# Patient Record
Sex: MALE | Race: WHITE | NOT HISPANIC OR LATINO | Employment: OTHER | URBAN - METROPOLITAN AREA
[De-identification: names, ages, dates, MRNs, and addresses within clinical notes are randomized per-mention and may not be internally consistent; named-entity substitution may affect disease eponyms.]

---

## 2017-01-09 ENCOUNTER — TELEPHONE (OUTPATIENT)
Dept: CARDIOLOGY | Facility: CLINIC | Age: 67
End: 2017-01-09

## 2017-01-09 NOTE — TELEPHONE ENCOUNTER
Called and spoke with wife regarding who is following his defibrillator.  His wife said Dr Powell is following his device.  He missed his last appt.  We are receiving remote interrogations on him through [facility].  Will have  get pt an appt in [place].

## 2017-01-11 ENCOUNTER — TELEPHONE (OUTPATIENT)
Dept: CARDIOLOGY | Facility: CLINIC | Age: 67
End: 2017-01-11

## 2017-01-11 NOTE — TELEPHONE ENCOUNTER
Pt wife called Amanda stated that Derek had been in hospital for ICD shock and was told to call us for follow up appt. Pt will  send download to Fabiola in device clinic and she has been notified as well as Taryn Hall to schedule follow up.

## 2017-01-12 ENCOUNTER — TRANSCRIBE ORDERS (OUTPATIENT)
Dept: ADMINISTRATIVE | Facility: HOSPITAL | Age: 67
End: 2017-01-12

## 2017-01-12 ENCOUNTER — HOSPITAL ENCOUNTER (OUTPATIENT)
Dept: GENERAL RADIOLOGY | Facility: HOSPITAL | Age: 67
Discharge: HOME | End: 2017-01-12
Admitting: FAMILY MEDICINE

## 2017-01-12 DIAGNOSIS — J40 BRONCHITIS: ICD-10-CM

## 2017-01-12 DIAGNOSIS — J44.9 CHRONIC OBSTRUCTIVE PULMONARY DISEASE, UNSPECIFIED COPD TYPE (HCC): Primary | ICD-10-CM

## 2017-01-12 DIAGNOSIS — J44.9 CHRONIC OBSTRUCTIVE PULMONARY DISEASE, UNSPECIFIED COPD TYPE (HCC): ICD-10-CM

## 2017-01-12 PROCEDURE — 71020 HC CHEST PA AND LATERAL: CPT

## 2017-01-12 PROCEDURE — 71020 XR CHEST PA AND LATERAL: CPT | Performed by: RADIOLOGY

## 2017-01-17 ENCOUNTER — DOCUMENTATION (OUTPATIENT)
Dept: CARDIOLOGY | Facility: CLINIC | Age: 67
End: 2017-01-17

## 2017-01-17 ENCOUNTER — OFFICE VISIT (OUTPATIENT)
Dept: CARDIOLOGY | Facility: CLINIC | Age: 67
End: 2017-01-17

## 2017-01-17 VITALS
HEIGHT: 68 IN | BODY MASS INDEX: 27.01 KG/M2 | WEIGHT: 178.2 LBS | DIASTOLIC BLOOD PRESSURE: 62 MMHG | HEART RATE: 93 BPM | SYSTOLIC BLOOD PRESSURE: 124 MMHG

## 2017-01-17 DIAGNOSIS — I47.20 VT (VENTRICULAR TACHYCARDIA) (HCC): Primary | ICD-10-CM

## 2017-01-17 DIAGNOSIS — Z72.0 TOBACCO ABUSE: ICD-10-CM

## 2017-01-17 DIAGNOSIS — I25.9 ISCHEMIC HEART DISEASE: ICD-10-CM

## 2017-01-17 DIAGNOSIS — I42.9 CARDIOMYOPATHY (HCC): ICD-10-CM

## 2017-01-17 DIAGNOSIS — I50.22 CHRONIC SYSTOLIC HEART FAILURE (HCC): ICD-10-CM

## 2017-01-17 DIAGNOSIS — I25.5 CARDIOMYOPATHY, ISCHEMIC: Primary | ICD-10-CM

## 2017-01-17 DIAGNOSIS — Z95.810 ICD (IMPLANTABLE CARDIOVERTER-DEFIBRILLATOR) IN PLACE: ICD-10-CM

## 2017-01-17 PROBLEM — I25.118 CORONARY ARTERY DISEASE OF NATIVE ARTERY WITH STABLE ANGINA PECTORIS (HCC): Status: ACTIVE | Noted: 2017-01-17

## 2017-01-17 PROCEDURE — 99214 OFFICE O/P EST MOD 30 MIN: CPT | Performed by: INTERNAL MEDICINE

## 2017-01-17 PROCEDURE — 93289 INTERROG DEVICE EVAL HEART: CPT | Performed by: INTERNAL MEDICINE

## 2017-01-17 RX ORDER — CARVEDILOL 12.5 MG/1
12.5 TABLET ORAL 2 TIMES DAILY WITH MEALS
Status: ON HOLD | COMMUNITY
Start: 2017-01-17 | End: 2018-07-07

## 2017-01-17 RX ORDER — TIZANIDINE 4 MG/1
4 TABLET ORAL NIGHTLY
Refills: 4 | COMMUNITY
Start: 2016-12-21 | End: 2018-09-20

## 2017-01-17 NOTE — PROGRESS NOTES
Chief Complaint(s): VT    History of Present Illness:    The acute complicated chief complaint(s) first occurred in last month,  is paroxysmal in nature, moderate in severity, random in occurrence, happening twice times since last visit, lasting seconds at a time, and has been medicated with nothing new, and manifest as a feeling of dizziness followed by shock. It is worsened with exertion and is relieved with rest.  He had evaluation at [facility] with stress test by his report with EF 12% (much worse than had previously been). It is also occurring in the setting of concomitant chronic ICM, chronic tobacco abuse and likely COPD, and chronic systolic CHF. He is more short of breath with chest tightness with minimal exertion.      Patient Active Problem List   Diagnosis   • Ischemic heart disease   • Hypertension   • Chronic systolic heart failure   • VT (ventricular tachycardia)   • Hyperlipidemia   • Type 2 diabetes mellitus   • Tobacco abuse   • Coronary artery disease of native artery with stable angina pectoris   • Cardiomyopathy          Current Outpatient Prescriptions:   •  carvedilol (COREG) 25 MG tablet, Take 25 mg by mouth 2 (two) times a day with meals., Disp: , Rfl:   •  cefadroxil (DURICEF) 500 MG capsule, Take 500 mg by mouth 2 (two) times a day., Disp: , Rfl:   •  doxazosin (CARDURA) 2 MG tablet, Take 2 mg by mouth every night., Disp: , Rfl:   •  fluticasone (FLONASE) 50 MCG/ACT nasal spray, 2 sprays into each nostril As Needed. Administer 2 sprays in each nostril for each dose. , Disp: , Rfl:   •  glipiZIDE (GLUCOTROL) 5 MG tablet, Take 5 mg by mouth Daily., Disp: , Rfl:   •  lisinopril (PRINIVIL,ZESTRIL) 40 MG tablet, Take 40 mg by mouth 2 (Two) Times a Day., Disp: , Rfl:   •  rOPINIRole (REQUIP) 2 MG tablet, Take 2 mg by mouth every night., Disp: , Rfl:   •  spironolactone (ALDACTONE) 25 MG tablet, Take 25 mg by mouth daily., Disp: , Rfl:   •  tiZANidine (ZANAFLEX) 4 MG tablet, 2 (Two) Times a  Day., Disp: , Rfl: 4  Allergies   Allergen Reactions   • Crestor [Rosuvastatin Calcium]    • Lipitor [Atorvastatin]      Joint pain myalgias   • Plavix [Clopidogrel Bisulfate]       resistant.        PFSH:    Denies any alcohol, caffeine or tobacco abuse      ROS:    Cardiov: Denies chest pain, tightness, palpitations, LIMON, PND, or edema  Respiratory: Denies dyspnea, cough, hemoptysis, pleuritic chest pain, wheezing or sputum production    Vitals:    01/17/17 1133   BP: 124/62   Pulse: 93        Physical Exam   Pulmonary/Chest:            Lungs: CTA, no wheezing, equal air entry bilaterally, resonant to percussion  Cor: RRR, physiologic S1, S2, no rubs, gallops, murmurs, snaps, clicks, rubs or thrills, PMI non-displaced  Ext: warm, negative edema, all pulses normal     Diagnosis Plan   1. VT (ventricular tachycardia)  Has symptoms consistent with possible ischemia and in setting of worsened chronic ICM. He needs a left heart cath. As well will switch from coreg to sotalol. He may also need referral for LVAD   2. ICD (implantable cardioverter-defibrillator) in place normal function    3. Ischemic heart disease     4. Cardiomyopathy EF now 12%    5. Tobacco abuse  Encouraged cessation        Device Check (ICD)    Company SJM  Mode DDDR  Lower Rate 70 bpm  Upper rate 120 bpm         Thresholds    % pacing 81  Atrial Pacing 0.875 Volts @ 0.5 ms  Atrial Sensing 3.7 mV  Atrial Impedence 450 Ohms    % pacing <1  Right Ventricular Pacing 0.75 Volts @ 0.5 ms  Right Ventricular Sensing >12 mV  Right Ventricular Impedence 510 Ohms             Tachy Rx    VT1 -- - bpm  VT2 - - - bpm  VF > 222 bpm    Charge Time 83% sec  Shock Impedence 61 Ohms    Battery Voltage 83% Volts  Longevity 6.9Y    Episodes 2 VF    Reprogramming 0    Comments NORMAL FUNCTION

## 2017-01-17 NOTE — MR AVS SNAPSHOT
Derek Richardenrique Sanchez   1/17/2017 11:30 AM   Office Visit    Dept Phone:  [phone]   Encounter #:  [id number]    Provider:  Trav Powell MD   Department:  [facility] CARDIOLOGY                Your Full Care Plan              Today's Medication Changes          These changes are accurate as of: 1/17/17 12:29 PM.  If you have any questions, ask your nurse or doctor.               New Medication(s)Ordered:     Sotalol HCl AF 80 MG tablet   Take 1 tablet by mouth 2 (Two) Times a Day.   Started by:  Trav Powell MD         Medication(s)that have changed:     carvedilol 12.5 MG tablet   Commonly known as:  COREG   Take 1 tablet by mouth 2 (Two) Times a Day With Meals.   What changed:  medication strength   Changed by:  Trav Powell MD         Stop taking medication(s)listed here:     predniSONE 20 MG tablet   Commonly known as:  DELTASONE   Stopped by:  Trav Powell MD                Where to Get Your Medications      These medications were sent to [facility], [place] - y [address] - [phone] [facility] [phone]   [address], [facility] [address]     Phone:  [phone]     Sotalol HCl AF 80 MG tablet                  Your Updated Medication List          This list is accurate as of: 1/17/17 12:29 PM.  Always use your most recent med list.                carvedilol 12.5 MG tablet   Commonly known as:  COREG       cefadroxil 500 MG capsule   Commonly known as:  DURICEF       doxazosin 2 MG tablet   Commonly known as:  CARDURA       fluticasone 50 MCG/ACT nasal spray   Commonly known as:  FLONASE       glipiZIDE 5 MG tablet   Commonly known as:  GLUCOTROL       lisinopril 40 MG tablet   Commonly known as:  PRINIVIL,ZESTRIL       rOPINIRole 2 MG tablet   Commonly known as:  REQUIP       Sotalol HCl AF 80 MG tablet   Take 1 tablet by mouth 2 (Two) Times a Day.       spironolactone 25 MG tablet      Commonly known as:  ALDACTONE       tiZANidine 4 MG tablet   Commonly known as:  ZANAFLEX               You Were Diagnosed With        Codes Comments    VT (ventricular tachycardia)    -  Primary ICD-10-CM: I47.2  ICD-9-CM: 427.1     ICD (implantable cardioverter-defibrillator) in place     ICD-10-CM: Z95.810  ICD-9-CM: V45.02     Ischemic heart disease     ICD-10-CM: I25.9  ICD-9-CM: 414.9     Cardiomyopathy     ICD-10-CM: I42.9  ICD-9-CM: 425.4     Tobacco abuse     ICD-10-CM: Z72.0  ICD-9-CM: 305.1     Chronic systolic heart failure     ICD-10-CM: I50.22  ICD-9-CM: 428.22       Instructions     None    Patient Instructions History      Upcoming Appointments     Visit Type Date Time Department    FO[facility] UP 2017 11:30 AM [facility]    FOLLOW UP 2017  1:30 PM [facility] [place]      [employer] Signup     [facility] [employer] allows you to send messages to your doctor, view your test results, renew your prescriptions, schedule appointments, and more. To sign up, go to [employer] and click on the Sign Up Now link in the New User? box. Enter your [employer] Activation Code exactly as it appears below along with the last four digits of your Social Security Number and your Date of Birth () to complete the sign-up process. If you do not sign up before the expiration date, you must request a new code.    [employer] Activation Code: [email or web address]  Expires: 2017 12:27 PM    If you have questions, you can email [email or web address] or call [phone] to talk to our [employer] staff. Remember, [employer] is NOT to be used for urgent needs. For medical emergencies, dial 911.               Other Info from Your Visit           Your Appointments     Aug 04, 2017  1:30 PM EDT   Follow Up with Trav Powell MD   [facility] [place] [facility] (--)    [address] [place]  [facility] [id number] [phone]           Arrive 15 minutes prior to appointment.     "          Allergies     Crestor [Rosuvastatin Calcium]      Lipitor [Atorvastatin]      Joint pain myalgias    Plavix [Clopidogrel Bisulfate]       resistant.      Vital Signs     Blood Pressure Pulse Height Weight Body Mass Index Smoking Status    124/62 (BP Location: Right arm, Patient Position: Sitting) 93 68\" (172.7 cm) 178 lb 3.2 oz (80.8 kg) 27.1 kg/m2 Current Every Day Smoker      Problems and Diagnoses Noted     Heart muscle disorder    Heart failure    Coronary artery disease of native artery with stable angina pectoris    Ischemic heart disease    Tobacco abuse    VT (ventricular tachycardia)    ICD (implantable cardioverter-defibrillator) in place            "

## 2017-01-17 NOTE — LETTER
January 17, 2017     Lyndsey Ahumada MD  [address]  [facility] [address]  [place] [address]    Patient: Derek Morris Jr.   YOB: 1950   Date of Visit: 1/17/2017       Dear Dr. Brandyn MD:    Thank you for referring Derek Morris to me for evaluation. Below are the relevant portions of my assessment and plan of care.    If you have questions, please do not hesitate to call me. I look forward to following Derek along with you.         Sincerely,        Trav Powell MD        CC: No Recipients  Trav Powell MD  1/17/2017 12:22 PM  Sign at close encounter    Chief Complaint(s): VT    History of Present Illness:    The acute complicated chief complaint(s) first occurred in last month,  is paroxysmal in nature, moderate in severity, random in occurrence, happening twice times since last visit, lasting seconds at a time, and has been medicated with nothing new, and manifest as a feeling of dizziness followed by shock. It is worsened with exertion and is relieved with rest.  He had evaluation at [facility] with stress test by his report with EF 12% (much worse than had previously been). It is also occurring in the setting of concomitant chronic ICM, chronic tobacco abuse and likely COPD, and chronic systolic CHF. He is more short of breath with chest tightness with minimal exertion.      Patient Active Problem List   Diagnosis   • Ischemic heart disease   • Hypertension   • Chronic systolic heart failure   • VT (ventricular tachycardia)   • Hyperlipidemia   • Type 2 diabetes mellitus   • Tobacco abuse   • Coronary artery disease of native artery with stable angina pectoris   • Cardiomyopathy          Current Outpatient Prescriptions:   •  carvedilol (COREG) 25 MG tablet, Take 25 mg by mouth 2 (two) times a day with meals., Disp: , Rfl:   •  cefadroxil (DURICEF) 500 MG capsule, Take 500 mg by mouth 2 (two) times a day., Disp: , Rfl:   •  doxazosin (CARDURA) 2 MG tablet, Take 2 mg by mouth every night.,  Disp: , Rfl:   •  fluticasone (FLONASE) 50 MCG/ACT nasal spray, 2 sprays into each nostril As Needed. Administer 2 sprays in each nostril for each dose. , Disp: , Rfl:   •  glipiZIDE (GLUCOTROL) 5 MG tablet, Take 5 mg by mouth Daily., Disp: , Rfl:   •  lisinopril (PRINIVIL,ZESTRIL) 40 MG tablet, Take 40 mg by mouth 2 (Two) Times a Day., Disp: , Rfl:   •  rOPINIRole (REQUIP) 2 MG tablet, Take 2 mg by mouth every night., Disp: , Rfl:   •  spironolactone (ALDACTONE) 25 MG tablet, Take 25 mg by mouth daily., Disp: , Rfl:   •  tiZANidine (ZANAFLEX) 4 MG tablet, 2 (Two) Times a Day., Disp: , Rfl: 4  Allergies   Allergen Reactions   • Crestor [Rosuvastatin Calcium]    • Lipitor [Atorvastatin]      Joint pain myalgias   • Plavix [Clopidogrel Bisulfate]       resistant.        PFSH:    Denies any alcohol, caffeine or tobacco abuse      ROS:    Cardiov: Denies chest pain, tightness, palpitations, LIMON, PND, or edema  Respiratory: Denies dyspnea, cough, hemoptysis, pleuritic chest pain, wheezing or sputum production    Vitals:    01/17/17 1133   BP: 124/62   Pulse: 93        Physical Exam   Pulmonary/Chest:            Lungs: CTA, no wheezing, equal air entry bilaterally, resonant to percussion  Cor: RRR, physiologic S1, S2, no rubs, gallops, murmurs, snaps, clicks, rubs or thrills, PMI non-displaced  Ext: warm, negative edema, all pulses normal     Diagnosis Plan   1. VT (ventricular tachycardia)  Has symptoms consistent with possible ischemia and in setting of worsened chronic ICM. He needs a left heart cath. As well will switch from coreg to sotalol. He may also need referral for LVAD   2. ICD (implantable cardioverter-defibrillator) in place normal function    3. Ischemic heart disease     4. Cardiomyopathy EF now 12%    5. Tobacco abuse  Encouraged cessation        Device Check (ICD)    Company [employer]  Mode DDDR  Lower Rate 70 bpm  Upper rate 120 bpm         Thresholds    % pacing 81  Atrial Pacing 0.875 Volts @ 0.5 ms  Atrial  Sensing 3.7 mV  Atrial Impedence 450 Ohms    % pacing <1  Right Ventricular Pacing 0.75 Volts @ 0.5 ms  Right Ventricular Sensing >12 mV  Right Ventricular Impedence 510 Ohms             Tachy Rx    VT1 -- - bpm  VT2 - - - bpm  VF > 222 bpm    Charge Time 83% sec  Shock Impedence 61 Ohms    Battery Voltage 83% Volts  Longevity 6.9Y    Episodes 2 VF    Reprogramming 0    Comments NORMAL FUNCTION

## 2017-01-17 NOTE — PROGRESS NOTES
[facility] They are going to see him next week and then refer him to Dr. Miramontes at  for possible LVAD.

## 2017-01-19 ENCOUNTER — TELEPHONE (OUTPATIENT)
Dept: CARDIOLOGY | Facility: CLINIC | Age: 67
End: 2017-01-19

## 2017-01-19 NOTE — TELEPHONE ENCOUNTER
As per my note he needs a cath. If they'd like to be transferred here please find an interventionalist to accept.

## 2017-01-19 NOTE — TELEPHONE ENCOUNTER
Patient's wife called to let you know that he is currently at [facility] with respiratory issues. She said that he is having issues with his CO2 levels. She was concerned about him needing a heart cath.

## 2017-01-19 NOTE — TELEPHONE ENCOUNTER
I called the patient's daughter, Alejandra. She said that her dad was feeling much better today. He is off of the c-pap now and on oxygen. I explained to her that if they want him transferred to [facility] that one of his physicians there will have to call the exchange here and get Dr. Bravo (who is here tomorrow) to accept him. I told her that I was not sure when the heart cath would be done, but hopefully the process could be started tomorrow. She said that she would let the physicians know that she wants him transferred tomorrow.

## 2017-01-25 ENCOUNTER — TELEPHONE (OUTPATIENT)
Dept: CARDIOLOGY | Facility: CLINIC | Age: 67
End: 2017-01-25

## 2017-01-25 ENCOUNTER — PREP FOR SURGERY (OUTPATIENT)
Dept: CARDIOLOGY | Facility: CLINIC | Age: 67
End: 2017-01-25

## 2017-01-25 RX ORDER — ASPIRIN 81 MG/1
325 TABLET ORAL DAILY
Status: CANCELLED | OUTPATIENT
Start: 2017-01-26

## 2017-01-25 RX ORDER — NITROGLYCERIN 0.4 MG/1
0.4 TABLET SUBLINGUAL
Status: CANCELLED | OUTPATIENT
Start: 2017-01-25

## 2017-01-25 RX ORDER — ONDANSETRON 2 MG/ML
4 INJECTION INTRAMUSCULAR; INTRAVENOUS EVERY 6 HOURS PRN
Status: CANCELLED | OUTPATIENT
Start: 2017-01-25

## 2017-01-25 RX ORDER — ACETAMINOPHEN 325 MG/1
650 TABLET ORAL EVERY 4 HOURS PRN
Status: CANCELLED | OUTPATIENT
Start: 2017-01-25

## 2017-01-25 RX ORDER — SODIUM CHLORIDE 0.9 % (FLUSH) 0.9 %
1-10 SYRINGE (ML) INJECTION AS NEEDED
Status: CANCELLED | OUTPATIENT
Start: 2017-01-25

## 2017-01-25 RX ORDER — ASPIRIN 325 MG
325 TABLET ORAL ONCE
Status: CANCELLED | OUTPATIENT
Start: 2017-01-25 | End: 2017-01-25

## 2017-01-25 NOTE — TELEPHONE ENCOUNTER
Returned wife's call regarding patient needing to come this evening for registration or labs. No answer at home number and cell number is not a working number.

## 2017-01-26 ENCOUNTER — HOSPITAL ENCOUNTER (OUTPATIENT)
Facility: HOSPITAL | Age: 67
Setting detail: HOSPITAL OUTPATIENT SURGERY
Discharge: HOME | End: 2017-01-26
Attending: INTERNAL MEDICINE | Admitting: INTERNAL MEDICINE

## 2017-01-26 ENCOUNTER — APPOINTMENT (OUTPATIENT)
Dept: CARDIOLOGY | Facility: HOSPITAL | Age: 67
End: 2017-01-26
Attending: INTERNAL MEDICINE

## 2017-01-26 VITALS
TEMPERATURE: 97.8 F | HEIGHT: 68 IN | DIASTOLIC BLOOD PRESSURE: 77 MMHG | RESPIRATION RATE: 16 BRPM | WEIGHT: 171 LBS | SYSTOLIC BLOOD PRESSURE: 121 MMHG | HEART RATE: 69 BPM | BODY MASS INDEX: 25.91 KG/M2 | OXYGEN SATURATION: 97 %

## 2017-01-26 DIAGNOSIS — I25.5 CARDIOMYOPATHY, ISCHEMIC: ICD-10-CM

## 2017-01-26 LAB
ACT BLD: 219 SECONDS (ref 82–152)
ALBUMIN SERPL-MCNC: 3.8 G/DL (ref 3.2–4.8)
ALBUMIN/GLOB SERPL: 1.6 G/DL (ref 1.5–2.5)
ALP SERPL-CCNC: 72 U/L (ref 25–100)
ALT SERPL W P-5'-P-CCNC: 32 U/L (ref 7–40)
ANION GAP SERPL CALCULATED.3IONS-SCNC: 6 MMOL/L (ref 3–11)
ARTICHOKE IGE QN: 94 MG/DL (ref 0–130)
AST SERPL-CCNC: 20 U/L (ref 0–33)
BH CV ECHO MEAS - AO ROOT AREA (BSA CORRECTED): 1.5
BH CV ECHO MEAS - AO ROOT AREA: 6.6 CM^2
BH CV ECHO MEAS - AO ROOT DIAM: 2.9 CM
BH CV ECHO MEAS - BSA(HAYCOCK): 1.9 M^2
BH CV ECHO MEAS - BSA: 1.9 M^2
BH CV ECHO MEAS - BZI_BMI: 26 KILOGRAMS/M^2
BH CV ECHO MEAS - BZI_METRIC_HEIGHT: 172.7 CM
BH CV ECHO MEAS - BZI_METRIC_WEIGHT: 77.6 KG
BH CV ECHO MEAS - CONTRAST EF (2CH): 26.5 ML/M^2
BH CV ECHO MEAS - CONTRAST EF 4CH: 44.5 ML/M^2
BH CV ECHO MEAS - EDV(CUBED): 58.9 ML
BH CV ECHO MEAS - EDV(MOD-SP2): 68 ML
BH CV ECHO MEAS - EDV(MOD-SP4): 119 ML
BH CV ECHO MEAS - EDV(TEICH): 65.5 ML
BH CV ECHO MEAS - EF(CUBED): 49.4 %
BH CV ECHO MEAS - EF(MOD-SP2): 26.5 %
BH CV ECHO MEAS - EF(MOD-SP4): 37 %
BH CV ECHO MEAS - EF(TEICH): 42.1 %
BH CV ECHO MEAS - ESV(CUBED): 29.8 ML
BH CV ECHO MEAS - ESV(MOD-SP2): 50 ML
BH CV ECHO MEAS - ESV(MOD-SP4): 66 ML
BH CV ECHO MEAS - ESV(TEICH): 37.9 ML
BH CV ECHO MEAS - FS: 20.3 %
BH CV ECHO MEAS - IVS/LVPW: 1.4
BH CV ECHO MEAS - IVSD: 2 CM
BH CV ECHO MEAS - LA DIMENSION: 3.7 CM
BH CV ECHO MEAS - LA/AO: 1.3
BH CV ECHO MEAS - LAT PEAK E' VEL: 10 CM/SEC
BH CV ECHO MEAS - LV DIASTOLIC VOL/BSA (35-75): 62.2 ML/M^2
BH CV ECHO MEAS - LV MASS(C)D: 286 GRAMS
BH CV ECHO MEAS - LV MASS(C)DI: 149.6 GRAMS/M^2
BH CV ECHO MEAS - LV MAX PG: 2.5 MMHG
BH CV ECHO MEAS - LV MEAN PG: 1 MMHG
BH CV ECHO MEAS - LV SYSTOLIC VOL/BSA (12-30): 34.5 ML/M^2
BH CV ECHO MEAS - LV V1 MAX: 79 CM/SEC
BH CV ECHO MEAS - LV V1 MEAN: 45.7 CM/SEC
BH CV ECHO MEAS - LV V1 VTI: 13.8 CM
BH CV ECHO MEAS - LVIDD: 3.9 CM
BH CV ECHO MEAS - LVIDS: 3.1 CM
BH CV ECHO MEAS - LVLD AP2: 8 CM
BH CV ECHO MEAS - LVLD AP4: 7.6 CM
BH CV ECHO MEAS - LVLS AP2: 7.4 CM
BH CV ECHO MEAS - LVLS AP4: 7.7 CM
BH CV ECHO MEAS - LVOT AREA (M): 3.5 CM^2
BH CV ECHO MEAS - LVOT AREA: 3.5 CM^2
BH CV ECHO MEAS - LVOT DIAM: 2.1 CM
BH CV ECHO MEAS - LVPWD: 1.5 CM
BH CV ECHO MEAS - MED PEAK E' VEL: 2.77 CM/SEC
BH CV ECHO MEAS - MV A MAX VEL: 99.2 CM/SEC
BH CV ECHO MEAS - MV E MAX VEL: 66.6 CM/SEC
BH CV ECHO MEAS - MV E/A: 0.67
BH CV ECHO MEAS - RAP SYSTOLE: 3 MMHG
BH CV ECHO MEAS - RVSP: 25 MMHG
BH CV ECHO MEAS - SI(CUBED): 15.2 ML/M^2
BH CV ECHO MEAS - SI(LVOT): 25 ML/M^2
BH CV ECHO MEAS - SI(MOD-SP2): 9.4 ML/M^2
BH CV ECHO MEAS - SI(MOD-SP4): 27.7 ML/M^2
BH CV ECHO MEAS - SI(TEICH): 14.4 ML/M^2
BH CV ECHO MEAS - SV(CUBED): 29.1 ML
BH CV ECHO MEAS - SV(LVOT): 47.8 ML
BH CV ECHO MEAS - SV(MOD-SP2): 18 ML
BH CV ECHO MEAS - SV(MOD-SP4): 53 ML
BH CV ECHO MEAS - SV(TEICH): 27.6 ML
BH CV ECHO MEAS - TAPSE (>1.6): 1.2 CM2
BH CV ECHO MEAS - TR MAX VEL: 232 CM/SEC
BH CV XLRA - RV BASE: 3.6 CM
BH CV XLRA - RV LENGTH: 5.8 CM
BH CV XLRA - RV MID: 3 CM
BH CV XLRA - TDI S': 7.5 CM/SEC
BILIRUB SERPL-MCNC: 0.7 MG/DL (ref 0.3–1.2)
BUN BLD-MCNC: 13 MG/DL (ref 9–23)
BUN/CREAT SERPL: 16.3 (ref 7–25)
CALCIUM SPEC-SCNC: 9.2 MG/DL (ref 8.7–10.4)
CHLORIDE SERPL-SCNC: 102 MMOL/L (ref 99–109)
CHOLEST SERPL-MCNC: 170 MG/DL (ref 0–200)
CO2 SERPL-SCNC: 33 MMOL/L (ref 20–31)
CREAT BLD-MCNC: 0.8 MG/DL (ref 0.6–1.3)
DEPRECATED RDW RBC AUTO: 46.1 FL (ref 37–54)
E/E' RATIO: 6.6
ERYTHROCYTE [DISTWIDTH] IN BLOOD BY AUTOMATED COUNT: 13.3 % (ref 11.3–14.5)
GFR SERPL CREATININE-BSD FRML MDRD: 96 ML/MIN/1.73
GLOBULIN UR ELPH-MCNC: 2.4 GM/DL
GLUCOSE BLD-MCNC: 98 MG/DL (ref 70–100)
GLUCOSE BLDC GLUCOMTR-MCNC: 93 MG/DL (ref 70–130)
GLUCOSE BLDC GLUCOMTR-MCNC: 95 MG/DL (ref 70–130)
HBA1C MFR BLD: 7.3 % (ref 4.8–5.6)
HCT VFR BLD AUTO: 46.3 % (ref 38.9–50.9)
HDLC SERPL-MCNC: 60 MG/DL (ref 40–60)
HGB BLD-MCNC: 15.2 G/DL (ref 13.1–17.5)
MCH RBC QN AUTO: 31.3 PG (ref 27–31)
MCHC RBC AUTO-ENTMCNC: 32.8 G/DL (ref 32–36)
MCV RBC AUTO: 95.5 FL (ref 80–99)
PLATELET # BLD AUTO: 187 10*3/MM3 (ref 150–450)
PMV BLD AUTO: 10.7 FL (ref 6–12)
POTASSIUM BLD-SCNC: 4.1 MMOL/L (ref 3.5–5.5)
PROT SERPL-MCNC: 6.2 G/DL (ref 5.7–8.2)
RBC # BLD AUTO: 4.85 10*6/MM3 (ref 4.2–5.76)
SODIUM BLD-SCNC: 141 MMOL/L (ref 132–146)
TRIGL SERPL-MCNC: 113 MG/DL (ref 0–150)
WBC NRBC COR # BLD: 13.14 10*3/MM3 (ref 3.5–10.8)

## 2017-01-26 PROCEDURE — C1894 INTRO/SHEATH, NON-LASER: HCPCS | Performed by: INTERNAL MEDICINE

## 2017-01-26 PROCEDURE — 92928 PRQ TCAT PLMT NTRAC ST 1 LES: CPT | Performed by: INTERNAL MEDICINE

## 2017-01-26 PROCEDURE — 93459 L HRT ART/GRFT ANGIO: CPT | Performed by: INTERNAL MEDICINE

## 2017-01-26 PROCEDURE — 93571 IV DOP VEL&/PRESS C FLO 1ST: CPT | Performed by: INTERNAL MEDICINE

## 2017-01-26 PROCEDURE — C1725 CATH, TRANSLUMIN NON-LASER: HCPCS | Performed by: INTERNAL MEDICINE

## 2017-01-26 PROCEDURE — C1874 STENT, COATED/COV W/DEL SYS: HCPCS | Performed by: INTERNAL MEDICINE

## 2017-01-26 PROCEDURE — C8929 TTE W OR WO FOL WCON,DOPPLER: HCPCS

## 2017-01-26 PROCEDURE — 82962 GLUCOSE BLOOD TEST: CPT

## 2017-01-26 PROCEDURE — C1769 GUIDE WIRE: HCPCS | Performed by: INTERNAL MEDICINE

## 2017-01-26 PROCEDURE — 0 IOPAMIDOL PER 1 ML: Performed by: INTERNAL MEDICINE

## 2017-01-26 PROCEDURE — 25010000002 HEPARIN (PORCINE) PER 1000 UNITS: Performed by: INTERNAL MEDICINE

## 2017-01-26 PROCEDURE — C9600 PERC DRUG-EL COR STENT SING: HCPCS | Performed by: INTERNAL MEDICINE

## 2017-01-26 PROCEDURE — C1887 CATHETER, GUIDING: HCPCS | Performed by: INTERNAL MEDICINE

## 2017-01-26 PROCEDURE — 85027 COMPLETE CBC AUTOMATED: CPT | Performed by: PHYSICIAN ASSISTANT

## 2017-01-26 PROCEDURE — 85347 COAGULATION TIME ACTIVATED: CPT

## 2017-01-26 PROCEDURE — 25010000002 MIDAZOLAM PER 1 MG: Performed by: INTERNAL MEDICINE

## 2017-01-26 PROCEDURE — 83036 HEMOGLOBIN GLYCOSYLATED A1C: CPT | Performed by: PHYSICIAN ASSISTANT

## 2017-01-26 PROCEDURE — 93306 TTE W/DOPPLER COMPLETE: CPT | Performed by: INTERNAL MEDICINE

## 2017-01-26 PROCEDURE — 36415 COLL VENOUS BLD VENIPUNCTURE: CPT

## 2017-01-26 PROCEDURE — 80061 LIPID PANEL: CPT | Performed by: PHYSICIAN ASSISTANT

## 2017-01-26 PROCEDURE — 80053 COMPREHEN METABOLIC PANEL: CPT | Performed by: PHYSICIAN ASSISTANT

## 2017-01-26 PROCEDURE — 25010000002 SULFUR HEXAFLUORIDE MICROSPH 60.7-25 MG RECONSTITUTED SUSPENSION: Performed by: INTERNAL MEDICINE

## 2017-01-26 PROCEDURE — 25010000002 FENTANYL CITRATE (PF) 100 MCG/2ML SOLUTION: Performed by: INTERNAL MEDICINE

## 2017-01-26 DEVICE — IMPLANTABLE DEVICE: Type: IMPLANTABLE DEVICE | Status: FUNCTIONAL

## 2017-01-26 RX ORDER — PRASUGREL 10 MG/1
10 TABLET, FILM COATED ORAL DAILY
Qty: 30 TABLET | Refills: 11 | Status: SHIPPED | OUTPATIENT
Start: 2017-01-27 | End: 2018-02-12 | Stop reason: SDUPTHER

## 2017-01-26 RX ORDER — METOCLOPRAMIDE HYDROCHLORIDE 5 MG/ML
10 INJECTION INTRAMUSCULAR; INTRAVENOUS EVERY 6 HOURS PRN
Status: DISCONTINUED | OUTPATIENT
Start: 2017-01-26 | End: 2017-01-26 | Stop reason: HOSPADM

## 2017-01-26 RX ORDER — MIDAZOLAM HYDROCHLORIDE 1 MG/ML
INJECTION INTRAMUSCULAR; INTRAVENOUS AS NEEDED
Status: DISCONTINUED | OUTPATIENT
Start: 2017-01-26 | End: 2017-01-26 | Stop reason: HOSPADM

## 2017-01-26 RX ORDER — SODIUM CHLORIDE 9 MG/ML
1-3 INJECTION, SOLUTION INTRAVENOUS CONTINUOUS
Status: DISCONTINUED | OUTPATIENT
Start: 2017-01-26 | End: 2017-01-26 | Stop reason: HOSPADM

## 2017-01-26 RX ORDER — SODIUM CHLORIDE 0.9 % (FLUSH) 0.9 %
1-10 SYRINGE (ML) INJECTION AS NEEDED
Status: DISCONTINUED | OUTPATIENT
Start: 2017-01-26 | End: 2017-01-26 | Stop reason: HOSPADM

## 2017-01-26 RX ORDER — ONDANSETRON 2 MG/ML
4 INJECTION INTRAMUSCULAR; INTRAVENOUS EVERY 6 HOURS PRN
Status: DISCONTINUED | OUTPATIENT
Start: 2017-01-26 | End: 2017-01-26 | Stop reason: HOSPADM

## 2017-01-26 RX ORDER — LIDOCAINE HYDROCHLORIDE 10 MG/ML
INJECTION, SOLUTION INFILTRATION; PERINEURAL AS NEEDED
Status: DISCONTINUED | OUTPATIENT
Start: 2017-01-26 | End: 2017-01-26 | Stop reason: HOSPADM

## 2017-01-26 RX ORDER — PRASUGREL 5 MG/1
TABLET, FILM COATED ORAL AS NEEDED
Status: DISCONTINUED | OUTPATIENT
Start: 2017-01-26 | End: 2017-01-26 | Stop reason: HOSPADM

## 2017-01-26 RX ORDER — ASPIRIN 325 MG
325 TABLET ORAL ONCE
Status: COMPLETED | OUTPATIENT
Start: 2017-01-26 | End: 2017-01-26

## 2017-01-26 RX ORDER — FENTANYL CITRATE 50 UG/ML
INJECTION, SOLUTION INTRAMUSCULAR; INTRAVENOUS AS NEEDED
Status: DISCONTINUED | OUTPATIENT
Start: 2017-01-26 | End: 2017-01-26 | Stop reason: HOSPADM

## 2017-01-26 RX ORDER — ACETAMINOPHEN 325 MG/1
650 TABLET ORAL EVERY 4 HOURS PRN
Status: DISCONTINUED | OUTPATIENT
Start: 2017-01-26 | End: 2017-01-26 | Stop reason: HOSPADM

## 2017-01-26 RX ORDER — PRASUGREL 10 MG/1
10 TABLET, FILM COATED ORAL DAILY
Status: DISCONTINUED | OUTPATIENT
Start: 2017-01-27 | End: 2017-01-26 | Stop reason: HOSPADM

## 2017-01-26 RX ORDER — HEPARIN SODIUM 1000 [USP'U]/ML
INJECTION, SOLUTION INTRAVENOUS; SUBCUTANEOUS AS NEEDED
Status: DISCONTINUED | OUTPATIENT
Start: 2017-01-26 | End: 2017-01-26 | Stop reason: HOSPADM

## 2017-01-26 RX ORDER — NITROGLYCERIN 0.4 MG/1
0.4 TABLET SUBLINGUAL
Status: DISCONTINUED | OUTPATIENT
Start: 2017-01-26 | End: 2017-01-26 | Stop reason: HOSPADM

## 2017-01-26 RX ORDER — ASPIRIN 325 MG
325 TABLET, DELAYED RELEASE (ENTERIC COATED) ORAL DAILY
Status: DISCONTINUED | OUTPATIENT
Start: 2017-01-27 | End: 2017-01-26 | Stop reason: HOSPADM

## 2017-01-26 RX ORDER — NITROGLYCERIN 5 MG/ML
INJECTION, SOLUTION INTRAVENOUS AS NEEDED
Status: DISCONTINUED | OUTPATIENT
Start: 2017-01-26 | End: 2017-01-26 | Stop reason: HOSPADM

## 2017-01-26 RX ADMIN — SULFUR HEXAFLUORIDE 3 ML: KIT at 14:45

## 2017-01-26 RX ADMIN — SODIUM CHLORIDE 3 ML/KG/HR: 9 INJECTION, SOLUTION INTRAVENOUS at 09:17

## 2017-01-26 RX ADMIN — ASPIRIN 325 MG ORAL TABLET 325 MG: 325 PILL ORAL at 08:50

## 2017-01-26 NOTE — H&P (VIEW-ONLY)
Chief Complaint(s): VT    History of Present Illness:    The acute complicated chief complaint(s) first occurred in last month,  is paroxysmal in nature, moderate in severity, random in occurrence, happening twice times since last visit, lasting seconds at a time, and has been medicated with nothing new, and manifest as a feeling of dizziness followed by shock. It is worsened with exertion and is relieved with rest.  He had evaluation at [facility] with stress test by his report with EF 12% (much worse than had previously been). It is also occurring in the setting of concomitant chronic ICM, chronic tobacco abuse and likely COPD, and chronic systolic CHF. He is more short of breath with chest tightness with minimal exertion.      Patient Active Problem List   Diagnosis   • Ischemic heart disease   • Hypertension   • Chronic systolic heart failure   • VT (ventricular tachycardia)   • Hyperlipidemia   • Type 2 diabetes mellitus   • Tobacco abuse   • Coronary artery disease of native artery with stable angina pectoris   • Cardiomyopathy          Current Outpatient Prescriptions:   •  carvedilol (COREG) 25 MG tablet, Take 25 mg by mouth 2 (two) times a day with meals., Disp: , Rfl:   •  cefadroxil (DURICEF) 500 MG capsule, Take 500 mg by mouth 2 (two) times a day., Disp: , Rfl:   •  doxazosin (CARDURA) 2 MG tablet, Take 2 mg by mouth every night., Disp: , Rfl:   •  fluticasone (FLONASE) 50 MCG/ACT nasal spray, 2 sprays into each nostril As Needed. Administer 2 sprays in each nostril for each dose. , Disp: , Rfl:   •  glipiZIDE (GLUCOTROL) 5 MG tablet, Take 5 mg by mouth Daily., Disp: , Rfl:   •  lisinopril (PRINIVIL,ZESTRIL) 40 MG tablet, Take 40 mg by mouth 2 (Two) Times a Day., Disp: , Rfl:   •  rOPINIRole (REQUIP) 2 MG tablet, Take 2 mg by mouth every night., Disp: , Rfl:   •  spironolactone (ALDACTONE) 25 MG tablet, Take 25 mg by mouth daily., Disp: , Rfl:   •  tiZANidine (ZANAFLEX) 4 MG tablet, 2 (Two) Times a  Day., Disp: , Rfl: 4  Allergies   Allergen Reactions   • Crestor [Rosuvastatin Calcium]    • Lipitor [Atorvastatin]      Joint pain myalgias   • Plavix [Clopidogrel Bisulfate]       resistant.        PFSH:    Denies any alcohol, caffeine or tobacco abuse      ROS:    Cardiov: Denies chest pain, tightness, palpitations, LIMON, PND, or edema  Respiratory: Denies dyspnea, cough, hemoptysis, pleuritic chest pain, wheezing or sputum production    Vitals:    01/17/17 1133   BP: 124/62   Pulse: 93        Physical Exam   Pulmonary/Chest:            Lungs: CTA, no wheezing, equal air entry bilaterally, resonant to percussion  Cor: RRR, physiologic S1, S2, no rubs, gallops, murmurs, snaps, clicks, rubs or thrills, PMI non-displaced  Ext: warm, negative edema, all pulses normal     Diagnosis Plan   1. VT (ventricular tachycardia)  Has symptoms consistent with possible ischemia and in setting of worsened chronic ICM. He needs a left heart cath. As well will switch from coreg to sotalol. He may also need referral for LVAD   2. ICD (implantable cardioverter-defibrillator) in place normal function    3. Ischemic heart disease     4. Cardiomyopathy EF now 12%    5. Tobacco abuse  Encouraged cessation        Device Check (ICD)    Company SJM  Mode DDDR  Lower Rate 70 bpm  Upper rate 120 bpm         Thresholds    % pacing 81  Atrial Pacing 0.875 Volts @ 0.5 ms  Atrial Sensing 3.7 mV  Atrial Impedence 450 Ohms    % pacing <1  Right Ventricular Pacing 0.75 Volts @ 0.5 ms  Right Ventricular Sensing >12 mV  Right Ventricular Impedence 510 Ohms             Tachy Rx    VT1 -- - bpm  VT2 - - - bpm  VF > 222 bpm    Charge Time 83% sec  Shock Impedence 61 Ohms    Battery Voltage 83% Volts  Longevity 6.9Y    Episodes 2 VF    Reprogramming 0    Comments NORMAL FUNCTION

## 2017-01-26 NOTE — IP AVS SNAPSHOT
AFTER VISIT SUMMARY             Derek Morris Jr.           About your hospitalization     You were admitted on:  January 26, 2017 You last received care in the:  [facility]       Procedures & Surgeries      Procedure(s) (LRB):  Left Heart Cath (N/A)     1/26/2017     Surgeon(s):  Vlad Bravo MD  -------------------      Medications    If you or your caregiver advised us that you are currently taking a medication and that medication is marked below as “Resume”, this simply indicates that we have reviewed those medications to make sure our new therapy recommendations do not interfere.  If you have concerns about medications other than those new ones which we are prescribing today, please consult the physician who prescribed them (or your primary physician).  Our review of your home medications is not meant to indicate that we are directing their use.             Your Medications      START taking these medications     prasugrel 10 MG tablet   Take 1 tablet by mouth Daily.   Last time this was given:  1/26/2017 12:03 PM   Commonly known as:  EFFIENT   Start taking on:  1/27/2017             CONTINUE taking these medications     aspirin 81 MG tablet   Take 1 tablet by mouth Daily.   Last time this was given:  1/26/2017  8:50 AM           carvedilol 12.5 MG tablet   Take 1 tablet by mouth 2 (Two) Times a Day With Meals.   Commonly known as:  COREG           doxazosin 2 MG tablet   Take 2 mg by mouth every night.   Commonly known as:  CARDURA           fluticasone 50 MCG/ACT nasal spray   2 sprays into each nostril As Needed. Administer 2 sprays in each nostril for each dose.   Commonly known as:  FLONASE           glipiZIDE 5 MG tablet   Take 5 mg by mouth Daily.   Commonly known as:  GLUCOTROL           lisinopril 40 MG tablet   Take 40 mg by mouth 2 (Two) Times a Day.   Commonly known as:  PRINIVIL,ZESTRIL           rOPINIRole 2 MG tablet   Take 2 mg by mouth every night.   Commonly known as:   REQUIP           Sotalol HCl AF 80 MG tablet   Take 1 tablet by mouth 2 (Two) Times a Day.           spironolactone 25 MG tablet   Take 25 mg by mouth daily.   Commonly known as:  ALDACTONE           tiZANidine 4 MG tablet   2 (Two) Times a Day.   Commonly known as:  ZANAFLEX                Where to Get Your Medications      These medications were sent to [facility] [place] - [address] - [phone]  - [phone] [address]  [address], [place] [address]     Phone:  [phone]     prasugrel 10 MG tablet                  Your Medications      Your Medication List           Morning Noon Evening Bedtime As Needed    aspirin 81 MG tablet   Take 1 tablet by mouth Daily.                                carvedilol 12.5 MG tablet   Take 1 tablet by mouth 2 (Two) Times a Day With Meals.   Commonly known as:  COREG                                doxazosin 2 MG tablet   Take 2 mg by mouth every night.   Commonly known as:  CARDURA                                fluticasone 50 MCG/ACT nasal spray   2 sprays into each nostril As Needed. Administer 2 sprays in each nostril for each dose.   Commonly known as:  FLONASE                                glipiZIDE 5 MG tablet   Take 5 mg by mouth Daily.   Commonly known as:  GLUCOTROL                                lisinopril 40 MG tablet   Take 40 mg by mouth 2 (Two) Times a Day.   Commonly known as:  PRINIVIL,ZESTRIL                                prasugrel 10 MG tablet   Take 1 tablet by mouth Daily.   Commonly known as:  EFFIENT   Start taking on:  1/27/2017                                rOPINIRole 2 MG tablet   Take 2 mg by mouth every night.   Commonly known as:  REQUIP                                Sotalol HCl AF 80 MG tablet   Take 1 tablet by mouth 2 (Two) Times a Day.                                spironolactone 25 MG tablet   Take 25 mg by mouth daily.   Commonly known as:  ALDACTONE                                tiZANidine  4 MG tablet   2 (Two) Times a Day.   Commonly known as:  ZANAFLEX                                         Instructions for After Discharge        Discharge References/Attachments     CORONARY ANGIOGRAM WITH STENT (ENGLISH)    CORONARY ANGIOGRAM WITH STENT, CARE AFTER  (ENGLISH)    RADIAL SITE CARE (ENGLISH)    PRASUGREL ORAL TABLETS (ENGLISH)       Follow-ups for After Discharge        Scheduled Appointments     Aug 04, 2017  1:30 PM EDT   Follow Up with Trav Powell MD   [facility] (--)    [address]  [facility] [id number] [phone]           Arrive 15 minutes prior to appointment.              [facility] Signup     [facility] allows you to send messages to your doctor, view your test results, renew your prescriptions, schedule appointments, and more. To sign up, go to [employer] and click on the Sign Up Now link in the New User? box. Enter your [facility] Activation Code exactly as it appears below along with the last four digits of your Social Security Number and your Date of Birth () to complete the sign-up process. If you do not sign up before the expiration date, you must request a new code.    [facility] Activation Code: [email or web address]  Expires: 2017 12:27 PM    If you have questions, you can email [email or web address] or call [phone] to talk to our [facility] staff. Remember, [employer] [facility] is NOT to be used for urgent needs. For medical emergencies, dial 911.           Summary of Your Hospitalization        Reason for Hospitalization     Your primary diagnosis was:  Not on File      Care Providers     Provider Service Role Specialty    Vlad Bravo MD Cardiology Attending Provider Cardiology      Your Allergies  Date Reviewed: 2017    Allergen Reactions    Crestor (Rosuvastatin Calcium) Not Noted         Lipitor (Atorvastatin) Not Noted    Joint pain myalgias         Plavix (Clopidogrel Bisulfate) Not  Noted     resistant.      Patient Belongings Returned     Document Return of Belongings Flowsheet     Were the patient bedside belongings sent home?   --   Belongings Retrieved from Security & Sent Home   --    Belongings Sent to Safe   --   Medications Retrieved from Pharmacy & Sent Home   --              More Information      Coronary Angiogram With Stent  Coronary angiography with stent placement is a procedure to widen or open a narrow blood vessel of the heart (coronary artery). When a coronary artery becomes partially blocked, it decreases blood flow to that area. This may lead to chest pain or a heart attack (myocardial infarction). Arteries may become blocked by cholesterol buildup (plaque) in the lining or wall.   A stent is a small piece of metal that looks like a mesh or a spring. Stent placement may be done right after a coronary angiography in which a blocked artery is found or as a treatment for a heart attack.   LET YOUR HEALTH CARE PROVIDER KNOW ABOUT:  · Any allergies you have.    · All medicines you are taking, including vitamins, herbs, eye drops, creams, and over-the-counter medicines.    · Previous problems you or members of your family have had with the use of anesthetics.    · Any blood disorders you have.    · Previous surgeries you have had.    · Medical conditions you have.  RISKS AND COMPLICATIONS  Generally, coronary angiography with stent is a safe procedure. However, problems can occur and include:  · Damage to the heart or its blood vessels.    · A return of blockage.    · Bleeding, infection, or bruising at the insertion site.    · A collection of blood under the skin (hematoma) at the insertion site.  · Blood clot in another part of the body.    · Kidney injury.    · Allergic reaction to the dye or contrast used.    · Bleeding into the abdomen (retroperitoneal bleeding).  BEFORE THE PROCEDURE  · Do not eat or drink anything after midnight on the night before the procedure or as  directed by your health care provider.   · Ask your health care provider about changing or stopping your regular medicines. This is especially important if you are taking diabetes medicines or blood thinners.  · Your health care provider will make sure you understand the procedure as well as the risks and potential problems associated with the procedure.    PROCEDURE  · You may be given a medicine to help you relax before and during the procedure (sedative). This medicine will be given through an IV tube that is put into one of your veins.    · The area where the catheter will be inserted will be shaved and cleaned. This is usually done in the groin but may be done in the fold of your arm (near your elbow) or in the wrist.     · A medicine will be given to numb the area where the catheter will be inserted (local anesthetic).    · The catheter will be inserted into an artery using a guide wire. A type of X-ray (fluoroscopy) will be used to help guide the catheter to the opening of the blocked artery.    · A dye will then be injected into the catheter, and X-rays will be taken. The dye will help to show where any narrowing or blockages are located in the heart arteries.    · A tiny wire will be guided to the blocked spot, and a balloon will be inflated to make the artery wider. The stent will be expanded and will crush the plaque into the wall of the vessel. The stent will hold the area open like a scaffolding and improve the blood flow.    · Sometimes the artery may be made wider using a laser or other tools to remove plaque.    · When the blood flow is better, the catheter will be removed. The lining of the artery will grow over the stent, which stays where it was placed.    AFTER THE PROCEDURE  · If the procedure is done through the leg, you will be kept in bed lying flat for about 6 hours. You will be instructed to not bend or cross your legs.    · The insertion site will be checked frequently.    · The pulse in  your feet or wrist will be checked frequently.    · Additional blood tests, X-rays, and electrocardiography may be done.     This information is not intended to replace advice given to you by your health care provider. Make sure you discuss any questions you have with your health care provider.     Document Released: 06/23/2004 Document Revised: 01/08/2016 Document Reviewed: 05/12/2014  [employer] Interactive Patient Education ©2016 [employer]          Coronary Angiogram With Stent, Care After  Refer to this sheet in the next few weeks. These instructions provide you with information about caring for yourself after your procedure. Your health care provider may also give you more specific instructions. Your treatment has been planned according to current medical practices, but problems sometimes occur. Call your health care provider if you have any problems or questions after your procedure.  WHAT TO EXPECT AFTER THE PROCEDURE   After your procedure, it is typical to have the following:  · Bruising at the catheter insertion site that usually fades within 1-2 weeks.  · Blood collecting in the tissue (hematoma) that may be painful to the touch. It should usually decrease in size and tenderness within 1-2 weeks.  HOME CARE INSTRUCTIONS  · Take medicines only as directed by your health care provider. Blood thinners may be prescribed after your procedure to improve blood flow through the stent.  · You may shower 24-48 hours after the procedure or as directed by your health care provider. Remove the bandage (dressing) and gently wash the catheter insertion site with plain soap and water. Pat the area dry with a clean towel. Do not rub the site, because this may cause bleeding.  · Do not take baths, swim, or use a hot tub until your health care provider approves.  · Check your catheter insertion site every day for redness, swelling, or drainage.  · Do not apply powder or lotion to the site.  · Do not lift over 10 lb (4.5  kg) for 5 days after your procedure or as directed by your health care provider.  · Ask your health care provider when it is okay to:    Return to work or school.    Resume usual physical activities or sports.    Resume sexual activity.  · Eat a heart-healthy diet. This should include plenty of fresh fruits and vegetables. Meat should be lean cuts. Avoid the following types of food:    Food that is high in salt.    Canned or highly processed food.    Food that is high in saturated fat or sugar.    Fried food.  · Make any other lifestyle changes as recommended by your health care provider. These may include:    Not using any tobacco products, including cigarettes, chewing tobacco, or electronic cigarettes. If you need help quitting, ask your health care provider.    Managing your weight.    Getting regular exercise.    Managing your blood pressure.    Limiting your alcohol intake.    Managing other health problems, such as diabetes.  · If you need an MRI after your heart stent has been placed, be sure to tell the health care provider who orders the MRI that you have a heart stent.  · Keep all follow-up visits as directed by your health care provider. This is important.  SEEK MEDICAL CARE IF:  · You have a fever.  · You have chills.  · You have increased bleeding from the catheter insertion site. Hold pressure on the site.  SEEK IMMEDIATE MEDICAL CARE IF:  · You develop chest pain or shortness of breath, feel faint, or pass out.  · You have unusual pain at the catheter insertion site.  · You have redness, warmth, or swelling at the catheter insertion site.  · You have drainage (other than a small amount of blood on the dressing) from the catheter insertion site.  · The catheter insertion site is bleeding, and the bleeding does not stop after 30 minutes of holding steady pressure on the site.  · You develop bleeding from any other place, such as from your rectum. There may be bright red blood in your urine or stool, or  it may appear as black, tarry stool.     This information is not intended to replace advice given to you by your health care provider. Make sure you discuss any questions you have with your health care provider.     Document Released: 07/07/2006 Document Revised: 01/08/2016 Document Reviewed: 05/12/2014  [employer] Interactive Patient Education ©2016 [employer]          Radial Site Care  Refer to this sheet in the next few weeks. These instructions provide you with information about caring for yourself after your procedure. Your health care provider may also give you more specific instructions. Your treatment has been planned according to current medical practices, but problems sometimes occur. Call your health care provider if you have any problems or questions after your procedure.  WHAT TO EXPECT AFTER THE PROCEDURE  After your procedure, it is typical to have the following:  · Bruising at the radial site that usually fades within 1-2 weeks.  · Blood collecting in the tissue (hematoma) that may be painful to the touch. It should usually decrease in size and tenderness within 1-2 weeks.  HOME CARE INSTRUCTIONS  · Take medicines only as directed by your health care provider.  · You may shower 24-48 hours after the procedure or as directed by your health care provider. Remove the bandage (dressing) and gently wash the site with plain soap and water. Pat the area dry with a clean towel. Do not rub the site, because this may cause bleeding.  · Do not take baths, swim, or use a hot tub until your health care provider approves.  · Check your insertion site every day for redness, swelling, or drainage.  · Do not apply powder or lotion to the site.  · Do not flex or bend the affected arm for 24 hours or as directed by your health care provider.  · Do not push or pull heavy objects with the affected arm for 24 hours or as directed by your health care provider.  · Do not lift over 10 lb (4.5 kg) for 5 days after your  procedure or as directed by your health care provider.  · Ask your health care provider when it is okay to:    Return to work or school.    Resume usual physical activities or sports.    Resume sexual activity.  · Do not drive home if you are discharged the same day as the procedure. Have someone else drive you.  · You may drive 24 hours after the procedure unless otherwise instructed by your health care provider.  · Do not operate machinery or power tools for 24 hours after the procedure.  · If your procedure was done as an outpatient procedure, which means that you went home the same day as your procedure, a responsible adult should be with you for the first 24 hours after you arrive home.  · Keep all follow-up visits as directed by your health care provider. This is important.  SEEK MEDICAL CARE IF:  · You have a fever.  · You have chills.  · You have increased bleeding from the radial site. Hold pressure on the site.  SEEK IMMEDIATE MEDICAL CARE IF:  · You have unusual pain at the radial site.  · You have redness, warmth, or swelling at the radial site.  · You have drainage (other than a small amount of blood on the dressing) from the radial site.  · The radial site is bleeding, and the bleeding does not stop after 30 minutes of holding steady pressure on the site.  · Your arm or hand becomes pale, cool, tingly, or numb.     This information is not intended to replace advice given to you by your health care provider. Make sure you discuss any questions you have with your health care provider.     Document Released: 01/20/2012 Document Revised: 01/08/2016 Document Reviewed: 07/06/2015  [employer] Interactive Patient Education ©2016 [employer]          Prasugrel oral tablets  What is this medicine?  PRASUGREL (PRA gillian grel) helps to prevent blood clots. This medicine is used to prevent heart attack, stroke, or other vascular events in people who are at high risk.  This medicine may be used for other purposes; ask  your health care provider or pharmacist if you have questions.  What should I tell my health care provider before I take this medicine?  They need to know if you have any of these conditions:  -bleeding disorders  -kidney disease  -liver disease  -recent trauma or surgery  -stomach or intestinal ulcers  -stroke or transient ischemic attack  -an unusual or allergic reaction to prasugrel, other medicines, foods, dyes, or preservatives  -pregnant or trying to get pregnant  -breast-feeding  How should I use this medicine?  Take this medicine by mouth with a drink of water. Follow the directions on the prescription label. You may take this medicine with or without food. If it upsets your stomach, take it with food. This medicine may be chewed or it may be crushed and put into food or liquids such as applesauce, juice, or water as long as it is taken immediately. This medicine has a bitter taste that you may notice if it is chewed or crushed. Take your medicine at regular intervals. Do not take your medicine more often than directed. Do not stop taking except on your doctor's advice.  Talk to your pediatrician regarding the use of this medicine in children. Special care may be needed.  Overdosage: If you think you have taken too much of this medicine contact a poison control center or emergency room at once.  NOTE: This medicine is only for you. Do not share this medicine with others.  What if I miss a dose?  If you miss a dose, take it as soon as you can. If it is almost time for your next dose, take only that dose. Do not take double or extra doses.  What may interact with this medicine?  -aspirin  -certain medicines that treat or prevent blood clots like warfarin, enoxaparin, and dalteparin  -NSAIDS, medicines for pain and inflammation, like ibuprofen or naproxen  This list may not describe all possible interactions. Give your health care provider a list of all the medicines, herbs, non-prescription drugs, or dietary  supplements you use. Also tell them if you smoke, drink alcohol, or use illegal drugs. Some items may interact with your medicine.  What should I watch for while using this medicine?  Visit your doctor or health care professional for regular check ups. Do not stop taking your medicine unless your doctor tells you to.  Notify your doctor or health care professional and seek emergency treatment if you develop breathing problems; changes in vision; chest pain; severe, sudden headache; pain, swelling, warmth in the leg; trouble speaking; sudden numbness or weakness of the face, arm, or leg. These can be signs that your condition has gotten worse.  If you are going to have surgery or dental work, tell your doctor or health care professional that you are taking this medicine.  What side effects may I notice from receiving this medicine?  Side effects that you should report to your doctor or health care professional as soon as possible:  -allergic reactions like skin rash, itching or hives, swelling of the face, lips, or tongue  -signs and symptoms of bleeding such as bloody or black, tarry stools; red or dark-brown urine; spitting up blood or brown material that looks like coffee grounds; red spots on the skin; unusual bruising or bleeding from the eye, gums, or nose  Side effects that usually do not require medical attention (report to your doctor or health care professional if they continue or are bothersome):  -diarrhea  -headache  -nausea, vomiting  -pain in back, arms or legs  This list may not describe all possible side effects. Call your doctor for medical advice about side effects. You may report side effects to FDA at 2-792-FDA-0289.  Where should I keep my medicine?  Keep out of the reach of children.  Store at room temperature between 15 and 30 degrees C (59 and 86 degrees F). Keep this medicine in the original container. Keep container closed and do not remove the gray cylinder from the bottle. Throw away any  unused medicine after the expiration date.  NOTE: This sheet is a summary. It may not cover all possible information. If you have questions about this medicine, talk to your doctor, pharmacist, or health care provider.     © 2016, [employer]. (2016-01-27 10:14:24)         PREVENTING SURGICAL SITE INFECTIONS     Surgical Site Infections FAQs  What is a Surgical Site Infection (SSI)?  A surgical site infection is an infection that occurs after surgery in the part of the body where the surgery took place. Most patients who have surgery do not develop an infection. However, infections develop in about 1 to 3 out of every 100 patients who have surgery.  Some of the common symptoms of a surgical site infection are:  · Redness and pain around the area where you had surgery  · Drainage of cloudy fluid from your surgical wound  · Fever  Can SSIs be treated?  Yes. Most surgical site infections can be treated with antibiotics. The antibiotic given to you depends on the bacteria (germs) causing the infection. Sometimes patients with SSIs also need another surgery to treat the infection.  What are some of the things that hospitals are doing to prevent SSIs?  To prevent SSIs, doctors, nurses, and other healthcare providers:  · Clean their hands and arms up to their elbows with an antiseptic agent just before the surgery.  · Clean their hands with soap and water or an alcohol-based hand rub before and after caring for each patient.  · May remove some of your hair immediately before your surgery using electric clippers if the hair is in the same area where the procedure will occur. They should not shave you with a razor.  · Wear special hair covers, masks, gowns, and gloves during surgery to keep the surgery area clean.  · Give you antibiotics before your surgery starts. In most cases, you should get antibiotics within 60 minutes before the surgery starts and the antibiotics should be stopped within 24 hours after  surgery.  · Clean the skin at the site of your surgery with a special soap that kills germs.  What can I do to help prevent SSIs?  Before your surgery:  · Tell your doctor about other medical problems you may have. Health problems such as allergies, diabetes, and obesity could affect your surgery and your treatment.  · Quit smoking. Patients who smoke get more infections. Talk to your doctor about how you can quit before your surgery.  · Do not shave near where you will have surgery. Shaving with a razor can irritate your skin and make it easier to develop an infection.  At the time of your surgery:  · Speak up if someone tries to shave you with a razor before surgery. Ask why you need to be shaved and talk with your surgeon if you have any concerns.  · Ask if you will get antibiotics before surgery.  After your surgery:  · Make sure that your healthcare providers clean their hands before examining you, either with soap and water or an alcohol-based hand rub.    If you do not see your providers clean their hands, please ask them to do so.  · Family and friends who visit you should not touch the surgical wound or dressings.  · Family and friends should clean their hands with soap and water or an alcohol-based hand rub before and after visiting you. If you do not see them clean their hands, ask them to clean their hands.  What do I need to do when I go home from the hospital?  · Before you go home, your doctor or nurse should explain everything you need to know about taking care of your wound. Make sure you understand how to care for your wound before you leave the hospital.  · Always clean your hands before and after caring for your wound.  · Before you go home, make sure you know who to contact if you have questions or problems after you get home.  · If you have any symptoms of an infection, such as redness and pain at the surgery site, drainage, or fever, call your doctor immediately.  If you have additional  questions, please ask your doctor or nurse.  Developed and co-sponsored by The Society for Healthcare Epidemiology of [place] (SHEA); Infectious Diseases Society of [place] (IDSA); American Hospital Association; Association for Professionals in Infection Control and Epidemiology (APIC); Centers for Disease Control and Prevention (CDC); and The Joint Commission.     This information is not intended to replace advice given to you by your health care provider. Make sure you discuss any questions you have with your health care provider.     Document Released: 12/23/2014 Document Revised: 01/08/2016 Document Reviewed: 03/02/2016  [employer] Interactive Patient Education ©2016 [employer].             SYMPTOMS OF A STROKE    Call 911 or have someone take you to the Emergency Department if you have any of the following:    · Sudden numbness or weakness of your face, arm or leg especially on one side of the body  · Sudden confusion, diffiiculty speaking or trouble understanding   · Changes in your vision or loss of sight in one eye  · Sudden severe headache with no known cause  · sudden dizziness, trouble walking, loss of balance or coordination    It is important to seek emergency care right away if you have further stroke symptoms. If you get emergency help quickly, the powerful clot-dissolving medicines can reduce the disabilities caused by a stroke.     For more information:    American Stroke Association  4-660-7-STROKE  www.strokeassociation.org           IF YOU SMOKE OR USE TOBACCO PLEASE READ THE FOLLOWING:    Why is smoking bad for me?  Smoking increases the risk of heart disease, lung disease, vascular disease, stroke, and cancer.     If you smoke, STOP!    If you would like more information on quitting smoking, please visit the [employer] website: [email or web address]   This link will provide additional resources including the QUIT line and the [employer] the [employer] support  groups.     For more information:    American Cancer Society  ([phone] 152-8101    American Heart Association  1-542.272.8470               YOU ARE THE MOST IMPORTANT FACTOR IN YOUR RECOVERY.     Follow all instructions carefully.     I have reviewed my discharge instructions with my nurse, including the following information, if applicable:     Information about my illness and diagnosis   Follow up appointments (including lab draws)   Wound Care   Equipment Needs   Medications (new and continuing) along with side effects   Preventative information such as vaccines and smoking cessations   Diet   Pain   I know when to contact my Doctor's office or seek emergency care      I want my nurse to describe the side effects of my medications: YES NO   If the answer is no, I understand the side effects of my medications: YES NO   My nurse described the side effects of my medications in a way that I could understand: YES NO   I have taken my personal belongings and my own medications with me at discharge: YES NO            I have received this information and my questions have been answered. I have discussed any concerns I see with this plan with the nurse or physician. I understand these instructions.    Signature of Patient or Responsible Person: _____________________________________    Date: _________________  Time: __________________    Signature of Healthcare Provider: _______________________________________  Date: _________________  Time: __________________

## 2017-01-26 NOTE — INTERVAL H&P NOTE
Pre-cardiac Catheterization Report  Cardiovascular Laboratory  [facility]    Patient:  Derek Morris Jr.  :  1950  PCP:  Lyndsey Ahumada MD  PHONE:  [phone]    DATE: 2017    Chief Complaint:  Ventricular Tachycardia, ICM, H/O IHD.    Stress test within last 6 months:  Yes; [facility] 2017   Details: Lexiscan Stress Test 01/10/17.                            Severe LV dysfunction with myocardial scarring. No evidence of reversibility.                           Global hypokinesis of the remaining wall segments. LVEF 12%.    Previous cardiac catheterization:  yes  Details:   H/O CABG x 3 -  (D.B.I)  [facility] 2010; Dr. Vlad Bird.            Chronic occlusion of 2 of 3 bypass grafts.           Sirolimus KAILASH placed in the proximal body of the SVG to major LCx marginal artery.     Allergies:     IV contrast allergy:  no  Allergies   Allergen Reactions   • Crestor [Rosuvastatin Calcium]   Joint pain myalgias   • Lipitor [Atorvastatin]  Joint pain myalgias   • Plavix [Clopidogrel Bisulfate]  Resistant     MEDICATIONS:  Medication Sig   carvedilol (COREG) 12.5 MG tablet Take 1 tablet by mouth  BID W/ Meals.   doxazosin (CARDURA) 2 MG tablet Take 2 mg by mouth every night.   fluticasone 50 MCG/ACT nasal spray 2 sprays in each nostril/prn.     glipizide (GLUCOTROL) 5 MG tablet Take 5 mg by mouth Daily.   lisinopril (PRINIVIL,ZESTRIL) 40 MG tablet Take 40 mg by mouth 2 (Two) Times a Day.   ropinerole (REQUIP) 2 MG tablet Take 2 mg by mouth every night.   sotalol HCl AF 80 MG tablet Take 1 tablet by mouth 2 (Two) Times a Day.   spironolactone (ALDACTONE) 25 MG tablet Take 25 mg by mouth daily.   tizanidine (ZANAFLEX) 4 MG tablet 2 (Two) Times a Day.   cefadroxil (DURICEF) 500 MG capsule Take 500 mg by mouth 2 (two) times a day.     Past medical & surgical history, social and family history reviewed in the electronic medical record.    Physical Exam:  Vitals:    17 0826    BP: 144/83   Pulse: 70   Resp: 16   Temp: 97.8 F   SpO2: 96 % on Room Air    Last Weight   BMI 26 kg/(m^2). 01/26/17 0825   Weight: 171 lb (77.6 kg)     GENERAL: No apparent distress.  No significant changes since last exam.  CHEST: Clear to auscultation bilaterally no stridor no wheeze.  CV: S1, S2, Regular without Murmurs, Rubs or Gallops  EXTREMITIES: No edema.    Barbaeu Test:  Left: Normal  (oxymetric Allens) Right: Not Assessed     Lab Units 01/26/17  0818   SODIUM mmol/L 141   POTASSIUM mmol/L 4.1   CHLORIDE mmol/L 102   TOTAL CO2 mmol/L 33.0*   BUN mg/dL 13   CREATININE mg/dL 0.80   GLUCOSE mg/dL 98   CALCIUM mg/dL 9.2     Lab Units 01/26/17  0818   WBC 10*3/mm3 13.14*   HGB g/dL 15.2   HCT % 46.3   PLATELETS 10*3/mm3 187     Lab Results   Component Value Date    TRIG 113 01/26/2017    HDL 60 01/26/2017    LDL 94 01/26/2017    AST 20 01/26/2017    ALT 32 01/26/2017     CXR-PA/LAT: 01/12/2017  IMPRESSION:  1. Reactive hilar changes and faint/focal opacities RUL which may represent atypical pneumonia.  2. Follow-up to resolution recommended.  3. Underlying COPD.    IMPRESSION:  Anginal class in last 2 weeks:  No anginal symptoms    PLAN:  · Procedure to perform: Left Heart Catheterization +/- CBI with Dr. Vlad Bravo.  · Planned Access: Left Radial.  · Patient was informed of this procedure, the risks, benefits, alternatives and agrees to proceed.  · Further recommendations will be based on outcomes of this procedure      Lorena Parson PA-C  01/26/2017    Cc: MD Lyndsey Le MD Michael J. Shih, MD, MSc, [facility]  Interventional Cardiology  [facility]

## 2017-02-01 ENCOUNTER — TELEPHONE (OUTPATIENT)
Dept: CARDIOLOGY | Facility: CLINIC | Age: 67
End: 2017-02-01

## 2017-02-01 DIAGNOSIS — J44.9 CHRONIC OBSTRUCTIVE PULMONARY DISEASE, UNSPECIFIED COPD TYPE (HCC): Primary | ICD-10-CM

## 2017-02-01 NOTE — TELEPHONE ENCOUNTER
Wife called concerned about his status, I spoke with the pt and he is very fatigued and has trouble breathing with any exertion at all, no chest tightness.  I discussed symptoms with Dr. Powell, referral to pulmonary as he has bad COPD.    I LM for pulm office to call back with appt and notified pt of plan.    Pulmonary called back they are calling pt and have appt for tomorrow at 12:30

## 2017-02-02 ENCOUNTER — OFFICE VISIT (OUTPATIENT)
Dept: PULMONOLOGY | Facility: CLINIC | Age: 67
End: 2017-02-02

## 2017-02-02 VITALS
RESPIRATION RATE: 16 BRPM | SYSTOLIC BLOOD PRESSURE: 114 MMHG | HEART RATE: 80 BPM | WEIGHT: 169 LBS | BODY MASS INDEX: 26.53 KG/M2 | HEIGHT: 67 IN | TEMPERATURE: 97.3 F | OXYGEN SATURATION: 95 % | DIASTOLIC BLOOD PRESSURE: 70 MMHG

## 2017-02-02 DIAGNOSIS — Z72.0 TOBACCO ABUSE: ICD-10-CM

## 2017-02-02 DIAGNOSIS — J44.9 CHRONIC OBSTRUCTIVE PULMONARY DISEASE, UNSPECIFIED COPD TYPE (HCC): ICD-10-CM

## 2017-02-02 DIAGNOSIS — R06.02 SHORTNESS OF BREATH: Primary | ICD-10-CM

## 2017-02-02 PROCEDURE — 94200 LUNG FUNCTION TEST (MBC/MVV): CPT | Performed by: INTERNAL MEDICINE

## 2017-02-02 PROCEDURE — 94726 PLETHYSMOGRAPHY LUNG VOLUMES: CPT | Performed by: INTERNAL MEDICINE

## 2017-02-02 PROCEDURE — 94060 EVALUATION OF WHEEZING: CPT | Performed by: INTERNAL MEDICINE

## 2017-02-02 PROCEDURE — 94729 DIFFUSING CAPACITY: CPT | Performed by: INTERNAL MEDICINE

## 2017-02-02 PROCEDURE — 99204 OFFICE O/P NEW MOD 45 MIN: CPT | Performed by: INTERNAL MEDICINE

## 2017-02-02 PROCEDURE — 82103 ALPHA-1-ANTITRYPSIN TOTAL: CPT | Performed by: INTERNAL MEDICINE

## 2017-02-02 PROCEDURE — 82785 ASSAY OF IGE: CPT | Performed by: INTERNAL MEDICINE

## 2017-02-02 RX ORDER — IPRATROPIUM BROMIDE AND ALBUTEROL SULFATE 2.5; .5 MG/3ML; MG/3ML
SOLUTION RESPIRATORY (INHALATION) EVERY 4 HOURS PRN
Refills: 3 | COMMUNITY
Start: 2017-01-04 | End: 2018-09-20

## 2017-02-02 RX ORDER — BUDESONIDE AND FORMOTEROL FUMARATE DIHYDRATE 160; 4.5 UG/1; UG/1
2 AEROSOL RESPIRATORY (INHALATION) 2 TIMES DAILY
Refills: 0 | COMMUNITY
Start: 2017-01-21 | End: 2018-02-27

## 2017-02-02 NOTE — PROGRESS NOTES
Subjective   Derek Morris Jr. is a 66 y.o. male.  He is referred by Dr. Powell for the evaluation of chronic obstructive lung disease.  His primary care physician is Dr. Ahumada.    History of Present Illness   Patient has a long history of cigarette abuse smoking up to 3 packs per day for 50 years.  He is been cutting back to only 1-2 cigarettes per day but still smokes.  He said occasional respiratory problems he says for the past 5 years.  He has been on supplemental oxygen for roughly 2 years.  He uses uses it mostly at night.  In January 2016 he was hospitalized at [facility] was on the ventilator for couple days with respiratory: He was also hospitalized in January 2017 but did not require intubation on this admission.  He denies much in the way of occupational exposure.  He did work with cars in the past.  He says currently he can walk 100 yards on flat ground or climb one flight of steps.  He denies any history of TB exposure or positive PPD.  He has a brother also has COPD.  He says he gets his flu shot regularly and has had a pneumonia shot past.  He complains of significant weakness when he has worse respiratory problems.  He says occasional night sweats and dyspnea.    He has a significant cardiac history is had his first MI in 1989.  These had coronary artery bypass grafting.  He's had problems with congestive heart failure and is had a defibrillator implanted.  He's had problems with ventricular tachycardia he was just recently [facility] and had a cardiac catheter and stent placed.    He has a history of type 2 diabetes.  He has a history of hypertension.    Past medical histories: Surgery: Had on antibiotics as grafting    Allergies: He is allergic to Crestor Lipitor and Plavix    Habits: Smoking: He smoked up to 3 packs per day for 50 years    Alcohol: Without    Caffeine: He has 6 cups of coffee and 4 caffeinated soft drinks each day    Family history is positive for chronic  "obstructive lung disease and congestive heart failure  The following portions of the patient's history were reviewed and updated as appropriate: allergies, current medications, past family history, past medical history, past social history, past surgical history and problem list.    Review of Systems   Constitutional: Positive for diaphoresis.   HENT: Positive for congestion, postnasal drip and sinus pressure.    Eyes: Negative.    Respiratory: Positive for cough, shortness of breath and wheezing.    Cardiovascular: Positive for palpitations.   Gastrointestinal: Positive for constipation.   Endocrine: Positive for polydipsia and polyuria.   Genitourinary: Negative.    Musculoskeletal: Negative.    Skin: Negative.    Allergic/Immunologic: Positive for environmental allergies.   Neurological: Negative.    Hematological: Negative.    Psychiatric/Behavioral: Negative.        Objective    Visit Vitals   • /70   • Pulse 80   • Temp 97.3 °F (36.3 °C)   • Resp 16   • Ht 67\" (170.2 cm)   • Wt 169 lb (76.7 kg)   • SpO2 95%  Comment: RA   • BMI 26.47 kg/m2       Physical Exam   Constitutional: He is oriented to person, place, and time. He appears well-developed and well-nourished.   He is overweight.   HENT:   Head: Normocephalic and atraumatic.   He has nasal airway narrowing and Mallampati class III anatomy he is edentulous   Eyes: EOM are normal. Pupils are equal, round, and reactive to light.   Neck: Normal range of motion. Neck supple.   Cardiovascular: Normal rate, regular rhythm and normal heart sounds.    Pulmonary/Chest: Effort normal. He has wheezes.   She had diminished breath sounds with some expiratory wheezes bilaterally   Abdominal: Soft. Bowel sounds are normal.   Musculoskeletal: Normal range of motion. He exhibits no edema.   Neurological: He is alert and oriented to person, place, and time.   Skin: Skin is warm and dry.   Psychiatric: He has a normal mood and affect. His behavior is normal.    chest " x-ray from [facility] showed changes consistent COPD and a slight right upper lobe infiltrate recently.    Pulmonary function tests show severe airways obstruction with FEV1 26% of predicted lung volumes are consistent with air trapping and seen obstruction diffusion capacity is within normal limits when corrected for alveolar volume.    Assessment/Plan   Derek was seen today for shortness of breath.    Diagnoses and all orders for this visit:    Shortness of breath  -     Pulmonary Function Test  -     IgE  -     Alpha - 1 - Antitrypsin    Chronic obstructive pulmonary disease, unspecified COPD type  -     Tiotropium Bromide Monohydrate (SPIRIVA RESPIMAT) 2.5 MCG/ACT aerosol solution; Inhale 2 puffs Daily.  -     IgE  -     Alpha - 1 - Antitrypsin    Tobacco abuse  -     IgE  -     Alpha - 1 - Antitrypsin     patient presents with a long history of cigarette abuse and  severe chronic obstructive lung disease.  He is on a fairly good regimen.  We will add Spiriva Respimat 2 puffs inhaled daily.  He is to use this in addition to his Symbicort.  He is also to continue his DuoNeb's as needed.  He is encouraged to completely stop smoking.  He and his wife are interested in doing pulmonary rehabilitation but will like to do it at [facility].  I've given a copy of his pulmonary function test and a order for this.  He is to return in 2 months.  He is to contact us earlier symptoms worsen.  We will check an alpha-1 antitrypsin IgE levels today.    Chris Sewell MD [facility]  Sleep Medicine  Pulmonary and Critical Care Medicine

## 2017-02-03 LAB
A1AT SERPL-MCNC: 145 MG/DL (ref 90–200)
TOTAL IGE SMQN RAST: 9 IU/ML (ref 0–100)

## 2017-04-19 ENCOUNTER — TELEPHONE (OUTPATIENT)
Dept: CARDIOLOGY | Facility: CLINIC | Age: 67
End: 2017-04-19

## 2017-04-19 ENCOUNTER — CLINICAL SUPPORT NO REQUIREMENTS (OUTPATIENT)
Dept: CARDIOLOGY | Facility: CLINIC | Age: 67
End: 2017-04-19

## 2017-04-19 DIAGNOSIS — I42.9 CARDIOMYOPATHY (HCC): Primary | ICD-10-CM

## 2017-04-19 DIAGNOSIS — I25.9 ISCHEMIC HEART DISEASE: ICD-10-CM

## 2017-04-19 PROCEDURE — 93295 DEV INTERROG REMOTE 1/2/MLT: CPT | Performed by: INTERNAL MEDICINE

## 2017-04-19 PROCEDURE — 93296 REM INTERROG EVL PM/IDS: CPT | Performed by: INTERNAL MEDICINE

## 2017-04-19 NOTE — TELEPHONE ENCOUNTER
Request for clearance to hold anticoagulant ASA for 6 teeth extracted.        []         If desired may come off Warfarin, Xarelto, Eliquis, Pradaxa, Savaysa, ASA for dental procedure.     []   Guidelines for holding per ADA.org recommendations        Performing physician: Dr. Marquez   Phone: [phone]  Fax: [phone]    []   Mail to patient   Fax to Dr. Marquez

## 2017-04-20 ENCOUNTER — TELEPHONE (OUTPATIENT)
Dept: CARDIOLOGY | Facility: CLINIC | Age: 67
End: 2017-04-20

## 2017-05-10 ENCOUNTER — TELEPHONE (OUTPATIENT)
Dept: CARDIOLOGY | Facility: CLINIC | Age: 67
End: 2017-05-10

## 2017-08-18 ENCOUNTER — OFFICE VISIT (OUTPATIENT)
Dept: CARDIOLOGY | Facility: CLINIC | Age: 67
End: 2017-08-18

## 2017-08-18 VITALS
WEIGHT: 170 LBS | HEART RATE: 71 BPM | SYSTOLIC BLOOD PRESSURE: 126 MMHG | DIASTOLIC BLOOD PRESSURE: 76 MMHG | BODY MASS INDEX: 25.76 KG/M2 | HEIGHT: 68 IN

## 2017-08-18 DIAGNOSIS — I25.9 ISCHEMIC HEART DISEASE: ICD-10-CM

## 2017-08-18 DIAGNOSIS — I47.20 VT (VENTRICULAR TACHYCARDIA) (HCC): Primary | ICD-10-CM

## 2017-08-18 DIAGNOSIS — J43.8 OTHER EMPHYSEMA (HCC): ICD-10-CM

## 2017-08-18 DIAGNOSIS — I25.118 CORONARY ARTERY DISEASE OF NATIVE ARTERY OF NATIVE HEART WITH STABLE ANGINA PECTORIS (HCC): ICD-10-CM

## 2017-08-18 DIAGNOSIS — I42.9 CARDIOMYOPATHY (HCC): ICD-10-CM

## 2017-08-18 PROCEDURE — 99214 OFFICE O/P EST MOD 30 MIN: CPT | Performed by: INTERNAL MEDICINE

## 2017-08-18 PROCEDURE — 93289 INTERROG DEVICE EVAL HEART: CPT | Performed by: INTERNAL MEDICINE

## 2017-08-18 NOTE — PROGRESS NOTES
Subjective:   Derek Morris Jr.  1950  [phone]      08/18/2017    [facility] CARDIOLOGY    Lyndsey Ahumada MD  [address]  [facility] [id number]    REFERRING DOCTOR: Vlad Bird      Patient ID: Derek Morris Jr. is a 66 y.o. male.    Chief Complaint: ICD, VT, CAD      PROBLEM LIST:  1. Ischemic heart disease:  a. MI x3, 1990, 1993, and 1995.  b. CABG x3 by Dr. Noble Cuello, 1995:  SVG to OM1 and OM2, SVG to PDA.  c. Normal LV.  d. Questionable LAD stent, incomplete  database.  e. STEMI, April 2009.  f. LHC by Dr. Glen Baker, April 2009:  EF approximately 40%, 1 of 3 patent grafts; data deficit.  g. LHC by Dr. Bird, 07/30/2010:  EF 30%, occluded vein graft to occluded RCA, minor plaque in LAD, 70% SVG -  circumflex treated with 4 x 18 mm Cypher KAILASH.  h. Echocardiogram, 02/08/2012:  EF 40% to 45%, diastolic dysfunction, mild TR.  i. Echocardiogram, 01/29/2015:  EF 30% to 35%, mild TR.  j. Jan 2017 EF 35-40%  k. LHC in Jan 2017 as noted in the results below. Medical Management Rx recommended.   2. Hypertension.  3. Chronic systolic CHF.  4. Nonsustained VT:  a. Inducible VT by EPS, January 2002.  b. Pacesetter ICD implant by Dr. Dickinson, January 2002.  c. Chronic amiodarone, discontinued fall of 2010:  Cannot  exclude amiodarone pulmonary toxicity.  d. ICD generator change-out with enrollment in the ANALYZE ST SEGMENT protocol, 08/16/2012.  5.  Hyperlipidemia; intolerance to multiple statins.  6. Type 2 diabetes mellitus.  7. Chronic tobacco; home O2.  8. Surgical history:  a.  CABG    Allergies   Allergen Reactions   • Crestor [Rosuvastatin Calcium]    • Lipitor [Atorvastatin]      Joint pain myalgias   • Plavix [Clopidogrel Bisulfate]       resistant.       Current Outpatient Prescriptions:   •  aspirin 81 MG tablet, Take 1 tablet by mouth Daily., Disp: 30 tablet, Rfl: 11  •  carvedilol (COREG) 12.5 MG tablet, Take 1 tablet by mouth 2 (Two) Times a Day With  Meals., Disp: , Rfl:   •  doxazosin (CARDURA) 2 MG tablet, Take 2 mg by mouth every night., Disp: , Rfl:   •  fluticasone (FLONASE) 50 MCG/ACT nasal spray, 2 sprays into each nostril As Needed. Administer 2 sprays in each nostril for each dose. , Disp: , Rfl:   •  glipiZIDE (GLUCOTROL) 5 MG tablet, Take 5 mg by mouth Daily., Disp: , Rfl:   •  ipratropium-albuterol (DUO-NEB) 0.5-2.5 mg/mL nebulizer, Every 4 (Four) Hours As Needed., Disp: , Rfl: 3  •  lisinopril (PRINIVIL,ZESTRIL) 40 MG tablet, Take 40 mg by mouth 2 (Two) Times a Day., Disp: , Rfl:   •  MUCINEX 600 MG 12 hr tablet, 2 (Two) Times a Day., Disp: , Rfl: 0  •  prasugrel (EFFIENT) 10 MG tablet, Take 1 tablet by mouth Daily., Disp: 30 tablet, Rfl: 11  •  rOPINIRole (REQUIP) 2 MG tablet, Take 2 mg by mouth every night., Disp: , Rfl:   •  Sotalol HCl AF 80 MG tablet, Take 1 tablet by mouth 2 (Two) Times a Day., Disp: 60 tablet, Rfl: 11  •  spironolactone (ALDACTONE) 25 MG tablet, Take 25 mg by mouth daily., Disp: , Rfl:   •  SYMBICORT 160-4.5 MCG/ACT inhaler, 2 puffs 2 (Two) Times a Day., Disp: , Rfl: 0  •  Tiotropium Bromide Monohydrate (SPIRIVA RESPIMAT) 2.5 MCG/ACT aerosol solution, Inhale 2 puffs Daily., Disp: 1 inhaler, Rfl: 3  •  tiZANidine (ZANAFLEX) 4 MG tablet, 2 (Two) Times a Day., Disp: , Rfl: 4    History of Present Illness  Patient is a 66-year-old male here today for follow-up visit for his ischemicCardiomyopathy/coronary disease/ventricular tachycardia and ICD. At times having some CP and SOB. CP about 2 times and takes the NTG maybe only once a month. Still smoking. Feels better in regards to energy since January events. Found a spot on his lungs and going to see a surgeon in the near future to get a biopsy. Overall doing well and just mowed some grass yesterday.     No issues with fevers, chills, night sweats, PND, orthopnea or palpitations. No recent ER visits or hospital stays.      The following portions of the patient's history were  "reviewed and updated as appropriate: allergies, current medications, past family history, past medical history, past social history, past surgical history and problem list.    ROS   14 point ROS negative except as outlined in problem list, HPI and other parts of the note.    Procedures       Objective:       Vitals:    08/18/17 1313   BP: 126/76   BP Location: Right arm   Patient Position: Sitting   Pulse: 71   Weight: 170 lb (77.1 kg)   Height: 68\" (172.7 cm)       GENERAL: Well-developed, well-nourished patient in no acute distress.  HEENT: Normocephalic, atraumatic, PERRLA. Moist mucous membranes.  NECK: No JVD present at 30°. No carotid bruits auscultated.  LUNGS: Clear to auscultation decreased at time  CARDIOVASCULAR: Heart has a regular rate and rhythm. No murmurs, gallops or rubs noted.   ABDOMEN: Soft, nontender. Positive bowel sounds.  MUSCULOSKELETAL: No gross deformities. No clubbing, cyanosis, or lower extremity edema.  SKIN: Pink, warm  Neuro: Nonfocal exam. Gait intact  Ext: No edema or bruising  ICD site.     The patient's old records including ambulatory rhythm recordings (ECGs, Holter/event monitor) were reviewed and discussed.      Lab Review:   Results for orders placed or performed in visit on 02/02/17   IgE   Result Value Ref Range    IgE 9 0 - 100 IU/mL   Alpha - 1 - Antitrypsin   Result Value Ref Range    A-1 Antitrypsin 145 90 - 200 mg/dL     DEVICE CHECK ST GERARDO ICD DDD ICD DDR    Device check ok today with no events or changes other than < 1% AMS longest 1 hour.       ECHO 1/2017    Left ventricular function is moderately decreased. Estimated EF appears to be in the range of 36 - 40%. Calculated EF = 37%.  · Left ventricular wall thickness is consistent with mild-to-moderate concentric hypertrophy.  · Left ventricular diastolic dysfunction (grade I a) consistent with impaired relaxation.  · Left ventricular wall segments contract abnormally. Akinesis of the inferior wall    [facility] Jan " 2017  IMPRESSION:  · Severe multivessel disease including a previously known occluded proximal left circumflex and mid right coronary artery.  There was a de hao 60% discrete stenosis in a medium sized second diagonal that supplies much of the anterolateral wall that was found to be functionally significant with an iFR of 0.60.  The LAD was otherwise patent, the SVG to a distal OM was widely patent, the circumflex artery is supplied by septal collaterals, the RCA was supplied by moderate graft to OM to right collaterals.  · Mild functionally significant lesion was unlikely to be the only contributor to the patient's ventricular fibrillation, it was deemed function is significant and reasonable to revascularize due to the patient's severe CAD, recent ventricular arrhythmia, severe cardiomyopathy.  The lesion is now status post resolute integrity 2.25 x 14 mm drug-eluting stent placement with 0% residual stenosis.  · Left ventricular ejection fraction of 49% with an akinetic inferior wall and hypokinesis of the anterolateral wall     RECOMMENDATIONS:  · Dual antiplatelet therapy with prasugrel and aspirin  · Transthoracic echocardiogram today to confirm ejection fraction  · Follow-up with Dr. Powell in 1 month for further management of the patient's severe CAD, severe card of myopathy, and recent ventricular fibrillation      Diagnosis:   1. VT (ventricular tachycardia)  2. Ischemic heart disease  3. COPD  4. Coronary artery disease of native artery of native heart with stable angina pectoris  5. Cardiomyopathy      Assessment & Plan:   1. CAD s/p stent in Jan 2017 to the diag branch as noted above with Dr Bravo. Occasionally with some recurrent chest pains but infrequent in nature. Needs to follow up with Dr Bravo in the next one month or so for his continued CP and CAD history. Cont DAPT  2. History of VT s/p ICD. No events today with normal device check other than AMS longest 1 hour. Monitor closely.   3.  Ischemic CMP EF 36-40% on optimal therapy wtih BP ok tody  4. COPD with no spot found on lung. Needs biopsy  5. Tobacco abuse current. Needs to stop  6. Follow up in 6 mths and Dr Bravo in 1-2 mths         CC: Lyndsey Santa,   08/18/17  1:51 PM      EMR Dragon/Transcription disclaimer:  Much of this encounter note is an electronic transcription/translation of spoken language to printed text. Electronic translation of spoken language may permit erroneous, or at times, nonsensical words or phrases to be inadvertently transcribed. Although I have reviewed the note for such errors, some may still exist.

## 2017-09-07 ENCOUNTER — CLINICAL SUPPORT NO REQUIREMENTS (OUTPATIENT)
Dept: CARDIOLOGY | Facility: CLINIC | Age: 67
End: 2017-09-07

## 2017-09-07 DIAGNOSIS — I42.9 CARDIOMYOPATHY (HCC): Primary | ICD-10-CM

## 2017-09-11 ENCOUNTER — OFFICE VISIT (OUTPATIENT)
Dept: CARDIAC SURGERY | Facility: CLINIC | Age: 67
End: 2017-09-11

## 2017-09-11 VITALS
TEMPERATURE: 97.6 F | WEIGHT: 174 LBS | BODY MASS INDEX: 26.37 KG/M2 | SYSTOLIC BLOOD PRESSURE: 150 MMHG | HEART RATE: 85 BPM | HEIGHT: 68 IN | DIASTOLIC BLOOD PRESSURE: 90 MMHG | OXYGEN SATURATION: 96 %

## 2017-09-11 DIAGNOSIS — R91.8 LUNG MASS: Primary | ICD-10-CM

## 2017-09-11 DIAGNOSIS — R91.1 LUNG NODULE: ICD-10-CM

## 2017-09-11 PROCEDURE — 94010 BREATHING CAPACITY TEST: CPT | Performed by: THORACIC SURGERY (CARDIOTHORACIC VASCULAR SURGERY)

## 2017-09-11 PROCEDURE — 99204 OFFICE O/P NEW MOD 45 MIN: CPT | Performed by: THORACIC SURGERY (CARDIOTHORACIC VASCULAR SURGERY)

## 2017-09-11 PROCEDURE — 99406 BEHAV CHNG SMOKING 3-10 MIN: CPT | Performed by: THORACIC SURGERY (CARDIOTHORACIC VASCULAR SURGERY)

## 2017-09-11 RX ORDER — ALBUTEROL SULFATE 90 UG/1
1-2 AEROSOL, METERED RESPIRATORY (INHALATION) EVERY 6 HOURS PRN
COMMUNITY

## 2017-09-11 RX ORDER — FUROSEMIDE 40 MG/1
40 TABLET ORAL DAILY
COMMUNITY
End: 2018-02-28 | Stop reason: ALTCHOICE

## 2017-09-11 NOTE — PROGRESS NOTES
09/11/2017  Patient Information  Derek Morris Jr.                                                                                          [address]  [place] [address]   1950  'PCP/Referring Physician'  Lyndsey Ahumada MD  [phone]  No ref. provider found    Chief Complaint   Patient presents with   • Consult     NP per Dr. Ahumada for Lung Nodule       History of Present Illness:  The patient is referred for evaluation of an 8 mm pulmonary nodule in the right middle lobe area.  The patient's nodule was found on a screening exam by his pulmonologist in the [facility].  Patient has lost approximately 20 pounds over the last 2 years.  The patient denies any hemoptysis.  Patient has had a cough over the last few months.  He denies hemoptysis or other pulmonary symptoms.  He denies any exposure to industrial carcinogens or to any infectious agents.  He has been a heavy smoker of 1-2 packs per day for most of his adult life and he continues to smoke.  Patient Active Problem List   Diagnosis   • Ischemic heart disease   • Hypertension   • Chronic systolic heart failure   • VT (ventricular tachycardia)   • Hyperlipidemia   • Type 2 diabetes mellitus   • Tobacco abuse   • Coronary artery disease of native artery with stable angina pectoris   • Cardiomyopathy   • Shortness of breath   • COPD (chronic obstructive pulmonary disease)   • Lung nodule     Past Medical History:   Diagnosis Date   • Arthritis    • Carotid stenosis    • CHF (congestive heart failure)    • COPD (chronic obstructive pulmonary disease)    • Coronary artery disease    • Diabetes mellitus    • Heart attack     X3 1990, 1993, 1995   • Hyperlipidemia    • Hypertension    • Lung nodule    • Peptic ulcer disease      Past Surgical History:   Procedure Laterality Date   • CARDIAC CATHETERIZATION Left 04/2009    by Dr. Glen Marcelo I. EF approx 40% II one of three patent grafts iii, Data deficit    • CARDIAC CATHETERIZATION N/A 1/26/2017     Procedure: Left Heart Cath;  Surgeon: Vlad Barvo MD;  Location: [facility] CATH INVASIVE LOCATION;  Service:    • CARDIAC CATHETERIZATION Left 07/30/2010    i. EF 30% ii occluded vein graft to occluded RCS iii minor plaque in the LAD iv. 70% SVG- circumfelx, treated with 4x18 mm. Cypher KAILASH.   • CARDIAC DEFIBRILLATOR PLACEMENT     • CORONARY ANGIOPLASTY     • CORONARY ARTERY BYPASS GRAFT  1995 1995, w/ subsequent stents 2009/2010 X3; svg TO om1 AND om2, svg TO pda    • CYST REMOVAL      Tailbone   • INSERT / REPLACE / REMOVE PACEMAKER     • PACEMAKER IMPLANTATION  01/2002    Implant by Dr. Dickinson        Current Outpatient Prescriptions:   •  albuterol (PROVENTIL HFA;VENTOLIN HFA) 108 (90 Base) MCG/ACT inhaler, Inhale 2 puffs Every 4 (Four) Hours As Needed for Wheezing., Disp: , Rfl:   •  carvedilol (COREG) 12.5 MG tablet, Take 25 mg by mouth 2 (Two) Times a Day With Meals., Disp: , Rfl:   •  furosemide (LASIX) 40 MG tablet, Take 40 mg by mouth Daily., Disp: , Rfl:   •  glipiZIDE (GLUCOTROL) 5 MG tablet, Take 5 mg by mouth Daily. 2.5 mg Daily, Disp: , Rfl:   •  lisinopril (PRINIVIL,ZESTRIL) 40 MG tablet, Take 40 mg by mouth 2 (Two) Times a Day., Disp: , Rfl:   •  prasugrel (EFFIENT) 10 MG tablet, Take 1 tablet by mouth Daily., Disp: 30 tablet, Rfl: 11  •  rOPINIRole (REQUIP) 2 MG tablet, Take 2 mg by mouth every night., Disp: , Rfl:   •  Sotalol HCl AF 80 MG tablet, Take 1 tablet by mouth 2 (Two) Times a Day., Disp: 60 tablet, Rfl: 11  •  tiZANidine (ZANAFLEX) 4 MG tablet, 2 (Two) Times a Day., Disp: , Rfl: 4  •  Umeclidinium Bromide 62.5 MCG/INH aerosol powder , Inhale., Disp: , Rfl:   •  aspirin 81 MG tablet, Take 1 tablet by mouth Daily., Disp: 30 tablet, Rfl: 11  •  doxazosin (CARDURA) 2 MG tablet, Take 2 mg by mouth every night., Disp: , Rfl:   •  fluticasone (FLONASE) 50 MCG/ACT nasal spray, 2 sprays into each nostril As Needed. Administer 2 sprays in each nostril for each dose. , Disp: , Rfl:   •   ipratropium-albuterol (DUO-NEB) 0.5-2.5 mg/mL nebulizer, Every 4 (Four) Hours As Needed., Disp: , Rfl: 3  •  MUCINEX 600 MG 12 hr tablet, 2 (Two) Times a Day., Disp: , Rfl: 0  •  spironolactone (ALDACTONE) 25 MG tablet, Take 25 mg by mouth daily., Disp: , Rfl:   •  SYMBICORT 160-4.5 MCG/ACT inhaler, 2 puffs 2 (Two) Times a Day., Disp: , Rfl: 0  •  Tiotropium Bromide Monohydrate (SPIRIVA RESPIMAT) 2.5 MCG/ACT aerosol solution, Inhale 2 puffs Daily., Disp: 1 inhaler, Rfl: 3  Allergies   Allergen Reactions   • Crestor [Rosuvastatin Calcium]    • Lipitor [Atorvastatin]      Joint pain myalgias   • Plavix [Clopidogrel Bisulfate]       resistant.     Social History     Social History   • Marital status:      Spouse name: N/A   • Number of children: 2   • Years of education: N/A     Occupational History   •       Retired     Social History Main Topics   • Smoking status: Current Every Day Smoker     Packs/day: 1.00     Years: 45.00     Types: Cigarettes   • Smokeless tobacco: Never Used   • Alcohol use No   • Drug use: No   • Sexual activity: Defer     Other Topics Concern   • Not on file     Social History Narrative     Family History   Problem Relation Age of Onset   • Hypertension Mother    • Sick sinus syndrome Father    • Coronary artery disease Father      Review of Systems   Constitution: Positive for malaise/fatigue. Negative for chills, fever, night sweats and weight loss.   HENT: Negative for headaches, hearing loss, odynophagia and sore throat.    Cardiovascular: Positive for chest pain, dyspnea on exertion, leg swelling and palpitations. Negative for orthopnea.   Respiratory: Positive for cough, shortness of breath and wheezing. Negative for hemoptysis.    Endocrine: Negative for cold intolerance, heat intolerance, polydipsia, polyphagia and polyuria.   Hematologic/Lymphatic: Bruises/bleeds easily.   Skin: Positive for itching and rash (Bilateal Ankle areas).   Musculoskeletal: Positive  "for arthritis, back pain and joint pain. Negative for joint swelling and myalgias.   Gastrointestinal: Negative for abdominal pain, constipation, diarrhea, hematemesis, hematochezia, melena, nausea and vomiting.   Genitourinary: Negative for dysuria, frequency and hematuria.   Neurological: Positive for light-headedness. Negative for focal weakness, numbness and seizures.   Psychiatric/Behavioral: Negative for suicidal ideas.   All other systems reviewed and are negative.    Vitals:    09/11/17 1422   BP: 150/90   BP Location: Left arm   Patient Position: Sitting   Pulse: 85   Temp: 97.6 °F (36.4 °C)   SpO2: 96%   Weight: 174 lb (78.9 kg)   Height: 68\" (172.7 cm)      Physical Exam   Constitutional: He is oriented to person, place, and time. He appears well-developed and well-nourished. No distress.   HENT:   Head: Normocephalic.   Eyes: EOM are normal. Pupils are equal, round, and reactive to light.   Neck: Normal range of motion. Carotid bruit is not present. No thyromegaly present.   Cardiovascular: Normal rate and regular rhythm.  Exam reveals no gallop and no friction rub.    No murmur heard.  Pulmonary/Chest: He has no wheezes. He has no rales.   Abdominal: Soft. Bowel sounds are normal. He exhibits no distension and no mass. There is no hepatomegaly. There is no tenderness.   Musculoskeletal: Normal range of motion. He exhibits no deformity.   Neurological: He is alert and oriented to person, place, and time. He has normal strength. No cranial nerve deficit or sensory deficit.   Skin: No bruising and no petechiae noted. No cyanosis. Nails show no clubbing.   Psychiatric: He has a normal mood and affect.       Labs/Imaging:  I obtained and reviewed medical records from Dr. Ahumada's office including CT of the chest demonstrated a pleural-based 8 mm right middle lobe nodule.    Assessment/Plan:   Patient is a 66 year old, very complex patient who has been a heavy smoker most of his life.  He has been found to " have 8 mm right middle lobe pulmonary nodule.  At this time I think the nodule is much too small do a needle biopsy.  The PET scan will not a value of any benefit.  Certainly a transbronchial biopsy would not be feasible as well.  I think the best thing to do at this point would be to follow the patient back approximately in 3 months with a repeat CT scan.  I also spent a great deal of time, 3-5 minutes, talking to him about the importance of smoking cessation.  He has obvious COPD as well.  He appears to understand all the above and is agreeable with the plan.         Patient Active Problem List   Diagnosis   • Ischemic heart disease   • Hypertension   • Chronic systolic heart failure   • VT (ventricular tachycardia)   • Hyperlipidemia   • Type 2 diabetes mellitus   • Tobacco abuse   • Coronary artery disease of native artery with stable angina pectoris   • Cardiomyopathy   • Shortness of breath   • COPD (chronic obstructive pulmonary disease)   • Lung nodule     Signed by: Anibal Noel M.D.    9/11/2017    CC:  MD Nela Montana, , editing for Anibal Noel M.D.    I, Anibal Noel MD, have read and agree with the editing done by Nela Santos, .

## 2017-09-13 DIAGNOSIS — R91.1 LUNG NODULE: Primary | ICD-10-CM

## 2017-10-02 ENCOUNTER — OFFICE VISIT (OUTPATIENT)
Dept: CARDIOLOGY | Facility: CLINIC | Age: 67
End: 2017-10-02

## 2017-10-02 VITALS
HEART RATE: 73 BPM | BODY MASS INDEX: 26.61 KG/M2 | DIASTOLIC BLOOD PRESSURE: 68 MMHG | SYSTOLIC BLOOD PRESSURE: 124 MMHG | HEIGHT: 68 IN | WEIGHT: 175.6 LBS

## 2017-10-02 DIAGNOSIS — I25.118 CORONARY ARTERY DISEASE OF NATIVE ARTERY OF NATIVE HEART WITH STABLE ANGINA PECTORIS (HCC): Primary | ICD-10-CM

## 2017-10-02 DIAGNOSIS — I47.20 VT (VENTRICULAR TACHYCARDIA) (HCC): ICD-10-CM

## 2017-10-02 DIAGNOSIS — I50.22 CHRONIC SYSTOLIC HEART FAILURE (HCC): ICD-10-CM

## 2017-10-02 PROCEDURE — 99214 OFFICE O/P EST MOD 30 MIN: CPT | Performed by: INTERNAL MEDICINE

## 2017-10-02 RX ORDER — SOTALOL HYDROCHLORIDE 120 MG/1
120 TABLET ORAL 2 TIMES DAILY
Qty: 60 EACH | Refills: 11 | Status: ON HOLD | OUTPATIENT
Start: 2017-10-02 | End: 2018-09-26

## 2017-10-02 NOTE — PROGRESS NOTES
" [facility] AT [address], [address]  [place], [place], [address] [phone]  [email or web address]           OUTPATIENT CLINIC FOLLOW UP NOTE    Patient Care Team:  Patient Care Team:  Lyndsey Ahumada MD as PCP - General (Family Medicine)    Subjective:   Reason for consultation:   Chief Complaint   Patient presents with   • Ischemic heart disease     Pt. states that \"device has shocked him twice in the last 2 weeks\".   • Hypertension   • Chronic systolic CHF       HPI:    Derek Morris Jr. is a 66 y.o. male.  History of Present Illness  The patient has a history of severe ischemic heart disease with prior bypass and most recently PCI in 1/2017, ischemic cardiomyopathy with an EF of about 35%, dual-chamber ICD, long smoking history, COPD, hypertension, diabetes, hyperlipidemia, who presents for follow-up.    Since the patient saw Dr. Lawson in 8/2017 the patient has had 2 episodes of defibrillator shocks.  The first time he was just seated and feeling well and suddenly got shocked.  The second time he was weed eating without symptoms and settling Lexy.  He did not lose consciousness.  This was not preceded by chest pain.  He has not had ongoing chest pain.  The patient does have exertional fatigue and dyspnea with class III symptoms.He has associated lower external swelling.  Dyspnea and swelling are improved with ongoing Lasix.    PFSH:  PROBLEM LIST:  1. Ischemic heart disease:  a. MI x3, 1990, 1993, and 1995.  b. CABG x3 by Dr. Noble Cuello, 1995:  SVG to OM1 and OM2, SVG to PDA.  c. Normal LV.  d. Questionable LAD stent, incomplete  database.  e. STEMI, April 2009.  f. LHC by Dr. Glen Baker, April 2009:  EF approximately 40%, 1 of 3 patent grafts; data deficit.  g. LHC by Dr. Bird, 07/30/2010:  EF 30%, occluded vein graft to occluded RCA, minor plaque in LAD, 70% SVG -  circumflex treated with 4 x 18 mm Cypher KAILASH.  h. Echocardiogram, 02/08/2012:  EF 40% to " 45%, diastolic dysfunction, mild TR.  i. Echocardiogram, 01/29/2015:  EF 30% to 35%, mild TR.  j. Jan 2017 EF 35-40%  k. [facility] in Jan 2017 as noted in the results below. Resolute Integrity KAILASH to D2  2. Hypertension.  3. Chronic systolic CHF.  4. Nonsustained VT:  a. Inducible VT by EPS, January 2002.  b. Pacesetter ICD implant by Dr. Dickinson, January 2002.  c. Chronic amiodarone, discontinued fall of 2010:  Cannot  exclude amiodarone pulmonary toxicity.  d. ICD generator change-out with enrollment in the ANALYZE ST SEGMENT protocol, 08/16/2012.  5.  Hyperlipidemia; intolerance to multiple statins.  6. Type 2 diabetes mellitus.  7. Chronic tobacco; home O2.  8. Surgical history:  a.  CABG    Patient Active Problem List   Diagnosis   • Ischemic heart disease   • Hypertension   • Chronic systolic heart failure   • VT (ventricular tachycardia)   • Hyperlipidemia   • Type 2 diabetes mellitus   • Tobacco abuse   • Coronary artery disease of native artery with stable angina pectoris   • Cardiomyopathy   • Shortness of breath   • COPD (chronic obstructive pulmonary disease)   • Lung nodule         Current Outpatient Prescriptions:   •  albuterol (PROVENTIL HFA;VENTOLIN HFA) 108 (90 Base) MCG/ACT inhaler, Inhale 2 puffs Every 4 (Four) Hours As Needed for Wheezing., Disp: , Rfl:   •  aspirin 81 MG tablet, Take 1 tablet by mouth Daily., Disp: 30 tablet, Rfl: 11  •  carvedilol (COREG) 12.5 MG tablet, Take 12.5 mg by mouth 2 (Two) Times a Day With Meals., Disp: , Rfl:   •  doxazosin (CARDURA) 2 MG tablet, Take 2 mg by mouth every night., Disp: , Rfl:   •  fluticasone (FLONASE) 50 MCG/ACT nasal spray, 2 sprays into each nostril As Needed. Administer 2 sprays in each nostril for each dose. , Disp: , Rfl:   •  furosemide (LASIX) 40 MG tablet, Take 40 mg by mouth Daily., Disp: , Rfl:   •  glipiZIDE (GLUCOTROL) 5 MG tablet, Take 5 mg by mouth Daily., Disp: , Rfl:   •  ipratropium-albuterol (DUO-NEB) 0.5-2.5 mg/mL nebulizer, Every 4  "(Four) Hours As Needed., Disp: , Rfl: 3  •  lisinopril (PRINIVIL,ZESTRIL) 40 MG tablet, Take 40 mg by mouth 2 (Two) Times a Day., Disp: , Rfl:   •  MUCINEX 600 MG 12 hr tablet, 2 (Two) Times a Day., Disp: , Rfl: 0  •  prasugrel (EFFIENT) 10 MG tablet, Take 1 tablet by mouth Daily., Disp: 30 tablet, Rfl: 11  •  rOPINIRole (REQUIP) 2 MG tablet, Take 2 mg by mouth every night., Disp: , Rfl:   •  Sotalol HCl AF 80 MG tablet, Take 1 tablet by mouth 2 (Two) Times a Day., Disp: 60 tablet, Rfl: 11  •  spironolactone (ALDACTONE) 25 MG tablet, Take 25 mg by mouth daily., Disp: , Rfl:   •  SYMBICORT 160-4.5 MCG/ACT inhaler, 2 puffs 2 (Two) Times a Day., Disp: , Rfl: 0  •  Tiotropium Bromide Monohydrate (SPIRIVA RESPIMAT) 2.5 MCG/ACT aerosol solution, Inhale 2 puffs Daily., Disp: 1 inhaler, Rfl: 3  •  tiZANidine (ZANAFLEX) 4 MG tablet, 2 (Two) Times a Day., Disp: , Rfl: 4  •  Umeclidinium Bromide 62.5 MCG/INH aerosol powder , Inhale., Disp: , Rfl:     Allergies   Allergen Reactions   • Crestor [Rosuvastatin Calcium]    • Lipitor [Atorvastatin]      Joint pain myalgias   • Plavix [Clopidogrel Bisulfate]       resistant.        reports that he has been smoking Cigarettes.  He has a 45.00 pack-year smoking history. He has never used smokeless tobacco.    Family History   Problem Relation Age of Onset   • Hypertension Mother    • Sick sinus syndrome Father    • Coronary artery disease Father        Review of Systems:  Positive for dyspnea, lower extremity swelling  Negative for exertional chest pain, palpitations, lightheadedness, syncope.   All other systems reviewed and are negative.    Objective:   Blood pressure 124/68, pulse 73, height 68\" (172.7 cm), weight 175 lb 9.6 oz (79.7 kg).  CONSTITUTIONAL: No acute distress, normal affect  HEENT: PERRLA, moist mucous membranes  Neck: No masses, no deviation, borderline JVD  RESPIRATORY: Normal effort. Clear to auscultation bilaterally without wheezing or rales  CARDIOVASCULAR: " Carotids with normal upstrokes without bruits.  Regular rate and rhythm with normal S1 and S2. Without murmur, gallop or rub.  PERIPHERAL VASCULAR: Normal radial pulses bilaterally. There is mild lower extremity edema bilaterally.  Psych: Normal mood, normal affect  Neuro: Cranial nerves II through XII grossly intact, normal lower external sensation    Labs:  Lab Results   Component Value Date    ALT 32 01/26/2017    AST 20 01/26/2017     Lab Results   Component Value Date    CHOL 170 01/26/2017    TRIG 113 01/26/2017    HDL 60 01/26/2017    LDLDIRECT 94 01/26/2017    CREATININE 0.80 01/26/2017       Diagnostic Data:      ECG 12 Lead  Date/Time: 10/2/2017 4:35 PM  Performed by: DEYA DEJESUS  Authorized by: DEYA DEJESUS   Comments: Atrial paced nonspecific intraventricular block  ms,  ms          [facility] 1/2017  · Severe multivessel disease including a previously known occluded proximal left circumflex and mid right coronary artery.  There was a de hao 60% discrete stenosis in a medium sized second diagonal that supplies much of the anterolateral wall that was found to be functionally significant with an iFR of 0.60.  The LAD was otherwise patent, the SVG to a distal OM was widely patent, the circumflex artery is supplied by septal collaterals, the RCA was supplied by moderate graft to OM to right collaterals.  · Mild functionally significant lesion was unlikely to be the only contributor to the patient's ventricular fibrillation, it was deemed function is significant and reasonable to revascularize due to the patient's severe CAD, recent ventricular arrhythmia, severe cardiomyopathy.  The lesion is now status post resolute integrity 2.25 x 14 mm drug-eluting stent placement with 0% residual stenosis.  · Left ventricular ejection fraction of 49% with an akinetic inferior wall and hypokinesis of the anterolateral wall    TTE 1/2017  · Left ventricular function is moderately decreased. Estimated EF appears  to be in the range of 36 - 40%. Calculated EF = 37%.  · Left ventricular wall thickness is consistent with mild-to-moderate concentric hypertrophy.  · Left ventricular diastolic dysfunction (grade I a) consistent with impaired relaxation.  · Left ventricular wall segments contract abnormally. Akinesis of the inferior wall    DEVICE INTERROGATION:  10/2/17, [employer] DC ICD: RA pacing 96%, RV pacing <1%. P wave is 1.8 mV with a threshold of 1 V at 0.5 msec and an impedance of 460 ohms. R wave is 11.9 mV with a threshold of 1 V at 0.4 msec and an impedance of 610 ohms. Battery voltage is 6yr. one and VF episode on 9/6/6/17 with failed ATP ×1 and a successful shock at 20 J.  Less than 1% mode switch longest 10 seconds.    Assessment and Plan:   Derek was seen today for ischemic heart disease, hypertension and chronic systolic chf.    Diagnoses and all orders for this visit:    Coronary artery disease of native artery of native heart with stable angina pectoris  Hyperlipidemia  -No chest pain but the patient has known under revascularized territories of the heart primarily due to an occluded RCA, occluded SVG to RCA, and no further revascularize for this vessel due to diffuse disease.  An attempt at recannulizing this  is unlikely to benefit the patient due to diffuse distal disease and small caliber size  -Continue aspirin, Effient, carvedilol  -Intolerance to atorvastatin and rosuvastatin   -We'll consider starting Zetia, LDL 94 in 1/2017  -Also consider PCS K-9 inhibitor especially if he has another acute coronary syndrome      Chronic systolic heart failure  Status post dual-chamber ICD  -Functional class 2-3  -Continue carvedilol, spironolactone  -Switch lisinopril 40 mg twice a day to Entresto 49-51 milligrams twice a day.  Stop lisinopril today, start Entresto Wednesday.  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    Vlad Bravo MD, MSc, [facility]  Interventional Cardiology  [facility] at [facility]

## 2017-10-10 DIAGNOSIS — I47.20 VT (VENTRICULAR TACHYCARDIA) (HCC): ICD-10-CM

## 2017-10-10 DIAGNOSIS — I50.22 CHRONIC SYSTOLIC HEART FAILURE (HCC): ICD-10-CM

## 2017-10-10 DIAGNOSIS — I25.118 CORONARY ARTERY DISEASE OF NATIVE ARTERY OF NATIVE HEART WITH STABLE ANGINA PECTORIS (HCC): ICD-10-CM

## 2017-10-11 ENCOUNTER — HOSPITAL ENCOUNTER (OUTPATIENT)
Dept: CV DIAGNOSTICS | Facility: HOSPITAL | Age: 67
Discharge: HOME | End: 2017-10-11
Admitting: INTERNAL MEDICINE

## 2017-10-11 DIAGNOSIS — I47.20 VT (VENTRICULAR TACHYCARDIA) (HCC): Primary | ICD-10-CM

## 2017-10-11 DIAGNOSIS — I47.20 VT (VENTRICULAR TACHYCARDIA) (HCC): ICD-10-CM

## 2017-10-11 PROCEDURE — 93010 ELECTROCARDIOGRAM REPORT: CPT | Performed by: INTERNAL MEDICINE

## 2017-10-11 PROCEDURE — 93005 ELECTROCARDIOGRAM TRACING: CPT | Performed by: INTERNAL MEDICINE

## 2017-10-13 ENCOUNTER — TELEPHONE (OUTPATIENT)
Dept: CARDIOLOGY | Facility: CLINIC | Age: 67
End: 2017-10-13

## 2017-10-13 NOTE — TELEPHONE ENCOUNTER
LVM advising that lab work was normal.  I left my name and number should he have any questions or concerns.

## 2017-12-06 ENCOUNTER — TELEPHONE (OUTPATIENT)
Dept: CARDIAC SURGERY | Facility: CLINIC | Age: 67
End: 2017-12-06

## 2018-01-29 ENCOUNTER — OFFICE VISIT (OUTPATIENT)
Dept: CARDIOLOGY | Facility: CLINIC | Age: 68
End: 2018-01-29

## 2018-01-29 VITALS
WEIGHT: 181 LBS | HEIGHT: 68 IN | BODY MASS INDEX: 27.43 KG/M2 | HEART RATE: 70 BPM | DIASTOLIC BLOOD PRESSURE: 50 MMHG | SYSTOLIC BLOOD PRESSURE: 128 MMHG

## 2018-01-29 DIAGNOSIS — I47.20 VT (VENTRICULAR TACHYCARDIA) (HCC): ICD-10-CM

## 2018-01-29 DIAGNOSIS — E78.2 MIXED HYPERLIPIDEMIA: ICD-10-CM

## 2018-01-29 DIAGNOSIS — F17.200 TOBACCO DEPENDENCE: ICD-10-CM

## 2018-01-29 DIAGNOSIS — I25.118 CORONARY ARTERY DISEASE OF NATIVE ARTERY OF NATIVE HEART WITH STABLE ANGINA PECTORIS (HCC): Primary | ICD-10-CM

## 2018-01-29 DIAGNOSIS — I10 ESSENTIAL HYPERTENSION: ICD-10-CM

## 2018-01-29 DIAGNOSIS — I50.22 CHRONIC SYSTOLIC HEART FAILURE (HCC): ICD-10-CM

## 2018-01-29 PROCEDURE — 93000 ELECTROCARDIOGRAM COMPLETE: CPT | Performed by: INTERNAL MEDICINE

## 2018-01-29 PROCEDURE — 99214 OFFICE O/P EST MOD 30 MIN: CPT | Performed by: INTERNAL MEDICINE

## 2018-01-29 RX ORDER — NITROGLYCERIN 0.4 MG/1
0.4 TABLET SUBLINGUAL
COMMUNITY
End: 2019-09-18 | Stop reason: SDUPTHER

## 2018-01-29 RX ORDER — EZETIMIBE 10 MG/1
10 TABLET ORAL DAILY
Qty: 30 TABLET | Refills: 11 | Status: SHIPPED | OUTPATIENT
Start: 2018-01-29 | End: 2019-02-25 | Stop reason: SDUPTHER

## 2018-01-29 NOTE — PROGRESS NOTES
[facility] AT [address], [address]  [place], [place], [address] [phone]  [email or web address]           OUTPATIENT CLINIC FOLLOW UP NOTE    Patient Care Team:  Patient Care Team:  Lyndsey Ahumada MD as PCP - General (Family Medicine)    Subjective:   Reason for consultation:   Chief Complaint   Patient presents with   • Coronary Artery Disease   • Chest Pain   • Dizziness       HPI:    Derek Morris Jr. is a 67 y.o. male.  Coronary Artery Disease   Presents for follow-up visit. Symptoms include chest pain and dizziness.   Chest Pain    Associated symptoms include dizziness.   His past medical history is significant for CAD.   Dizziness   Associated symptoms include chest pain.     The patient has a history of severe ischemic heart disease with prior bypass and most recently PCI in 1/2017, ischemic cardiomyopathy with an EF of about 35%, dual-chamber ICD, long smoking history, COPD, hypertension, diabetes, hyperlipidemia, who presents for follow-up.    The patient had defibrillator shocks in August 2017, has done well since increasing sotalol 120 mg twice a day.  Denies exertional chest pain currently.  Has class II-III exertional dyspnea and associated lower external swelling.  Has been taking Lasix only every other day.  He has good diuretic response from taking Lasix.  Feels marginally better today than he did at his last visit 3 months ago    PFSH:  PROBLEM LIST:  1. Ischemic heart disease:  a. MI x3, 1990, 1993, and 1995.  b. CABG x3 by Dr. Noble Cuello, 1995:  SVG to OM1 and OM2, SVG to PDA.  c. Normal LV.  d. Questionable LAD stent, incomplete  database.  e. STEMI, April 2009.  f. [facility] by Dr. Glen Baker, April 2009:  EF approximately 40%, 1 of 3 patent grafts; data deficit.  g. C by Dr. Bird, 07/30/2010:  EF 30%, occluded vein graft to occluded RCA, minor plaque in LAD, 70% SVG -  circumflex treated with 4 x 18 mm Cypher KAILASH.  h. Echocardiogram, 02/08/2012:   EF 40% to 45%, diastolic dysfunction, mild TR.  i. Echocardiogram, 01/29/2015:  EF 30% to 35%, mild TR.  j. Jan 2017 EF 35-40%  k. [facility] in Jan 2017 as noted in the results below. Resolute Integrity KAILASH to D2  2. Hypertension.  3. Chronic systolic CHF.  4. Nonsustained VT:  a. Inducible VT by EPS, January 2002.  b. Pacesetter ICD implant by Dr. Dickinson, January 2002.  c. Chronic amiodarone, discontinued fall of 2010:  Cannot  exclude amiodarone pulmonary toxicity.  d. ICD generator change-out with enrollment in the ANALYZE ST SEGMENT protocol, 08/16/2012.  5.  Hyperlipidemia; intolerance to multiple statins.  6. Type 2 diabetes mellitus.  7. Chronic tobacco; home O2.  8. Surgical history:  a.  CABG    Patient Active Problem List   Diagnosis   • Ischemic heart disease   • Hypertension   • Chronic systolic heart failure   • VT (ventricular tachycardia)   • Hyperlipidemia   • Type 2 diabetes mellitus   • Tobacco abuse   • Coronary artery disease of native artery with stable angina pectoris   • Cardiomyopathy   • Shortness of breath   • COPD (chronic obstructive pulmonary disease)   • Lung nodule         Current Outpatient Prescriptions:   •  albuterol (PROVENTIL HFA;VENTOLIN HFA) 108 (90 Base) MCG/ACT inhaler, Inhale 2 puffs Every 4 (Four) Hours As Needed for Wheezing., Disp: , Rfl:   •  carvedilol (COREG) 12.5 MG tablet, Take 12.5 mg by mouth 2 (Two) Times a Day With Meals., Disp: , Rfl:   •  fluticasone (FLONASE) 50 MCG/ACT nasal spray, 2 sprays into each nostril As Needed. Administer 2 sprays in each nostril for each dose. , Disp: , Rfl:   •  furosemide (LASIX) 40 MG tablet, Take 40 mg by mouth As Needed., Disp: , Rfl:   •  glipiZIDE (GLUCOTROL) 5 MG tablet, Take 5 mg by mouth Daily., Disp: , Rfl:   •  ipratropium-albuterol (DUO-NEB) 0.5-2.5 mg/mL nebulizer, Every 4 (Four) Hours As Needed., Disp: , Rfl: 3  •  MUCINEX 600 MG 12 hr tablet, As Needed., Disp: , Rfl: 0  •  nitroglycerin (NITROSTAT) 0.4 MG SL tablet,  "Place 0.4 mg under the tongue Every 5 (Five) Minutes As Needed for Chest Pain. Take no more than 3 doses in 15 minutes., Disp: , Rfl:   •  prasugrel (EFFIENT) 10 MG tablet, Take 1 tablet by mouth Daily., Disp: 30 tablet, Rfl: 11  •  rOPINIRole (REQUIP) 2 MG tablet, Take 2 mg by mouth every night., Disp: , Rfl:   •  sacubitril-valsartan (ENTRESTO) 49-51 MG tablet, Take 1 tablet by mouth 2 (Two) Times a Day., Disp: 60 tablet, Rfl: 11  •  Sotalol HCl, AF, 120 MG tablet, Take 120 mg by mouth 2 (Two) Times a Day., Disp: 60 each, Rfl: 11  •  spironolactone (ALDACTONE) 25 MG tablet, Take 25 mg by mouth daily., Disp: , Rfl:   •  SYMBICORT 160-4.5 MCG/ACT inhaler, 2 puffs 2 (Two) Times a Day., Disp: , Rfl: 0  •  Tiotropium Bromide Monohydrate (SPIRIVA RESPIMAT) 2.5 MCG/ACT aerosol solution, Inhale 2 puffs Daily., Disp: 1 inhaler, Rfl: 3  •  tiZANidine (ZANAFLEX) 4 MG tablet, 2 (Two) Times a Day., Disp: , Rfl: 4  •  Umeclidinium Bromide 62.5 MCG/INH aerosol powder , Inhale., Disp: , Rfl:     Allergies   Allergen Reactions   • Crestor [Rosuvastatin Calcium]    • Lipitor [Atorvastatin]      Joint pain myalgias   • Plavix [Clopidogrel Bisulfate]       resistant.        reports that he has been smoking Cigarettes.  He has a 45.00 pack-year smoking history. He has never used smokeless tobacco.    Family History   Problem Relation Age of Onset   • Hypertension Mother    • Sick sinus syndrome Father    • Coronary artery disease Father        Review of Systems:  Positive for dyspnea, lower extremity swelling  Negative for exertional chest pain, palpitations, lightheadedness, syncope.   All other systems reviewed and are negative.    Objective:   Blood pressure 128/50, pulse 70, height 172.7 cm (68\"), weight 82.1 kg (181 lb).  CONSTITUTIONAL: No acute distress, normal affect  Neck: No masses, no deviation, negative for JVD  RESPIRATORY: Normal effort. Clear to auscultation bilaterally without wheezing or rales  CARDIOVASCULAR: " Carotids with normal upstrokes without bruits.  Regular rate and rhythm with normal S1 and S2. Without murmur, gallop or rub.  PERIPHERAL VASCULAR: Normal radial pulses bilaterally. There is 1-2+ lower extremity edema bilaterally.    Labs:  Lab Results   Component Value Date    ALT 32 01/26/2017    AST 20 01/26/2017     Lab Results   Component Value Date    CHOL 170 01/26/2017    TRIG 113 01/26/2017    HDL 60 01/26/2017    LDLDIRECT 94 01/26/2017    CREATININE 0.80 01/26/2017       Diagnostic Data:      ECG 12 Lead  Date/Time: 1/29/2018 9:59 AM  Performed by: DEYA DEJESUS  Authorized by: DEYA DEJESUS   Comments: Atrial paced rhythm, rightward axis, nonspecific intraventricular block,  ms,  ms, no significant difference when compared to ECG on 10/2/17          [facility] 1/2017  · Severe multivessel disease including a previously known occluded proximal left circumflex and mid right coronary artery.  There was a de hao 60% discrete stenosis in a medium sized second diagonal that supplies much of the anterolateral wall that was found to be functionally significant with an iFR of 0.60.  The LAD was otherwise patent, the SVG to a distal OM was widely patent, the circumflex artery is supplied by septal collaterals, the RCA was supplied by moderate graft to OM to right collaterals.  · Mild functionally significant lesion was unlikely to be the only contributor to the patient's ventricular fibrillation, it was deemed function is significant and reasonable to revascularize due to the patient's severe CAD, recent ventricular arrhythmia, severe cardiomyopathy.  The lesion is now status post resolute integrity 2.25 x 14 mm drug-eluting stent placement with 0% residual stenosis.  · Left ventricular ejection fraction of 49% with an akinetic inferior wall and hypokinesis of the anterolateral wall    TTE 1/2017  · Left ventricular function is moderately decreased. Estimated EF appears to be in the range of 36 - 40%.  Calculated EF = 37%.  · Left ventricular wall thickness is consistent with mild-to-moderate concentric hypertrophy.  · Left ventricular diastolic dysfunction (grade I a) consistent with impaired relaxation.  · Left ventricular wall segments contract abnormally. Akinesis of the inferior wall    DEVICE INTERROGATION:  10/2/17, [employer] DC ICD: RA pacing 96%, RV pacing <1%. P wave is 1.8 mV with a threshold of 1 V at 0.5 msec and an impedance of 460 ohms. R wave is 11.9 mV with a threshold of 1 V at 0.4 msec and an impedance of 610 ohms. Battery voltage is 6yr. one and VF episode on 9/6/6/17 with failed ATP ×1 and a successful shock at 20 J.  Less than 1% mode switch longest 10 seconds.    DEVICE INTERROGATION:  [employer], Interrogation date 1/29/18 -   RA pacing 96%, RV pacing <1%. P wave is 1 mV with a threshold of 1 V at 0.5 msec and an impedance of 490 ohms. R wave is >12 mV with a threshold of 1 V at 0.5 msec and an impedance of 580 ohms. Battery voltage is 5.6yrs, no events.      Assessment and Plan:   Derek was seen today for ischemic heart disease, hypertension and chronic systolic chf.    Diagnoses and all orders for this visit:    Coronary artery disease of native artery of native heart with stable angina pectoris  Hyperlipidemia  -No chest pain but the patient has known under revascularized territories of the heart primarily due to an occluded RCA, occluded SVG to RCA, and no further revascularize for this vessel due to diffuse disease.  An attempt at recannulizing this  is unlikely to benefit the patient due to diffuse distal disease and small caliber size  -Continue aspirin, Effient, carvedilol  -Intolerance to atorvastatin and rosuvastatin   -Starting Zetia, LDL 94 in 1/2017; repeat labs at next visit  -Also consider PCS K-9 inhibitor especially if he has another acute coronary syndrome      Chronic systolic heart failure  Status post dual-chamber ICD  -Functional class 2-3  -Continue carvedilol,  spironolactone, Entresto (started 10/2017)  -Increase furosemide 40 mg to daily (taking QOD currently)  -Daily weights, additional furosemide if he gains more than 3 pounds in 3 days    VT (ventricular tachycardia), VF  Possible pulmonary toxicity due to amiodarone around 2002  -VF episode 9/2017, now on sotalol at 120 mg twice a day without recurrent events.   -QRS/QTc okay today  -If the patient has a repeat episode of VF despite increasing sotalol, we'll trial mexiletine due to his previous intolerance to amiodarone     Advanced COPD and pulmonary nodule  -Management per pulmonary and CT surgery teams    - Dietary and exercise counseling was provided during this visit  - Return in about 3 months (around 4/29/2018).    Vlad Bravo MD, MSc, [facility]  Interventional Cardiology  [facility] at [facility]

## 2018-02-13 RX ORDER — PRASUGREL 10 MG/1
TABLET, FILM COATED ORAL
Qty: 30 TABLET | Refills: 11 | Status: SHIPPED | OUTPATIENT
Start: 2018-02-13 | End: 2018-09-20

## 2018-02-26 ENCOUNTER — TELEPHONE (OUTPATIENT)
Dept: CARDIOLOGY | Facility: CLINIC | Age: 68
End: 2018-02-26

## 2018-02-26 DIAGNOSIS — R60.9 EDEMA, UNSPECIFIED TYPE: ICD-10-CM

## 2018-02-26 DIAGNOSIS — R06.00 DYSPNEA, UNSPECIFIED TYPE: ICD-10-CM

## 2018-02-26 DIAGNOSIS — R06.09 DYSPNEA ON EXERTION: Primary | ICD-10-CM

## 2018-02-26 NOTE — TELEPHONE ENCOUNTER
The patient's wife called stating that over the weekend his BP has run between /50-70, and he has been very fatigued, SOA, and edematous.  Per MJS I asked that he take an extra lasix tonight, come in the morning for labs and CXR, then be seen in the heart and valve center at 10:45.  She verbalized agreement and understanding at this time.  Date, time, and location of heart and valve appt as well as lab reviewed.

## 2018-02-27 ENCOUNTER — OFFICE VISIT (OUTPATIENT)
Dept: CARDIOLOGY | Facility: HOSPITAL | Age: 68
End: 2018-02-27

## 2018-02-27 ENCOUNTER — LAB (OUTPATIENT)
Dept: LAB | Facility: HOSPITAL | Age: 68
End: 2018-02-27

## 2018-02-27 ENCOUNTER — HOSPITAL ENCOUNTER (OUTPATIENT)
Dept: CARDIOLOGY | Facility: HOSPITAL | Age: 68
Discharge: HOME | End: 2018-02-27
Admitting: NURSE PRACTITIONER

## 2018-02-27 ENCOUNTER — HOSPITAL ENCOUNTER (OUTPATIENT)
Dept: GENERAL RADIOLOGY | Facility: HOSPITAL | Age: 68
Discharge: HOME | End: 2018-02-27
Attending: INTERNAL MEDICINE

## 2018-02-27 VITALS
TEMPERATURE: 98 F | HEART RATE: 80 BPM | WEIGHT: 182 LBS | RESPIRATION RATE: 22 BRPM | HEIGHT: 68 IN | SYSTOLIC BLOOD PRESSURE: 130 MMHG | OXYGEN SATURATION: 95 % | BODY MASS INDEX: 27.58 KG/M2 | DIASTOLIC BLOOD PRESSURE: 77 MMHG

## 2018-02-27 DIAGNOSIS — R06.09 DYSPNEA ON EXERTION: ICD-10-CM

## 2018-02-27 DIAGNOSIS — R06.00 DYSPNEA, UNSPECIFIED TYPE: ICD-10-CM

## 2018-02-27 DIAGNOSIS — I50.22 CHRONIC SYSTOLIC HEART FAILURE (HCC): ICD-10-CM

## 2018-02-27 DIAGNOSIS — R06.00 DYSPNEA, UNSPECIFIED TYPE: Primary | ICD-10-CM

## 2018-02-27 DIAGNOSIS — J43.8 OTHER EMPHYSEMA (HCC): ICD-10-CM

## 2018-02-27 DIAGNOSIS — R60.9 EDEMA, UNSPECIFIED TYPE: ICD-10-CM

## 2018-02-27 DIAGNOSIS — I25.9 ISCHEMIC HEART DISEASE: ICD-10-CM

## 2018-02-27 DIAGNOSIS — I25.118 CORONARY ARTERY DISEASE OF NATIVE ARTERY OF NATIVE HEART WITH STABLE ANGINA PECTORIS (HCC): ICD-10-CM

## 2018-02-27 DIAGNOSIS — I10 ESSENTIAL HYPERTENSION: ICD-10-CM

## 2018-02-27 DIAGNOSIS — I47.20 VT (VENTRICULAR TACHYCARDIA) (HCC): ICD-10-CM

## 2018-02-27 LAB
ANION GAP SERPL CALCULATED.3IONS-SCNC: 7 MMOL/L (ref 3–11)
BNP SERPL-MCNC: 114 PG/ML (ref 0–100)
BUN BLD-MCNC: 11 MG/DL (ref 9–23)
BUN/CREAT SERPL: 11 (ref 7–25)
CALCIUM SPEC-SCNC: 9.4 MG/DL (ref 8.7–10.4)
CHLORIDE SERPL-SCNC: 106 MMOL/L (ref 99–109)
CO2 SERPL-SCNC: 31 MMOL/L (ref 20–31)
CREAT BLD-MCNC: 1 MG/DL (ref 0.6–1.3)
GFR SERPL CREATININE-BSD FRML MDRD: 74 ML/MIN/1.73
GLUCOSE BLD-MCNC: 90 MG/DL (ref 70–100)
POTASSIUM BLD-SCNC: 4.7 MMOL/L (ref 3.5–5.5)
SODIUM BLD-SCNC: 144 MMOL/L (ref 132–146)

## 2018-02-27 PROCEDURE — 36415 COLL VENOUS BLD VENIPUNCTURE: CPT

## 2018-02-27 PROCEDURE — 80048 BASIC METABOLIC PNL TOTAL CA: CPT

## 2018-02-27 PROCEDURE — 71046 X-RAY EXAM CHEST 2 VIEWS: CPT

## 2018-02-27 PROCEDURE — 99214 OFFICE O/P EST MOD 30 MIN: CPT | Performed by: NURSE PRACTITIONER

## 2018-02-27 PROCEDURE — 83880 ASSAY OF NATRIURETIC PEPTIDE: CPT

## 2018-02-27 PROCEDURE — 93005 ELECTROCARDIOGRAM TRACING: CPT | Performed by: NURSE PRACTITIONER

## 2018-02-27 PROCEDURE — 93010 ELECTROCARDIOGRAM REPORT: CPT | Performed by: INTERNAL MEDICINE

## 2018-02-27 RX ORDER — BUMETANIDE 0.25 MG/ML
2 INJECTION INTRAMUSCULAR; INTRAVENOUS ONCE
Status: DISCONTINUED | OUTPATIENT
Start: 2018-02-27 | End: 2018-06-05

## 2018-02-27 RX ORDER — GLIPIZIDE 2.5 MG/1
2.5 TABLET, EXTENDED RELEASE ORAL DAILY
Refills: 11 | COMMUNITY
Start: 2018-02-16

## 2018-02-27 NOTE — PROGRESS NOTES
Subjective:     Encounter Date:02/27/2018      Patient ID: Derek Morris Jr. is a 67 y.o. male.    Chief Complaint: Low BP, SOA, edema  History of Present Illness:  Mr. Morris comes in to the [facility] today at the request of Dr. Vlad Bravo.  Known history of CAD, ischemic cardiomyopathy, Ventricular tachycardia.  Patient's spouse called Cardiology yesterday to say he was feeling very SOA, fatigued, and edematous.  Dr. Bravo instructed him to take an extra lasix and come in today.     Mr. Morris states he has been feeling bad about one week.  In addition to the above symptoms, he states he has a persistent cough and feels swollen in his abdomen.  The extra dose lasix last night did not yield much UOP.     Past Medical History:   Diagnosis Date   • Arthritis    • Carotid stenosis    • CHF (congestive heart failure)    • COPD (chronic obstructive pulmonary disease)    • Coronary artery disease    • Diabetes mellitus    • Heart attack     X3 1990, 1993, 1995   • Hyperlipidemia    • Hypertension    • Lung nodule    • Peptic ulcer disease        Past Surgical History:   Procedure Laterality Date   • CARDIAC CATHETERIZATION Left 04/2009    by Dr. Glen Baker- I. EF approx 40% II one of three patent grafts iii, Data deficit    • CARDIAC CATHETERIZATION N/A 1/26/2017    Procedure: Left Heart Cath;  Surgeon: Vlad Bravo MD;  Location: [facility] INVASIVE LOCATION;  Service:    • CARDIAC CATHETERIZATION Left 07/30/2010    i. EF 30% ii occluded vein graft to occluded RCS iii minor plaque in the LAD iv. 70% SVG- circumfelx, treated with 4x18 mm. Cypher KAILASH.   • CARDIAC DEFIBRILLATOR PLACEMENT     • CORONARY ANGIOPLASTY     • CORONARY ARTERY BYPASS GRAFT  1995 1995, w/ subsequent stents 2009/2010 X3; svg TO om1 AND om2, svg TO pda    • CYST REMOVAL      T[employer]bone   • INSERT / REPLACE / REMOVE PACEMAKER     • PACEMAKER IMPLANTATION  01/2002    Implant by Dr. Dickinson        Social History     Social  History   • Marital status:      Spouse name: N/A   • Number of children: 2   • Years of education: N/A     Occupational History   •       Retired     Social History Main Topics   • Smoking status: Current Every Day Smoker     Packs/day: 1.00     Years: 45.00     Types: Cigarettes   • Smokeless tobacco: Never Used   • Alcohol use No   • Drug use: No   • Sexual activity: Defer     Other Topics Concern   • Not on file     Social History Narrative    Caffeine use: 5-6 serving daily.                Family History   Problem Relation Age of Onset   • Hypertension Mother    • Sick sinus syndrome Father    • Coronary artery disease Father        Review of Systems   Constitution: Positive for malaise/fatigue. Negative for chills, decreased appetite, diaphoresis, fever, weakness, night sweats, weight gain and weight loss.   HENT: Positive for congestion and hearing loss. Negative for hoarse voice and nosebleeds.    Eyes: Negative for blurred vision, visual disturbance and visual halos.   Cardiovascular: Positive for dyspnea on exertion, leg swelling and orthopnea. Negative for chest pain, claudication, cyanosis, irregular heartbeat, near-syncope, palpitations, paroxysmal nocturnal dyspnea and syncope.   Respiratory: Positive for cough and wheezing. Negative for hemoptysis, shortness of breath, sleep disturbances due to breathing, snoring and sputum production.    Hematologic/Lymphatic: Negative for bleeding problem. Does not bruise/bleed easily.   Skin: Positive for itching. Negative for dry skin and rash.   Musculoskeletal: Negative for arthritis, joint pain, joint swelling and myalgias.   Gastrointestinal: Positive for bloating and constipation. Negative for abdominal pain, diarrhea, flatus, heartburn, hematemesis, hematochezia, melena, nausea and vomiting.   Genitourinary: Negative for dysuria, frequency, hematuria, nocturia and urgency.   Neurological: Positive for excessive daytime sleepiness,  "dizziness and light-headedness. Negative for headaches and loss of balance.   Psychiatric/Behavioral: Negative for depression. The patient does not have insomnia and is not nervous/anxious.      Vitals:    02/27/18 1027 02/27/18 1032 02/27/18 1033   BP: 120/70 117/71 130/77   BP Location: Right arm Left arm Left arm   Patient Position: Sitting Sitting Standing   Pulse: 72  80   Resp: 22     Temp: 98 °F (36.7 °C)     TempSrc: Temporal Artery      SpO2: 95%     Weight: 82.6 kg (182 lb)     Height: 172.7 cm (67.99\")         Objective:     Physical Exam   Constitutional: He is oriented to person, place, and time. He appears well-developed and well-nourished. No distress.   HENT:   Head: Normocephalic and atraumatic.   Eyes: Conjunctivae are normal. Pupils are equal, round, and reactive to light. No scleral icterus.   Neck: Neck supple. No JVD present.   Cardiovascular: Normal rate, regular rhythm, normal heart sounds and intact distal pulses.    Pulmonary/Chest: Effort normal. No respiratory distress. He has wheezes. He has rales. He exhibits no tenderness.   Expiratory wheezing and bi-basilar rales   Abdominal: Soft. Bowel sounds are normal.   probtuerant   Musculoskeletal: Normal range of motion. He exhibits edema.   Trace / +1 bilateral lower extremity edema   Neurological: He is alert and oriented to person, place, and time. No cranial nerve deficit.   Skin: Skin is warm and dry.   Psychiatric: He has a normal mood and affect. His behavior is normal.       Lab Review: BNP, BMP, CXR (pending/ will review)     Assessment/ Plan:          Diagnosis Plan   1. Chronic systolic heart failure With mild exacerbation.  Last LVEF 37% (January 2017).   Overall weight is up 1 lb from January visit weight, but evidence of exacerbation on exam.  Bumex 2 mg slow IV push in clinic today. (See flow sheet).  Diuresis of 220 ml prior to leaving the clinic.  Meds:  Entresto 49/51 mg BID, coreg 12.5 mg BID, Zetia 10 mg daily,  Effient 10 " mg dialy, aldactone 25 mg daily, lasix 40 mg daily.  Will review labs once available with Dr. Bravo and discuss any recommended dosing changes/ notify patient.     2. CAD EKG stable w/o ischemic changes.  QT/QTc intervals appropriate.     3. COPD CXR:  Pending.  Ongoing cigarette use.  Advised to DC. BreoEllipta, albuterol Duo Neb, Umeclidineium daily   4. Hx of VT Sotalol 120 mg BID.  No sx of palpitations.

## 2018-02-28 ENCOUNTER — TELEPHONE (OUTPATIENT)
Dept: CARDIOLOGY | Facility: HOSPITAL | Age: 68
End: 2018-02-28

## 2018-02-28 RX ORDER — BUMETANIDE 2 MG/1
2 TABLET ORAL DAILY
Qty: 30 TABLET | Refills: 1 | Status: SHIPPED | OUTPATIENT
Start: 2018-02-28 | End: 2018-05-08 | Stop reason: SDUPTHER

## 2018-02-28 NOTE — PROGRESS NOTES
Please call  Alexandra and let him know Dr. lozano wants to DC lasix.  Start Bumex 2 mg QD.  Weigh daily and call me in one week with symptoms/ weights.

## 2018-03-02 ENCOUNTER — TELEPHONE (OUTPATIENT)
Dept: CARDIOLOGY | Facility: HOSPITAL | Age: 68
End: 2018-03-02

## 2018-03-02 NOTE — TELEPHONE ENCOUNTER
Tried both phone numbers to follow up with Mr. Morris after HF clinic visit earlier this week.  Unable to leave a message.

## 2018-03-06 ENCOUNTER — TELEPHONE (OUTPATIENT)
Dept: CARDIOLOGY | Facility: CLINIC | Age: 68
End: 2018-03-06

## 2018-03-06 NOTE — TELEPHONE ENCOUNTER
Reviewed results of CXR with the patient, as well as lab results.  All questions and concerns addressed at this time.  Understanding verbalized.

## 2018-04-30 ENCOUNTER — OFFICE VISIT (OUTPATIENT)
Dept: CARDIOLOGY | Facility: CLINIC | Age: 68
End: 2018-04-30

## 2018-04-30 VITALS
WEIGHT: 183 LBS | SYSTOLIC BLOOD PRESSURE: 124 MMHG | BODY MASS INDEX: 27.74 KG/M2 | DIASTOLIC BLOOD PRESSURE: 68 MMHG | HEIGHT: 68 IN | HEART RATE: 70 BPM

## 2018-04-30 DIAGNOSIS — I25.118 CORONARY ARTERY DISEASE OF NATIVE ARTERY OF NATIVE HEART WITH STABLE ANGINA PECTORIS (HCC): Primary | ICD-10-CM

## 2018-04-30 DIAGNOSIS — I73.9 CLAUDICATION (HCC): ICD-10-CM

## 2018-04-30 DIAGNOSIS — I10 ESSENTIAL HYPERTENSION: ICD-10-CM

## 2018-04-30 DIAGNOSIS — I47.20 VT (VENTRICULAR TACHYCARDIA) (HCC): ICD-10-CM

## 2018-04-30 DIAGNOSIS — I50.22 CHRONIC SYSTOLIC HEART FAILURE (HCC): ICD-10-CM

## 2018-04-30 DIAGNOSIS — E78.2 MIXED HYPERLIPIDEMIA: ICD-10-CM

## 2018-04-30 PROCEDURE — 93000 ELECTROCARDIOGRAM COMPLETE: CPT | Performed by: INTERNAL MEDICINE

## 2018-04-30 PROCEDURE — 99214 OFFICE O/P EST MOD 30 MIN: CPT | Performed by: INTERNAL MEDICINE

## 2018-04-30 NOTE — PROGRESS NOTES
[facility] AT [address], [address]  [place], [place], [address] [phone]  WWW.[facility]           OUTPATIENT CLINIC FOLLOW UP NOTE    Patient Care Team:  Patient Care Team:  Lyndsey Ahumada MD as PCP - General (Family Medicine)    Subjective:   Reason for consultation:   Chief Complaint   Patient presents with   • Coronary Artery Disease       HPI:    Derek Morris Jr. is a 67 y.o. male.  Coronary Artery Disease   Presents for follow-up visit. Symptoms include chest pain and dizziness.   Chest Pain    Associated symptoms include dizziness.   His past medical history is significant for CAD.   Dizziness   Associated symptoms include chest pain.     The patient has a history of severe ischemic heart disease with prior bypass and most recently PCI in 1/2017, ischemic cardiomyopathy with an EF of about 35%, dual-chamber ICD, long smoking history, COPD, hypertension, diabetes, hyperlipidemia, who presents for follow-up.    His last visit patient has had persistent moderately severe dyspnea with exertion associated with lower extremity swelling.  The patient was transitioned from Lasix to Bumex 2 mg daily.  He states that his swelling is only gotten worse.  3 nights ago the patient had 2 sharp intense episodes of chest pain that potentially improved with nitroglycerin.  He was fatigued and next day.  He has not otherwise had chest pain over the last 3 months since his last follow-up.  Patient also complains of lower extremity weakness and pain with walking    The patient denies having had any defibrillator shocks since his last visit    PFSH:  PROBLEM LIST:  1. Ischemic heart disease:  a. MI x3, 1990, 1993, and 1995.  b. CABG x3 by Dr. Noble Cuello, 1995:  SVG to OM1 and OM2, SVG to PDA.  c. Normal LV.  d. Questionable LAD stent, incomplete  database.  e. STEMI, April 2009.  f. LHC by Dr. Glen Baker, April 2009:  EF approximately 40%, 1 of 3 patent grafts; data  travon canales. C by Dr. Bird, 07/30/2010:  EF 30%, occluded vein graft to occluded RCA, minor plaque in LAD, 70% SVG -  circumflex treated with 4 x 18 mm Cypher KAILASH.  h. Echocardiogram, 02/08/2012:  EF 40% to 45%, diastolic dysfunction, mild TR.  i. Echocardiogram, 01/29/2015:  EF 30% to 35%, mild TR.  j. Jan 2017 EF 35-40%  k. LHC in Jan 2017 as noted in the results below. Resolute Integrity KAILASH to D2  2. Hypertension.  3. Chronic systolic CHF.  4. Nonsustained VT:  a. Inducible VT by EPS, January 2002.  b. Pacesetter ICD implant by Dr. Dickinson, January 2002.  c. Chronic amiodarone, discontinued fall of 2010:  Cannot  exclude amiodarone pulmonary toxicity.  d. ICD generator change-out with enrollment in the ANALYZE ST SEGMENT protocol, 08/16/2012.  5.  Hyperlipidemia; intolerance to multiple statins.  6. Type 2 diabetes mellitus.  7. Chronic tobacco; home O2.  8. Surgical history:  a.  CABG    Patient Active Problem List   Diagnosis   • Ischemic heart disease   • Hypertension   • Chronic systolic heart failure   • VT (ventricular tachycardia)   • Hyperlipidemia   • Type 2 diabetes mellitus   • Tobacco dependence   • Coronary artery disease of native artery with stable angina pectoris   • Cardiomyopathy   • Shortness of breath   • COPD (chronic obstructive pulmonary disease)   • Lung nodule         Current Outpatient Prescriptions:   •  albuterol (PROVENTIL HFA;VENTOLIN HFA) 108 (90 Base) MCG/ACT inhaler, Inhale 2 puffs Every 4 (Four) Hours As Needed for Wheezing., Disp: , Rfl:   •  BREO ELLIPTA 100-25 MCG/INH aerosol powder , Inhale 1 puff Daily., Disp: , Rfl: 5  •  bumetanide (BUMEX) 2 MG tablet, Take 1 tablet by mouth Daily., Disp: 30 tablet, Rfl: 1  •  carvedilol (COREG) 12.5 MG tablet, Take 12.5 mg by mouth 2 (Two) Times a Day With Meals. PATIENT TAKES 1/2 TABLET, Disp: , Rfl:   •  ezetimibe (ZETIA) 10 MG tablet, Take 1 tablet by mouth Daily., Disp: 30 tablet, Rfl: 11  •  fluticasone (FLONASE) 50 MCG/ACT  nasal spray, 2 sprays into each nostril As Needed. Administer 2 sprays in each nostril for each dose. , Disp: , Rfl:   •  GLIPIZIDE XL 2.5 MG 24 hr tablet, Take 2.5 mg by mouth Daily., Disp: , Rfl: 11  •  ipratropium-albuterol (DUO-NEB) 0.5-2.5 mg/mL nebulizer, Every 4 (Four) Hours As Needed., Disp: , Rfl: 3  •  MUCINEX 600 MG 12 hr tablet, Take 600 mg by mouth As Needed., Disp: , Rfl: 0  •  nitroglycerin (NITROSTAT) 0.4 MG SL tablet, Place 0.4 mg under the tongue Every 5 (Five) Minutes As Needed for Chest Pain. Take no more than 3 doses in 15 minutes., Disp: , Rfl:   •  prasugrel (EFFIENT) 10 MG tablet, TAKE 1 TABLET BY MOUTH DAILY., Disp: 30 tablet, Rfl: 11  •  rOPINIRole (REQUIP) 2 MG tablet, Take 2 mg by mouth every night., Disp: , Rfl:   •  sacubitril-valsartan (ENTRESTO) 49-51 MG tablet, Take 1 tablet by mouth 2 (Two) Times a Day., Disp: 60 tablet, Rfl: 11  •  Sotalol HCl, AF, 120 MG tablet, Take 120 mg by mouth 2 (Two) Times a Day., Disp: 60 each, Rfl: 11  •  spironolactone (ALDACTONE) 25 MG tablet, Take 25 mg by mouth daily., Disp: , Rfl:   •  tiZANidine (ZANAFLEX) 4 MG tablet, Take 4 mg by mouth Every Night., Disp: , Rfl: 4  •  Umeclidinium Bromide 62.5 MCG/INH aerosol powder , Inhale 1 puff Daily., Disp: , Rfl:   •  aspirin 81 MG tablet, Take 1 tablet by mouth Daily., Disp: 30 tablet, Rfl: 11    Current Facility-Administered Medications:   •  bumetanide (BUMEX) injection 2 mg, 2 mg, Intravenous, Once, VANDANA Denton    Allergies   Allergen Reactions   • Crestor [Rosuvastatin Calcium]    • Lipitor [Atorvastatin]      Joint pain myalgias   • Plavix [Clopidogrel Bisulfate]       resistant.        reports that he has been smoking Cigarettes.  He has a 45.00 pack-year smoking history. He has never used smokeless tobacco.    Family History   Problem Relation Age of Onset   • Hypertension Mother    • Sick sinus syndrome Father    • Coronary artery disease Father        Review of Systems:  Positive for  "dyspnea, lower extremity swelling  Negative for exertional chest pain, palpitations, lightheadedness, syncope.   All other systems reviewed and are negative.    Objective:   Blood pressure 124/68, pulse 70, height 172.7 cm (68\"), weight 83 kg (183 lb).  CONSTITUTIONAL: No acute distress, normal affect  RESPIRATORY: Normal effort. Clear to auscultation bilaterally without wheezing or rales  CARDIOVASCULAR: Carotids with normal upstrokes without bruits.  Regular rate and rhythm with normal S1 and S2. Without murmur, gallop or rub.  PERIPHERAL VASCULAR: Normal radial pulses bilaterally. There is 1-2+ lower extremity edema bilaterally.    Labs:  Lab Results   Component Value Date    ALT 32 01/26/2017    AST 20 01/26/2017     Lab Results   Component Value Date    CHOL 170 01/26/2017    TRIG 113 01/26/2017    HDL 60 01/26/2017    CREATININE 1.00 02/27/2018       Diagnostic Data:      ECG 12 Lead  Date/Time: 4/30/2018 6:52 PM  Performed by: DEYA DEJESUS  Authorized by: DEYA DEJESUS   Rhythm: paced  Comments: Atrial paced rhythm, nonspecific intraventricular block, cannot rule out septal infarct, prior lateral and inferior infarct, T wave abnormality in the anterior leads, heart rate 78, ,             [facility] 1/2017  · Severe multivessel disease including a previously known occluded proximal left circumflex and mid right coronary artery.  There was a de hao 60% discrete stenosis in a medium sized second diagonal that supplies much of the anterolateral wall that was found to be functionally significant with an iFR of 0.60.  The LAD was otherwise patent, the SVG to a distal OM was widely patent, the circumflex artery is supplied by septal collaterals, the RCA was supplied by moderate graft to OM left to right collaterals.  · The diagonal stenosis is now status post resolute integrity 2.25 x 14 mm drug-eluting stent placement with 0% residual stenosis.  · Left ventricular ejection fraction of 49% with an akinetic " inferior wall and hypokinesis of the anterolateral wall    TTE 1/2017  · Left ventricular function is moderately decreased. Estimated EF appears to be in the range of 36 - 40%. Calculated EF = 37%.  · Left ventricular wall thickness is consistent with mild-to-moderate concentric hypertrophy.  · Left ventricular diastolic dysfunction (grade I a) consistent with impaired relaxation.  · Left ventricular wall segments contract abnormally. Akinesis of the inferior wall    DEVICE INTERROGATION:  St Ricky, Interrogation date 1/29/18 -   RA pacing 96%, RV pacing <1%. P wave is 1 mV with a threshold of 1 V at 0.5 msec and an impedance of 490 ohms. R wave is >12 mV with a threshold of 1 V at 0.5 msec and an impedance of 580 ohms. Battery voltage is 5.6yrs, no events.    DEVICE INTERROGATION:  St Ricky, Interrogation date 4/30/18-   RA pacing 97%, RV pacing 1%. P wave is 1 mV with a threshold of 1.25 V at 0.5 msec and an impedance of 490 ohms. R wave is >12 mV with a threshold of 1.0 V at 0.5 msec and an impedance of 590 ohms. Battery voltage is 5.5yr. mode switches less than 1% of the time due to far field R waves, no events, no shocks          Assessment and Plan:   Derek was seen today for ischemic heart disease, hypertension and chronic systolic chf.    Diagnoses and all orders for this visit:    Coronary artery disease of native artery of native heart with stable angina pectoris  Hyperlipidemia  -No chest pain but the patient has known under revascularized territories with no clear targets for intervention as of his last heart cath  -Continue aspirin, Effient, carvedilol  -Intolerance to atorvastatin and rosuvastatin   -Starting Zetia, LDL 94 in 1/2017; repeat panel ordered    Chronic systolic heart failure  Status post dual-chamber ICD  -Repeat Echo, depending on clinical  Course and EF, will consider another ischemic evaluation  -NYHA class 3  -Continue carvedilol, spironolactone, Entresto (started 10/2017)  -On Bumex 2  mg daily.  He felt he did better with Lasix.  He will try Lasix again for now.  Before Bumex he was on Lasix 40 mg daily.  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   -QRS/QTc okay today  -If the patient has a repeat episode of VF despite increasing sotalol, we'll trial mexiletine due to his previous intolerance to amiodarone     Possible Claudication  -ABIs    Advanced COPD and pulmonary nodule  -Management per pulmonary and CT surgery teams    - Dietary and exercise counseling was provided during this visit  - Return in about 3 months (around 7/30/2018).    Vlad Bravo MD, MSc, [facility]  Interventional Cardiology  [facility] at [facility]

## 2018-05-08 RX ORDER — BUMETANIDE 2 MG/1
TABLET ORAL
Qty: 30 TABLET | Refills: 1 | Status: ON HOLD | OUTPATIENT
Start: 2018-05-08 | End: 2018-06-05

## 2018-05-22 ENCOUNTER — TELEPHONE (OUTPATIENT)
Dept: CARDIAC SURGERY | Facility: CLINIC | Age: 68
End: 2018-05-22

## 2018-05-22 NOTE — TELEPHONE ENCOUNTER
I called the patient about returning to clinic and if they'd received letters, verified address as well. Spouse states she doesn't recall getting anything in the mail but would check with patient and call back.

## 2018-05-23 ENCOUNTER — LAB (OUTPATIENT)
Dept: LAB | Facility: HOSPITAL | Age: 68
End: 2018-05-23

## 2018-05-23 ENCOUNTER — HOSPITAL ENCOUNTER (OUTPATIENT)
Dept: CARDIOLOGY | Facility: HOSPITAL | Age: 68
Discharge: HOME | End: 2018-05-23
Attending: INTERNAL MEDICINE | Admitting: INTERNAL MEDICINE

## 2018-05-23 ENCOUNTER — HOSPITAL ENCOUNTER (OUTPATIENT)
Dept: CARDIOLOGY | Facility: HOSPITAL | Age: 68
Discharge: HOME | End: 2018-05-23
Attending: INTERNAL MEDICINE

## 2018-05-23 DIAGNOSIS — I73.9 CLAUDICATION (HCC): ICD-10-CM

## 2018-05-23 DIAGNOSIS — I50.22 CHRONIC SYSTOLIC HEART FAILURE (HCC): ICD-10-CM

## 2018-05-23 DIAGNOSIS — I25.118 CORONARY ARTERY DISEASE OF NATIVE ARTERY OF NATIVE HEART WITH STABLE ANGINA PECTORIS (HCC): ICD-10-CM

## 2018-05-23 LAB
ANION GAP SERPL CALCULATED.3IONS-SCNC: 4 MMOL/L (ref 3–11)
ARTICHOKE IGE QN: 154 MG/DL (ref 0–130)
BH CV ECHO MEAS - AO MAX PG (FULL): 1.4 MMHG
BH CV ECHO MEAS - AO MAX PG: 4 MMHG
BH CV ECHO MEAS - AO MEAN PG (FULL): 1 MMHG
BH CV ECHO MEAS - AO MEAN PG: 2.3 MMHG
BH CV ECHO MEAS - AO V2 MAX: 102 CM/SEC
BH CV ECHO MEAS - AO V2 MEAN: 69.7 CM/SEC
BH CV ECHO MEAS - AO V2 VTI: 22 CM
BH CV ECHO MEAS - AVA(I,A): 2.3 CM^2
BH CV ECHO MEAS - AVA(I,D): 2.3 CM^2
BH CV ECHO MEAS - AVA(V,A): 2.4 CM^2
BH CV ECHO MEAS - AVA(V,D): 2.4 CM^2
BH CV ECHO MEAS - BSA(HAYCOCK): 2 M^2
BH CV ECHO MEAS - BSA: 1.9 M^2
BH CV ECHO MEAS - BZI_BMI: 26.6 KILOGRAMS/M^2
BH CV ECHO MEAS - BZI_METRIC_HEIGHT: 172.7 CM
BH CV ECHO MEAS - BZI_METRIC_WEIGHT: 79.4 KG
BH CV ECHO MEAS - CONTRAST EF (2CH): 31.2 ML/M^2
BH CV ECHO MEAS - CONTRAST EF 4CH: 50.4 ML/M^2
BH CV ECHO MEAS - EDV(CUBED): 132.6 ML
BH CV ECHO MEAS - EDV(MOD-SP2): 109 ML
BH CV ECHO MEAS - EDV(MOD-SP4): 119 ML
BH CV ECHO MEAS - EDV(TEICH): 123.7 ML
BH CV ECHO MEAS - EF(CUBED): 59.6 %
BH CV ECHO MEAS - EF(MOD-BP): 40 %
BH CV ECHO MEAS - EF(MOD-SP2): 31.2 %
BH CV ECHO MEAS - EF(MOD-SP4): 50.4 %
BH CV ECHO MEAS - EF(TEICH): 50.9 %
BH CV ECHO MEAS - ESV(CUBED): 53.6 ML
BH CV ECHO MEAS - ESV(MOD-SP2): 75 ML
BH CV ECHO MEAS - ESV(MOD-SP4): 59 ML
BH CV ECHO MEAS - ESV(TEICH): 60.8 ML
BH CV ECHO MEAS - FS: 26.1 %
BH CV ECHO MEAS - IVS/LVPW: 1.1
BH CV ECHO MEAS - IVSD: 1.1 CM
BH CV ECHO MEAS - LAT PEAK E' VEL: 8.6 CM/SEC
BH CV ECHO MEAS - LV DIASTOLIC VOL/BSA (35-75): 61.6 ML/M^2
BH CV ECHO MEAS - LV MASS(C)D: 202.4 GRAMS
BH CV ECHO MEAS - LV MASS(C)DI: 104.8 GRAMS/M^2
BH CV ECHO MEAS - LV MAX PG: 2.6 MMHG
BH CV ECHO MEAS - LV MEAN PG: 1.3 MMHG
BH CV ECHO MEAS - LV SYSTOLIC VOL/BSA (12-30): 30.6 ML/M^2
BH CV ECHO MEAS - LV V1 MAX: 80.9 CM/SEC
BH CV ECHO MEAS - LV V1 MEAN: 51.5 CM/SEC
BH CV ECHO MEAS - LV V1 VTI: 16.7 CM
BH CV ECHO MEAS - LVIDD: 5.1 CM
BH CV ECHO MEAS - LVIDS: 3.8 CM
BH CV ECHO MEAS - LVLD AP2: 7.7 CM
BH CV ECHO MEAS - LVLD AP4: 7.1 CM
BH CV ECHO MEAS - LVLS AP2: 7 CM
BH CV ECHO MEAS - LVLS AP4: 6.1 CM
BH CV ECHO MEAS - LVOT AREA (M): 3.1 CM^2
BH CV ECHO MEAS - LVOT AREA: 3 CM^2
BH CV ECHO MEAS - LVOT DIAM: 2 CM
BH CV ECHO MEAS - LVPWD: 1 CM
BH CV ECHO MEAS - MED PEAK E' VEL: 6.02 CM/SEC
BH CV ECHO MEAS - MV A MAX VEL: 100.2 CM/SEC
BH CV ECHO MEAS - MV DEC TIME: 0.21 SEC
BH CV ECHO MEAS - MV E MAX VEL: 85.4 CM/SEC
BH CV ECHO MEAS - MV E/A: 0.85
BH CV ECHO MEAS - MV MAX PG: 3.2 MMHG
BH CV ECHO MEAS - MV MEAN PG: 1.9 MMHG
BH CV ECHO MEAS - MV V2 MAX: 90.1 CM/SEC
BH CV ECHO MEAS - MV V2 MEAN: 66.2 CM/SEC
BH CV ECHO MEAS - MV V2 VTI: 24.9 CM
BH CV ECHO MEAS - MVA(VTI): 2 CM^2
BH CV ECHO MEAS - PA ACC SLOPE: 428.3 CM/SEC^2
BH CV ECHO MEAS - PA ACC TIME: 0.13 SEC
BH CV ECHO MEAS - PA MAX PG: 2.4 MMHG
BH CV ECHO MEAS - PA PR(ACCEL): 22 MMHG
BH CV ECHO MEAS - PA V2 MAX: 77.8 CM/SEC
BH CV ECHO MEAS - RAP SYSTOLE: 8 MMHG
BH CV ECHO MEAS - RVSP: 36 MMHG
BH CV ECHO MEAS - SI(CUBED): 40.9 ML/M^2
BH CV ECHO MEAS - SI(LVOT): 26.1 ML/M^2
BH CV ECHO MEAS - SI(MOD-SP2): 17.6 ML/M^2
BH CV ECHO MEAS - SI(MOD-SP4): 31.1 ML/M^2
BH CV ECHO MEAS - SI(TEICH): 32.6 ML/M^2
BH CV ECHO MEAS - SV(CUBED): 79 ML
BH CV ECHO MEAS - SV(LVOT): 50.4 ML
BH CV ECHO MEAS - SV(MOD-SP2): 34 ML
BH CV ECHO MEAS - SV(MOD-SP4): 60 ML
BH CV ECHO MEAS - SV(TEICH): 62.9 ML
BH CV ECHO MEAS - TAPSE (>1.6): 1.6 CM2
BH CV ECHO MEAS - TR MAX VEL: 263.2 CM/SEC
BH CV ECHO MEASUREMENTS AVERAGE E/E' RATIO: 11.68
BH CV LOWER ARTERIAL LEFT ABI RATIO: 0.71
BH CV LOWER ARTERIAL LEFT DORSALIS PEDIS SYS MAX: 77 MMHG
BH CV LOWER ARTERIAL LEFT POST EX ABI RATIO: 0.41
BH CV LOWER ARTERIAL LEFT POST TIBIAL SYS MAX: 89 MMHG
BH CV LOWER ARTERIAL RIGHT ABI RATIO: 0.89
BH CV LOWER ARTERIAL RIGHT DORSALIS PEDIS SYS MAX: 109 MMHG
BH CV LOWER ARTERIAL RIGHT POST EX ABI RATIO: 0.69
BH CV LOWER ARTERIAL RIGHT POST TIBIAL SYS MAX: 111 MMHG
BH CV VAS BP LEFT ARM: NORMAL MMHG
BH CV XLRA - RV BASE: 3.8 CM
BH CV XLRA - RV LENGTH: 7.1 CM
BH CV XLRA - RV MID: 3.5 CM
BH CV XLRA - TDI S': 6.19 CM/SEC
BUN BLD-MCNC: 14 MG/DL (ref 9–23)
BUN/CREAT SERPL: 12.7 (ref 7–25)
CALCIUM SPEC-SCNC: 9.3 MG/DL (ref 8.7–10.4)
CHLORIDE SERPL-SCNC: 105 MMOL/L (ref 99–109)
CHOLEST SERPL-MCNC: 187 MG/DL (ref 0–200)
CO2 SERPL-SCNC: 33 MMOL/L (ref 20–31)
CREAT BLD-MCNC: 1.1 MG/DL (ref 0.6–1.3)
GFR SERPL CREATININE-BSD FRML MDRD: 66 ML/MIN/1.73
GLUCOSE BLD-MCNC: 118 MG/DL (ref 70–100)
HDLC SERPL-MCNC: 38 MG/DL (ref 40–60)
LEFT ATRIUM VOLUME INDEX: 21.2 ML/M2
POTASSIUM BLD-SCNC: 4.8 MMOL/L (ref 3.5–5.5)
SODIUM BLD-SCNC: 142 MMOL/L (ref 132–146)
TRIGL SERPL-MCNC: 119 MG/DL (ref 0–150)
UPPER ARTERIAL LEFT ARM BRACHIAL SYS MAX: 124 MMHG
UPPER ARTERIAL RIGHT ARM BRACHIAL SYS MAX: 125 MMHG

## 2018-05-23 PROCEDURE — 80048 BASIC METABOLIC PNL TOTAL CA: CPT

## 2018-05-23 PROCEDURE — 93923 UPR/LXTR ART STDY 3+ LVLS: CPT

## 2018-05-23 PROCEDURE — 80061 LIPID PANEL: CPT

## 2018-05-23 PROCEDURE — 93306 TTE W/DOPPLER COMPLETE: CPT

## 2018-05-23 PROCEDURE — 93923 UPR/LXTR ART STDY 3+ LVLS: CPT | Performed by: INTERNAL MEDICINE

## 2018-05-23 PROCEDURE — 36415 COLL VENOUS BLD VENIPUNCTURE: CPT

## 2018-05-23 PROCEDURE — 93306 TTE W/DOPPLER COMPLETE: CPT | Performed by: INTERNAL MEDICINE

## 2018-05-29 ENCOUNTER — TELEPHONE (OUTPATIENT)
Dept: CARDIOLOGY | Facility: CLINIC | Age: 68
End: 2018-05-29

## 2018-05-29 DIAGNOSIS — R68.89 ABNORMAL ANKLE BRACHIAL INDEX (ABI): Primary | ICD-10-CM

## 2018-05-29 NOTE — TELEPHONE ENCOUNTER
I spoke with the patient and reviewed the results of both his labs as well as his ABIs.  I advised per MJS that peripheral angiography is recommended at this time.  After explaining the procedure to him, he is amenable to this plan.  I advised that the order has been placed, and procedure scheduling will contact him soon to set this up.  All questions and concerns addressed at this time.  Understanding verbalized.

## 2018-05-31 PROBLEM — R68.89 ABNORMAL ANKLE BRACHIAL INDEX (ABI): Status: ACTIVE | Noted: 2018-05-31

## 2018-06-01 ENCOUNTER — PREP FOR SURGERY (OUTPATIENT)
Dept: OTHER | Facility: HOSPITAL | Age: 68
End: 2018-06-01

## 2018-06-01 DIAGNOSIS — E11.65 TYPE 2 DIABETES MELLITUS WITH HYPERGLYCEMIA, UNSPECIFIED LONG TERM INSULIN USE STATUS: ICD-10-CM

## 2018-06-01 DIAGNOSIS — R68.89 ABNORMAL ANKLE BRACHIAL INDEX (ABI): Primary | ICD-10-CM

## 2018-06-01 RX ORDER — ASPIRIN 81 MG/1
324 TABLET, CHEWABLE ORAL ONCE
Status: CANCELLED | OUTPATIENT
Start: 2018-06-01 | End: 2018-06-01

## 2018-06-01 RX ORDER — SODIUM CHLORIDE 0.9 % (FLUSH) 0.9 %
1-10 SYRINGE (ML) INJECTION AS NEEDED
Status: CANCELLED | OUTPATIENT
Start: 2018-06-01

## 2018-06-01 RX ORDER — ASPIRIN 81 MG/1
81 TABLET ORAL DAILY
Status: CANCELLED | OUTPATIENT
Start: 2018-06-02

## 2018-06-01 RX ORDER — ACETAMINOPHEN 325 MG/1
650 TABLET ORAL EVERY 4 HOURS PRN
Status: CANCELLED | OUTPATIENT
Start: 2018-06-01

## 2018-06-05 ENCOUNTER — HOSPITAL ENCOUNTER (OUTPATIENT)
Facility: HOSPITAL | Age: 68
Discharge: HOME | End: 2018-06-05
Attending: INTERNAL MEDICINE | Admitting: INTERNAL MEDICINE

## 2018-06-05 VITALS
HEART RATE: 71 BPM | WEIGHT: 181.66 LBS | OXYGEN SATURATION: 94 % | RESPIRATION RATE: 18 BRPM | HEIGHT: 68 IN | TEMPERATURE: 97.5 F | BODY MASS INDEX: 27.53 KG/M2 | SYSTOLIC BLOOD PRESSURE: 132 MMHG | DIASTOLIC BLOOD PRESSURE: 115 MMHG

## 2018-06-05 DIAGNOSIS — R68.89 ABNORMAL ANKLE BRACHIAL INDEX (ABI): ICD-10-CM

## 2018-06-05 LAB
ALBUMIN SERPL-MCNC: 4.3 G/DL (ref 3.2–4.8)
ALBUMIN/GLOB SERPL: 1.6 G/DL (ref 1.5–2.5)
ALP SERPL-CCNC: 70 U/L (ref 25–100)
ALT SERPL W P-5'-P-CCNC: 18 U/L (ref 7–40)
ANION GAP SERPL CALCULATED.3IONS-SCNC: 2 MMOL/L (ref 3–11)
ARTICHOKE IGE QN: 149 MG/DL (ref 0–130)
AST SERPL-CCNC: 17 U/L (ref 0–33)
BILIRUB SERPL-MCNC: 0.5 MG/DL (ref 0.3–1.2)
BUN BLD-MCNC: 12 MG/DL (ref 9–23)
BUN/CREAT SERPL: 10.9 (ref 7–25)
CALCIUM SPEC-SCNC: 9.2 MG/DL (ref 8.7–10.4)
CHLORIDE SERPL-SCNC: 109 MMOL/L (ref 99–109)
CHOLEST SERPL-MCNC: 183 MG/DL (ref 0–200)
CO2 SERPL-SCNC: 30 MMOL/L (ref 20–31)
CREAT BLD-MCNC: 1.1 MG/DL (ref 0.6–1.3)
DEPRECATED RDW RBC AUTO: 46 FL (ref 37–54)
ERYTHROCYTE [DISTWIDTH] IN BLOOD BY AUTOMATED COUNT: 13.1 % (ref 11.3–14.5)
GFR SERPL CREATININE-BSD FRML MDRD: 66 ML/MIN/1.73
GLOBULIN UR ELPH-MCNC: 2.7 GM/DL
GLUCOSE BLD-MCNC: 114 MG/DL (ref 70–100)
GLUCOSE BLDC GLUCOMTR-MCNC: 100 MG/DL (ref 70–130)
HBA1C MFR BLD: 6.9 % (ref 4.8–5.6)
HCT VFR BLD AUTO: 48.4 % (ref 38.9–50.9)
HDLC SERPL-MCNC: 37 MG/DL (ref 40–60)
HGB BLD-MCNC: 15.8 G/DL (ref 13.1–17.5)
MCH RBC QN AUTO: 31.1 PG (ref 27–31)
MCHC RBC AUTO-ENTMCNC: 32.6 G/DL (ref 32–36)
MCV RBC AUTO: 95.3 FL (ref 80–99)
PLATELET # BLD AUTO: 154 10*3/MM3 (ref 150–450)
PMV BLD AUTO: 10.5 FL (ref 6–12)
POTASSIUM BLD-SCNC: 4.3 MMOL/L (ref 3.5–5.5)
PROT SERPL-MCNC: 7 G/DL (ref 5.7–8.2)
RBC # BLD AUTO: 5.08 10*6/MM3 (ref 4.2–5.76)
SODIUM BLD-SCNC: 141 MMOL/L (ref 132–146)
TRIGL SERPL-MCNC: 145 MG/DL (ref 0–150)
WBC NRBC COR # BLD: 8.04 10*3/MM3 (ref 3.5–10.8)

## 2018-06-05 PROCEDURE — 75630 X-RAY AORTA LEG ARTERIES: CPT | Performed by: INTERNAL MEDICINE

## 2018-06-05 PROCEDURE — C1769 GUIDE WIRE: HCPCS | Performed by: INTERNAL MEDICINE

## 2018-06-05 PROCEDURE — 36415 COLL VENOUS BLD VENIPUNCTURE: CPT

## 2018-06-05 PROCEDURE — C1887 CATHETER, GUIDING: HCPCS | Performed by: INTERNAL MEDICINE

## 2018-06-05 PROCEDURE — 82962 GLUCOSE BLOOD TEST: CPT

## 2018-06-05 PROCEDURE — 25010000002 FENTANYL CITRATE (PF) 100 MCG/2ML SOLUTION: Performed by: INTERNAL MEDICINE

## 2018-06-05 PROCEDURE — 25010000002 MIDAZOLAM PER 1 MG: Performed by: INTERNAL MEDICINE

## 2018-06-05 PROCEDURE — 85027 COMPLETE CBC AUTOMATED: CPT | Performed by: INTERNAL MEDICINE

## 2018-06-05 PROCEDURE — C1894 INTRO/SHEATH, NON-LASER: HCPCS | Performed by: INTERNAL MEDICINE

## 2018-06-05 PROCEDURE — C1760 CLOSURE DEV, VASC: HCPCS | Performed by: INTERNAL MEDICINE

## 2018-06-05 PROCEDURE — 80053 COMPREHEN METABOLIC PANEL: CPT | Performed by: INTERNAL MEDICINE

## 2018-06-05 PROCEDURE — 83036 HEMOGLOBIN GLYCOSYLATED A1C: CPT | Performed by: INTERNAL MEDICINE

## 2018-06-05 PROCEDURE — 0 IOPAMIDOL PER 1 ML: Performed by: INTERNAL MEDICINE

## 2018-06-05 PROCEDURE — 36245 INS CATH ABD/L-EXT ART 1ST: CPT | Performed by: INTERNAL MEDICINE

## 2018-06-05 PROCEDURE — 80061 LIPID PANEL: CPT | Performed by: NURSE PRACTITIONER

## 2018-06-05 RX ORDER — MIDAZOLAM HYDROCHLORIDE 1 MG/ML
INJECTION INTRAMUSCULAR; INTRAVENOUS AS NEEDED
Status: DISCONTINUED | OUTPATIENT
Start: 2018-06-05 | End: 2018-06-05 | Stop reason: HOSPADM

## 2018-06-05 RX ORDER — ACETAMINOPHEN 325 MG/1
650 TABLET ORAL EVERY 4 HOURS PRN
Status: DISCONTINUED | OUTPATIENT
Start: 2018-06-05 | End: 2018-06-05 | Stop reason: HOSPADM

## 2018-06-05 RX ORDER — SODIUM CHLORIDE 0.9 % (FLUSH) 0.9 %
1-10 SYRINGE (ML) INJECTION AS NEEDED
Status: DISCONTINUED | OUTPATIENT
Start: 2018-06-05 | End: 2018-06-05 | Stop reason: HOSPADM

## 2018-06-05 RX ORDER — ASPIRIN 81 MG/1
324 TABLET, CHEWABLE ORAL ONCE
Status: COMPLETED | OUTPATIENT
Start: 2018-06-05 | End: 2018-06-05

## 2018-06-05 RX ORDER — FENTANYL CITRATE 50 UG/ML
INJECTION, SOLUTION INTRAMUSCULAR; INTRAVENOUS AS NEEDED
Status: DISCONTINUED | OUTPATIENT
Start: 2018-06-05 | End: 2018-06-05 | Stop reason: HOSPADM

## 2018-06-05 RX ORDER — BUMETANIDE 2 MG/1
1 TABLET ORAL DAILY
Qty: 30 TABLET | Refills: 11 | Status: SHIPPED | OUTPATIENT
Start: 2018-06-05 | End: 2018-08-27 | Stop reason: SDUPTHER

## 2018-06-05 RX ORDER — SODIUM CHLORIDE 9 MG/ML
3 INJECTION, SOLUTION INTRAVENOUS ONCE
Status: COMPLETED | OUTPATIENT
Start: 2018-06-05 | End: 2018-06-05

## 2018-06-05 RX ORDER — LIDOCAINE HYDROCHLORIDE 10 MG/ML
INJECTION, SOLUTION INFILTRATION; PERINEURAL AS NEEDED
Status: DISCONTINUED | OUTPATIENT
Start: 2018-06-05 | End: 2018-06-05 | Stop reason: HOSPADM

## 2018-06-05 RX ORDER — ASPIRIN 81 MG/1
81 TABLET ORAL DAILY
Status: DISCONTINUED | OUTPATIENT
Start: 2018-06-06 | End: 2018-06-05 | Stop reason: HOSPADM

## 2018-06-05 RX ADMIN — SODIUM CHLORIDE 3 ML/KG/HR: 9 INJECTION, SOLUTION INTRAVENOUS at 08:45

## 2018-06-05 RX ADMIN — ASPIRIN 81 MG CHEWABLE TABLET 324 MG: 81 TABLET CHEWABLE at 08:45

## 2018-06-13 ENCOUNTER — TELEPHONE (OUTPATIENT)
Dept: CARDIOLOGY | Facility: CLINIC | Age: 68
End: 2018-06-13

## 2018-06-13 NOTE — TELEPHONE ENCOUNTER
Pt will need to be scheduled first week in July (for a peripheral intervention) to accommodate both pt and Dr. Bravo. Pt has family event 6/22 and Lawrence out last week of June. Dr. Bravo is going to talk to Sharla in cath lab to ensure all catheters needed are available at that time. I don't see an order placed. I will send to [facility] as well. I spoke with wife Amanda and she is agreeable to this plan.

## 2018-06-14 DIAGNOSIS — I73.9 CLAUDICATION (HCC): Primary | ICD-10-CM

## 2018-06-26 PROBLEM — I73.9 CLAUDICATION (HCC): Status: ACTIVE | Noted: 2018-06-26

## 2018-07-03 ENCOUNTER — PREP FOR SURGERY (OUTPATIENT)
Dept: OTHER | Facility: HOSPITAL | Age: 68
End: 2018-07-03

## 2018-07-03 DIAGNOSIS — I73.9 CLAUDICATION (HCC): Primary | ICD-10-CM

## 2018-07-03 DIAGNOSIS — Z01.818 PREOPERATIVE TESTING: ICD-10-CM

## 2018-07-03 RX ORDER — SODIUM CHLORIDE 0.9 % (FLUSH) 0.9 %
1-10 SYRINGE (ML) INJECTION AS NEEDED
Status: CANCELLED | OUTPATIENT
Start: 2018-07-03

## 2018-07-03 RX ORDER — ASPIRIN 81 MG/1
81 TABLET ORAL DAILY
Status: CANCELLED | OUTPATIENT
Start: 2018-07-04

## 2018-07-03 RX ORDER — ASPIRIN 81 MG/1
324 TABLET, CHEWABLE ORAL ONCE
Status: CANCELLED | OUTPATIENT
Start: 2018-07-03 | End: 2018-07-03

## 2018-07-03 RX ORDER — ACETAMINOPHEN 325 MG/1
650 TABLET ORAL EVERY 4 HOURS PRN
Status: CANCELLED | OUTPATIENT
Start: 2018-07-03

## 2018-07-03 RX ORDER — SODIUM CHLORIDE 9 MG/ML
3 INJECTION, SOLUTION INTRAVENOUS CONTINUOUS
Status: CANCELLED | OUTPATIENT
Start: 2018-07-03 | End: 2018-07-03

## 2018-07-04 ENCOUNTER — APPOINTMENT (OUTPATIENT)
Dept: PREADMISSION TESTING | Facility: HOSPITAL | Age: 68
End: 2018-07-04

## 2018-07-04 DIAGNOSIS — Z01.818 PREOPERATIVE TESTING: ICD-10-CM

## 2018-07-04 DIAGNOSIS — I73.9 CLAUDICATION (HCC): ICD-10-CM

## 2018-07-04 LAB
ALBUMIN SERPL-MCNC: 4.31 G/DL (ref 3.2–4.8)
ALBUMIN/GLOB SERPL: 1.7 G/DL (ref 1.5–2.5)
ALP SERPL-CCNC: 75 U/L (ref 25–100)
ALT SERPL W P-5'-P-CCNC: 17 U/L (ref 7–40)
ANION GAP SERPL CALCULATED.3IONS-SCNC: 7 MMOL/L (ref 3–11)
AST SERPL-CCNC: 15 U/L (ref 0–33)
BILIRUB SERPL-MCNC: 0.5 MG/DL (ref 0.3–1.2)
BUN BLD-MCNC: 16 MG/DL (ref 9–23)
BUN/CREAT SERPL: 13.7 (ref 7–25)
CALCIUM SPEC-SCNC: 9.1 MG/DL (ref 8.7–10.4)
CHLORIDE SERPL-SCNC: 103 MMOL/L (ref 99–109)
CO2 SERPL-SCNC: 29 MMOL/L (ref 20–31)
CREAT BLD-MCNC: 1.17 MG/DL (ref 0.6–1.3)
DEPRECATED RDW RBC AUTO: 44.7 FL (ref 37–54)
ERYTHROCYTE [DISTWIDTH] IN BLOOD BY AUTOMATED COUNT: 12.9 % (ref 11.3–14.5)
GFR SERPL CREATININE-BSD FRML MDRD: 62 ML/MIN/1.73
GLOBULIN UR ELPH-MCNC: 2.6 GM/DL
GLUCOSE BLD-MCNC: 191 MG/DL (ref 70–100)
HCT VFR BLD AUTO: 48.3 % (ref 38.9–50.9)
HGB BLD-MCNC: 16 G/DL (ref 13.1–17.5)
MCH RBC QN AUTO: 31.2 PG (ref 27–31)
MCHC RBC AUTO-ENTMCNC: 33.1 G/DL (ref 32–36)
MCV RBC AUTO: 94.2 FL (ref 80–99)
PLATELET # BLD AUTO: 132 10*3/MM3 (ref 150–450)
PMV BLD AUTO: 11.2 FL (ref 6–12)
POTASSIUM BLD-SCNC: 4.1 MMOL/L (ref 3.5–5.5)
PROT SERPL-MCNC: 6.9 G/DL (ref 5.7–8.2)
RBC # BLD AUTO: 5.13 10*6/MM3 (ref 4.2–5.76)
SODIUM BLD-SCNC: 139 MMOL/L (ref 132–146)
WBC NRBC COR # BLD: 6.93 10*3/MM3 (ref 3.5–10.8)

## 2018-07-04 PROCEDURE — 80053 COMPREHEN METABOLIC PANEL: CPT | Performed by: NURSE PRACTITIONER

## 2018-07-04 PROCEDURE — 36415 COLL VENOUS BLD VENIPUNCTURE: CPT

## 2018-07-04 PROCEDURE — 85027 COMPLETE CBC AUTOMATED: CPT | Performed by: NURSE PRACTITIONER

## 2018-07-04 NOTE — DISCHARGE INSTRUCTIONS
"Dear Patient,    Do NOT eat, drink, or smoke after midnight the night before your procedure.   Take your medications as instructed by your doctor.    Glasses and jewelry may be worn, but dentures must be removed prior to your procedure.    Leave any items you consider valuable at home.      MORNING of your Procedure, please bring the following:     -Photo ID and insurance card(s)    -ALL medications in their ORIGINAL CONTAINERS    -Co-pay and/or deductible required by your insurance   -Copy of living will or power of  document (if not brought to    Pre-Admission Testing department)   -CPAP mask and tubing, not your machine (if applicable)    -Relaxation aids (music, books, magazines)   -Skin Prep Instruction Sheet (if applicable)   -Relaxation Aids    Check in on the 2nd floor in the [facility].  Your procedure will be performed in the cath lab or EP lab.  During your procedure, your family will wait in the cath lab waiting area where you checked in.      Need to make arrangements for transportation prior to discharge.    A handout regarding \"Heart Healthy Eating\" was provided today to encourage healthy eating habits.    Booklet published by [employer] was given in Pre-Admission testing.  This booklet is for informational purposes only.  If you have any questions about your procedure, please speak with your physician.      Please note:  If you are scheduled to have one of the following procedures: Pulmonary Vein Ablation, Lead Extraction, MitraClip, Cerebral Coilings or Embolization, please let your family know that after your procedure you will be going to recovery unit on the 2nd floor of the [facility].  When the physician is finished speaking with your family after your procedure is completed, your family will be directed or escorted to the surgery waiting area in the [facility].  This is where your family will wait until you are given a room assignment and then your family will be directed to the " appropriate unit.

## 2018-07-06 ENCOUNTER — HOSPITAL ENCOUNTER (OUTPATIENT)
Facility: HOSPITAL | Age: 68
Discharge: HOME | End: 2018-07-07
Attending: INTERNAL MEDICINE | Admitting: INTERNAL MEDICINE

## 2018-07-06 DIAGNOSIS — I73.9 CLAUDICATION (HCC): ICD-10-CM

## 2018-07-06 LAB
ACT BLD: 230 SECONDS (ref 82–152)
ACT BLD: 285 SECONDS (ref 82–152)
GLUCOSE BLDC GLUCOMTR-MCNC: 142 MG/DL (ref 70–130)

## 2018-07-06 PROCEDURE — C1760 CLOSURE DEV, VASC: HCPCS | Performed by: INTERNAL MEDICINE

## 2018-07-06 PROCEDURE — 75710 ARTERY X-RAYS ARM/LEG: CPT | Performed by: INTERNAL MEDICINE

## 2018-07-06 PROCEDURE — 37799 UNLISTED PX VASCULAR SURGERY: CPT

## 2018-07-06 PROCEDURE — A9270 NON-COVERED ITEM OR SERVICE: HCPCS | Performed by: INTERNAL MEDICINE

## 2018-07-06 PROCEDURE — 63710000001 SACUBITRIL-VALSARTAN 49-51 MG TABLET: Performed by: INTERNAL MEDICINE

## 2018-07-06 PROCEDURE — C1769 GUIDE WIRE: HCPCS | Performed by: INTERNAL MEDICINE

## 2018-07-06 PROCEDURE — 37252 INTRVASC US NONCORONARY 1ST: CPT

## 2018-07-06 PROCEDURE — 82962 GLUCOSE BLOOD TEST: CPT

## 2018-07-06 PROCEDURE — 25010000002 FENTANYL CITRATE (PF) 100 MCG/2ML SOLUTION: Performed by: INTERNAL MEDICINE

## 2018-07-06 PROCEDURE — 63710000001 TIZANIDINE 4 MG TABLET: Performed by: INTERNAL MEDICINE

## 2018-07-06 PROCEDURE — 63710000001 PRASUGREL 5 MG TABLET: Performed by: INTERNAL MEDICINE

## 2018-07-06 PROCEDURE — 37226 PR REVSC OPN/PRQ FEM/POP W/STNT/ANGIOP SM VSL: CPT | Performed by: INTERNAL MEDICINE

## 2018-07-06 PROCEDURE — 63710000001 SOTALOL 80 MG TABLET: Performed by: INTERNAL MEDICINE

## 2018-07-06 PROCEDURE — C1725 CATH, TRANSLUMIN NON-LASER: HCPCS | Performed by: INTERNAL MEDICINE

## 2018-07-06 PROCEDURE — C1894 INTRO/SHEATH, NON-LASER: HCPCS | Performed by: INTERNAL MEDICINE

## 2018-07-06 PROCEDURE — 25010000002 HEPARIN (PORCINE) PER 1000 UNITS: Performed by: INTERNAL MEDICINE

## 2018-07-06 PROCEDURE — 85347 COAGULATION TIME ACTIVATED: CPT

## 2018-07-06 PROCEDURE — C1874 STENT, COATED/COV W/DEL SYS: HCPCS | Performed by: INTERNAL MEDICINE

## 2018-07-06 PROCEDURE — C1887 CATHETER, GUIDING: HCPCS | Performed by: INTERNAL MEDICINE

## 2018-07-06 PROCEDURE — 25010000002 MIDAZOLAM PER 1 MG: Performed by: INTERNAL MEDICINE

## 2018-07-06 PROCEDURE — 0 IOPAMIDOL PER 1 ML: Performed by: INTERNAL MEDICINE

## 2018-07-06 PROCEDURE — C1753 CATH, INTRAVAS ULTRASOUND: HCPCS | Performed by: INTERNAL MEDICINE

## 2018-07-06 PROCEDURE — 37252 INTRVASC US NONCORONARY 1ST: CPT | Performed by: INTERNAL MEDICINE

## 2018-07-06 PROCEDURE — 63710000001 ROPINIROLE 2 MG TABLET: Performed by: INTERNAL MEDICINE

## 2018-07-06 DEVICE — STNT PERIPH ZILVER PTX 6F 6X80MM 125CM BX/1: Type: IMPLANTABLE DEVICE | Status: FUNCTIONAL

## 2018-07-06 RX ORDER — SOTALOL HYDROCHLORIDE 80 MG/1
120 TABLET ORAL EVERY 12 HOURS SCHEDULED
Status: DISCONTINUED | OUTPATIENT
Start: 2018-07-06 | End: 2018-07-07 | Stop reason: HOSPADM

## 2018-07-06 RX ORDER — PRASUGREL 10 MG/1
10 TABLET, FILM COATED ORAL DAILY
Status: DISCONTINUED | OUTPATIENT
Start: 2018-07-07 | End: 2018-07-07 | Stop reason: HOSPADM

## 2018-07-06 RX ORDER — GUAIFENESIN 600 MG/1
600 TABLET, EXTENDED RELEASE ORAL AS NEEDED
Status: DISCONTINUED | OUTPATIENT
Start: 2018-07-06 | End: 2018-07-07 | Stop reason: HOSPADM

## 2018-07-06 RX ORDER — IPRATROPIUM BROMIDE AND ALBUTEROL SULFATE 2.5; .5 MG/3ML; MG/3ML
3 SOLUTION RESPIRATORY (INHALATION) EVERY 4 HOURS PRN
Status: DISCONTINUED | OUTPATIENT
Start: 2018-07-06 | End: 2018-07-07 | Stop reason: HOSPADM

## 2018-07-06 RX ORDER — BUDESONIDE AND FORMOTEROL FUMARATE DIHYDRATE 80; 4.5 UG/1; UG/1
2 AEROSOL RESPIRATORY (INHALATION)
Status: DISCONTINUED | OUTPATIENT
Start: 2018-07-06 | End: 2018-07-07 | Stop reason: HOSPADM

## 2018-07-06 RX ORDER — SODIUM CHLORIDE 0.9 % (FLUSH) 0.9 %
1-10 SYRINGE (ML) INJECTION AS NEEDED
Status: DISCONTINUED | OUTPATIENT
Start: 2018-07-06 | End: 2018-07-06 | Stop reason: HOSPADM

## 2018-07-06 RX ORDER — CARVEDILOL 12.5 MG/1
12.5 TABLET ORAL 2 TIMES DAILY WITH MEALS
Status: DISCONTINUED | OUTPATIENT
Start: 2018-07-07 | End: 2018-07-07 | Stop reason: HOSPADM

## 2018-07-06 RX ORDER — ASPIRIN 81 MG/1
81 TABLET, CHEWABLE ORAL DAILY
Status: DISCONTINUED | OUTPATIENT
Start: 2018-07-07 | End: 2018-07-07 | Stop reason: HOSPADM

## 2018-07-06 RX ORDER — SODIUM CHLORIDE 9 MG/ML
3 INJECTION, SOLUTION INTRAVENOUS CONTINUOUS
Status: ACTIVE | OUTPATIENT
Start: 2018-07-06 | End: 2018-07-06

## 2018-07-06 RX ORDER — NITROGLYCERIN 0.4 MG/1
0.4 TABLET SUBLINGUAL
Status: DISCONTINUED | OUTPATIENT
Start: 2018-07-06 | End: 2018-07-07 | Stop reason: HOSPADM

## 2018-07-06 RX ORDER — ASPIRIN 81 MG/1
81 TABLET ORAL DAILY
Status: DISCONTINUED | OUTPATIENT
Start: 2018-07-07 | End: 2018-07-06 | Stop reason: HOSPADM

## 2018-07-06 RX ORDER — ROPINIROLE 2 MG/1
2 TABLET, FILM COATED ORAL NIGHTLY
Status: DISCONTINUED | OUTPATIENT
Start: 2018-07-06 | End: 2018-07-07 | Stop reason: HOSPADM

## 2018-07-06 RX ORDER — LIDOCAINE HYDROCHLORIDE 10 MG/ML
INJECTION, SOLUTION EPIDURAL; INFILTRATION; INTRACAUDAL; PERINEURAL AS NEEDED
Status: DISCONTINUED | OUTPATIENT
Start: 2018-07-06 | End: 2018-07-06 | Stop reason: HOSPADM

## 2018-07-06 RX ORDER — ASPIRIN 81 MG/1
324 TABLET, CHEWABLE ORAL ONCE
Status: COMPLETED | OUTPATIENT
Start: 2018-07-06 | End: 2018-07-06

## 2018-07-06 RX ORDER — SPIRONOLACTONE 25 MG/1
25 TABLET ORAL DAILY
Status: DISCONTINUED | OUTPATIENT
Start: 2018-07-07 | End: 2018-07-07 | Stop reason: HOSPADM

## 2018-07-06 RX ORDER — BUMETANIDE 1 MG/1
1 TABLET ORAL DAILY
Status: DISCONTINUED | OUTPATIENT
Start: 2018-07-07 | End: 2018-07-07 | Stop reason: HOSPADM

## 2018-07-06 RX ORDER — ACETAMINOPHEN 325 MG/1
650 TABLET ORAL EVERY 4 HOURS PRN
Status: DISCONTINUED | OUTPATIENT
Start: 2018-07-06 | End: 2018-07-06 | Stop reason: HOSPADM

## 2018-07-06 RX ORDER — TIZANIDINE 4 MG/1
4 TABLET ORAL NIGHTLY
Status: DISCONTINUED | OUTPATIENT
Start: 2018-07-06 | End: 2018-07-07 | Stop reason: HOSPADM

## 2018-07-06 RX ORDER — PRASUGREL 5 MG/1
TABLET, FILM COATED ORAL AS NEEDED
Status: DISCONTINUED | OUTPATIENT
Start: 2018-07-06 | End: 2018-07-06 | Stop reason: HOSPADM

## 2018-07-06 RX ORDER — HEPARIN SODIUM 1000 [USP'U]/ML
INJECTION, SOLUTION INTRAVENOUS; SUBCUTANEOUS AS NEEDED
Status: DISCONTINUED | OUTPATIENT
Start: 2018-07-06 | End: 2018-07-06 | Stop reason: HOSPADM

## 2018-07-06 RX ORDER — MIDAZOLAM HYDROCHLORIDE 1 MG/ML
INJECTION INTRAMUSCULAR; INTRAVENOUS AS NEEDED
Status: DISCONTINUED | OUTPATIENT
Start: 2018-07-06 | End: 2018-07-06 | Stop reason: HOSPADM

## 2018-07-06 RX ORDER — FENTANYL CITRATE 50 UG/ML
INJECTION, SOLUTION INTRAMUSCULAR; INTRAVENOUS AS NEEDED
Status: DISCONTINUED | OUTPATIENT
Start: 2018-07-06 | End: 2018-07-06 | Stop reason: HOSPADM

## 2018-07-06 RX ADMIN — SACUBITRIL AND VALSARTAN 1 TABLET: 49; 51 TABLET, FILM COATED ORAL at 20:44

## 2018-07-06 RX ADMIN — SOTALOL HYDROCHLORIDE 120 MG: 80 TABLET ORAL at 20:43

## 2018-07-06 RX ADMIN — TIZANIDINE 4 MG: 4 TABLET ORAL at 20:44

## 2018-07-06 RX ADMIN — ROPINIROLE HYDROCHLORIDE 2 MG: 2 TABLET, FILM COATED ORAL at 20:44

## 2018-07-06 RX ADMIN — ASPIRIN 81 MG 324 MG: 81 TABLET ORAL at 09:40

## 2018-07-06 RX ADMIN — SODIUM CHLORIDE 3 ML/KG/HR: 9 INJECTION, SOLUTION INTRAVENOUS at 09:41

## 2018-07-06 NOTE — PLAN OF CARE
Problem: Patient Care Overview  Goal: Plan of Care Review  Outcome: Ongoing (interventions implemented as appropriate)   07/06/18 3800   Coping/Psychosocial   Plan of Care Reviewed With patient   Plan of Care Review   Progress improving   OTHER   Outcome Summary pt arrived to floor s/p AA with runoff. Left groin site remains clean/dry/intact. Paced on the monitor with all VSS. Will continue to monitor.        Problem: Tissue Perfusion, Ineffective Peripheral (Adult)  Goal: Identify Related Risk Factors and Signs and Symptoms  Outcome: Ongoing (interventions implemented as appropriate)    Goal: Adequate Tissue Perfusion  Outcome: Ongoing (interventions implemented as appropriate)

## 2018-07-06 NOTE — H&P
[facility] AT [address], [address]  [place], [place], [address] [phone]  [email or web address]           History & Physical    Patient Care Team:  Patient Care Team:  Lyndsey Ahumada MD as PCP - General (Family Medicine)    Chief Complaint: Claudication           HPI:    Derek Morris Jr. is a 67 y.o. male.  History of Present Illness    The patient has a history of severe ischemic heart disease with prior bypass and most recently PCI in 1/2017, ischemic cardiomyopathy with an EF of about 41-45%, dual-chamber ICD, long smoking history, COPD, hypertension, diabetes, and hyperlipidemia.    The patient presents to [facility] for an outpatient AA with run off with staged intervention.  He continues to have claudication symptoms with walking.  His left leg remains more painful than the right.  He has complaints of lower extremity edema with the left being > than the right. He denies any changes to his moderate shortness of breath and continue to smoke. He denies any current chest pain or shortness of breath.    PFSH:  Problem List/PMH:  1. Ischemic heart disease:  a. MI x3, 1990, 1993, and 1995.  b. CABG x3 by Dr. Noble Cuello, 1995:  SVG to OM1 and OM2, SVG to PDA.  c. Normal LV.  d. Questionable LAD stent, incomplete  database.  e. STEMI, April 2009.  f. LHC by Dr. Glen Baker, April 2009:  EF approximately 40%, 1 of 3 patent grafts; data deficit.  g. LHC by Dr. Bird, 07/30/2010:  EF 30%, occluded vein graft to occluded RCA, minor plaque in LAD, 70% SVG -  circumflex treated with 4 x 18 mm Cypher KAILASH.  h. Echocardiogram, 02/08/2012:  EF 40% to 45%, diastolic dysfunction, mild TR.  i. Echocardiogram, 01/29/2015:  EF 30% to 35%, mild TR.  j. Jan 2017 EF 35-40%  k. LHC in Jan 2017 as noted in the results below. Resolute Integrity KAILASH to D2  l. Echocardiogram 5/23/2108: EF 41-45%, trace-mild AR.  2. Hypertension.  3. Chronic systolic CHF.  4. Nonsustained VT:  a. Inducible VT by  EPS, January 2002.  b. Pacesetter ICD implant by Dr. Dickinson, January 2002.  c. Chronic amiodarone, discontinued fall of 2010:  Cannot  exclude amiodarone pulmonary toxicity.  d. ICD generator change-out with enrollment in the ANALYZE ST SEGMENT protocol, 08/16/2012.  5.  Hyperlipidemia; intolerance to multiple statins.  6. Type 2 diabetes mellitus.  7. Chronic tobacco; home O2.  8. Surgical history:  a.  CABG    Patient Active Problem List   Diagnosis   • Ischemic heart disease   • Hypertension   • Chronic systolic heart failure (CMS/[facility])   • VT (ventricular tachycardia) (CMS/[facility])   • Hyperlipidemia   • Type 2 diabetes mellitus (CMS/[facility])   • Tobacco dependence   • Coronary artery disease of native artery with stable angina pectoris (CMS/[facility])   • Cardiomyopathy (CMS/[facility])   • Shortness of breath   • COPD (chronic obstructive pulmonary disease) (CMS/[facility])   • Lung nodule   • Abnormal ankle brachial index (PATT)   • Claudication (CMS/[facility])       No current facility-administered medications on file prior to encounter.      Current Outpatient Prescriptions on File Prior to Encounter   Medication Sig Dispense Refill   • albuterol (PROVENTIL HFA;VENTOLIN HFA) 108 (90 Base) MCG/ACT inhaler Inhale 2 puffs Every 4 (Four) Hours As Needed for Wheezing.     • aspirin 81 MG tablet Take 1 tablet by mouth Daily. 30 tablet 11   • BREO ELLIPTA 100-25 MCG/INH aerosol powder  Inhale 1 puff Daily.  5   • bumetanide (BUMEX) 2 MG tablet Take 0.5 tablets by mouth Daily. 30 tablet 11   • carvedilol (COREG) 12.5 MG tablet Take 12.5 mg by mouth 2 (Two) Times a Day With Meals.     • ezetimibe (ZETIA) 10 MG tablet Take 1 tablet by mouth Daily. 30 tablet 11   • GLIPIZIDE XL 2.5 MG 24 hr tablet Take 2.5 mg by mouth Daily.  11   • ipratropium-albuterol (DUO-NEB) 0.5-2.5 mg/mL nebulizer Every 4 (Four) Hours As Needed.  3   • MUCINEX 600 MG 12 hr tablet Take 600 mg by mouth As Needed.  0   • prasugrel (EFFIENT) 10 MG tablet TAKE 1 TABLET BY MOUTH  DAILY. 30 tablet 11   • rOPINIRole (REQUIP) 2 MG tablet Take 2 mg by mouth every night.     • sacubitril-valsartan (ENTRESTO) 49-51 MG tablet Take 1 tablet by mouth 2 (Two) Times a Day. 60 tablet 11   • Sotalol HCl, AF, 120 MG tablet Take 120 mg by mouth 2 (Two) Times a Day. 60 each 11   • spironolactone (ALDACTONE) 25 MG tablet Take 25 mg by mouth daily.     • tiZANidine (ZANAFLEX) 4 MG tablet Take 4 mg by mouth Every Night.  4   • Umeclidinium Bromide 62.5 MCG/INH aerosol powder  Inhale 1 puff Daily.     • nitroglycerin (NITROSTAT) 0.4 MG SL tablet Place 0.4 mg under the tongue Every 5 (Five) Minutes As Needed for Chest Pain. Take no more than 3 doses in 15 minutes.       Allergies   Allergen Reactions   • Crestor [Rosuvastatin Calcium] Myalgia   • Lipitor [Atorvastatin] Myalgia     Joint pain myalgias   • Plavix [Clopidogrel Bisulfate] Unknown (See Comments)      resistant.       Social History     Social History   • Marital status:    • Number of children: 2     Occupational History   •       Retired     Social History Main Topics   • Smoking status: Current Every Day Smoker     Packs/day: 2.00     Years: 45.00     Types: Cigarettes   • Smokeless tobacco: Never Used   • Alcohol use No   • Drug use: Yes     Types: Marijuana      Comment: occasional use - maybe once a month    • Sexual activity: Defer     Other Topics Concern   • Not on file     Social History Narrative    Caffeine use: 5-6 serving daily.              Family History   Problem Relation Age of Onset   • Hypertension Mother    • Sick sinus syndrome Father    • Coronary artery disease Father        Review of Systems:  Positive for claudication, shortness of breath, lower extremity edema.  All other systems reviewed are negative.         Objective:     Vital Sign Min/Max for last 24 hours  Temp  Min: 97.4 °F (36.3 °C)  Max: 97.4 °F (36.3 °C)   BP  Min: 98/63  Max: 119/70   Pulse  Min: 70  Max: 70   Resp  Min: 20  Max: 20   SpO2   Min: 95 %  Max: 96 %   No Data Recorded    No intake or output data in the 24 hours ending 07/06/18 1001        Vitals:    07/06/18 0918   BP: 98/63   Pulse: 70   Resp: 20   Temp: 97.4 °F (36.3 °C)   SpO2: 96%       CONSTITUTIONAL: Well-nourished. In no acute distress.   SKIN: Warm and dry. No rashes noted  HEENT: Head is normocephalic and atraumatic. Mucous membranes are pink and moist.   NECK: Supple without masses or thyromegaly. There is no jugular venous distention at 30°.  LUNGS: Normal effort. Clear to auscultation bilaterally without wheezing, rhonchi, or rales noted.   CARDIOVASCULAR: The heart has a regular rate with a normal S1 and S2. There is no murmur, gallop, rub, or click appreciated.   PERIPHERAL VASCULAR: Carotid upstroke is 2+ bilaterally and without bruits. Radial pulses are 2+ bilaterally. Posterior tibial pulses are dopplerable. There is mild left lower extremity edema.   ABDOMEN: Soft with no tenderness with palpitation. No hepatosplenomegaly  MUSCULOSKELETAL:  No digital cyanosis  NEUROLOGICAL: Nonfocal.  PSYCHIATRIC: Alert, orientated x 3, appropriate affect     Labs:  Lab Results   Component Value Date    TROPONINI <0.200 08/07/2016       Glucose   Date Value Ref Range Status   07/04/2018 191 (H) 70 - 100 mg/dL Final     BUN   Date Value Ref Range Status   07/04/2018 16 9 - 23 mg/dL Final     Creatinine   Date Value Ref Range Status   07/04/2018 1.17 0.60 - 1.30 mg/dL Final     Sodium   Date Value Ref Range Status   07/04/2018 139 132 - 146 mmol/L Final     Potassium   Date Value Ref Range Status   07/04/2018 4.1 3.5 - 5.5 mmol/L Final     Chloride   Date Value Ref Range Status   07/04/2018 103 99 - 109 mmol/L Final     CO2   Date Value Ref Range Status   07/04/2018 29.0 20.0 - 31.0 mmol/L Final     Calcium   Date Value Ref Range Status   07/04/2018 9.1 8.7 - 10.4 mg/dL Final     Total Protein   Date Value Ref Range Status   07/04/2018 6.9 5.7 - 8.2 g/dL Final     Albumin   Date Value Ref  Range Status   07/04/2018 4.31 3.20 - 4.80 g/dL Final     ALT (SGPT)   Date Value Ref Range Status   07/04/2018 17 7 - 40 U/L Final     AST (SGOT)   Date Value Ref Range Status   07/04/2018 15 0 - 33 U/L Final     Alkaline Phosphatase   Date Value Ref Range Status   07/04/2018 75 25 - 100 U/L Final     Total Bilirubin   Date Value Ref Range Status   07/04/2018 0.5 0.3 - 1.2 mg/dL Final     eGFR Non  Amer   Date Value Ref Range Status   07/04/2018 62 >60 mL/min/1.73 Final     A/G Ratio   Date Value Ref Range Status   07/04/2018 1.7 1.5 - 2.5 g/dL Final     BUN/Creatinine Ratio   Date Value Ref Range Status   07/04/2018 13.7 7.0 - 25.0 Final     Anion Gap   Date Value Ref Range Status   07/04/2018 7.0 3.0 - 11.0 mmol/L Final       Lab Results   Component Value Date    CHOL 183 06/05/2018    CHOL 187 05/23/2018    CHOL 170 01/26/2017     Lab Results   Component Value Date    TRIG 145 06/05/2018    TRIG 119 05/23/2018    TRIG 113 01/26/2017     Lab Results   Component Value Date    HDL 37 (L) 06/05/2018    HDL 38 (L) 05/23/2018    HDL 60 01/26/2017     No components found for: LDLCALC  No results found for: VLDL  No results found for: LDLHDL    WBC   Date Value Ref Range Status   07/04/2018 6.93 3.50 - 10.80 10*3/mm3 Final     RBC   Date Value Ref Range Status   07/04/2018 5.13 4.20 - 5.76 10*6/mm3 Final     Hemoglobin   Date Value Ref Range Status   07/04/2018 16.0 13.1 - 17.5 g/dL Final     Hematocrit   Date Value Ref Range Status   07/04/2018 48.3 38.9 - 50.9 % Final     MCV   Date Value Ref Range Status   07/04/2018 94.2 80.0 - 99.0 fL Final     MCH   Date Value Ref Range Status   07/04/2018 31.2 (H) 27.0 - 31.0 pg Final     MCHC   Date Value Ref Range Status   07/04/2018 33.1 32.0 - 36.0 g/dL Final     RDW   Date Value Ref Range Status   07/04/2018 12.9 11.3 - 14.5 % Final     RDW-SD   Date Value Ref Range Status   07/04/2018 44.7 37.0 - 54.0 fl Final     MPV   Date Value Ref Range Status   07/04/2018 11.2  6.0 - 12.0 fL Final     Platelets   Date Value Ref Range Status   07/04/2018 132 (L) 150 - 450 10*3/mm3 Final         Diagnostic Data:      [facility] 1/2017  · Severe multivessel disease including a previously known occluded proximal left circumflex and mid right coronary artery.  There was a de hao 60% discrete stenosis in a medium sized second diagonal that supplies much of the anterolateral wall that was found to be functionally significant with an iFR of 0.60.  The LAD was otherwise patent, the SVG to a distal OM was widely patent, the circumflex artery is supplied by septal collaterals, the RCA was supplied by moderate graft to OM to right collaterals.  · Mild functionally significant lesion was unlikely to be the only contributor to the patient's ventricular fibrillation, it was deemed function is significant and reasonable to revascularize due to the patient's severe CAD, recent ventricular arrhythmia, severe cardiomyopathy.  The lesion is now status post resolute integrity 2.25 x 14 mm drug-eluting stent placement with 0% residual stenosis.  · Left ventricular ejection fraction of 49% with an akinetic inferior wall and hypokinesis of the anterolateral wall    TTE 5/2018  · Left ventricular systolic function is mildly decreased. Estimated EF appears to be in the range of 41 - 45%.  · The following left ventricular wall segments are hypokinetic: mid anterolateral. The following left ventricular wall segments are akinetic: mid inferolateral, basal inferior and mid inferior.  · Left ventricular diastolic dysfunction (grade I a) consistent with impaired relaxation.  · There is mild calcification of the aortic valve.  · Trace-to-mild aortic valve regurgitation is present.  · Estimated right ventricular systolic pressure from tricuspid regurgitation is mildly elevated (35-45 mmHg).    AA with runoff 6/2018  · There was diffuse atherosclerosis including a 70% discrete stenosis of the proximal left common iliac artery as  well as a 100% chronic total occlusion of the left superficial femoral artery.         Assessment and Plan:   Assessment  -Claudication, recent AA with run off revealed 70% discrete stenosis of the proximal left common iliac artery and 100%  of left superficial femoral artery.  -Dyspnea with exertion, ongoing, moderate in nature remains unchanged.  -CAD, remote CABG, recent KAILASH 1/2017, no recurrent chest pain.  -Hypertension, stable  -Hyperlipidemia, most recent   -Tobacco abuse    Plan  -AA with run off with staged intervention today. The risks, benefits, and alternatives of the procedure have been reviewed and the patient wishes to proceed.     Slime Bowling, APRN  7/6/2018 9:35 AM

## 2018-07-07 VITALS
RESPIRATION RATE: 20 BRPM | BODY MASS INDEX: 27.7 KG/M2 | SYSTOLIC BLOOD PRESSURE: 128 MMHG | WEIGHT: 182.76 LBS | DIASTOLIC BLOOD PRESSURE: 72 MMHG | HEART RATE: 69 BPM | TEMPERATURE: 98.4 F | OXYGEN SATURATION: 95 % | HEIGHT: 68 IN

## 2018-07-07 LAB
ANION GAP SERPL CALCULATED.3IONS-SCNC: 4 MMOL/L (ref 3–11)
BUN BLD-MCNC: 11 MG/DL (ref 9–23)
BUN/CREAT SERPL: 11.2 (ref 7–25)
CALCIUM SPEC-SCNC: 8.7 MG/DL (ref 8.7–10.4)
CHLORIDE SERPL-SCNC: 106 MMOL/L (ref 99–109)
CO2 SERPL-SCNC: 31 MMOL/L (ref 20–31)
CREAT BLD-MCNC: 0.98 MG/DL (ref 0.6–1.3)
GFR SERPL CREATININE-BSD FRML MDRD: 76 ML/MIN/1.73
GLUCOSE BLD-MCNC: 119 MG/DL (ref 70–100)
POTASSIUM BLD-SCNC: 4.3 MMOL/L (ref 3.5–5.5)
SODIUM BLD-SCNC: 141 MMOL/L (ref 132–146)

## 2018-07-07 PROCEDURE — 63710000001 BUDESONIDE-FORMOTEROL 80-4.5 MCG/ACT AEROSOL 6.9 G INHALER: Performed by: INTERNAL MEDICINE

## 2018-07-07 PROCEDURE — 94640 AIRWAY INHALATION TREATMENT: CPT

## 2018-07-07 PROCEDURE — 99214 OFFICE O/P EST MOD 30 MIN: CPT | Performed by: INTERNAL MEDICINE

## 2018-07-07 PROCEDURE — A9270 NON-COVERED ITEM OR SERVICE: HCPCS | Performed by: INTERNAL MEDICINE

## 2018-07-07 PROCEDURE — 80048 BASIC METABOLIC PNL TOTAL CA: CPT | Performed by: INTERNAL MEDICINE

## 2018-07-07 PROCEDURE — 63710000001 PRASUGREL 10 MG TABLET: Performed by: INTERNAL MEDICINE

## 2018-07-07 PROCEDURE — 63710000001 SACUBITRIL-VALSARTAN 49-51 MG TABLET: Performed by: INTERNAL MEDICINE

## 2018-07-07 PROCEDURE — 63710000001 ASPIRIN 81 MG CHEWABLE TABLET: Performed by: INTERNAL MEDICINE

## 2018-07-07 PROCEDURE — 63710000001 CARVEDILOL 12.5 MG TABLET: Performed by: INTERNAL MEDICINE

## 2018-07-07 PROCEDURE — 63710000001 SOTALOL 80 MG TABLET: Performed by: INTERNAL MEDICINE

## 2018-07-07 PROCEDURE — 63710000001 SPIRONOLACTONE 25 MG TABLET: Performed by: INTERNAL MEDICINE

## 2018-07-07 PROCEDURE — 63710000001 BUMETANIDE 1 MG TABLET: Performed by: INTERNAL MEDICINE

## 2018-07-07 RX ORDER — CARVEDILOL 3.12 MG/1
3.12 TABLET ORAL 2 TIMES DAILY WITH MEALS
Qty: 60 TABLET | Refills: 11 | Status: ON HOLD | OUTPATIENT
Start: 2018-07-07 | End: 2018-09-26

## 2018-07-07 RX ADMIN — PRASUGREL HYDROCHLORIDE 10 MG: 10 TABLET, FILM COATED ORAL at 08:18

## 2018-07-07 RX ADMIN — ASPIRIN 81 MG 81 MG: 81 TABLET ORAL at 08:18

## 2018-07-07 RX ADMIN — CARVEDILOL 12.5 MG: 12.5 TABLET, FILM COATED ORAL at 08:18

## 2018-07-07 RX ADMIN — SPIRONOLACTONE 25 MG: 25 TABLET, FILM COATED ORAL at 08:18

## 2018-07-07 RX ADMIN — SACUBITRIL AND VALSARTAN 1 TABLET: 49; 51 TABLET, FILM COATED ORAL at 08:18

## 2018-07-07 RX ADMIN — BUDESONIDE AND FORMOTEROL FUMARATE DIHYDRATE 2 PUFF: 80; 4.5 AEROSOL RESPIRATORY (INHALATION) at 08:09

## 2018-07-07 RX ADMIN — SOTALOL HYDROCHLORIDE 120 MG: 80 TABLET ORAL at 08:17

## 2018-07-07 RX ADMIN — BUMETANIDE 1 MG: 1 TABLET ORAL at 08:18

## 2018-07-07 NOTE — PROGRESS NOTES
[facility] at [facility]    Inpatient Progress Note      Chief Complaint/Reason for service:    · Claudication         Subjective:       Patient resting in bed.  Reports has been ambulating without issue.  No bleeding events overnight.  Patient reports mild tingling sensation in left leg.    Past medical, surgical, social and family history reviewed in the patient's electronic medical record.    Review of Systems:   Positive for tingling left leg  Negative for exertional chest pain, dyspnea with exertion, lower extremity edema, palpitations.    Problem List  Active Hospital Problems    Diagnosis Date Noted   • **Claudication (CMS/[facility]) [I73.9] 06/26/2018      Resolved Hospital Problems    Diagnosis Date Noted Date Resolved   No resolved problems to display.            Objective:      Current Facility-Administered Medications:   •  aspirin chewable tablet 81 mg, 81 mg, Oral, Daily, Vlad Bravo MD  •  budesonide-formoterol (SYMBICORT) 80-4.5 MCG/ACT inhaler 2 puff, 2 puff, Inhalation, BID - RT, Vlad Bravo MD  •  bumetanide (BUMEX) tablet 1 mg, 1 mg, Oral, Daily, Vlad Bravo MD  •  carvedilol (COREG) tablet 12.5 mg, 12.5 mg, Oral, BID With Meals, Vlad Bravo MD  •  guaiFENesin (MUCINEX) 12 hr tablet 600 mg, 600 mg, Oral, PRN, Vlad Bravo MD  •  ipratropium-albuterol (DUO-NEB) nebulizer solution 3 mL, 3 mL, Nebulization, Q4H PRN, Vlad Bravo MD  •  nitroglycerin (NITROSTAT) SL tablet 0.4 mg, 0.4 mg, Sublingual, Q5 Min PRN, Vlad Bravo MD  •  prasugrel (EFFIENT) tablet 10 mg, 10 mg, Oral, Daily, Vlad Bravo MD  •  rOPINIRole (REQUIP) tablet 2 mg, 2 mg, Oral, Nightly, Vlad Bravo MD, 2 mg at 07/06/18 2044  •  sacubitril-valsartan (ENTRESTO) 49-51 MG tablet 1 tablet, 1 tablet, Oral, Q12H, Vlad Bravo MD, 1 tablet at 07/06/18 2044  •  sotalol (BETAPACE) tablet 120 mg, 120 mg, Oral, Q12H, Vlad Bravo MD, 120 mg at 07/06/18 2043  •  spironolactone (ALDACTONE) tablet 25 mg, 25 mg,  Oral, Daily, Vlad Bravo MD  •  tiZANidine (ZANAFLEX) tablet 4 mg, 4 mg, Oral, Nightly, Vlad Bravo MD, 4 mg at 07/06/18 2044    Vital Sign Min/Max for last 24 hours  Temp  Min: 97.4 °F (36.3 °C)  Max: 98.5 °F (36.9 °C)   BP  Min: 89/65  Max: 138/84   Pulse  Min: 69  Max: 78   Resp  Min: 16  Max: 20   SpO2  Min: 93 %  Max: 98 %   No Data Recorded      Intake/Output Summary (Last 24 hours) at 07/07/18 0735  Last data filed at 07/07/18 0426   Gross per 24 hour   Intake              240 ml   Output              600 ml   Net             -360 ml           CONSTITUTIONAL: No acute distress, normal affect  RESPIRATORY: Normal effort. Expiratory wheezing noted throughout, clears with cough.   CARDIOVASCULAR: Regular rate and rhythm with normal S1 and S2. Without murmur, gallop or rub.  PERIPHERAL VASCULAR: Normal radial pulses bilaterally. There is no peripheral edema bilaterally.  Right PT/DP pulses are dopplerable.  Left DP pulse 1+, PT dopplerable.  Left femoral access site without hematoma, dressing C/D/I     Results Review:   Lab Results   Component Value Date    TROPONINI <0.200 08/07/2016       Glucose   Date Value Ref Range Status   07/07/2018 119 (H) 70 - 100 mg/dL Final     BUN   Date Value Ref Range Status   07/07/2018 11 9 - 23 mg/dL Final     Creatinine   Date Value Ref Range Status   07/07/2018 0.98 0.60 - 1.30 mg/dL Final     Sodium   Date Value Ref Range Status   07/07/2018 141 132 - 146 mmol/L Final     Potassium   Date Value Ref Range Status   07/07/2018 4.3 3.5 - 5.5 mmol/L Final     Chloride   Date Value Ref Range Status   07/07/2018 106 99 - 109 mmol/L Final     CO2   Date Value Ref Range Status   07/07/2018 31.0 20.0 - 31.0 mmol/L Final     Calcium   Date Value Ref Range Status   07/07/2018 8.7 8.7 - 10.4 mg/dL Final     Total Protein   Date Value Ref Range Status   07/04/2018 6.9 5.7 - 8.2 g/dL Final     Albumin   Date Value Ref Range Status   07/04/2018 4.31 3.20 - 4.80 g/dL Final     ALT (SGPT)    Date Value Ref Range Status   07/04/2018 17 7 - 40 U/L Final     AST (SGOT)   Date Value Ref Range Status   07/04/2018 15 0 - 33 U/L Final     Alkaline Phosphatase   Date Value Ref Range Status   07/04/2018 75 25 - 100 U/L Final     Total Bilirubin   Date Value Ref Range Status   07/04/2018 0.5 0.3 - 1.2 mg/dL Final     eGFR Non  Amer   Date Value Ref Range Status   07/07/2018 76 >60 mL/min/1.73 Final     A/G Ratio   Date Value Ref Range Status   07/04/2018 1.7 1.5 - 2.5 g/dL Final     BUN/Creatinine Ratio   Date Value Ref Range Status   07/07/2018 11.2 7.0 - 25.0 Final     Anion Gap   Date Value Ref Range Status   07/07/2018 4.0 3.0 - 11.0 mmol/L Final       Lab Results   Component Value Date    CHOL 183 06/05/2018    CHOL 187 05/23/2018    CHOL 170 01/26/2017     Lab Results   Component Value Date    TRIG 145 06/05/2018    TRIG 119 05/23/2018    TRIG 113 01/26/2017     Lab Results   Component Value Date    HDL 37 (L) 06/05/2018    HDL 38 (L) 05/23/2018    HDL 60 01/26/2017     No components found for: LDLCALC  No results found for: VLDL  No results found for: LDLHDL    Tele:  NSR         Assessment/Plan:     ASSESSMENT:  -Claudication, recent AA with run off revealed 70% discrete stenosis of the proximal left common iliac artery and 100%  of left superficial femoral artery.  -Dyspnea with exertion, ongoing, moderate in nature remains unchanged.  -CAD, remote CABG, recent KAILASH 1/2017, no recurrent chest pain.  -Hypertension, stable  -Hyperlipidemia, most recent   -Tobacco abuse    PLAN:  -The patient will be discharged home today in stable condition.  -Follow-up with Dr. Bravo in the cardiology clinic in 4-6 weeks.    -The patient's Coreg dose was lowered due to BP; he will be discharged home on the following medications:       Discharge Medications      Changes to Medications      Instructions Start Date   carvedilol 3.125 MG tablet  Commonly known as:  COREG  What changed:  · medication  strength  · how much to take   3.125 mg, Oral, 2 Times Daily With Meals         Continue These Medications      Instructions Start Date   albuterol 108 (90 Base) MCG/ACT inhaler  Commonly known as:  PROVENTIL HFA;VENTOLIN HFA   2 puffs, Inhalation, Every 4 Hours PRN      aspirin 81 MG tablet   81 mg, Oral, Daily      BREO ELLIPTA 100-25 MCG/INH aerosol powder   Generic drug:  Fluticasone Furoate-Vilanterol   1 puff, Inhalation, Daily      bumetanide 2 MG tablet  Commonly known as:  BUMEX   1 mg, Oral, Daily      ezetimibe 10 MG tablet  Commonly known as:  ZETIA   10 mg, Oral, Daily      GLIPIZIDE XL 2.5 MG 24 hr tablet  Generic drug:  glipiZIDE   2.5 mg, Oral, Daily      ipratropium-albuterol 0.5-2.5 mg/3 ml nebulizer  Commonly known as:  DUO-NEB   Every 4 Hours PRN      MUCINEX 600 MG 12 hr tablet  Generic drug:  guaiFENesin   600 mg, Oral, As Needed      nitroglycerin 0.4 MG SL tablet  Commonly known as:  NITROSTAT   0.4 mg, Sublingual, Every 5 Minutes PRN, Take no more than 3 doses in 15 minutes.      prasugrel 10 MG tablet  Commonly known as:  EFFIENT   TAKE 1 TABLET BY MOUTH DAILY.      rOPINIRole 2 MG tablet  Commonly known as:  REQUIP   2 mg, Oral, Nightly      sacubitril-valsartan 49-51 MG tablet  Commonly known as:  ENTRESTO   1 tablet, Oral, 2 Times Daily      Sotalol HCl (AF) 120 MG tablet   120 mg, Oral, 2 Times Daily      spironolactone 25 MG tablet  Commonly known as:  ALDACTONE   25 mg, Oral, Daily      tiZANidine 4 MG tablet  Commonly known as:  ZANAFLEX   4 mg, Oral, Nightly      Umeclidinium Bromide 62.5 MCG/INH aerosol powder    1 puff, Inhalation, Daily             VANDANA Coker obtained past medical, family history, social history, review of systems and functioned as a scribe for the remainder of the dictation for Dr. Bravo.    7/7/2018    IVlad MD, personally performed the services as scribed by the above named individual. I have made any necessary edits and it is both accurate  and complete.     Vlad Bravo MD, MSc, [facility]  Interventional Cardiology  [facility] at [facility]

## 2018-07-07 NOTE — PLAN OF CARE
Problem: Patient Care Overview  Goal: Plan of Care Review  Outcome: Ongoing (interventions implemented as appropriate)   07/07/18 0610   Coping/Psychosocial   Plan of Care Reviewed With patient   Plan of Care Review   Progress no change   OTHER   Outcome Summary vss. no complaints. left groin remains clean dry and intact

## 2018-07-31 ENCOUNTER — CLINICAL SUPPORT NO REQUIREMENTS (OUTPATIENT)
Dept: CARDIOLOGY | Facility: CLINIC | Age: 68
End: 2018-07-31

## 2018-07-31 ENCOUNTER — TELEPHONE (OUTPATIENT)
Dept: CARDIOLOGY | Facility: CLINIC | Age: 68
End: 2018-07-31

## 2018-07-31 DIAGNOSIS — I42.9 CARDIOMYOPATHY, UNSPECIFIED TYPE (HCC): ICD-10-CM

## 2018-07-31 NOTE — TELEPHONE ENCOUNTER
Merlin monitor did not transmit on scheduled date. I called to speak with patient and there was no answer.

## 2018-08-01 PROCEDURE — 93295 DEV INTERROG REMOTE 1/2/MLT: CPT | Performed by: PHYSICIAN ASSISTANT

## 2018-08-01 PROCEDURE — 93296 REM INTERROG EVL PM/IDS: CPT | Performed by: PHYSICIAN ASSISTANT

## 2018-08-08 NOTE — PROGRESS NOTES
[facility] AT [address], [address]  [place], [place], [address] [phone]  WWW.[facility]           OUTPATIENT CLINIC FOLLOW UP NOTE    Patient Care Team:  Patient Care Team:  Lyndsey Ahumada MD as PCP - General (Family Medicine)  Dash Goldman MD as PCP - Claims Attributed    Subjective:   Reason for consultation:   Chief Complaint   Patient presents with   • Coronary Artery Disease   • Shortness of Breath   • Dizziness       HPI:    Derek Morris Jr. is a 67 y.o. male.  Coronary Artery Disease   Presents for follow-up visit. Symptoms include chest pain and dizziness.   Chest Pain    Associated symptoms include dizziness.   His past medical history is significant for CAD.   Dizziness   Associated symptoms include chest pain.     The patient has a history of severe ischemic heart disease with prior bypass and most recently PCI in 1/2017, ischemic cardiomyopathy with an EF of about 35%, dual-chamber ICD, PAD s/p left SFA stenting, long smoking history, COPD, hypertension, diabetes, hyperlipidemia, who presents for follow-up.    Since his peripheral intervention the patient has been doing well clinically.  He has had a significant improvement in his ability to walk.  He does not have claudication of the left leg.  He does have persistent, chronic, moderately severe, lower extremity swelling that is not adequately improved with once a day Bumex.    The patient denies having had any defibrillator shocks since his last visit    PFSH:  PROBLEM LIST:  1. Ischemic heart disease:  a. MI x3, 1990, 1993, and 1995.  b. CABG x3 by Dr. Noble Cuello, 1995:  SVG to OM1 and OM2, SVG to PDA.  c. Normal LV.  d. Questionable LAD stent, incomplete  database.  e. STEMI, April 2009.  f. LHC by Dr. Glen Baker, April 2009:  EF approximately 40%, 1 of 3 patent grafts; data deficit.  g. LHC by Dr. Bird, 07/30/2010:  EF 30%, occluded vein graft to occluded RCA, minor plaque in  LAD, 70% SVG -  circumflex treated with 4 x 18 mm Cypher KAILASH.  h. Echocardiogram, 02/08/2012:  EF 40% to 45%, diastolic dysfunction, mild TR.  i. Echocardiogram, 01/29/2015:  EF 30% to 35%, mild TR.  j. Jan 2017 EF 35-40%  k. [facility] in Jan 2017 as noted in the results below. Resolute Integrity KAILASH to D2  2. Hypertension.  3. Chronic systolic CHF.  4. Nonsustained VT:  a. Inducible VT by EPS, January 2002.  b. Pacesetter ICD implant by Dr. Dickinson, January 2002.  c. Chronic amiodarone, discontinued fall of 2010:  Cannot  exclude amiodarone pulmonary toxicity.  d. ICD generator change-out with enrollment in the ANALYZE ST SEGMENT protocol, 08/16/2012.  5. PAD s/p PTA 7/2018 to left SFA  6. Hyperlipidemia; intolerance to multiple statins.  7. Type 2 diabetes mellitus.  8. Chronic tobacco; home O2.  9. Surgical history:  a.  CABG    Patient Active Problem List   Diagnosis   • Ischemic heart disease   • Essential hypertension   • Chronic systolic heart failure (CMS/HCC)   • VT (ventricular tachycardia) (CMS/[facility])   • Mixed hyperlipidemia   • Type 2 diabetes mellitus (CMS/HCC)   • Tobacco dependence   • Coronary artery disease of native artery of native heart with stable angina pectoris (CMS/HCC)   • Cardiomyopathy (CMS/HCC)   • Shortness of breath   • COPD (chronic obstructive pulmonary disease) (CMS/HCC)   • Lung nodule   • PAD (peripheral artery disease) (CMS/HCC)   • Claudication (CMS/HCC)         Current Outpatient Prescriptions:   •  albuterol (PROVENTIL HFA;VENTOLIN HFA) 108 (90 Base) MCG/ACT inhaler, Inhale 2 puffs Every 4 (Four) Hours As Needed for Wheezing., Disp: , Rfl:   •  aspirin 81 MG tablet, Take 1 tablet by mouth Daily., Disp: 30 tablet, Rfl: 11  •  BREO ELLIPTA 100-25 MCG/INH aerosol powder , Inhale 1 puff Daily., Disp: , Rfl: 5  •  bumetanide (BUMEX) 2 MG tablet, Take 0.5 tablets by mouth Daily., Disp: 30 tablet, Rfl: 11  •  carvedilol (COREG) 3.125 MG tablet, Take 1 tablet by mouth 2 (Two) Times a Day  "With Meals. (Patient taking differently: Take 3.125 mg by mouth 2 (Two) Times a Day With Meals. \"quarter\"), Disp: 60 tablet, Rfl: 11  •  ezetimibe (ZETIA) 10 MG tablet, Take 1 tablet by mouth Daily., Disp: 30 tablet, Rfl: 11  •  GLIPIZIDE XL 2.5 MG 24 hr tablet, Take 2.5 mg by mouth Daily., Disp: , Rfl: 11  •  ipratropium-albuterol (DUO-NEB) 0.5-2.5 mg/mL nebulizer, Every 4 (Four) Hours As Needed., Disp: , Rfl: 3  •  MUCINEX 600 MG 12 hr tablet, Take 600 mg by mouth As Needed., Disp: , Rfl: 0  •  nitroglycerin (NITROSTAT) 0.4 MG SL tablet, Place 0.4 mg under the tongue Every 5 (Five) Minutes As Needed for Chest Pain. Take no more than 3 doses in 15 minutes., Disp: , Rfl:   •  prasugrel (EFFIENT) 10 MG tablet, TAKE 1 TABLET BY MOUTH DAILY., Disp: 30 tablet, Rfl: 11  •  rOPINIRole (REQUIP) 2 MG tablet, Take 2 mg by mouth every night., Disp: , Rfl:   •  sacubitril-valsartan (ENTRESTO) 49-51 MG tablet, Take 1 tablet by mouth 2 (Two) Times a Day., Disp: 60 tablet, Rfl: 11  •  Sotalol HCl, AF, 120 MG tablet, Take 120 mg by mouth 2 (Two) Times a Day., Disp: 60 each, Rfl: 11  •  spironolactone (ALDACTONE) 25 MG tablet, Take 25 mg by mouth daily., Disp: , Rfl:   •  tiZANidine (ZANAFLEX) 4 MG tablet, Take 4 mg by mouth Every Night., Disp: , Rfl: 4  •  Umeclidinium Bromide 62.5 MCG/INH aerosol powder , Inhale 1 puff Daily., Disp: , Rfl:     Allergies   Allergen Reactions   • Crestor [Rosuvastatin Calcium] Myalgia   • Lipitor [Atorvastatin] Myalgia     Joint pain myalgias   • Plavix [Clopidogrel Bisulfate] Unknown (See Comments)      resistant.        reports that he has been smoking Cigarettes.  He has a 22.50 pack-year smoking history. He has never used smokeless tobacco.    Family History   Problem Relation Age of Onset   • Hypertension Mother    • Sick sinus syndrome Father    • Coronary artery disease Father        Review of Systems:  Positive for dyspnea, lower extremity swelling  Negative for exertional chest pain, " "palpitations, lightheadedness, syncope.   All other systems reviewed and are negative.    Objective:   Blood pressure 116/64, pulse 75, height 172.7 cm (68\"), weight 81.6 kg (180 lb), SpO2 90 %.  CONSTITUTIONAL: No acute distress, normal affect  RESPIRATORY: Normal effort. Clear to auscultation bilaterally without wheezing or rales  CARDIOVASCULAR: Carotids with normal upstrokes without bruits.  Regular rate and rhythm with normal S1 and S2. Without murmur, gallop or rub.  PERIPHERAL VASCULAR: Normal radial pulses bilaterally. There is 1-2+ lower extremity edema bilaterally. 2+ Left DP Pulse    Labs:  Lab Results   Component Value Date    ALT 17 07/04/2018    AST 15 07/04/2018     Lab Results   Component Value Date    CHOL 183 06/05/2018    TRIG 145 06/05/2018    HDL 37 (L) 06/05/2018    CREATININE 0.98 07/07/2018       Diagnostic Data:    Procedures    Peripheral intervention 7/2018  · The 100% mid left SFA chronic total occlusion is now status post dissection and reentry revascularization with deployment of a Zilver PTX 6 x 80 mm drug-eluting stent with 0% residual stenosis.    Peripheral Angiogram 6/2018  · There was diffuse atherosclerosis including a 70% discrete stenosis of the proximal left common iliac artery as well as a 100% chronic total occlusion of the left superficial femoral artery.    [facility] 1/2017  · Severe multivessel disease including a previously known occluded proximal left circumflex and mid right coronary artery.  There was a de hao 60% discrete stenosis in a medium sized second diagonal that supplies much of the anterolateral wall that was found to be functionally significant with an iFR of 0.60.  The LAD was otherwise patent, the SVG to a distal OM was widely patent, the circumflex artery is supplied by septal collaterals, the RCA was supplied by moderate graft to OM left to right collaterals.  · The diagonal stenosis is now status post resolute integrity 2.25 x 14 mm drug-eluting stent " placement with 0% residual stenosis.  · Left ventricular ejection fraction of 49% with an akinetic inferior wall and hypokinesis of the anterolateral wall    TTE 5/2018  · Left ventricular systolic function is mildly decreased. Estimated EF appears to be in the range of 41 - 45%.  · The following left ventricular wall segments are hypokinetic: mid anterolateral. The following left ventricular wall segments are akinetic: mid inferolateral, basal inferior and mid inferior.  · Left ventricular diastolic dysfunction (grade I a) consistent with impaired relaxation.  · There is mild calcification of the aortic valve.  · Trace-to-mild aortic valve regurgitation is present.  · Estimated right ventricular systolic pressure from tricuspid regurgitation is mildly elevated (35-45 mmHg).    TTE 1/2017  · Left ventricular function is moderately decreased. Estimated EF appears to be in the range of 36 - 40%. Calculated EF = 37%.  · Left ventricular wall thickness is consistent with mild-to-moderate concentric hypertrophy.  · Left ventricular diastolic dysfunction (grade I a) consistent with impaired relaxation.  · Left ventricular wall segments contract abnormally. Akinesis of the inferior wall    DEVICE INTERROGATION:  St Ricky, Interrogation date 8/2018-   P wave is 1.5 mV with a threshold of 1 V at 0.5 msec and an impedance of 510 ohms. R wave is >12 mV with a threshold of 1 V at 0.5 msec and an impedance of 630 ohms. Battery voltage is 5 yrs. < 1% AMS, no AFib or VHR.    DEVICE INTERROGATION:  [employer] Ricky, Interrogation date 4/30/18-   RA pacing 97%, RV pacing 1%. P wave is 1 mV with a threshold of 1.25 V at 0.5 msec and an impedance of 490 ohms. R wave is >12 mV with a threshold of 1.0 V at 0.5 msec and an impedance of 590 ohms. Battery voltage is 5.5yr. mode switches less than 1% of the time due to far field R waves, no events, no shocks      Assessment and Plan:   Derek was seen today for ischemic heart disease, hypertension and  chronic systolic chf.    Diagnoses and all orders for this visit:    Coronary artery disease of native artery of native heart with stable angina pectoris  Hyperlipidemia  -No chest pain but the patient has known under revascularized territories with no clear targets for intervention as of his last heart cath  -Continue aspirin, Effient, carvedilol  -Intolerance to atorvastatin and rosuvastatin   -Zetia    Chronic systolic heart failure  Status post dual-chamber ICD  -NYHA class 2-3  -Continue carvedilol, spironolactone, Entresto (started 10/2017)  -Bumex 2 mg twice a day for the next 3 days and then 2 mg once daily with additional doses when necessary.  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   -If the patient has a repeat episode of VF despite increasing sotalol, we'll trial mexiletine due to his previous intolerance to amiodarone     PAD  -s/p KAILASH to Left SFA  -Continue current related medications    Advanced COPD and pulmonary nodule  -Management per pulmonary and CT surgery teams      - Return in about 3 months (around 11/13/2018) for No interrogation at next visit.    Vlad Bravo MD, MSc, [facility]  Interventional Cardiology  [facility] at [facility]

## 2018-08-13 ENCOUNTER — OFFICE VISIT (OUTPATIENT)
Dept: CARDIOLOGY | Facility: CLINIC | Age: 68
End: 2018-08-13

## 2018-08-13 VITALS
HEIGHT: 68 IN | SYSTOLIC BLOOD PRESSURE: 116 MMHG | BODY MASS INDEX: 27.28 KG/M2 | DIASTOLIC BLOOD PRESSURE: 64 MMHG | HEART RATE: 75 BPM | OXYGEN SATURATION: 90 % | WEIGHT: 180 LBS

## 2018-08-13 DIAGNOSIS — I10 ESSENTIAL HYPERTENSION: ICD-10-CM

## 2018-08-13 DIAGNOSIS — I25.118 CORONARY ARTERY DISEASE OF NATIVE ARTERY OF NATIVE HEART WITH STABLE ANGINA PECTORIS (HCC): ICD-10-CM

## 2018-08-13 DIAGNOSIS — E78.2 MIXED HYPERLIPIDEMIA: ICD-10-CM

## 2018-08-13 DIAGNOSIS — I47.20 VT (VENTRICULAR TACHYCARDIA) (HCC): ICD-10-CM

## 2018-08-13 DIAGNOSIS — I50.22 CHRONIC SYSTOLIC HEART FAILURE (HCC): Primary | ICD-10-CM

## 2018-08-13 DIAGNOSIS — I73.9 PAD (PERIPHERAL ARTERY DISEASE) (HCC): ICD-10-CM

## 2018-08-13 PROCEDURE — 99214 OFFICE O/P EST MOD 30 MIN: CPT | Performed by: INTERNAL MEDICINE

## 2018-08-13 PROCEDURE — 93283 PRGRMG EVAL IMPLANTABLE DFB: CPT | Performed by: INTERNAL MEDICINE

## 2018-08-27 RX ORDER — BUMETANIDE 2 MG/1
2 TABLET ORAL DAILY PRN
Qty: 30 TABLET | Refills: 11 | Status: ON HOLD | OUTPATIENT
Start: 2018-08-27 | End: 2018-09-26

## 2018-09-20 ENCOUNTER — OFFICE VISIT (OUTPATIENT)
Dept: SURGERY | Facility: CLINIC | Age: 68
End: 2018-09-20

## 2018-09-20 VITALS
DIASTOLIC BLOOD PRESSURE: 68 MMHG | SYSTOLIC BLOOD PRESSURE: 99 MMHG | RESPIRATION RATE: 18 BRPM | HEART RATE: 70 BPM | OXYGEN SATURATION: 97 % | TEMPERATURE: 97.9 F | WEIGHT: 178.9 LBS | HEIGHT: 68 IN | BODY MASS INDEX: 27.11 KG/M2

## 2018-09-20 DIAGNOSIS — I42.9 CARDIOMYOPATHY, UNSPECIFIED TYPE (HCC): Primary | ICD-10-CM

## 2018-09-20 DIAGNOSIS — I25.118 CORONARY ARTERY DISEASE OF NATIVE ARTERY OF NATIVE HEART WITH STABLE ANGINA PECTORIS (HCC): ICD-10-CM

## 2018-09-20 DIAGNOSIS — E11.9 TYPE 2 DIABETES MELLITUS WITHOUT COMPLICATION, UNSPECIFIED LONG TERM INSULIN USE STATUS: ICD-10-CM

## 2018-09-20 DIAGNOSIS — I10 ESSENTIAL HYPERTENSION: ICD-10-CM

## 2018-09-20 DIAGNOSIS — J43.8 OTHER EMPHYSEMA (HCC): ICD-10-CM

## 2018-09-20 PROCEDURE — 99204 OFFICE O/P NEW MOD 45 MIN: CPT | Performed by: SURGERY

## 2018-09-20 RX ORDER — PRASUGREL 10 MG/1
10 TABLET, FILM COATED ORAL DAILY
COMMUNITY
End: 2019-02-25 | Stop reason: SDUPTHER

## 2018-09-20 NOTE — PROGRESS NOTES
9/20/2018    Patient Information  Derek Morris Jr.  [address]  [place] [address]  1950  [phone] (home)     Chief Complaint   Patient presents with   • Hemorrhoids     Referred for Internal Hemorrhoids       HPI  Patient is a 67-year-old white male who is been having severe pain for months in the rectal area.  He thinks is related to hemorrhoids.  He's been having problems for years off and on.  He is tried all types of conservative measures suppositories 2 stool softeners and nothing else.  He's had no rectal bleeding    Review of Systems   Constitutional: Positive for appetite change.   HENT: Positive for hearing loss.    Eyes: Negative.    Respiratory: Positive for shortness of breath and wheezing.    Cardiovascular: Negative.    Gastrointestinal: Negative.    Endocrine: Negative.    Genitourinary: Negative.    Musculoskeletal: Negative.    Skin: Positive for rash and wound.   Allergic/Immunologic: Negative.    Neurological: Negative.    Hematological: Negative.    Psychiatric/Behavioral: Negative.      The Review of Systems has been reviewed and confirmed.    Patient Active Problem List   Diagnosis   • Ischemic heart disease   • Essential hypertension   • Chronic systolic heart failure (CMS/HCC)   • VT (ventricular tachycardia) (CMS/HCC)   • Mixed hyperlipidemia   • Type 2 diabetes mellitus (CMS/HCC)   • Tobacco dependence   • Coronary artery disease of native artery of native heart with stable angina pectoris (CMS/HCC)   • Cardiomyopathy (CMS/HCC)   • Shortness of breath   • COPD (chronic obstructive pulmonary disease) (CMS/HCC)   • Lung nodule   • PAD (peripheral artery disease) (CMS/HCC)   • Claudication (CMS/HCC)         Past Medical History:   Diagnosis Date   • Arthritis    • Carotid stenosis    • CHF (congestive heart failure) (CMS/HCC)    • COPD (chronic obstructive pulmonary disease) (CMS/HCC)    • Coronary artery disease    • Diabetes mellitus (CMS/HCC)    • Enlarged prostate     • GERD (gastroesophageal reflux disease)    • Heart attack     X3 1990, 1993, 1995   • Hyperlipidemia    • Hypertension    • Lung nodule    • On home O2     prn   • Peptic ulcer disease    • Urinary hesitancy    • Wears dentures    • Wears glasses          Past Surgical History:   Procedure Laterality Date   • CARDIAC CATHETERIZATION Left 04/2009    by Dr. Glen Marcelo I. EF approx 40% II one of three patent grafts iii, Data deficit    • CARDIAC CATHETERIZATION N/A 1/26/2017    Procedure: Left Heart Cath;  Surgeon: Vlad Bravo MD;  Location:  [facility] IN[facility]ASIVE LOCATION;  Service:    • CARDIAC CATHETERIZATION Left 07/30/2010    i. EF 30% ii occluded vein graft to occluded RCS iii minor plaque in the LAD iv. 70% SVG- circumfelx, treated with 4x18 mm. Cypher KAILASH.   • CARDIAC CATHETERIZATION N/A 6/5/2018    Procedure: Peripheral angiography;  Surgeon: Vlad Bravo MD;  Location:  [facility] CATH INVASIVE LOCATION;  Service: Cardiovascular   • CARDIAC DEFIBRILLATOR PLACEMENT     • COLONOSCOPY     • CORONARY ANGIOPLASTY     • CORONARY ARTERY BYPASS GRAFT  1995 1995, w/ subsequent stents 2009/2010 X3; svg TO om1 AND om2, svg TO pda    • CYST REMOVAL      Tailbone   • INSERT / REPLACE / REMOVE PACEMAKER     • INTERVENTIONAL RADIOLOGY PROCEDURE N/A 6/5/2018    Procedure: Abdominal Aortagram with Runoff;  Surgeon: Vlad Bravo MD;  Location:  [facility]ATH INVASIVE LOCATION;  Service: Cardiovascular   • INTERVENTIONAL RADIOLOGY PROCEDURE N/A 7/6/2018    Procedure: Abdominal Aortogram;  Surgeon: Vlad Bravo MD;  Location:  [facility]ATH INVASIVE LOCATION;  Service: Cardiology   • PACEMAKER IMPLANTATION  01/2002    Implant by Dr. Dickinson          Family History   Problem Relation Age of Onset   • Hypertension Mother    • Sick sinus syndrome Father    • Coronary artery disease Father          Social History   Substance Use Topics   • Smoking status: Current Every Day Smoker     Packs/day: 0.50     Years: 45.00     Types:  "Cigarettes   • Smokeless tobacco: Never Used   • Alcohol use No       Current Outpatient Prescriptions   Medication Sig Dispense Refill   • albuterol (PROVENTIL HFA;VENTOLIN HFA) 108 (90 Base) MCG/ACT inhaler Inhale 2 puffs Every 4 (Four) Hours As Needed for Wheezing.     • aspirin 81 MG tablet Take 1 tablet by mouth Daily. 30 tablet 11   • BREO ELLIPTA 100-25 MCG/INH aerosol powder  Inhale 1 puff Daily.  5   • bumetanide (BUMEX) 2 MG tablet Take 1 tablet by mouth Daily As Needed (0.5 tablet daily; additional 0.5 tab if needed). 30 tablet 11   • carvedilol (COREG) 3.125 MG tablet Take 1 tablet by mouth 2 (Two) Times a Day With Meals. (Patient taking differently: Take 3.125 mg by mouth 2 (Two) Times a Day With Meals. \"quarter\") 60 tablet 11   • ezetimibe (ZETIA) 10 MG tablet Take 1 tablet by mouth Daily. 30 tablet 11   • GLIPIZIDE XL 2.5 MG 24 hr tablet Take 2.5 mg by mouth Daily.  11   • nitroglycerin (NITROSTAT) 0.4 MG SL tablet Place 0.4 mg under the tongue Every 5 (Five) Minutes As Needed for Chest Pain. Take no more than 3 doses in 15 minutes.     • prasugrel (EFFIENT) 10 MG tablet Take 10 mg by mouth Daily.     • rOPINIRole (REQUIP) 2 MG tablet Take 2 mg by mouth every night.     • sacubitril-valsartan (ENTRESTO) 49-51 MG tablet Take 1 tablet by mouth 2 (Two) Times a Day. 60 tablet 11   • Sotalol HCl, AF, 120 MG tablet Take 120 mg by mouth 2 (Two) Times a Day. 60 each 11   • spironolactone (ALDACTONE) 25 MG tablet Take 25 mg by mouth daily.     • Umeclidinium Bromide (INCRUSE ELLIPTA) 62.5 MCG/INH aerosol powder  Inhale.       No current facility-administered medications for this visit.          Allergies  Crestor [rosuvastatin calcium]; Lipitor [atorvastatin]; and Plavix [clopidogrel bisulfate]    BP 99/68   Pulse 70   Temp 97.9 °F (36.6 °C) (Oral)   Resp 18   Ht 172.7 cm (67.99\")   Wt 81.1 kg (178 lb 14.4 oz)   SpO2 97%   BMI 27.21 kg/m²      Physical Exam   Constitutional: He is oriented to person, " place, and time. He appears well-developed and well-nourished. No distress.   HENT:   Head: Normocephalic.   Right Ear: External ear normal.   Left Ear: External ear normal.   Nose: Nose normal.   Mouth/Throat: Oropharynx is clear and moist.   Eyes: Conjunctivae and EOM are normal. Right eye exhibits no discharge. Left eye exhibits no discharge.   Neck: Normal range of motion. No JVD present. No tracheal deviation present. No thyromegaly present.   Cardiovascular: Normal rate, regular rhythm, normal heart sounds and intact distal pulses.  Exam reveals no gallop and no friction rub.    No murmur heard.  Pulmonary/Chest: Effort normal and breath sounds normal. No stridor. No respiratory distress. He has no wheezes. He has no rales. He exhibits no tenderness.   Abdominal: Soft. Bowel sounds are normal. He exhibits no distension and no mass. There is no tenderness. There is no rebound and no guarding. No hernia.   Genitourinary: Rectal exam shows guaiac negative stool.   Genitourinary Comments: External rectal exam normal.  Digital exam not   Musculoskeletal: Normal range of motion. He exhibits no edema, tenderness or deformity.   Lymphadenopathy:     He has no cervical adenopathy.   Neurological: He is alert and oriented to person, place, and time. He has normal reflexes. He displays normal reflexes. No cranial nerve deficit. He exhibits normal muscle tone. Coordination normal.   Skin: Skin is warm and dry. No rash noted. He is not diaphoretic. No erythema. No pallor.   Psychiatric: He has a normal mood and affect. His behavior is normal. Judgment and thought content normal.             Assessment   Rectal pain probably symptomatic hemorrhoids versus anal fissure  Cardiomyopathy  Hypertension  COPD  Diabetes      Plan   Cardiac clearance  Colonoscopy, exam under anesthesia, possible hemorrhoidectomy possible anal fissurectomy      Patient's Body mass index is 27.21 kg/m². BMI is within normal parameters. No follow-up  required.      Alfred Shah MD

## 2018-09-21 ENCOUNTER — TELEPHONE (OUTPATIENT)
Dept: SURGERY | Facility: CLINIC | Age: 68
End: 2018-09-21

## 2018-09-21 NOTE — TELEPHONE ENCOUNTER
Contacted 's office regarding cardiac clearance needed prior to colonoscopy, EUA, and Hemorrhoidectomy. Left a message for Dr Bravo's nurse.

## 2018-09-24 ENCOUNTER — TELEPHONE (OUTPATIENT)
Dept: SURGERY | Facility: CLINIC | Age: 68
End: 2018-09-24

## 2018-09-24 PROBLEM — E11.9 TYPE 2 DIABETES MELLITUS WITHOUT COMPLICATION (HCC): Status: ACTIVE | Noted: 2018-09-24

## 2018-09-24 PROBLEM — J43.8 OTHER EMPHYSEMA (HCC): Status: ACTIVE | Noted: 2018-09-24

## 2018-09-25 ENCOUNTER — TELEPHONE (OUTPATIENT)
Dept: SURGERY | Facility: CLINIC | Age: 68
End: 2018-09-25

## 2018-09-25 ENCOUNTER — APPOINTMENT (OUTPATIENT)
Dept: PREADMISSION TESTING | Facility: HOSPITAL | Age: 68
End: 2018-09-25

## 2018-09-25 DIAGNOSIS — R68.89 ABNORMAL ANKLE BRACHIAL INDEX (ABI): ICD-10-CM

## 2018-09-25 DIAGNOSIS — E11.65 TYPE 2 DIABETES MELLITUS WITH HYPERGLYCEMIA, UNSPECIFIED LONG TERM INSULIN USE STATUS: ICD-10-CM

## 2018-09-25 LAB
ALBUMIN SERPL-MCNC: 4.5 G/DL (ref 3.4–4.8)
ALBUMIN/GLOB SERPL: 1.8 G/DL (ref 1.5–2.5)
ALP SERPL-CCNC: 75 U/L (ref 40–129)
ALT SERPL W P-5'-P-CCNC: 22 U/L (ref 10–44)
ANION GAP SERPL CALCULATED.3IONS-SCNC: 1.3 MMOL/L (ref 3.6–11.2)
AST SERPL-CCNC: 20 U/L (ref 10–34)
BILIRUB SERPL-MCNC: 0.5 MG/DL (ref 0.2–1.8)
BUN BLD-MCNC: 14 MG/DL (ref 7–21)
BUN/CREAT SERPL: 12.3 (ref 7–25)
CALCIUM SPEC-SCNC: 8.8 MG/DL (ref 7.7–10)
CHLORIDE SERPL-SCNC: 103 MMOL/L (ref 99–112)
CO2 SERPL-SCNC: 32.7 MMOL/L (ref 24.3–31.9)
CREAT BLD-MCNC: 1.14 MG/DL (ref 0.43–1.29)
DEPRECATED RDW RBC AUTO: 45.2 FL (ref 37–54)
ERYTHROCYTE [DISTWIDTH] IN BLOOD BY AUTOMATED COUNT: 13 % (ref 11.5–14.5)
GFR SERPL CREATININE-BSD FRML MDRD: 64 ML/MIN/1.73
GLOBULIN UR ELPH-MCNC: 2.5 GM/DL
GLUCOSE BLD-MCNC: 182 MG/DL (ref 70–110)
HCT VFR BLD AUTO: 48.5 % (ref 42–52)
HGB BLD-MCNC: 15.8 G/DL (ref 14–18)
MCH RBC QN AUTO: 31.5 PG (ref 27–33)
MCHC RBC AUTO-ENTMCNC: 32.6 G/DL (ref 33–37)
MCV RBC AUTO: 96.8 FL (ref 80–94)
OSMOLALITY SERPL CALC.SUM OF ELEC: 278.9 MOSM/KG (ref 273–305)
PLATELET # BLD AUTO: 189 10*3/MM3 (ref 130–400)
PMV BLD AUTO: 11 FL (ref 6–10)
POTASSIUM BLD-SCNC: 4.1 MMOL/L (ref 3.5–5.3)
PROT SERPL-MCNC: 7 G/DL (ref 6–8)
RBC # BLD AUTO: 5.01 10*6/MM3 (ref 4.7–6.1)
SODIUM BLD-SCNC: 137 MMOL/L (ref 135–153)
WBC NRBC COR # BLD: 7.95 10*3/MM3 (ref 4.5–12.5)

## 2018-09-25 PROCEDURE — 36415 COLL VENOUS BLD VENIPUNCTURE: CPT

## 2018-09-25 PROCEDURE — 85027 COMPLETE CBC AUTOMATED: CPT | Performed by: NURSE PRACTITIONER

## 2018-09-25 PROCEDURE — 80053 COMPREHEN METABOLIC PANEL: CPT | Performed by: NURSE PRACTITIONER

## 2018-09-26 ENCOUNTER — ANESTHESIA EVENT (OUTPATIENT)
Dept: PERIOP | Facility: HOSPITAL | Age: 68
End: 2018-09-26

## 2018-09-26 ENCOUNTER — HOSPITAL ENCOUNTER (OUTPATIENT)
Facility: HOSPITAL | Age: 68
Setting detail: HOSPITAL OUTPATIENT SURGERY
Discharge: HOME | End: 2018-09-26
Attending: SURGERY | Admitting: SURGERY

## 2018-09-26 ENCOUNTER — ANESTHESIA (OUTPATIENT)
Dept: PERIOP | Facility: HOSPITAL | Age: 68
End: 2018-09-26

## 2018-09-26 VITALS
RESPIRATION RATE: 20 BRPM | HEIGHT: 68 IN | SYSTOLIC BLOOD PRESSURE: 124 MMHG | HEART RATE: 74 BPM | BODY MASS INDEX: 26.37 KG/M2 | OXYGEN SATURATION: 96 % | WEIGHT: 174 LBS | TEMPERATURE: 97.1 F | DIASTOLIC BLOOD PRESSURE: 69 MMHG

## 2018-09-26 DIAGNOSIS — J43.8 OTHER EMPHYSEMA (HCC): ICD-10-CM

## 2018-09-26 DIAGNOSIS — E11.9 TYPE 2 DIABETES MELLITUS WITHOUT COMPLICATION, UNSPECIFIED LONG TERM INSULIN USE STATUS: ICD-10-CM

## 2018-09-26 DIAGNOSIS — I42.9 CARDIOMYOPATHY, UNSPECIFIED TYPE (HCC): ICD-10-CM

## 2018-09-26 DIAGNOSIS — I10 ESSENTIAL HYPERTENSION: ICD-10-CM

## 2018-09-26 DIAGNOSIS — I25.118 CORONARY ARTERY DISEASE OF NATIVE ARTERY OF NATIVE HEART WITH STABLE ANGINA PECTORIS (HCC): ICD-10-CM

## 2018-09-26 LAB — GLUCOSE BLDC GLUCOMTR-MCNC: 121 MG/DL (ref 70–130)

## 2018-09-26 PROCEDURE — 45378 DIAGNOSTIC COLONOSCOPY: CPT | Performed by: SURGERY

## 2018-09-26 PROCEDURE — 94799 UNLISTED PULMONARY SVC/PX: CPT

## 2018-09-26 PROCEDURE — 25010000002 PROPOFOL 1000 MG/ML EMULSION: Performed by: NURSE ANESTHETIST, CERTIFIED REGISTERED

## 2018-09-26 PROCEDURE — 25010000002 PROPOFOL 10 MG/ML EMULSION: Performed by: NURSE ANESTHETIST, CERTIFIED REGISTERED

## 2018-09-26 PROCEDURE — 0DJD8ZZ INSPECTION OF LOWER INTESTINAL TRACT, VIA NATURAL OR ARTIFICIAL OPENING ENDOSCOPIC: ICD-10-PCS | Performed by: SURGERY

## 2018-09-26 PROCEDURE — 25010000002 MORPHINE PER 10 MG: Performed by: ANESTHESIOLOGY

## 2018-09-26 PROCEDURE — 94640 AIRWAY INHALATION TREATMENT: CPT

## 2018-09-26 PROCEDURE — 82962 GLUCOSE BLOOD TEST: CPT

## 2018-09-26 RX ORDER — ONDANSETRON 2 MG/ML
4 INJECTION INTRAMUSCULAR; INTRAVENOUS ONCE AS NEEDED
Status: DISCONTINUED | OUTPATIENT
Start: 2018-09-26 | End: 2018-09-26

## 2018-09-26 RX ORDER — PRASUGREL 10 MG/1
10 TABLET, FILM COATED ORAL DAILY
Status: CANCELLED | OUTPATIENT
Start: 2018-09-26

## 2018-09-26 RX ORDER — HYDROCODONE BITARTRATE AND ACETAMINOPHEN 7.5; 325 MG/1; MG/1
1 TABLET ORAL 2 TIMES DAILY PRN
Status: CANCELLED | OUTPATIENT
Start: 2018-09-26

## 2018-09-26 RX ORDER — TIZANIDINE 4 MG/1
8 TABLET ORAL NIGHTLY PRN
COMMUNITY

## 2018-09-26 RX ORDER — SOTALOL HYDROCHLORIDE 120 MG/1
120 TABLET ORAL 2 TIMES DAILY
COMMUNITY
End: 2018-11-19 | Stop reason: SDUPTHER

## 2018-09-26 RX ORDER — BUMETANIDE 2 MG/1
1 TABLET ORAL EVERY MORNING
COMMUNITY
End: 2018-11-19 | Stop reason: SDUPTHER

## 2018-09-26 RX ORDER — BUDESONIDE AND FORMOTEROL FUMARATE DIHYDRATE 80; 4.5 UG/1; UG/1
2 AEROSOL RESPIRATORY (INHALATION)
Status: CANCELLED | OUTPATIENT
Start: 2018-09-26

## 2018-09-26 RX ORDER — ALBUTEROL SULFATE 90 UG/1
2 AEROSOL, METERED RESPIRATORY (INHALATION) EVERY 6 HOURS PRN
Status: CANCELLED | OUTPATIENT
Start: 2018-09-26

## 2018-09-26 RX ORDER — SODIUM CHLORIDE, SODIUM LACTATE, POTASSIUM CHLORIDE, CALCIUM CHLORIDE 600; 310; 30; 20 MG/100ML; MG/100ML; MG/100ML; MG/100ML
125 INJECTION, SOLUTION INTRAVENOUS CONTINUOUS
Status: DISCONTINUED | OUTPATIENT
Start: 2018-09-26 | End: 2018-09-26

## 2018-09-26 RX ORDER — LIDOCAINE AND PRILOCAINE 25; 25 MG/G; MG/G
CREAM TOPICAL AS NEEDED
Status: CANCELLED | OUTPATIENT
Start: 2018-09-26

## 2018-09-26 RX ORDER — GLIPIZIDE 5 MG/1
1.25 TABLET ORAL
Status: CANCELLED | OUTPATIENT
Start: 2018-09-26

## 2018-09-26 RX ORDER — NITROGLYCERIN 0.4 MG/1
0.4 TABLET SUBLINGUAL
Status: CANCELLED | OUTPATIENT
Start: 2018-09-26

## 2018-09-26 RX ORDER — CARVEDILOL 6.25 MG/1
6.25 TABLET ORAL 2 TIMES DAILY WITH MEALS
Status: CANCELLED | OUTPATIENT
Start: 2018-09-26

## 2018-09-26 RX ORDER — TIZANIDINE 4 MG/1
8 TABLET ORAL NIGHTLY
Status: CANCELLED | OUTPATIENT
Start: 2018-09-26

## 2018-09-26 RX ORDER — MORPHINE SULFATE 2 MG/ML
2 INJECTION, SOLUTION INTRAMUSCULAR; INTRAVENOUS ONCE
Status: COMPLETED | OUTPATIENT
Start: 2018-09-26 | End: 2018-09-26

## 2018-09-26 RX ORDER — HYDROCODONE BITARTRATE AND ACETAMINOPHEN 7.5; 325 MG/1; MG/1
1 TABLET ORAL 2 TIMES DAILY PRN
COMMUNITY
End: 2018-11-19 | Stop reason: SDUPTHER

## 2018-09-26 RX ORDER — ROPINIROLE 1 MG/1
2 TABLET, FILM COATED ORAL NIGHTLY
Status: CANCELLED | OUTPATIENT
Start: 2018-09-26

## 2018-09-26 RX ORDER — BUMETANIDE 1 MG/1
1 TABLET ORAL DAILY
Status: CANCELLED | OUTPATIENT
Start: 2018-09-26

## 2018-09-26 RX ORDER — CARVEDILOL 6.25 MG/1
6.25 TABLET ORAL 2 TIMES DAILY WITH MEALS
COMMUNITY
End: 2018-11-02 | Stop reason: SDUPTHER

## 2018-09-26 RX ORDER — HYDROCORTISONE ACETATE 25 MG/1
25 SUPPOSITORY RECTAL DAILY PRN
Status: CANCELLED | OUTPATIENT
Start: 2018-09-26

## 2018-09-26 RX ORDER — SOTALOL HYDROCHLORIDE 80 MG/1
120 TABLET ORAL EVERY 12 HOURS SCHEDULED
Status: CANCELLED | OUTPATIENT
Start: 2018-09-26

## 2018-09-26 RX ORDER — HYDROCORTISONE ACETATE 25 MG/1
25 SUPPOSITORY RECTAL DAILY
COMMUNITY
End: 2018-11-19

## 2018-09-26 RX ORDER — PROPOFOL 10 MG/ML
VIAL (ML) INTRAVENOUS AS NEEDED
Status: DISCONTINUED | OUTPATIENT
Start: 2018-09-26 | End: 2018-09-26 | Stop reason: SURG

## 2018-09-26 RX ORDER — IPRATROPIUM BROMIDE AND ALBUTEROL SULFATE 2.5; .5 MG/3ML; MG/3ML
3 SOLUTION RESPIRATORY (INHALATION) ONCE AS NEEDED
Status: COMPLETED | OUTPATIENT
Start: 2018-09-26 | End: 2018-09-26

## 2018-09-26 RX ORDER — SPIRONOLACTONE 25 MG/1
25 TABLET ORAL DAILY
Status: CANCELLED | OUTPATIENT
Start: 2018-09-26

## 2018-09-26 RX ORDER — SODIUM CHLORIDE 0.9 % (FLUSH) 0.9 %
1-10 SYRINGE (ML) INJECTION AS NEEDED
Status: DISCONTINUED | OUTPATIENT
Start: 2018-09-26 | End: 2018-09-26 | Stop reason: HOSPADM

## 2018-09-26 RX ADMIN — EPHEDRINE SULFATE 10 MG: 50 INJECTION INTRAMUSCULAR; INTRAVENOUS; SUBCUTANEOUS at 11:48

## 2018-09-26 RX ADMIN — PROPOFOL 120 MCG/KG/MIN: 10 INJECTION, EMULSION INTRAVENOUS at 11:43

## 2018-09-26 RX ADMIN — MORPHINE SULFATE 2 MG: 2 INJECTION, SOLUTION INTRAMUSCULAR; INTRAVENOUS at 12:32

## 2018-09-26 RX ADMIN — IPRATROPIUM BROMIDE AND ALBUTEROL SULFATE 3 ML: .5; 3 SOLUTION RESPIRATORY (INHALATION) at 12:18

## 2018-09-26 RX ADMIN — SODIUM CHLORIDE, POTASSIUM CHLORIDE, SODIUM LACTATE AND CALCIUM CHLORIDE 125 ML/HR: 600; 310; 30; 20 INJECTION, SOLUTION INTRAVENOUS at 11:21

## 2018-09-26 RX ADMIN — PROPOFOL 120 MG: 10 INJECTION, EMULSION INTRAVENOUS at 11:43

## 2018-09-26 NOTE — ANESTHESIA PREPROCEDURE EVALUATION
Anesthesia Evaluation     Patient summary reviewed and Nursing notes reviewed   no history of anesthetic complications:  NPO Solid Status: > 8 hours  NPO Liquid Status: > 8 hours           Airway   Mallampati: II  TM distance: >3 FB  Neck ROM: full  no difficulty expected  Dental - normal exam   (+) edentulous    Pulmonary - normal exam   (+) a smoker Current Smoked day of surgery, COPD, shortness of breath, decreased breath sounds,   Cardiovascular - normal exam  Exercise tolerance: good (4-7 METS)    NYHA Classification: II  PT is on anticoagulation therapy  Patient on routine beta blocker and Beta blocker given within 24 hours of surgery    (+) pacemaker pacemaker, ICD, hypertension, past MI  >12 months, CAD, CABG, cardiac stents (x2) more than 12 months ago dysrhythmias PVC, angina, CHF, PVD, hyperlipidemia,  carotid artery disease      Neuro/Psych- negative ROS  (-) seizures, CVA  GI/Hepatic/Renal/Endo    (+)  GERD, PUD,  diabetes mellitus,   (-) liver disease, no renal disease    Musculoskeletal     Abdominal  - normal exam    Bowel sounds: normal.   Substance History - negative use     OB/GYN negative ob/gyn ROS         Other   (+) arthritis                     Anesthesia Plan    ASA 3     general     intravenous induction   Anesthetic plan, all risks, benefits, and alternatives have been provided, discussed and informed consent has been obtained with: patient and spouse/significant other.  Use of blood products discussed with patient and spouse/significant other  Consented to blood products.

## 2018-09-27 ENCOUNTER — HOSPITAL ENCOUNTER (INPATIENT)
Facility: HOSPITAL | Age: 68
LOS: 2 days | Discharge: HOME | End: 2018-09-29
Attending: SURGERY | Admitting: SURGERY

## 2018-09-27 ENCOUNTER — TELEPHONE (OUTPATIENT)
Dept: SURGERY | Facility: CLINIC | Age: 68
End: 2018-09-27

## 2018-09-27 DIAGNOSIS — K62.89 RECTAL PAIN: Primary | ICD-10-CM

## 2018-09-27 LAB
ALBUMIN SERPL-MCNC: 4.5 G/DL (ref 3.4–4.8)
ALBUMIN/GLOB SERPL: 1.7 G/DL (ref 1.5–2.5)
ALP SERPL-CCNC: 76 U/L (ref 40–129)
ALT SERPL W P-5'-P-CCNC: 17 U/L (ref 10–44)
ANION GAP SERPL CALCULATED.3IONS-SCNC: 2.5 MMOL/L (ref 3.6–11.2)
AST SERPL-CCNC: 20 U/L (ref 10–34)
BASOPHILS # BLD AUTO: 0.04 10*3/MM3 (ref 0–0.3)
BASOPHILS NFR BLD AUTO: 0.5 % (ref 0–2)
BILIRUB SERPL-MCNC: 0.6 MG/DL (ref 0.2–1.8)
BUN BLD-MCNC: 7 MG/DL (ref 7–21)
BUN/CREAT SERPL: 7.1 (ref 7–25)
CALCIUM SPEC-SCNC: 9.4 MG/DL (ref 7.7–10)
CHLORIDE SERPL-SCNC: 106 MMOL/L (ref 99–112)
CO2 SERPL-SCNC: 30.5 MMOL/L (ref 24.3–31.9)
CREAT BLD-MCNC: 0.98 MG/DL (ref 0.43–1.29)
DEPRECATED RDW RBC AUTO: 42.9 FL (ref 37–54)
EOSINOPHIL # BLD AUTO: 0.13 10*3/MM3 (ref 0–0.7)
EOSINOPHIL NFR BLD AUTO: 1.5 % (ref 0–7)
ERYTHROCYTE [DISTWIDTH] IN BLOOD BY AUTOMATED COUNT: 12.8 % (ref 11.5–14.5)
GFR SERPL CREATININE-BSD FRML MDRD: 76 ML/MIN/1.73
GLOBULIN UR ELPH-MCNC: 2.7 GM/DL
GLUCOSE BLD-MCNC: 89 MG/DL (ref 70–110)
HCT VFR BLD AUTO: 49 % (ref 42–52)
HGB BLD-MCNC: 16.1 G/DL (ref 14–18)
IMM GRANULOCYTES # BLD: 0.02 10*3/MM3 (ref 0–0.03)
IMM GRANULOCYTES NFR BLD: 0.2 % (ref 0–0.5)
LYMPHOCYTES # BLD AUTO: 2.93 10*3/MM3 (ref 1–3)
LYMPHOCYTES NFR BLD AUTO: 34.1 % (ref 16–46)
MCH RBC QN AUTO: 31.1 PG (ref 27–33)
MCHC RBC AUTO-ENTMCNC: 32.9 G/DL (ref 33–37)
MCV RBC AUTO: 94.8 FL (ref 80–94)
MONOCYTES # BLD AUTO: 0.81 10*3/MM3 (ref 0.1–0.9)
MONOCYTES NFR BLD AUTO: 9.4 % (ref 0–12)
NEUTROPHILS # BLD AUTO: 4.67 10*3/MM3 (ref 1.4–6.5)
NEUTROPHILS NFR BLD AUTO: 54.3 % (ref 40–75)
OSMOLALITY SERPL CALC.SUM OF ELEC: 275 MOSM/KG (ref 273–305)
PLATELET # BLD AUTO: 175 10*3/MM3 (ref 130–400)
PMV BLD AUTO: 10.7 FL (ref 6–10)
POTASSIUM BLD-SCNC: 4 MMOL/L (ref 3.5–5.3)
PROT SERPL-MCNC: 7.2 G/DL (ref 6–8)
RBC # BLD AUTO: 5.17 10*6/MM3 (ref 4.7–6.1)
SODIUM BLD-SCNC: 139 MMOL/L (ref 135–153)
WBC NRBC COR # BLD: 8.6 10*3/MM3 (ref 4.5–12.5)

## 2018-09-27 PROCEDURE — 93010 ELECTROCARDIOGRAM REPORT: CPT | Performed by: INTERNAL MEDICINE

## 2018-09-27 PROCEDURE — 94799 UNLISTED PULMONARY SVC/PX: CPT

## 2018-09-27 PROCEDURE — 93005 ELECTROCARDIOGRAM TRACING: CPT | Performed by: SURGERY

## 2018-09-27 PROCEDURE — 25010000002 PNEUMOCOCCAL VAC POLYVALENT PER 0.5 ML: Performed by: SURGERY

## 2018-09-27 PROCEDURE — 94640 AIRWAY INHALATION TREATMENT: CPT

## 2018-09-27 PROCEDURE — 90732 PPSV23 VACC 2 YRS+ SUBQ/IM: CPT | Performed by: SURGERY

## 2018-09-27 PROCEDURE — 85025 COMPLETE CBC W/AUTO DIFF WBC: CPT | Performed by: SURGERY

## 2018-09-27 PROCEDURE — 80053 COMPREHEN METABOLIC PANEL: CPT | Performed by: SURGERY

## 2018-09-27 PROCEDURE — 25010000002 ENOXAPARIN PER 10 MG: Performed by: SURGERY

## 2018-09-27 PROCEDURE — 25010000002 MORPHINE PER 10 MG: Performed by: SURGERY

## 2018-09-27 PROCEDURE — G0009 ADMIN PNEUMOCOCCAL VACCINE: HCPCS | Performed by: SURGERY

## 2018-09-27 RX ORDER — HYDROCODONE BITARTRATE AND ACETAMINOPHEN 7.5; 325 MG/1; MG/1
1 TABLET ORAL 2 TIMES DAILY PRN
Status: DISCONTINUED | OUTPATIENT
Start: 2018-09-27 | End: 2018-09-29

## 2018-09-27 RX ORDER — BUMETANIDE 1 MG/1
1 TABLET ORAL EVERY MORNING
Status: DISCONTINUED | OUTPATIENT
Start: 2018-09-28 | End: 2018-09-29 | Stop reason: HOSPADM

## 2018-09-27 RX ORDER — SODIUM CHLORIDE 9 MG/ML
50 INJECTION, SOLUTION INTRAVENOUS CONTINUOUS
Status: DISCONTINUED | OUTPATIENT
Start: 2018-09-27 | End: 2018-09-29 | Stop reason: HOSPADM

## 2018-09-27 RX ORDER — ALBUTEROL SULFATE 2.5 MG/3ML
2.5 SOLUTION RESPIRATORY (INHALATION) EVERY 6 HOURS PRN
Status: DISCONTINUED | OUTPATIENT
Start: 2018-09-27 | End: 2018-09-29 | Stop reason: HOSPADM

## 2018-09-27 RX ORDER — ONDANSETRON 2 MG/ML
4 INJECTION INTRAMUSCULAR; INTRAVENOUS EVERY 4 HOURS PRN
Status: DISCONTINUED | OUTPATIENT
Start: 2018-09-27 | End: 2018-09-29 | Stop reason: HOSPADM

## 2018-09-27 RX ORDER — SOTALOL HYDROCHLORIDE 80 MG/1
120 TABLET ORAL EVERY 12 HOURS SCHEDULED
Status: DISCONTINUED | OUTPATIENT
Start: 2018-09-27 | End: 2018-09-29 | Stop reason: HOSPADM

## 2018-09-27 RX ORDER — SPIRONOLACTONE 25 MG/1
25 TABLET ORAL DAILY
Status: DISCONTINUED | OUTPATIENT
Start: 2018-09-27 | End: 2018-09-29 | Stop reason: HOSPADM

## 2018-09-27 RX ORDER — HYDROCORTISONE ACETATE 25 MG/1
25 SUPPOSITORY RECTAL DAILY PRN
Status: DISCONTINUED | OUTPATIENT
Start: 2018-09-27 | End: 2018-09-29 | Stop reason: HOSPADM

## 2018-09-27 RX ORDER — ROPINIROLE 1 MG/1
2 TABLET, FILM COATED ORAL NIGHTLY
Status: DISCONTINUED | OUTPATIENT
Start: 2018-09-27 | End: 2018-09-29 | Stop reason: HOSPADM

## 2018-09-27 RX ORDER — GLIPIZIDE 5 MG/1
1.25 TABLET ORAL
Status: CANCELLED | OUTPATIENT
Start: 2018-09-27

## 2018-09-27 RX ORDER — BUDESONIDE AND FORMOTEROL FUMARATE DIHYDRATE 80; 4.5 UG/1; UG/1
2 AEROSOL RESPIRATORY (INHALATION)
Status: DISCONTINUED | OUTPATIENT
Start: 2018-09-27 | End: 2018-09-29 | Stop reason: HOSPADM

## 2018-09-27 RX ORDER — PRASUGREL 10 MG/1
10 TABLET, FILM COATED ORAL DAILY
Status: CANCELLED | OUTPATIENT
Start: 2018-09-27

## 2018-09-27 RX ORDER — NITROGLYCERIN 0.4 MG/1
0.4 TABLET SUBLINGUAL
Status: DISCONTINUED | OUTPATIENT
Start: 2018-09-27 | End: 2018-09-29 | Stop reason: HOSPADM

## 2018-09-27 RX ORDER — CARVEDILOL 6.25 MG/1
6.25 TABLET ORAL 2 TIMES DAILY WITH MEALS
Status: DISCONTINUED | OUTPATIENT
Start: 2018-09-27 | End: 2018-09-29 | Stop reason: HOSPADM

## 2018-09-27 RX ORDER — TIZANIDINE 4 MG/1
8 TABLET ORAL NIGHTLY
Status: DISCONTINUED | OUTPATIENT
Start: 2018-09-27 | End: 2018-09-29 | Stop reason: HOSPADM

## 2018-09-27 RX ADMIN — CARVEDILOL 6.25 MG: 6.25 TABLET, FILM COATED ORAL at 18:42

## 2018-09-27 RX ADMIN — ENOXAPARIN SODIUM 40 MG: 40 INJECTION SUBCUTANEOUS at 14:27

## 2018-09-27 RX ADMIN — TIZANIDINE 8 MG: 4 TABLET ORAL at 19:55

## 2018-09-27 RX ADMIN — IPRATROPIUM BROMIDE 0.5 MG: 0.5 SOLUTION RESPIRATORY (INHALATION) at 19:20

## 2018-09-27 RX ADMIN — SPIRONOLACTONE 25 MG: 25 TABLET ORAL at 18:42

## 2018-09-27 RX ADMIN — SODIUM CHLORIDE 50 ML/HR: 9 INJECTION, SOLUTION INTRAVENOUS at 14:27

## 2018-09-27 RX ADMIN — PNEUMOCOCCAL VACCINE POLYVALENT 0.5 ML
25; 25; 25; 25; 25; 25; 25; 25; 25; 25; 25; 25; 25; 25; 25; 25; 25; 25; 25; 25; 25; 25; 25 INJECTION, SOLUTION INTRAMUSCULAR; SUBCUTANEOUS at 14:27

## 2018-09-27 RX ADMIN — SOTALOL HYDROCHLORIDE 120 MG: 80 TABLET ORAL at 19:55

## 2018-09-27 RX ADMIN — ROPINIROLE HYDROCHLORIDE 2 MG: 1 TABLET, FILM COATED ORAL at 19:55

## 2018-09-27 RX ADMIN — BUDESONIDE AND FORMOTEROL FUMARATE DIHYDRATE 2 PUFF: 80; 4.5 AEROSOL RESPIRATORY (INHALATION) at 19:20

## 2018-09-27 RX ADMIN — MORPHINE SULFATE 4 MG: 4 INJECTION INTRAVENOUS at 14:26

## 2018-09-27 RX ADMIN — MORPHINE SULFATE 4 MG: 4 INJECTION INTRAVENOUS at 19:52

## 2018-09-27 RX ADMIN — MORPHINE SULFATE 4 MG: 4 INJECTION INTRAVENOUS at 22:45

## 2018-09-27 RX ADMIN — MUPIROCIN 1 APPLICATION: 20 OINTMENT TOPICAL at 18:42

## 2018-09-27 NOTE — TELEPHONE ENCOUNTER
Patients wife called and states he is in extreme pain and isn't able to sleep or eat. He was scheduled for hemorrhoidectomy yesterday however he had taken his blood thinners so this could not be done. Nicci and I both have tried to contact [facility] regarding cardiac clearance but have had to leave a message.The first clearance we received only said to continue aspirin but didn't say anything about his Effient. We are waiting for 's nurse to call us back regarding this and I made patient's wife aware. She says that he is in a lot of pain and needs something done today. I made Nicci aware and she contacted  and he is going to admit this patient. I made patient aware to go to  [facility] to the ER Registration and he would be admitted for pain control.

## 2018-09-28 ENCOUNTER — ANESTHESIA EVENT (OUTPATIENT)
Dept: PERIOP | Facility: HOSPITAL | Age: 68
End: 2018-09-28

## 2018-09-28 ENCOUNTER — ANESTHESIA (OUTPATIENT)
Dept: PERIOP | Facility: HOSPITAL | Age: 68
End: 2018-09-28

## 2018-09-28 PROCEDURE — 25010000002 ONDANSETRON PER 1 MG: Performed by: SURGERY

## 2018-09-28 PROCEDURE — 93005 ELECTROCARDIOGRAM TRACING: CPT | Performed by: SURGERY

## 2018-09-28 PROCEDURE — 94799 UNLISTED PULMONARY SVC/PX: CPT

## 2018-09-28 PROCEDURE — 46947 HEMORRHOIDOPEXY BY STAPLING: CPT | Performed by: SURGERY

## 2018-09-28 PROCEDURE — 99223 1ST HOSP IP/OBS HIGH 75: CPT | Performed by: SURGERY

## 2018-09-28 PROCEDURE — 06BY0ZC EXCISION OF HEMORRHOIDAL PLEXUS, OPEN APPROACH: ICD-10-PCS | Performed by: SURGERY

## 2018-09-28 PROCEDURE — 25010000002 MORPHINE PER 10 MG: Performed by: SURGERY

## 2018-09-28 PROCEDURE — 25010000002 MIDAZOLAM PER 1 MG: Performed by: ANESTHESIOLOGY

## 2018-09-28 PROCEDURE — 25010000002 PROPOFOL 10 MG/ML EMULSION: Performed by: NURSE ANESTHETIST, CERTIFIED REGISTERED

## 2018-09-28 PROCEDURE — 25010000002 FENTANYL CITRATE (PF) 100 MCG/2ML SOLUTION: Performed by: NURSE ANESTHETIST, CERTIFIED REGISTERED

## 2018-09-28 RX ORDER — FENTANYL CITRATE 50 UG/ML
INJECTION, SOLUTION INTRAMUSCULAR; INTRAVENOUS AS NEEDED
Status: DISCONTINUED | OUTPATIENT
Start: 2018-09-28 | End: 2018-09-28 | Stop reason: SURG

## 2018-09-28 RX ORDER — MEPERIDINE HYDROCHLORIDE 25 MG/ML
12.5 INJECTION INTRAMUSCULAR; INTRAVENOUS; SUBCUTANEOUS
Status: DISCONTINUED | OUTPATIENT
Start: 2018-09-28 | End: 2018-09-28 | Stop reason: HOSPADM

## 2018-09-28 RX ORDER — MIDAZOLAM HYDROCHLORIDE 1 MG/ML
1 INJECTION INTRAMUSCULAR; INTRAVENOUS
Status: DISCONTINUED | OUTPATIENT
Start: 2018-09-28 | End: 2018-09-28 | Stop reason: HOSPADM

## 2018-09-28 RX ORDER — FENTANYL CITRATE 50 UG/ML
50 INJECTION, SOLUTION INTRAMUSCULAR; INTRAVENOUS
Status: DISCONTINUED | OUTPATIENT
Start: 2018-09-28 | End: 2018-09-28 | Stop reason: HOSPADM

## 2018-09-28 RX ORDER — ONDANSETRON 2 MG/ML
4 INJECTION INTRAMUSCULAR; INTRAVENOUS ONCE AS NEEDED
Status: DISCONTINUED | OUTPATIENT
Start: 2018-09-28 | End: 2018-09-28 | Stop reason: HOSPADM

## 2018-09-28 RX ORDER — MAGNESIUM HYDROXIDE 1200 MG/15ML
LIQUID ORAL AS NEEDED
Status: DISCONTINUED | OUTPATIENT
Start: 2018-09-28 | End: 2018-09-28 | Stop reason: HOSPADM

## 2018-09-28 RX ORDER — FAMOTIDINE 10 MG/ML
INJECTION, SOLUTION INTRAVENOUS AS NEEDED
Status: DISCONTINUED | OUTPATIENT
Start: 2018-09-28 | End: 2018-09-28 | Stop reason: SURG

## 2018-09-28 RX ORDER — OXYCODONE HYDROCHLORIDE AND ACETAMINOPHEN 5; 325 MG/1; MG/1
1 TABLET ORAL ONCE AS NEEDED
Status: DISCONTINUED | OUTPATIENT
Start: 2018-09-28 | End: 2018-09-28 | Stop reason: HOSPADM

## 2018-09-28 RX ORDER — LIDOCAINE HYDROCHLORIDE 20 MG/ML
INJECTION, SOLUTION INFILTRATION; PERINEURAL AS NEEDED
Status: DISCONTINUED | OUTPATIENT
Start: 2018-09-28 | End: 2018-09-28 | Stop reason: SURG

## 2018-09-28 RX ORDER — SODIUM CHLORIDE 0.9 % (FLUSH) 0.9 %
1-10 SYRINGE (ML) INJECTION AS NEEDED
Status: DISCONTINUED | OUTPATIENT
Start: 2018-09-28 | End: 2018-09-28 | Stop reason: HOSPADM

## 2018-09-28 RX ORDER — SODIUM CHLORIDE, SODIUM LACTATE, POTASSIUM CHLORIDE, CALCIUM CHLORIDE 600; 310; 30; 20 MG/100ML; MG/100ML; MG/100ML; MG/100ML
125 INJECTION, SOLUTION INTRAVENOUS CONTINUOUS
Status: DISCONTINUED | OUTPATIENT
Start: 2018-09-28 | End: 2018-09-28

## 2018-09-28 RX ORDER — MIDAZOLAM HYDROCHLORIDE 1 MG/ML
2 INJECTION INTRAMUSCULAR; INTRAVENOUS
Status: DISCONTINUED | OUTPATIENT
Start: 2018-09-28 | End: 2018-09-28 | Stop reason: HOSPADM

## 2018-09-28 RX ORDER — IPRATROPIUM BROMIDE AND ALBUTEROL SULFATE 2.5; .5 MG/3ML; MG/3ML
3 SOLUTION RESPIRATORY (INHALATION) ONCE AS NEEDED
Status: DISCONTINUED | OUTPATIENT
Start: 2018-09-28 | End: 2018-09-28 | Stop reason: HOSPADM

## 2018-09-28 RX ORDER — PROPOFOL 10 MG/ML
VIAL (ML) INTRAVENOUS AS NEEDED
Status: DISCONTINUED | OUTPATIENT
Start: 2018-09-28 | End: 2018-09-28 | Stop reason: SURG

## 2018-09-28 RX ADMIN — MIDAZOLAM HYDROCHLORIDE 2 MG: 1 INJECTION, SOLUTION INTRAMUSCULAR; INTRAVENOUS at 13:20

## 2018-09-28 RX ADMIN — MUPIROCIN 1 APPLICATION: 20 OINTMENT TOPICAL at 08:26

## 2018-09-28 RX ADMIN — MORPHINE SULFATE 4 MG: 4 INJECTION INTRAVENOUS at 05:15

## 2018-09-28 RX ADMIN — PROPOFOL 60 MG: 10 INJECTION, EMULSION INTRAVENOUS at 13:24

## 2018-09-28 RX ADMIN — MUPIROCIN 1 APPLICATION: 20 OINTMENT TOPICAL at 22:56

## 2018-09-28 RX ADMIN — SODIUM CHLORIDE, POTASSIUM CHLORIDE, SODIUM LACTATE AND CALCIUM CHLORIDE 125 ML/HR: 600; 310; 30; 20 INJECTION, SOLUTION INTRAVENOUS at 13:02

## 2018-09-28 RX ADMIN — ROPINIROLE HYDROCHLORIDE 2 MG: 1 TABLET, FILM COATED ORAL at 22:56

## 2018-09-28 RX ADMIN — BUDESONIDE AND FORMOTEROL FUMARATE DIHYDRATE 2 PUFF: 80; 4.5 AEROSOL RESPIRATORY (INHALATION) at 07:08

## 2018-09-28 RX ADMIN — MORPHINE SULFATE 4 MG: 4 INJECTION INTRAVENOUS at 08:35

## 2018-09-28 RX ADMIN — SODIUM CHLORIDE, POTASSIUM CHLORIDE, SODIUM LACTATE AND CALCIUM CHLORIDE: 600; 310; 30; 20 INJECTION, SOLUTION INTRAVENOUS at 13:15

## 2018-09-28 RX ADMIN — HYDROCODONE BITARTRATE AND ACETAMINOPHEN 1 TABLET: 7.5; 325 TABLET ORAL at 23:59

## 2018-09-28 RX ADMIN — MORPHINE SULFATE 4 MG: 4 INJECTION INTRAVENOUS at 21:07

## 2018-09-28 RX ADMIN — IPRATROPIUM BROMIDE 0.5 MG: 0.5 SOLUTION RESPIRATORY (INHALATION) at 07:08

## 2018-09-28 RX ADMIN — MORPHINE SULFATE 4 MG: 4 INJECTION INTRAVENOUS at 01:46

## 2018-09-28 RX ADMIN — CEFAZOLIN 1 G: 1 INJECTION, POWDER, FOR SOLUTION INTRAMUSCULAR; INTRAVENOUS; PARENTERAL at 16:22

## 2018-09-28 RX ADMIN — SODIUM CHLORIDE 50 ML/HR: 9 INJECTION, SOLUTION INTRAVENOUS at 14:54

## 2018-09-28 RX ADMIN — LIDOCAINE HYDROCHLORIDE 40 MG: 20 INJECTION, SOLUTION INFILTRATION; PERINEURAL at 13:20

## 2018-09-28 RX ADMIN — FENTANYL CITRATE 50 MCG: 50 INJECTION INTRAMUSCULAR; INTRAVENOUS at 13:40

## 2018-09-28 RX ADMIN — FAMOTIDINE 20 MG: 10 INJECTION, SOLUTION INTRAVENOUS at 13:20

## 2018-09-28 RX ADMIN — FENTANYL CITRATE 50 MCG: 50 INJECTION INTRAMUSCULAR; INTRAVENOUS at 13:29

## 2018-09-28 RX ADMIN — SOTALOL HYDROCHLORIDE 120 MG: 80 TABLET ORAL at 22:55

## 2018-09-28 RX ADMIN — TIZANIDINE 8 MG: 4 TABLET ORAL at 22:55

## 2018-09-28 RX ADMIN — MUPIROCIN 1 APPLICATION: 20 OINTMENT TOPICAL at 16:22

## 2018-09-28 RX ADMIN — MORPHINE SULFATE 4 MG: 4 INJECTION INTRAVENOUS at 23:03

## 2018-09-28 RX ADMIN — ONDANSETRON 4 MG: 2 INJECTION INTRAMUSCULAR; INTRAVENOUS at 13:20

## 2018-09-28 RX ADMIN — IPRATROPIUM BROMIDE 0.5 MG: 0.5 SOLUTION RESPIRATORY (INHALATION) at 01:45

## 2018-09-28 NOTE — ANESTHESIA PROCEDURE NOTES
Airway  Urgency: elective    Date/Time: 9/28/2018 1:53 PM  Airway not difficult    General Information and Staff    Patient location during procedure: OR  Anesthesiologist: TONYA FINE  CRNA: SURINDER LAWRENCE    Indications and Patient Condition  Indications for airway management: airway protection    Preoxygenated: yes      Final Airway Details  Final airway type: supraglottic airway      Successful airway: unique  Size 4    Number of attempts at approach: 1

## 2018-09-28 NOTE — ANESTHESIA POSTPROCEDURE EVALUATION
Patient: Derek Morris Jr.    Procedure Summary     Date:  09/28/18 Room / Location:  [facility] OR 03 / [facility] OR    Anesthesia Start:  1320 Anesthesia Stop:  1401    Procedure:  HEMORRHOIDECTOMY (N/A Anus) Diagnosis:       Rectal pain      (Rectal pain [K62.89])    Surgeon:  Alfred Shah MD Provider:  Mingo Chris MD    Anesthesia Type:  general ASA Status:  3          Anesthesia Type: general  Last vitals  BP   103/58 (09/28/18 1401)   Temp   98.4 °F (36.9 °C) (09/28/18 1243)   Pulse   69 (09/28/18 1401)   Resp   13 (09/28/18 1401)     SpO2   97 % (09/28/18 1401)     Post Anesthesia Care and Evaluation    Patient location during evaluation: PHASE II  Patient participation: complete - patient participated  Level of consciousness: awake and alert  Pain score: 1  Pain management: adequate  Airway patency: patent  Anesthetic complications: No anesthetic complications  PONV Status: controlled  Cardiovascular status: acceptable  Respiratory status: acceptable  Hydration status: acceptable

## 2018-09-29 VITALS
BODY MASS INDEX: 25.9 KG/M2 | DIASTOLIC BLOOD PRESSURE: 49 MMHG | TEMPERATURE: 98 F | HEIGHT: 69 IN | WEIGHT: 174.9 LBS | SYSTOLIC BLOOD PRESSURE: 100 MMHG | RESPIRATION RATE: 20 BRPM | OXYGEN SATURATION: 97 % | HEART RATE: 78 BPM

## 2018-09-29 PROCEDURE — 25010000002 MORPHINE PER 10 MG: Performed by: SURGERY

## 2018-09-29 PROCEDURE — 94799 UNLISTED PULMONARY SVC/PX: CPT

## 2018-09-29 PROCEDURE — 99024 POSTOP FOLLOW-UP VISIT: CPT | Performed by: SURGERY

## 2018-09-29 RX ORDER — HYDROCODONE BITARTRATE AND ACETAMINOPHEN 7.5; 325 MG/1; MG/1
1 TABLET ORAL EVERY 4 HOURS PRN
Qty: 40 TABLET | Refills: 0 | Status: SHIPPED | OUTPATIENT
Start: 2018-09-29 | End: 2018-11-19

## 2018-09-29 RX ORDER — HYDROCODONE BITARTRATE AND ACETAMINOPHEN 7.5; 325 MG/1; MG/1
1 TABLET ORAL EVERY 4 HOURS PRN
Status: DISCONTINUED | OUTPATIENT
Start: 2018-09-29 | End: 2018-09-29 | Stop reason: HOSPADM

## 2018-09-29 RX ORDER — POLYETHYLENE GLYCOL 3350 17 G/17G
17 POWDER, FOR SOLUTION ORAL DAILY
Status: DISCONTINUED | OUTPATIENT
Start: 2018-09-29 | End: 2018-09-29 | Stop reason: HOSPADM

## 2018-09-29 RX ADMIN — IPRATROPIUM BROMIDE 0.5 MG: 0.5 SOLUTION RESPIRATORY (INHALATION) at 13:08

## 2018-09-29 RX ADMIN — MORPHINE SULFATE 4 MG: 4 INJECTION INTRAVENOUS at 06:13

## 2018-09-29 RX ADMIN — MORPHINE SULFATE 4 MG: 4 INJECTION INTRAVENOUS at 03:22

## 2018-09-29 RX ADMIN — MUPIROCIN 1 APPLICATION: 20 OINTMENT TOPICAL at 08:45

## 2018-09-29 RX ADMIN — MORPHINE SULFATE 4 MG: 4 INJECTION INTRAVENOUS at 09:46

## 2018-09-29 RX ADMIN — IPRATROPIUM BROMIDE 0.5 MG: 0.5 SOLUTION RESPIRATORY (INHALATION) at 00:42

## 2018-09-29 RX ADMIN — HYDROCODONE BITARTRATE AND ACETAMINOPHEN 1 TABLET: 7.5; 325 TABLET ORAL at 14:21

## 2018-09-29 RX ADMIN — BUMETANIDE 1 MG: 1 TABLET ORAL at 07:38

## 2018-09-29 RX ADMIN — CEFAZOLIN 1 G: 1 INJECTION, POWDER, FOR SOLUTION INTRAMUSCULAR; INTRAVENOUS; PARENTERAL at 01:17

## 2018-09-29 RX ADMIN — BUDESONIDE AND FORMOTEROL FUMARATE DIHYDRATE 2 PUFF: 80; 4.5 AEROSOL RESPIRATORY (INHALATION) at 07:14

## 2018-09-29 RX ADMIN — HYDROCODONE BITARTRATE AND ACETAMINOPHEN 1 TABLET: 7.5; 325 TABLET ORAL at 05:10

## 2018-09-29 RX ADMIN — MORPHINE SULFATE 4 MG: 4 INJECTION INTRAVENOUS at 01:04

## 2018-09-29 RX ADMIN — IPRATROPIUM BROMIDE 0.5 MG: 0.5 SOLUTION RESPIRATORY (INHALATION) at 07:14

## 2018-09-29 RX ADMIN — WITCH HAZEL: 500 SOLUTION RECTAL; TOPICAL at 06:13

## 2018-09-29 RX ADMIN — SPIRONOLACTONE 25 MG: 25 TABLET ORAL at 08:38

## 2018-09-30 ENCOUNTER — READMISSION MANAGEMENT (OUTPATIENT)
Dept: CALL CENTER | Facility: HOSPITAL | Age: 68
End: 2018-09-30

## 2018-09-30 NOTE — OUTREACH NOTE
Prep Survey      Responses   Facility patient discharged from?  [facility]   Is patient eligible?  Yes   Discharge diagnosis  Rectal pain/Hemorrhoidectomy    Does the patient have one of the following disease processes/diagnoses(primary or secondary)?  General Surgery   Does the patient have Home health ordered?  No   Is there a DME ordered?  No   Prep survey completed?  Yes          Amanda Adams RN

## 2018-10-01 ENCOUNTER — TELEPHONE (OUTPATIENT)
Dept: MEDSURG UNIT | Facility: HOSPITAL | Age: 68
End: 2018-10-01

## 2018-10-02 LAB
LAB AP CASE REPORT: NORMAL
PATH REPORT.FINAL DX SPEC: NORMAL

## 2018-10-03 ENCOUNTER — READMISSION MANAGEMENT (OUTPATIENT)
Dept: CALL CENTER | Facility: HOSPITAL | Age: 68
End: 2018-10-03

## 2018-10-03 NOTE — OUTREACH NOTE
General Surgery Week 1 Survey      Responses   Facility patient discharged from?  [facility]   Does the patient have one of the following disease processes/diagnoses(primary or secondary)?  General Surgery   Is there a successful TCM telephone encounter documented?  No   Week 1 attempt successful?  Yes   Call start time  1537   Call end time  1543   Discharge diagnosis  Rectal pain/Hemorrhoidectomy    Meds reviewed with patient/caregiver?  Yes   Is the patient having any side effects they believe may be caused by any medication additions or changes?  No   Does the patient have all medications related to this admission filled (includes all antibiotics, pain medications, etc.)  Yes   Is the patient taking all medications as directed (includes completed medication regime)?  Yes   Does the patient have a follow up appointment scheduled with their surgeon?  Yes   Has the patient kept scheduled appointments due by today?  Yes   Psychosocial issues?  No   Did the patient receive a copy of their discharge instructions?  Yes   Nursing interventions  Reviewed instructions with patient   Nursing interventions  Nurse provided patient education   Is the patient /caregiver able to teach back basic post-op care?  Practice 'cough and deep breath', Drive as instructed by MD in discharge instructions, Lifting as instructed by MD in discharge instructions   Is the patient/caregiver able to teach back signs and symptoms of incisional infection?  Increased redness, swelling or pain at the incisonal site, Increased drainage or bleeding, Incisional warmth, Pus or odor from incision, Fever   Is the patient/caregiver able to teach back steps to recovery at home?  Eat a well-balance diet, Set small, achievable goals for return to baseline health, Make a list of questions for surgeon's appointment   Is the patient/caregiver able to teach back the hierarchy of who to call/visit for symptoms/problems? PCP, Specialist, Home health nurse, Urgent  [facility], ED, 911  Yes   Week 1 call completed?  Yes          Carol Bundy, RN

## 2018-10-04 NOTE — DISCHARGE SUMMARY
Date of Discharge:  10/4/2018    Discharge Diagnosis: internal hemorrhoids, hypertension, coronary artery disease, cardiomyopathy, diabetes    Presenting Problem/History of Present Illness  Other emphysema (CMS/HCC) [J43.8]  Essential hypertension [I10]  Coronary artery disease of native artery of native heart with stable angina pectoris (CMS/HCC) [I25.118]  Type 2 diabetes mellitus without complication, unspecified long term insulin use status [E11.9]  Cardiomyopathy, unspecified type (CMS/HCC) [I42.9]  Cardiomyopathy, unspecified type (CMS/HCC) [I42.9]       Hospital Course  Patient's a 67-year-old white male complaining of severe rectal pain.  He was admitted to the hospital for pain control and the following day he underwent a stapled hemorrhoidal pexy.  He was discharged home the next morning.     Procedures Performed  Procedure(s):  COLONOSCOPY  EXAM UNDER ANESTHESIA   stapled hemorrhoidal plexi    Consults:  Consults     No orders found from 8/28/2018 to 9/27/2018.          Condition on Discharge:  Stable    Vital Signs       Physical Exam:     General Appearance:    Alert, cooperative, in no acute distress   Head:    Normocephalic, without obvious abnormality, atraumatic   Eyes:            Lids and lashes normal, conjunctivae and sclerae normal, no   icterus, no pallor, corneas clear, PERRLA   Ears:    Ears appear intact with no abnormalities noted   Throat:   No oral lesions, no thrush, oral mucosa moist   Neck:   No adenopathy, supple, trachea midline, no thyromegaly, no   carotid bruit, no JVD   Back:     No kyphosis present, no scoliosis present, no skin lesions,      erythema or scars, no tenderness to percussion or                   palpation,   range of motion normal   Lungs:     Clear to auscultation,respirations regular, even and                  unlabored    Heart:    Regular rhythm and normal rate, normal S1 and S2, no            murmur, no gallop, no rub, no click   Chest Wall:    No  abnormalities observed   Abdomen:     Normal bowel sounds, no masses, no organomegaly, soft        non-tender, non-distended, no guarding, no rebound                tenderness   Rectal:     External exam consistent with recent hemorrhoid operation    Extremities:   Moves all extremities well, no edema, no cyanosis, no             redness   Pulses:   Pulses palpable and equal bilaterally   Skin:   No bleeding, bruising or rash   Lymph nodes:   No palpable adenopathy   Neurologic:   Cranial nerves 2 - 12 grossly intact, sensation intact, DTR       present and equal bilaterally       Discharge Disposition  Home or Self Care    Discharge Medications     Discharge Medications      New Medications      Instructions Start Date   lidocaine 2 % jelly  Commonly known as:  XYLOCAINE   Apply to affected area daily prn         Continue These Medications      Instructions Start Date   albuterol 108 (90 Base) MCG/ACT inhaler  Commonly known as:  PROVENTIL HFA;VENTOLIN HFA   2 puffs, Inhalation, Every 6 Hours PRN      BREO ELLIPTA 100-25 MCG/INH aerosol powder   Generic drug:  Fluticasone Furoate-Vilanterol   1 puff, Inhalation, Daily      bumetanide 2 MG tablet  Commonly known as:  BUMEX   1 mg, Oral, Every Morning      carvedilol 6.25 MG tablet  Commonly known as:  COREG   6.25 mg, Oral, 2 Times Daily With Meals      ezetimibe 10 MG tablet  Commonly known as:  ZETIA   10 mg, Oral, Daily      GLIPIZIDE XL 2.5 MG 24 hr tablet  Generic drug:  glipiZIDE   2.5 mg, Oral, Daily      HYDROcodone-acetaminophen 7.5-325 MG per tablet  Commonly known as:  NORCO   1 tablet, Oral, 2 Times Daily PRN      hydrocortisone 25 MG suppository  Commonly known as:  ANUSOL-HC   25 mg, Rectal, Daily      INCRUSE ELLIPTA 62.5 MCG/INH aerosol powder   Generic drug:  Umeclidinium Bromide   1 puff, Inhalation, Daily      mupirocin 2 % ointment  Commonly known as:  BACTROBAN   1 application, Topical, 3 Times Daily, chest       nitroglycerin 0.4 MG SL  tablet  Commonly known as:  NITROSTAT   0.4 mg, Sublingual, Every 5 Minutes PRN, Take no more than 3 doses in 15 minutes.      prasugrel 10 MG tablet  Commonly known as:  EFFIENT   10 mg, Oral, Daily      rOPINIRole 2 MG tablet  Commonly known as:  REQUIP   2 mg, Oral, Nightly      sacubitril-valsartan 49-51 MG tablet  Commonly known as:  ENTRESTO   1 tablet, Oral, 2 Times Daily      sotalol 120 MG tablet  Commonly known as:  BETAPACE   120 mg, Oral, 2 Times Daily      spironolactone 25 MG tablet  Commonly known as:  ALDACTONE   25 mg, Oral, Daily      tiZANidine 4 MG tablet  Commonly known as:  ZANAFLEX   8 mg, Oral, Nightly             Discharge Disposition  Patient is discharged home.  He is given a prescription of Percocet.  He is to take sitz baths.  He can have regular diet, limited activity, stable condition, his prognosis is good.       Alfred Shah MD  10/04/18  10:40 AM            Dragon disclaimer:  Much of this encounter note is an electronic transcription/translation of spoken language to printed text. The electronic translation of spoken language may permit erroneous, or at times, nonsensical words or phrases to be inadvertently transcribed; Although I have reviewed the note for such errors, some may still exist.

## 2018-10-10 ENCOUNTER — READMISSION MANAGEMENT (OUTPATIENT)
Dept: CALL CENTER | Facility: HOSPITAL | Age: 68
End: 2018-10-10

## 2018-10-10 NOTE — OUTREACH NOTE
General Surgery Week 2 Survey      Responses   Facility patient discharged from?  [facility]   Does the patient have one of the following disease processes/diagnoses(primary or secondary)?  General Surgery   Week 2 attempt successful?  Yes   Call start time  1455   Call end time  1508   Discharge diagnosis  Rectal pain/Hemorrhoidectomy    Meds reviewed with patient/caregiver?  Yes   Is the patient taking all medications as directed (includes completed medication regime)?  Yes   Has the patient kept scheduled appointments due by today?  N/A   Comments  Pt has f/u appt surg 10-11.    Psychosocial issues?  No   Comments  Pt still having painful BM. Still has significant edema he reports. Having difficulty urinating unless having BM. Pt had constipation at home and he tried to use glycerin suppository but could not get it inside rectum per his report without checking with MD. Advised him to not use those unless speaking to MD first. He has appt in morning per his report. Pt now having soft stools. Pt having pain/burning with BM per his report.    What is the patient's perception of their health status since discharge?  Improving   Is the patient /caregiver able to teach back basic post-op care?  Practice 'cough and deep breath', Keep incision areas clean,dry and protected, Take showers only when approved by MD-sponge bathe until then, Continue use of incentive spirometry at least 1 week post discharge   Is the patient/caregiver able to teach back signs and symptoms of incisional infection?  Increased redness, swelling or pain at the incisonal site, Incisional warmth, Fever   Is the patient/caregiver able to teach back steps to recovery at home?  Eat a well-balance diet   Is the patient/caregiver able to teach back the hierarchy of who to call/visit for symptoms/problems? PCP, Specialist, Home health nurse, Urgent Care, ED, 911  Yes   Week 2 call completed?  Yes          Princess Holloway RN

## 2018-10-11 ENCOUNTER — OFFICE VISIT (OUTPATIENT)
Dept: SURGERY | Facility: CLINIC | Age: 68
End: 2018-10-11

## 2018-10-11 VITALS
HEART RATE: 74 BPM | OXYGEN SATURATION: 96 % | TEMPERATURE: 98 F | HEIGHT: 69 IN | SYSTOLIC BLOOD PRESSURE: 141 MMHG | DIASTOLIC BLOOD PRESSURE: 77 MMHG | BODY MASS INDEX: 25.92 KG/M2 | WEIGHT: 175 LBS | RESPIRATION RATE: 18 BRPM

## 2018-10-11 DIAGNOSIS — K62.89 RECTAL PAIN: Primary | ICD-10-CM

## 2018-10-11 PROCEDURE — 99024 POSTOP FOLLOW-UP VISIT: CPT | Performed by: SURGERY

## 2018-10-11 NOTE — DISCHARGE SUMMARY
Date of Discharge:  10/11/2018    Discharge Diagnosis: external and internal hemorrhoids    Presenting Problem/History of Present Illness  Rectal pain [K62.89]       Hospital Course  Patient is a 67-year-old white male is having severe painful hemorrhoids.  He underwent a hemorrhoidectomy was discharged following day.     Procedures Performed  Procedure(s):  HEMORRHOIDECTOMY       Consults:  Consults     No orders found from 8/29/2018 to 9/28/2018.          Condition on Discharge:  Stable    Vital Signs  Temp:  [98 °F (36.7 °C)] 98 °F (36.7 °C)  Heart Rate:  [74] 74  Resp:  [18] 18  BP: (141)/(77) 141/77    Physical Exam:     General Appearance:    Alert, cooperative, in no acute distress   Head:    Normocephalic, without obvious abnormality, atraumatic   Eyes:            Lids and lashes normal, conjunctivae and sclerae normal, no   icterus, no pallor, corneas clear, PERRLA   Ears:    Ears appear intact with no abnormalities noted   Throat:   No oral lesions, no thrush, oral mucosa moist   Neck:   No adenopathy, supple, trachea midline, no thyromegaly, no   carotid bruit, no JVD   Back:     No kyphosis present, no scoliosis present, no skin lesions,      erythema or scars, no tenderness to percussion or                   palpation,   range of motion normal   Lungs:     Clear to auscultation,respirations regular, even and                  unlabored    Heart:    Regular rhythm and normal rate, normal S1 and S2, no            murmur, no gallop, no rub, no click   Chest Wall:    No abnormalities observed   Abdomen:     Normal bowel sounds, no masses, no organomegaly, soft        non-tender, non-distended, no guarding, no rebound                tenderness   Rectal:     Deferred   Extremities:   Moves all extremities well, no edema, no cyanosis, no             redness   Pulses:   Pulses palpable and equal bilaterally   Skin:   No bleeding, bruising or rash   Lymph nodes:   No palpable adenopathy   Neurologic:   Cranial  nerves 2 - 12 grossly intact, sensation intact, DTR       present and equal bilaterally       Discharge Disposition  Home or Self Care    Discharge Medications     Discharge Medications      Changes to Medications      Instructions Start Date   HYDROcodone-acetaminophen 7.5-325 MG per tablet  Commonly known as:  NORCO  What changed:  Another medication with the same name was added. Make sure you understand how and when to take each.   1 tablet, Oral, 2 Times Daily PRN      HYDROcodone-acetaminophen 7.5-325 MG per tablet  Commonly known as:  NORCO  What changed:  You were already taking a medication with the same name, and this prescription was added. Make sure you understand how and when to take each.   1 tablet, Oral, Every 4 Hours PRN      lidocaine 2 % jelly  Commonly known as:  XYLOCAINE  What changed:  Another medication with the same name was added. Make sure you understand how and when to take each.   Apply to affected area daily prn      lidocaine 2 % jelly  Commonly known as:  XYLOCAINE  What changed:  You were already taking a medication with the same name, and this prescription was added. Make sure you understand how and when to take each.   Apply to affected area daily as needed.         Continue These Medications      Instructions Start Date   albuterol 108 (90 Base) MCG/ACT inhaler  Commonly known as:  PROVENTIL HFA;VENTOLIN HFA   2 puffs, Inhalation, Every 6 Hours PRN      BREO ELLIPTA 100-25 MCG/INH aerosol powder   Generic drug:  Fluticasone Furoate-Vilanterol   1 puff, Inhalation, Daily      bumetanide 2 MG tablet  Commonly known as:  BUMEX   1 mg, Oral, Every Morning      carvedilol 6.25 MG tablet  Commonly known as:  COREG   6.25 mg, Oral, 2 Times Daily With Meals      ezetimibe 10 MG tablet  Commonly known as:  ZETIA   10 mg, Oral, Daily      GLIPIZIDE XL 2.5 MG 24 hr tablet  Generic drug:  glipiZIDE   2.5 mg, Oral, Daily      hydrocortisone 25 MG suppository  Commonly known as:  ANUSOL-HC   25  mg, Rectal, Daily      INCRUSE ELLIPTA 62.5 MCG/INH aerosol powder   Generic drug:  Umeclidinium Bromide   1 puff, Inhalation, Daily      mupirocin 2 % ointment  Commonly known as:  BACTROBAN   1 application, Topical, 3 Times Daily, chest       nitroglycerin 0.4 MG SL tablet  Commonly known as:  NITROSTAT   0.4 mg, Sublingual, Every 5 Minutes PRN, Take no more than 3 doses in 15 minutes.      prasugrel 10 MG tablet  Commonly known as:  EFFIENT   10 mg, Oral, Daily      rOPINIRole 2 MG tablet  Commonly known as:  REQUIP   2 mg, Oral, Nightly      sacubitril-valsartan 49-51 MG tablet  Commonly known as:  ENTRESTO   1 tablet, Oral, 2 Times Daily      sotalol 120 MG tablet  Commonly known as:  BETAPACE   120 mg, Oral, 2 Times Daily      spironolactone 25 MG tablet  Commonly known as:  ALDACTONE   25 mg, Oral, Daily      tiZANidine 4 MG tablet  Commonly known as:  ZANAFLEX   8 mg, Oral, Nightly             Discharge Disposition  Patient is discharged home.  Return office in one       Alfred Shah MD  10/11/18  3:04 PM            Dragon disclaimer:  Much of this encounter note is an electronic transcription/translation of spoken language to printed text. The electronic translation of spoken language may permit erroneous, or at times, nonsensical words or phrases to be inadvertently transcribed; Although I have reviewed the note for such errors, some may still exist.

## 2018-10-11 NOTE — PROGRESS NOTES
10/11/2018    Patient Information  [address]  [place] [address]  1950  [phone] (home)     Chief Complaint   Patient presents with   • Post-op Follow-up     Post Op Stapled Hemorrhoidal Pexy       HPI  Patient is a 67-year-old white male who is a week status post stapled hemorrhoidal pexy.  He is having some discomfort but he says he is much better than he was before surgery.    Patient Active Problem List   Diagnosis   • Ischemic heart disease   • Essential hypertension   • Chronic systolic heart failure (CMS/HCC)   • VT (ventricular tachycardia) (CMS/HCC)   • Mixed hyperlipidemia   • Type 2 diabetes mellitus (CMS/HCC)   • Tobacco dependence   • Coronary artery disease of native artery of native heart with stable angina pectoris (CMS/HCC)   • Cardiomyopathy (CMS/HCC)   • Shortness of breath   • COPD (chronic obstructive pulmonary disease) (CMS/HCC)   • Lung nodule   • PAD (peripheral artery disease) (CMS/HCC)   • Claudication (CMS/HCC)   • Other emphysema (CMS/HCC)   • Type 2 diabetes mellitus without complication (CMS/HCC)   • Rectal pain         Past Medical History:   Diagnosis Date   • Arthritis    • Cardiomyopathy (CMS/HCC)    • Carotid stenosis    • CHF (congestive heart failure) (CMS/HCC)    • COPD (chronic obstructive pulmonary disease) (CMS/HCC)    • Coronary artery disease    • Diabetes mellitus (CMS/HCC)    • Enlarged prostate    • GERD (gastroesophageal reflux disease)    • Heart attack (CMS/HCC)     X3 1990, 1993, 1995   • Hyperlipidemia    • Hypertension    • Lung nodule    • On home O2     prn   • Peptic ulcer disease    • Urinary hesitancy    • Ventricular tachycardia (CMS/HCC)    • Wears dentures    • Wears glasses          Past Surgical History:   Procedure Laterality Date   • CARDIAC CATHETERIZATION Left 04/2009    by Dr. Glen Marcelo I. EF approx 40% II one of three patent grafts iii, Data deficit    • CARDIAC CATHETERIZATION N/A 1/26/2017     Procedure: Left Heart Cath;  Surgeon: Vlad Bravo MD;  Location:  [facility];  Service:    • CARDIAC CATHETERIZATION Left 07/30/2010    i. EF 30% ii occluded vein graft to occluded RCS iii minor plaque in the LAD iv. 70% SVG- circumfelx, treated with 4x18 mm. Cypher KAILASH.   • CARDIAC CATHETERIZATION N/A 6/5/2018    Procedure: Peripheral angiography;  Surgeon: Vlad Bravo MD;  Location:  [facility];  Service: Cardiovascular   • CARDIAC DEFIBRILLATOR PLACEMENT     • COLONOSCOPY     • COLONOSCOPY N/A 9/26/2018    Procedure: COLONOSCOPY;  Surgeon: Alfred Shah MD;  Location:  [facility];  Service: General   • CORONARY ANGIOPLASTY     • CORONARY ARTERY BYPASS GRAFT  1995 1995, w/ subsequent stents 2009/2010 X3; svg TO om1 AND om2, svg TO pda    • CYST REMOVAL      Tailbone   • EXAM UNDER ANESTHESIA N/A 9/26/2018    Procedure: EXAM UNDER ANESTHESIA;  Surgeon: Alfred Shah MD;  Location:  [facility];  Service: General   • HEMORRHOIDECTOMY N/A 9/28/2018    Procedure: HEMORRHOIDECTOMY;  Surgeon: Alfred Shah MD;  Location:  [facility];  Service: General   • INSERT / REPLACE / REMOVE PACEMAKER     • INTERVENTIONAL RADIOLOGY PROCEDURE N/A 6/5/2018    Procedure: Abdominal Aortagram with Runoff;  Surgeon: Vlad Bravo MD;  Location:  [facility];  Service: Cardiovascular   • INTERVENTIONAL RADIOLOGY PROCEDURE N/A 7/6/2018    Procedure: Abdominal Aortogram;  Surgeon: Vlad Bravo MD;  Location:  [facility];  Service: Cardiology   • PACEMAKER IMPLANTATION  01/2002    Implant by Dr. Dickinson          Family History   Problem Relation Age of Onset   • Hypertension Mother    • Sick sinus syndrome Father    • Coronary artery disease Father          Social History   Substance Use Topics   • Smoking status: Current Every Day Smoker     Packs/day: 0.50     Years: 45.00     Types: Cigarettes   • Smokeless tobacco: Never Used   • Alcohol use No        Current Outpatient Prescriptions   Medication Sig Dispense Refill   • albuterol (PROVENTIL HFA;VENTOLIN HFA) 108 (90 Base) MCG/ACT inhaler Inhale 2 puffs Every 6 (Six) Hours As Needed for Wheezing.     • BREO ELLIPTA 100-25 MCG/INH aerosol powder  Inhale 1 puff Daily.  5   • bumetanide (BUMEX) 2 MG tablet Take 1 mg by mouth Every Morning.     • carvedilol (COREG) 6.25 MG tablet Take 6.25 mg by mouth 2 (Two) Times a Day With Meals.     • ezetimibe (ZETIA) 10 MG tablet Take 1 tablet by mouth Daily. 30 tablet 11   • GLIPIZIDE XL 2.5 MG 24 hr tablet Take 2.5 mg by mouth Daily.  11   • HYDROcodone-acetaminophen (NORCO) 7.5-325 MG per tablet Take 1 tablet by mouth 2 (Two) Times a Day As Needed for Moderate Pain .     • HYDROcodone-acetaminophen (NORCO) 7.5-325 MG per tablet Take 1 tablet by mouth Every 4 (Four) Hours As Needed for Moderate Pain . 40 tablet 0   • hydrocortisone (ANUSOL-HC) 25 MG suppository Insert 25 mg into the rectum Daily.     • lidocaine (XYLOCAINE) 2 % jelly Apply to affected area daily prn 30 mL 1   • lidocaine (XYLOCAINE) 2 % jelly Apply to affected area daily as needed. 30 mL 1   • mupirocin (BACTROBAN) 2 % ointment Apply 1 application topically to the appropriate area as directed 3 (Three) Times a Day. chest     • nitroglycerin (NITROSTAT) 0.4 MG SL tablet Place 0.4 mg under the tongue Every 5 (Five) Minutes As Needed for Chest Pain. Take no more than 3 doses in 15 minutes.     • prasugrel (EFFIENT) 10 MG tablet Take 10 mg by mouth Daily.     • rOPINIRole (REQUIP) 2 MG tablet Take 2 mg by mouth every night.     • sacubitril-valsartan (ENTRESTO) 49-51 MG tablet Take 1 tablet by mouth 2 (Two) Times a Day. 60 tablet 11   • sotalol (BETAPACE) 120 MG tablet Take 120 mg by mouth 2 (Two) Times a Day.     • spironolactone (ALDACTONE) 25 MG tablet Take 25 mg by mouth daily.     • tiZANidine (ZANAFLEX) 4 MG tablet Take 8 mg by mouth Every Night.     • Umeclidinium Bromide (INCRUSE ELLIPTA) 62.5  "MCG/INH aerosol powder  Inhale 1 puff Daily.       No current facility-administered medications for this visit.          Allergies  Crestor [rosuvastatin calcium]; Lipitor [atorvastatin]; Plavix [clopidogrel bisulfate]; and Adhesive tape    /77   Pulse 74   Temp 98 °F (36.7 °C)   Resp 18   Ht 175.3 cm (69\")   Wt 79.4 kg (175 lb)   SpO2 96%   BMI 25.84 kg/m²      Physical Exam  Gen. 67-year-old white male stress  External rectal exam looks good            ASSESSMENT  Status post hemorrhoidectomy        PLAN    Return in 1 month when necessary    Patient's Body mass index is 25.84 kg/m². BMI is within normal parameters. No follow-up required.           This document signed by Alfred Shah MD October 11, 2018 2:18 PM   "

## 2018-11-02 RX ORDER — CARVEDILOL 6.25 MG/1
6.25 TABLET ORAL 2 TIMES DAILY WITH MEALS
Qty: 60 TABLET | Refills: 11 | Status: SHIPPED | OUTPATIENT
Start: 2018-11-02 | End: 2019-08-02 | Stop reason: ALTCHOICE

## 2018-11-02 RX ORDER — SOTALOL HYDROCHLORIDE 120 MG/1
TABLET ORAL
Qty: 60 TABLET | Refills: 6 | Status: SHIPPED | OUTPATIENT
Start: 2018-11-02 | End: 2019-02-18 | Stop reason: ALTCHOICE

## 2018-11-02 NOTE — TELEPHONE ENCOUNTER
"Per last office note pt taking 6.25 mg BID with sotalol 120 mg BID. Pharmacy called to clarify dose. PCP had prescribed 25 mg BID back in May 2018. Last note did state that he was taking a \"quarter\" . Sent 6.25 mg dose to pharmacy. KH  "

## 2018-11-15 NOTE — PROGRESS NOTES
[facility] AT [address], [address]  [place], [place], [address] [phone]  WWW[facility]           OUTPATIENT CLINIC FOLLOW UP NOTE    Patient Care Team:  Patient Care Team:  System, Provider Not In as PCP - General  Dash Goldman MD as PCP - Claims Attributed    Subjective:   Reason for consultation:   Chief Complaint   Patient presents with   • Coronary Artery Disease       HPI:    Derek Morris Jr. is a 67 y.o. male.  Coronary Artery Disease   Presents for follow-up visit. Symptoms include chest pain and dizziness.   Chest Pain    Associated symptoms include dizziness.   His past medical history is significant for CAD.   Dizziness   Associated symptoms include chest pain.     The patient has a history of severe ischemic heart disease with prior bypass and most recently PCI in 1/2017, ischemic cardiomyopathy with an EF of about 35%, dual-chamber ICD, PAD s/p left SFA stenting, long smoking history, COPD, hypertension, diabetes, hyperlipidemia, who presents for follow-up.    Since the patient was last seen he reports that he has had one episode of chest pain.  He notes that this lasted 3-4 minutes and improved with one nitroglycerin tablet.  He also notes continued shortness of breath that is at baseline if not mildly worse.  He also has complaints of right arm and hand numbness.  This occurs intermittently and has no known aggravating factors.  He also has complaints of fatigue.  He denies any shocks from his ICD.  He reports that he is only taking his Bumex on a as needed basis.    PFSH:  PROBLEM LIST:  1. Ischemic heart disease:  a. MI x3, 1990, 1993, and 1995.  b. CABG x3 by Dr. Noble Cuello, 1995:  SVG to OM1 and OM2, SVG to PDA.  c. Normal LV.  d. Questionable LAD stent, incomplete  database.  e. STEMI, April 2009.  f. LHC by Dr. Glen Baker, April 2009:  EF approximately 40%, 1 of 3 patent grafts; data deficit.  g. LHC by Dr. Bird, 07/30/2010:   EF 30%, occluded vein graft to occluded RCA, minor plaque in LAD, 70% SVG -  circumflex treated with 4 x 18 mm Cypher KAILASH.  h. Echocardiogram, 02/08/2012:  EF 40% to 45%, diastolic dysfunction, mild TR.  i. Echocardiogram, 01/29/2015:  EF 30% to 35%, mild TR.  j. Jan 2017 EF 35-40%  k. C in Jan 2017 as noted in the results below. Resolute Integrity KAILASH to D2  2. Hypertension.  3. Chronic systolic CHF.  4. Nonsustained VT:  a. Inducible VT by EPS, January 2002.  b. Pacesetter ICD implant by Dr. Dickinson, January 2002.  c. Chronic amiodarone, discontinued fall of 2010:  Cannot  exclude amiodarone pulmonary toxicity.  d. ICD generator change-out with enrollment in the ANALYZE ST SEGMENT protocol, 08/16/2012.  5. PAD s/p PTA 7/2018 to left SFA  6. Hyperlipidemia; intolerance to multiple statins.  7. Type 2 diabetes mellitus.  8. Chronic tobacco; home O2.  9. Surgical history:  a.  CABG    Patient Active Problem List   Diagnosis   • Ischemic heart disease   • Essential hypertension   • Chronic systolic heart failure (CMS/HCC)   • VT (ventricular tachycardia) (CMS/[facility])   • Mixed hyperlipidemia   • Type 2 diabetes mellitus (CMS/[facility])   • Tobacco dependence   • Coronary artery disease of native artery of native heart with stable angina pectoris (CMS/HCC)   • Cardiomyopathy (CMS/[facility])   • Shortness of breath   • COPD (chronic obstructive pulmonary disease) (CMS/[facility])   • Lung nodule   • PAD (peripheral artery disease) (CMS/[facility])   • Claudication (CMS/HCC)   • Other emphysema (CMS/HCC)   • Type 2 diabetes mellitus without complication (CMS/[facility])   • Rectal pain         Current Outpatient Medications:   •  albuterol (PROVENTIL HFA;VENTOLIN HFA) 108 (90 Base) MCG/ACT inhaler, Inhale 2 puffs Every 6 (Six) Hours As Needed for Wheezing., Disp: , Rfl:   •  BREO ELLIPTA 100-25 MCG/INH aerosol powder , Inhale 1 puff Daily., Disp: , Rfl: 5  •  bumetanide (BUMEX) 0.5 MG tablet, Take 2 tablets by mouth Daily., Disp: 30 tablet, Rfl: 11  •   carvedilol (COREG) 6.25 MG tablet, Take 1 tablet by mouth 2 (Two) Times a Day With Meals. (Patient taking differently: Take 6.25 mg by mouth 2 (Two) Times a Day With Meals. Pt takes 1/4 of a tablet twice a day), Disp: 60 tablet, Rfl: 11  •  ezetimibe (ZETIA) 10 MG tablet, Take 1 tablet by mouth Daily., Disp: 30 tablet, Rfl: 11  •  GLIPIZIDE XL 2.5 MG 24 hr tablet, Take 2.5 mg by mouth Daily., Disp: , Rfl: 11  •  nitroglycerin (NITROSTAT) 0.4 MG SL tablet, Place 0.4 mg under the tongue Every 5 (Five) Minutes As Needed for Chest Pain. Take no more than 3 doses in 15 minutes., Disp: , Rfl:   •  prasugrel (EFFIENT) 10 MG tablet, Take 10 mg by mouth Daily., Disp: , Rfl:   •  rOPINIRole (REQUIP) 2 MG tablet, Take 2 mg by mouth every night., Disp: , Rfl:   •  sacubitril-valsartan (ENTRESTO) 49-51 MG tablet, Take 1 tablet by mouth 2 (Two) Times a Day., Disp: 60 tablet, Rfl: 11  •  sotalol (BETAPACE) 120 MG tablet, TAKE ONE TABLET TWO TIMES A DAY FOR BLOOD PRESSURE OR FOR HEART (MAY CAUSE DROWSINESS), Disp: 60 tablet, Rfl: 6  •  spironolactone (ALDACTONE) 25 MG tablet, Take 25 mg by mouth daily., Disp: , Rfl:   •  tiZANidine (ZANAFLEX) 4 MG tablet, Take 8 mg by mouth Every Night., Disp: , Rfl:   •  Umeclidinium Bromide (INCRUSE ELLIPTA) 62.5 MCG/INH aerosol powder , Inhale 1 puff Daily., Disp: , Rfl:     Allergies   Allergen Reactions   • Crestor [Rosuvastatin Calcium] Myalgia   • Lipitor [Atorvastatin] Myalgia     Joint pain myalgias   • Plavix [Clopidogrel Bisulfate] Unknown (See Comments)      resistant.   • Adhesive Tape Itching and Rash        reports that he has been smoking cigarettes.  He has a 22.50 pack-year smoking history. he has never used smokeless tobacco.    Family History   Problem Relation Age of Onset   • Hypertension Mother    • Sick sinus syndrome Father    • Coronary artery disease Father        Review of Systems:  Positive for dyspnea, lower extremity swelling  Negative for exertional chest pain,  "palpitations, lightheadedness, syncope.   All other systems reviewed and are negative.    Objective:   Blood pressure 114/66, pulse 78, height 175.3 cm (69\"), weight 78.9 kg (174 lb).  CONSTITUTIONAL: No acute distress, normal affect  RESPIRATORY: Normal effort. Rhonchi present.  CARDIOVASCULAR: Carotids with normal upstrokes without bruits.  Regular rate and rhythm with normal S1 and S2. Without murmur, gallop or rub.  PERIPHERAL VASCULAR: Normal radial pulses bilaterally. There is mild lower extremity edema bilaterally.     Labs:  Lab Results   Component Value Date    ALT 17 09/27/2018    AST 20 09/27/2018     Lab Results   Component Value Date    CHOL 183 06/05/2018    TRIG 145 06/05/2018    HDL 37 (L) 06/05/2018    CREATININE 0.98 09/27/2018       Diagnostic Data:    Procedures    Peripheral intervention 7/2018  · The 100% mid left SFA chronic total occlusion is now status post dissection and reentry revascularization with deployment of a Zilver PTX 6 x 80 mm drug-eluting stent with 0% residual stenosis.    Peripheral Angiogram 6/2018  · There was diffuse atherosclerosis including a 70% discrete stenosis of the proximal left common iliac artery as well as a 100% chronic total occlusion of the left superficial femoral artery.    C 1/2017  · Severe multivessel disease including a previously known occluded proximal left circumflex and mid right coronary artery.  There was a de hao 60% discrete stenosis in a medium sized second diagonal that supplies much of the anterolateral wall that was found to be functionally significant with an iFR of 0.60.  The LAD was otherwise patent, the SVG to a distal OM was widely patent, the circumflex artery is supplied by septal collaterals, the RCA was supplied by moderate graft to OM left to right collaterals.  · The diagonal stenosis is now status post resolute integrity 2.25 x 14 mm drug-eluting stent placement with 0% residual stenosis.  · Left ventricular ejection fraction " of 49% with an akinetic inferior wall and hypokinesis of the anterolateral wall    TTE 5/2018  · Left ventricular systolic function is mildly decreased. Estimated EF appears to be in the range of 41 - 45%.  · The following left ventricular wall segments are hypokinetic: mid anterolateral. The following left ventricular wall segments are akinetic: mid inferolateral, basal inferior and mid inferior.  · Left ventricular diastolic dysfunction (grade I a) consistent with impaired relaxation.  · There is mild calcification of the aortic valve.  · Trace-to-mild aortic valve regurgitation is present.  · Estimated right ventricular systolic pressure from tricuspid regurgitation is mildly elevated (35-45 mmHg).    TTE 1/2017  · Left ventricular function is moderately decreased. Estimated EF appears to be in the range of 36 - 40%. Calculated EF = 37%.  · Left ventricular wall thickness is consistent with mild-to-moderate concentric hypertrophy.  · Left ventricular diastolic dysfunction (grade I a) consistent with impaired relaxation.  · Left ventricular wall segments contract abnormally. Akinesis of the inferior wall    DEVICE INTERROGATION:  [employer], Interrogation date 11/19/2018-   RA pacing 88 %, RV pacing less than 1 %. P wave is 1.2 mV with a threshold of 1 V at 0.5 msec and an impedance of 490 ohms. R wave is greater than 12 mV with a threshold of 0.75 V at 0.5 msec and an impedance of 640 ohms. Battery voltage is 4.9-5.2 years.  <1% AMS      DEVICE INTERROGATION:  [employer], Interrogation date 8/2018-   P wave is 1.5 mV with a threshold of 1 V at 0.5 msec and an impedance of 510 ohms. R wave is >12 mV with a threshold of 1 V at 0.5 msec and an impedance of 630 ohms. Battery voltage is 5 yrs. < 1% AMS, no AFib or VHR.        Assessment and Plan:   Derek was seen today for ischemic heart disease, hypertension and chronic systolic chf.    Diagnoses and all orders for this visit:    Coronary artery disease of native artery  of native heart with stable angina pectoris  Hyperlipidemia  -One episode of recurrent chest pain, resolved with NTG x1  -Continue aspirin, Effient, carvedilol  -Intolerance to atorvastatin and rosuvastatin   -Continue Zetia  -Repeat lipids. 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   -If the patient has a repeat episode of VF despite increasing sotalol, we'll trial mexiletine due to his previous intolerance to amiodarone     PAD  -s/p KAILASH to Left SFA  -Continue current related medications    Advanced COPD and pulmonary nodule  -Management per pulmonary and CT surgery teams    Right arm and hand numbness  -Carotid duplex for new motor impairment.    - Return in about 6 months (around 5/19/2019).    VANDANA Coker obtained past medical, family history, social history, review of systems and functioned as a scribe for the remainder of the dictation for Dr. Bravo.     I, Vlad rBavo MD, personally performed the services as scribed by the above named individual. I have made any necessary edits and it is both accurate and complete.     Vlad Bravo MD, MSc, Merged with [facility]  Interventional Cardiology  [facility] at [facility]

## 2018-11-19 ENCOUNTER — OFFICE VISIT (OUTPATIENT)
Dept: CARDIOLOGY | Facility: CLINIC | Age: 68
End: 2018-11-19

## 2018-11-19 VITALS
SYSTOLIC BLOOD PRESSURE: 114 MMHG | WEIGHT: 174 LBS | HEIGHT: 69 IN | HEART RATE: 78 BPM | BODY MASS INDEX: 25.77 KG/M2 | DIASTOLIC BLOOD PRESSURE: 66 MMHG

## 2018-11-19 DIAGNOSIS — R20.0 NUMBNESS AND TINGLING OF RIGHT ARM: ICD-10-CM

## 2018-11-19 DIAGNOSIS — I73.9 PAD (PERIPHERAL ARTERY DISEASE) (HCC): ICD-10-CM

## 2018-11-19 DIAGNOSIS — I25.118 CORONARY ARTERY DISEASE OF NATIVE ARTERY OF NATIVE HEART WITH STABLE ANGINA PECTORIS (HCC): Primary | ICD-10-CM

## 2018-11-19 DIAGNOSIS — R20.2 NUMBNESS AND TINGLING OF RIGHT ARM: ICD-10-CM

## 2018-11-19 DIAGNOSIS — E78.2 MIXED HYPERLIPIDEMIA: ICD-10-CM

## 2018-11-19 DIAGNOSIS — I50.22 CHRONIC SYSTOLIC HEART FAILURE (HCC): ICD-10-CM

## 2018-11-19 DIAGNOSIS — I47.20 VT (VENTRICULAR TACHYCARDIA) (HCC): ICD-10-CM

## 2018-11-19 PROCEDURE — 99214 OFFICE O/P EST MOD 30 MIN: CPT | Performed by: INTERNAL MEDICINE

## 2018-11-19 RX ORDER — BUMETANIDE 0.5 MG/1
1 TABLET ORAL DAILY
Qty: 30 TABLET | Refills: 11 | Status: ON HOLD | OUTPATIENT
Start: 2018-11-19 | End: 2019-07-22

## 2018-11-20 ENCOUNTER — TELEPHONE (OUTPATIENT)
Dept: CARDIOLOGY | Facility: CLINIC | Age: 68
End: 2018-11-20

## 2018-11-20 DIAGNOSIS — E78.00 HIGH CHOLESTEROL: Primary | ICD-10-CM

## 2018-11-20 RX ORDER — SACUBITRIL AND VALSARTAN 49; 51 MG/1; MG/1
TABLET, FILM COATED ORAL
Qty: 60 TABLET | Refills: 11 | Status: ON HOLD | OUTPATIENT
Start: 2018-11-20 | End: 2019-07-22

## 2018-11-20 NOTE — TELEPHONE ENCOUNTER
Pt called and instructed to have a [facility] panel drawn [facility] in anticipation of starting Repatha. All questions addressed at this time.

## 2018-11-26 ENCOUNTER — LAB (OUTPATIENT)
Dept: LAB | Facility: HOSPITAL | Age: 68
End: 2018-11-26

## 2018-11-26 ENCOUNTER — TRANSCRIBE ORDERS (OUTPATIENT)
Dept: LAB | Facility: HOSPITAL | Age: 68
End: 2018-11-26

## 2018-11-26 DIAGNOSIS — E78.00 HIGH CHOLESTEROL: ICD-10-CM

## 2018-11-26 DIAGNOSIS — R20.0 NUMBNESS AND TINGLING OF RIGHT ARM: Primary | ICD-10-CM

## 2018-11-26 DIAGNOSIS — R20.2 NUMBNESS AND TINGLING OF RIGHT ARM: Primary | ICD-10-CM

## 2018-11-26 LAB
CHOLEST SERPL-MCNC: 211 MG/DL (ref 0–200)
HDLC SERPL-MCNC: 54 MG/DL (ref 60–100)
LDLC SERPL CALC-MCNC: 127 MG/DL (ref 0–100)
LDLC/HDLC SERPL: 2.35 {RATIO}
TRIGL SERPL-MCNC: 151 MG/DL (ref 0–150)
VLDLC SERPL-MCNC: 30.2 MG/DL

## 2018-11-26 PROCEDURE — 36415 COLL VENOUS BLD VENIPUNCTURE: CPT

## 2018-11-26 PROCEDURE — 80061 LIPID PANEL: CPT

## 2018-11-28 ENCOUNTER — TELEPHONE (OUTPATIENT)
Dept: CARDIOLOGY | Facility: CLINIC | Age: 68
End: 2018-11-28

## 2018-11-30 ENCOUNTER — TELEPHONE (OUTPATIENT)
Dept: CARDIOLOGY | Facility: CLINIC | Age: 68
End: 2018-11-30

## 2018-11-30 DIAGNOSIS — E78.2 MIXED HYPERLIPIDEMIA: Primary | ICD-10-CM

## 2018-11-30 NOTE — TELEPHONE ENCOUNTER
Pt called to review lipid results and recommendations per [facility]. Pt to switch to Repatha, will begin PA on this. Pt verbalizes understanding, all questions answered at this time.

## 2018-11-30 NOTE — TELEPHONE ENCOUNTER
Pt called again to discuss recent lipid panel results and recommendations per [facility]. Per pts wife, pt is not home at this time but she will have him return my call when available. Will follow up if no return call.

## 2018-12-04 ENCOUNTER — APPOINTMENT (OUTPATIENT)
Dept: CARDIOLOGY | Facility: HOSPITAL | Age: 68
End: 2018-12-04

## 2018-12-04 ENCOUNTER — HOSPITAL ENCOUNTER (OUTPATIENT)
Dept: ULTRASOUND IMAGING | Facility: HOSPITAL | Age: 68
Discharge: HOME | End: 2018-12-04
Admitting: NURSE PRACTITIONER

## 2018-12-04 DIAGNOSIS — R20.2 NUMBNESS AND TINGLING OF RIGHT ARM: ICD-10-CM

## 2018-12-04 DIAGNOSIS — R20.0 NUMBNESS AND TINGLING OF RIGHT ARM: ICD-10-CM

## 2018-12-04 PROCEDURE — 93880 EXTRACRANIAL BILAT STUDY: CPT

## 2018-12-04 PROCEDURE — 93880 EXTRACRANIAL BILAT STUDY: CPT | Performed by: RADIOLOGY

## 2018-12-07 ENCOUNTER — TELEPHONE (OUTPATIENT)
Dept: CARDIOLOGY | Facility: CLINIC | Age: 68
End: 2018-12-07

## 2019-01-08 ENCOUNTER — CLINICAL SUPPORT NO REQUIREMENTS (OUTPATIENT)
Dept: CARDIOLOGY | Facility: CLINIC | Age: 69
End: 2019-01-08

## 2019-01-08 DIAGNOSIS — I42.9 CARDIOMYOPATHY, UNSPECIFIED TYPE (HCC): Primary | ICD-10-CM

## 2019-01-08 PROCEDURE — 93295 DEV INTERROG REMOTE 1/2/MLT: CPT | Performed by: PHYSICIAN ASSISTANT

## 2019-01-08 PROCEDURE — 93296 REM INTERROG EVL PM/IDS: CPT | Performed by: PHYSICIAN ASSISTANT

## 2019-02-15 ENCOUNTER — TELEPHONE (OUTPATIENT)
Dept: CARDIOLOGY | Facility: CLINIC | Age: 69
End: 2019-02-15

## 2019-02-15 NOTE — TELEPHONE ENCOUNTER
"Pts wife called to report that pt had an \"episode\" with this heart that got him admitted to [facility] where he is still admitted. Pt advised to let our office know when discharged if sooner appt is needed.   "

## 2019-02-18 ENCOUNTER — CLINICAL SUPPORT NO REQUIREMENTS (OUTPATIENT)
Dept: CARDIOLOGY | Facility: CLINIC | Age: 69
End: 2019-02-18

## 2019-02-18 DIAGNOSIS — I48.91 ATRIAL FIBRILLATION AND FLUTTER (HCC): Primary | ICD-10-CM

## 2019-02-18 DIAGNOSIS — R00.0 TACHYCARDIA: Primary | ICD-10-CM

## 2019-02-18 DIAGNOSIS — I42.9 CARDIOMYOPATHY, UNSPECIFIED TYPE (HCC): Primary | ICD-10-CM

## 2019-02-18 DIAGNOSIS — I48.92 ATRIAL FIBRILLATION AND FLUTTER (HCC): Primary | ICD-10-CM

## 2019-02-18 RX ORDER — DIGOXIN 250 MCG
250 TABLET ORAL DAILY
Qty: 30 TABLET | Refills: 4
Start: 2019-02-18 | End: 2019-03-18 | Stop reason: DRUGHIGH

## 2019-02-19 ENCOUNTER — TELEPHONE (OUTPATIENT)
Dept: CARDIOLOGY | Facility: CLINIC | Age: 69
End: 2019-02-19

## 2019-02-19 NOTE — TELEPHONE ENCOUNTER
Pt and patient wife called for follow up recommendations. Will get patient scheduled for 4 weeks in clinic. All questions answered at this time, pt verbalizes understanding.

## 2019-02-22 ENCOUNTER — TELEPHONE (OUTPATIENT)
Dept: CARDIOLOGY | Facility: CLINIC | Age: 69
End: 2019-02-22

## 2019-02-22 NOTE — TELEPHONE ENCOUNTER
"Pt wife called to report that last night pt had \"heart spell\" and she had to call EMS where they took him to the ER in [place]. Pts wife reports that his HR was 180-190 and they were told he was in SVT. Pt converted out of and sent home with recommendations to make a sooner f/u with [facility]. Pt having no complaints, chest pain, palpitations, SOA at time of call. Will attempt to move patient up for sooner f/u.   "

## 2019-02-25 RX ORDER — PRASUGREL 10 MG/1
TABLET, FILM COATED ORAL
Qty: 30 TABLET | Refills: 11 | Status: SHIPPED | OUTPATIENT
Start: 2019-02-25 | End: 2019-07-15

## 2019-02-25 RX ORDER — EZETIMIBE 10 MG/1
TABLET ORAL
Qty: 30 TABLET | Refills: 11 | Status: ON HOLD | OUTPATIENT
Start: 2019-02-25 | End: 2019-07-22

## 2019-03-14 NOTE — PROGRESS NOTES
[facility] AT [address], [address]  [place], [place], [address] [phone]  [email or web address]           OUTPATIENT CLINIC FOLLOW UP NOTE    Patient Care Team:  Patient Care Team:  Lyndsey Ahumdaa MD as PCP - General (Family Medicine)  Vlad Bravo MD as PCP - Claims Attributed    Subjective:   Reason for consultation:   Chief Complaint   Patient presents with   • Coronary Artery Disease   • Chronic systolic heart failure       HPI:    Derek Morris Jr. is a 68 y.o. male.  The patient has a history of severe ischemic heart disease with prior bypass and most recently PCI in 1/2017, ischemic cardiomyopathy with an EF of about 35%, dual-chamber ICD, VT, atrial fibrillation, PAD s/p left SFA stenting, long smoking history, COPD, hypertension, diabetes, hyperlipidemia, who presents for follow-up.    Since the patient's last visit he had an episode of significant palpitations, dyspnea.  Heart rate was noted to be around 200.  Presented to [facility] where he was given IV medication but ultimately converted to normal rhythm after a Valsalva maneuver. Was started on digoxin and Eliquis.  Since then has not had recurrent palpitations.    Has mild dyspnea with exertion continually    Denies claudication-like symptoms.  Denies chest pain    PFSH:  PROBLEM LIST:  1. Ischemic heart disease:  a. MI x3, 1990, 1993, and 1995.  b. CABG x3 by Dr. Noble Cuello, 1995:  SVG to OM1 and OM2, SVG to PDA.  c. Normal LV.  d. Questionable LAD stent, incomplete  database.  e. STEMI, April 2009.  f. [facility] by Dr. Glen Baker, April 2009:  EF approximately 40%, 1 of 3 patent grafts; data deficit.  g. [facility] by Dr. Bird, 07/30/2010:  EF 30%, occluded vein graft to occluded RCA, minor plaque in LAD, 70% SVG -  circumflex treated with 4 x 18 mm Cypher KAILASH.  h. Echocardiogram, 02/08/2012:  EF 40% to 45%, diastolic dysfunction, mild TR.  i. Echocardiogram, 01/29/2015:  EF 30% to 35%, mild  ROLF martin. Jan 2017 EF 35-40%  k. [facility] in Jan 2017 as noted in the results below. Resolute Integrity KAILASH to D2  2. Hypertension.  3. Chronic systolic CHF.  4. Nonsustained VT:  a. Inducible VT by EPS, January 2002.  b. Pacesetter ICD implant by Dr. Dickinson, January 2002.  c. Chronic amiodarone, discontinued fall of 2010:  Cannot  exclude amiodarone pulmonary toxicity.  d. ICD generator change-out with enrollment in the ANALYZE ST SEGMENT protocol, 08/16/2012.  5. PAD s/p PTA 7/2018 to left SFA  6. Hyperlipidemia; intolerance to multiple statins.  7. Type 2 diabetes mellitus.  8. Chronic tobacco; home O2.  9. Surgical history:  a.  CABG    Patient Active Problem List   Diagnosis   • Ischemic heart disease   • Essential hypertension   • Chronic systolic heart failure (CMS/HCC)   • VT (ventricular tachycardia) (CMS/[facility])   • Mixed hyperlipidemia   • Type 2 diabetes mellitus (CMS/[facility])   • Tobacco dependence   • Coronary artery disease of native artery of native heart with stable angina pectoris (CMS/[facility])   • Cardiomyopathy (CMS/[facility])   • Shortness of breath   • COPD (chronic obstructive pulmonary disease) (CMS/[facility])   • Lung nodule   • PAD (peripheral artery disease) (CMS/[facility])   • Claudication (CMS/[facility])   • Other emphysema (CMS/[facility])   • Type 2 diabetes mellitus without complication (CMS/[facility])   • Rectal pain         Current Outpatient Medications:   •  albuterol (PROVENTIL HFA;VENTOLIN HFA) 108 (90 Base) MCG/ACT inhaler, Inhale 2 puffs Every 6 (Six) Hours As Needed for Wheezing., Disp: , Rfl:   •  apixaban (ELIQUIS) 5 MG tablet tablet, Take 1 tablet by mouth Every 12 (Twelve) Hours for 120 days., Disp: 180 tablet, Rfl: 3  •  BREO ELLIPTA 100-25 MCG/INH aerosol powder , Inhale 1 puff Daily., Disp: , Rfl: 5  •  bumetanide (BUMEX) 0.5 MG tablet, Take 2 tablets by mouth Daily., Disp: 30 tablet, Rfl: 11  •  carvedilol (COREG) 6.25 MG tablet, Take 1 tablet by mouth 2 (Two) Times a Day With Meals. (Patient taking differently: Take  6.25 mg by mouth 2 (Two) Times a Day With Meals. Pt takes 1/4 of a tablet twice a day), Disp: 60 tablet, Rfl: 11  •  ENTRESTO 49-51 MG tablet, TAKE 1 TABLET TWO TIMES A DAY FOR BLOOD PRESSURE (MAY CAUSE DROWSINESS) **STOP LISINOPRIL 40MG**, Disp: 60 tablet, Rfl: 11  •  ezetimibe (ZETIA) 10 MG tablet, TAKE 1 TABLET ONCE A DAY TO LOWER CHOLESTEROL, Disp: 30 tablet, Rfl: 11  •  GLIPIZIDE XL 2.5 MG 24 hr tablet, Take 2.5 mg by mouth Daily., Disp: , Rfl: 11  •  nitroglycerin (NITROSTAT) 0.4 MG SL tablet, Place 0.4 mg under the tongue Every 5 (Five) Minutes As Needed for Chest Pain. Take no more than 3 doses in 15 minutes., Disp: , Rfl:   •  prasugrel (EFFIENT) 10 MG tablet, TAKE 1 TABLET ONCE A DAY AS DIRECTED BY YOUR PRESCRIBER, Disp: 30 tablet, Rfl: 11  •  rOPINIRole (REQUIP) 2 MG tablet, Take 2 mg by mouth every night., Disp: , Rfl:   •  spironolactone (ALDACTONE) 25 MG tablet, Take 25 mg by mouth daily., Disp: , Rfl:   •  Umeclidinium Bromide (INCRUSE ELLIPTA) 62.5 MCG/INH aerosol powder , Inhale 1 puff Daily., Disp: , Rfl:   •  clopidogrel (PLAVIX) 75 MG tablet, Take 1 tablet by mouth Daily. Stop Effient, Disp: 90 tablet, Rfl: 3  •  digoxin (LANOXIN) 125 MCG tablet, Take 1 tablet by mouth Daily., Disp: 90 tablet, Rfl: 3  •  tiZANidine (ZANAFLEX) 4 MG tablet, Take 8 mg by mouth Every Night., Disp: , Rfl:     Current Facility-Administered Medications:   •  Evolocumab solution prefilled syringe 140 mg, 140 mg, Subcutaneous, Q14 Days, Vlad Bravo MD    Allergies   Allergen Reactions   • Crestor [Rosuvastatin Calcium] Myalgia   • Lipitor [Atorvastatin] Myalgia     Joint pain myalgias   • Plavix [Clopidogrel Bisulfate] Unknown (See Comments)      resistant.   • Adhesive Tape Itching and Rash        reports that he has been smoking cigarettes.  He has a 22.50 pack-year smoking history. he has never used smokeless tobacco.    Family History   Problem Relation Age of Onset   • Hypertension Mother    • Sick sinus syndrome  "Father    • Coronary artery disease Father        Review of Systems:  Positive for dyspnea  Negative for exertional chest pain, palpitations, lightheadedness, syncope.       Objective:   Blood pressure 140/84, pulse 76, height 172.7 cm (68\"), weight 80.5 kg (177 lb 6.4 oz).  CONSTITUTIONAL: No acute distress, normal affect  RESPIRATORY: Normal effort. Clear to auscultation bilaterally without wheezing or rales  CARDIOVASCULAR: Regular rate and rhythm with normal S1 and S2. Without murmur, gallop or rub.  PERIPHERAL VASCULAR: Normal radial pulses bilaterally. There is mild peripheral edema bilaterally. 1-2+ bilateral PT pulses    Labs:  Lab Results   Component Value Date    ALT 17 09/27/2018    AST 20 09/27/2018     Lab Results   Component Value Date    CHOL 211 (H) 11/26/2018    TRIG 151 (H) 11/26/2018    HDL 54 (L) 11/26/2018    CREATININE 0.95 03/18/2019       Diagnostic Data:    Procedures    Peripheral intervention 7/2018  · The 100% mid left SFA chronic total occlusion is now status post dissection and reentry revascularization with deployment of a Zilver PTX 6 x 80 mm drug-eluting stent with 0% residual stenosis.    Peripheral Angiogram 6/2018  · There was diffuse atherosclerosis including a 70% discrete stenosis of the proximal left common iliac artery as well as a 100% chronic total occlusion of the left superficial femoral artery.    [facility] 1/2017  · Severe multivessel disease including a previously known occluded proximal left circumflex and mid right coronary artery.  There was a de hao 60% discrete stenosis in a medium sized second diagonal that supplies much of the anterolateral wall that was found to be functionally significant with an iFR of 0.60.  The LAD was otherwise patent, the SVG to a distal OM was widely patent, the circumflex artery is supplied by septal collaterals, the RCA was supplied by moderate graft to OM left to right collaterals.  · The diagonal stenosis is now status post resolute " integrity 2.25 x 14 mm drug-eluting stent placement with 0% residual stenosis.  · Left ventricular ejection fraction of 49% with an akinetic inferior wall and hypokinesis of the anterolateral wall    TTE 5/2018  · Left ventricular systolic function is mildly decreased. Estimated EF appears to be in the range of 41 - 45%.  · The following left ventricular wall segments are hypokinetic: mid anterolateral. The following left ventricular wall segments are akinetic: mid inferolateral, basal inferior and mid inferior.  · Left ventricular diastolic dysfunction (grade I a) consistent with impaired relaxation.  · There is mild calcification of the aortic valve.  · Trace-to-mild aortic valve regurgitation is present.  · Estimated right ventricular systolic pressure from tricuspid regurgitation is mildly elevated (35-45 mmHg).    TTE 1/2017  · Left ventricular function is moderately decreased. Estimated EF appears to be in the range of 36 - 40%. Calculated EF = 37%.  · Left ventricular wall thickness is consistent with mild-to-moderate concentric hypertrophy.  · Left ventricular diastolic dysfunction (grade I a) consistent with impaired relaxation.  · Left ventricular wall segments contract abnormally. Akinesis of the inferior wall    DEVICE INTERROGATION:  St. Ricky, Interrogation date 11/19/2018-   RA pacing 88 %, RV pacing less than 1 %. P wave is 1.2 mV with a threshold of 1 V at 0.5 msec and an impedance of 490 ohms. R wave is greater than 12 mV with a threshold of 0.75 V at 0.5 msec and an impedance of 640 ohms. Battery voltage is 4.9-5.2 years.  <1% AMS    DEVICE INTERROGATION:  St Ricky, Interrogation date 3/2019-   RA pacing 60%, RV pacing 2.2%, underlying rhythm sinus mary in the 50s.  P wave is 1.0 mV with a threshold of 1 V at 0.5 msec and an impedance of 490 ohms. R wave is >12 mV with a threshold of 1 V at 0.5 msec and an impedance of 590 ohms. Battery voltage is 5 yrs. AFib noted in mid Feb (time of  hospitalization). No VT/VF. Mode switch 6.5% but due to competitive atrial pacing (not AFib)       Assessment and Plan:   Derek was seen today for ischemic heart disease, hypertension and chronic systolic chf.    Diagnoses and all orders for this visit:    Coronary artery disease of native artery of native heart with stable angina pectoris  Hyperlipidemia  -CCS I  -Continue carvedilol  -Continue Effient, prior difficulty with clopidogrel. Hold ASA since patient now also on Eliquis  -Intolerance to atorvastatin and rosuvastatin   -Continue Repatha, Zetia  -Repeat lipids next visit  -Will consider discontinuing Zetia    Chronic systolic heart failure  Status post dual-chamber ICD  -NYHA class 2-3  -Continue Bumex, carvedilol, Entresto, spironolactone, digoxin  -Repeat BMP, BNP    Atrial fibrillation  -Beta blocker, digoxin, Eliquis    VT (ventricular tachycardia), VF  Possible pulmonary toxicity due to amiodarone around 2002  -VF episode 9/2017, was on sotalol at 120 mg twice a day without recurrent events at that time.   -If the patient has a repeat episode of VF despite increasing sotalol, we'll trial mexiletine due to his previous intolerance to amiodarone     PAD  -s/p KAILASH to Left SFA  -Continue current related medications    Advanced COPD and pulmonary nodule  -Management per pulmonary and CT surgery teams    Prior right arm and hand numbness  -Consider Carotid Duplex    - Return in about 3 months (around 6/18/2019).    Vlad Bravo MD, MSc, [facility]  Interventional Cardiology  [facility] at [facility]

## 2019-03-18 ENCOUNTER — OFFICE VISIT (OUTPATIENT)
Dept: CARDIOLOGY | Facility: CLINIC | Age: 69
End: 2019-03-18

## 2019-03-18 ENCOUNTER — LAB REQUISITION (OUTPATIENT)
Dept: LAB | Facility: HOSPITAL | Age: 69
End: 2019-03-18

## 2019-03-18 VITALS
HEART RATE: 76 BPM | SYSTOLIC BLOOD PRESSURE: 140 MMHG | HEIGHT: 68 IN | BODY MASS INDEX: 26.89 KG/M2 | DIASTOLIC BLOOD PRESSURE: 84 MMHG | WEIGHT: 177.4 LBS

## 2019-03-18 DIAGNOSIS — I47.20 VT (VENTRICULAR TACHYCARDIA) (HCC): ICD-10-CM

## 2019-03-18 DIAGNOSIS — R06.02 SHORTNESS OF BREATH: Primary | ICD-10-CM

## 2019-03-18 DIAGNOSIS — I25.118 CORONARY ARTERY DISEASE OF NATIVE ARTERY OF NATIVE HEART WITH STABLE ANGINA PECTORIS (HCC): ICD-10-CM

## 2019-03-18 DIAGNOSIS — I25.9 ISCHEMIC HEART DISEASE: ICD-10-CM

## 2019-03-18 DIAGNOSIS — I10 ESSENTIAL HYPERTENSION: ICD-10-CM

## 2019-03-18 DIAGNOSIS — I50.22 CHRONIC SYSTOLIC HEART FAILURE (HCC): ICD-10-CM

## 2019-03-18 DIAGNOSIS — Z00.00 ROUTINE GENERAL MEDICAL EXAMINATION AT A HEALTH CARE FACILITY: ICD-10-CM

## 2019-03-18 DIAGNOSIS — E78.2 MIXED HYPERLIPIDEMIA: ICD-10-CM

## 2019-03-18 DIAGNOSIS — I48.91 ATRIAL FIBRILLATION AND FLUTTER (HCC): ICD-10-CM

## 2019-03-18 DIAGNOSIS — I48.92 ATRIAL FIBRILLATION AND FLUTTER (HCC): ICD-10-CM

## 2019-03-18 DIAGNOSIS — I73.9 PAD (PERIPHERAL ARTERY DISEASE) (HCC): ICD-10-CM

## 2019-03-18 LAB
ANION GAP SERPL CALCULATED.3IONS-SCNC: 7 MMOL/L (ref 3–11)
BNP SERPL-MCNC: 97 PG/ML (ref 0–100)
BUN BLD-MCNC: 8 MG/DL (ref 9–23)
BUN/CREAT SERPL: 8.4 (ref 7–25)
CALCIUM SPEC-SCNC: 9.3 MG/DL (ref 8.7–10.4)
CHLORIDE SERPL-SCNC: 104 MMOL/L (ref 99–109)
CO2 SERPL-SCNC: 29 MMOL/L (ref 20–31)
CREAT BLD-MCNC: 0.95 MG/DL (ref 0.6–1.3)
GFR SERPL CREATININE-BSD FRML MDRD: 78 ML/MIN/1.73
GLUCOSE BLD-MCNC: 125 MG/DL (ref 70–100)
POTASSIUM BLD-SCNC: 4.4 MMOL/L (ref 3.5–5.5)
SODIUM BLD-SCNC: 140 MMOL/L (ref 132–146)

## 2019-03-18 PROCEDURE — 80048 BASIC METABOLIC PNL TOTAL CA: CPT | Performed by: INTERNAL MEDICINE

## 2019-03-18 PROCEDURE — 83880 ASSAY OF NATRIURETIC PEPTIDE: CPT | Performed by: INTERNAL MEDICINE

## 2019-03-18 PROCEDURE — 99214 OFFICE O/P EST MOD 30 MIN: CPT | Performed by: INTERNAL MEDICINE

## 2019-03-18 RX ORDER — CLOPIDOGREL BISULFATE 75 MG/1
75 TABLET ORAL DAILY
Qty: 90 TABLET | Refills: 3 | Status: ON HOLD | OUTPATIENT
Start: 2019-03-18 | End: 2019-07-22

## 2019-03-18 RX ORDER — DIGOXIN 125 MCG
125 TABLET ORAL DAILY
Qty: 90 TABLET | Refills: 3 | Status: SHIPPED | OUTPATIENT
Start: 2019-03-18 | End: 2019-07-25 | Stop reason: HOSPADM

## 2019-03-21 ENCOUNTER — TELEPHONE (OUTPATIENT)
Dept: CARDIOLOGY | Facility: CLINIC | Age: 69
End: 2019-03-21

## 2019-03-21 NOTE — TELEPHONE ENCOUNTER
----- Message from Vlad Bravo MD sent at 3/21/2019  2:17 PM EDT -----  Please let Mr Morris know his labs looked good. Thanks

## 2019-04-25 ENCOUNTER — TELEPHONE (OUTPATIENT)
Dept: CARDIOLOGY | Facility: CLINIC | Age: 69
End: 2019-04-25

## 2019-04-30 ENCOUNTER — TELEPHONE (OUTPATIENT)
Dept: CARDIOLOGY | Facility: CLINIC | Age: 69
End: 2019-04-30

## 2019-04-30 NOTE — TELEPHONE ENCOUNTER
Spoke with Jam pino pharmacist with pts mail order pharmacy. The pt requested his medications be called into the mail order. Did over the phone rx refills for Coreg 6.25 mg BID, Eliquis 5 mg BID, Entresto 49-51 mg BID, Plavix 75 mg once a day, digoxin 125 MCG once daily, Zetia 10 mg once a day, and bumex 0.5 mg two tablets once daily.

## 2019-07-15 ENCOUNTER — OFFICE VISIT (OUTPATIENT)
Dept: CARDIOLOGY | Facility: CLINIC | Age: 69
End: 2019-07-15

## 2019-07-15 ENCOUNTER — TELEPHONE (OUTPATIENT)
Dept: CARDIOLOGY | Facility: CLINIC | Age: 69
End: 2019-07-15

## 2019-07-15 VITALS
BODY MASS INDEX: 25.48 KG/M2 | DIASTOLIC BLOOD PRESSURE: 60 MMHG | HEART RATE: 72 BPM | WEIGHT: 172 LBS | HEIGHT: 69 IN | SYSTOLIC BLOOD PRESSURE: 110 MMHG

## 2019-07-15 DIAGNOSIS — I48.92 ATRIAL FIBRILLATION AND FLUTTER (HCC): ICD-10-CM

## 2019-07-15 DIAGNOSIS — R07.9 CHEST PAIN, UNSPECIFIED TYPE: ICD-10-CM

## 2019-07-15 DIAGNOSIS — I10 ESSENTIAL HYPERTENSION: ICD-10-CM

## 2019-07-15 DIAGNOSIS — I50.22 CHRONIC SYSTOLIC HEART FAILURE (HCC): ICD-10-CM

## 2019-07-15 DIAGNOSIS — I48.91 ATRIAL FIBRILLATION AND FLUTTER (HCC): ICD-10-CM

## 2019-07-15 DIAGNOSIS — E78.2 MIXED HYPERLIPIDEMIA: ICD-10-CM

## 2019-07-15 DIAGNOSIS — I73.9 PAD (PERIPHERAL ARTERY DISEASE) (HCC): ICD-10-CM

## 2019-07-15 DIAGNOSIS — I47.20 VT (VENTRICULAR TACHYCARDIA) (HCC): ICD-10-CM

## 2019-07-15 DIAGNOSIS — I25.118 CORONARY ARTERY DISEASE OF NATIVE ARTERY OF NATIVE HEART WITH STABLE ANGINA PECTORIS (HCC): Primary | ICD-10-CM

## 2019-07-15 PROCEDURE — 93284 PRGRMG EVAL IMPLANTABLE DFB: CPT | Performed by: INTERNAL MEDICINE

## 2019-07-15 PROCEDURE — 99214 OFFICE O/P EST MOD 30 MIN: CPT | Performed by: INTERNAL MEDICINE

## 2019-07-15 NOTE — PROGRESS NOTES
[facility] AT [address], [address]  [place], [place], [address] [phone]  [email or web address]           OUTPATIENT CLINIC FOLLOW UP NOTE    Patient Care Team:  Patient Care Team:  Lyndsey Ahumada MD as PCP - General (Family Medicine)  Vlad Bravo MD as PCP - Claims Attributed    Subjective:   Reason for consultation:   Chief Complaint   Patient presents with   • Coronary Artery Disease       HPI:    Derek Morris Jr. is a 68 y.o. male.  The patient has a history of severe ischemic heart disease with prior bypass and most recently PCI in 1/2017, ischemic cardiomyopathy with an EF of about 35%, dual-chamber ICD, VT, atrial fibrillation, PAD s/p left SFA stenting, long smoking history, COPD, hypertension, diabetes, hyperlipidemia, who presents for follow-up.    Since patient was last seen he reports that he has had multiple intermittent episodes of chest pain.  He reports he has taken sublingual nitroglycerin with them and typically 1 to 2 tablets improve his symptoms.  He also reports a cough that is been present for the last month.  This is worse at night when trying to lie down.  He is also noted some abdominal distention at times as well as lightheadedness.  He denies palpitations, or lower extremity edema.  He reports that he has not been taking Repatha.    [facility]:  PROBLEM LIST:  1. Ischemic heart disease:  a. MI x3, 1990, 1993, and 1995.  b. CABG x3 by Dr. Noble Cuello, 1995:  SVG to OM1 and OM2, SVG to PDA.  c. Normal LV.  d. Questionable LAD stent, incomplete  database.  e. STEMI, April 2009.  f. C by Dr. Glen Baker, April 2009:  EF approximately 40%, 1 of 3 patent grafts; data deficit.  g. [facility] by Dr. Bird, 07/30/2010:  EF 30%, occluded vein graft to occluded RCA, minor plaque in LAD, 70% SVG -  circumflex treated with 4 x 18 mm Cypher KAILASH.  h. Echocardiogram, 02/08/2012:  EF 40% to 45%, diastolic dysfunction, mild TR.  i. Echocardiogram, 01/29/2015:  EF  30% to 35%, mild TR.  j. Jan 2017 EF 35-40%  k. C in Jan 2017 as noted in the results below. Resolute Integrity KAILASH to D2  2. Hypertension.  3. Chronic systolic CHF.  4. Nonsustained VT:  a. Inducible VT by EPS, January 2002.  b. Pacesetter ICD implant by Dr. Dickinson, January 2002.  c. Chronic amiodarone, discontinued fall of 2010:  Cannot  exclude amiodarone pulmonary toxicity.  d. ICD generator change-out with enrollment in the ANALYZE ST SEGMENT protocol, 08/16/2012.  5. PAD s/p PTA 7/2018 to left SFA  6. Hyperlipidemia; intolerance to multiple statins.  7. Type 2 diabetes mellitus.  8. Chronic tobacco; home O2.  9. Surgical history:  a.  CABG    Patient Active Problem List   Diagnosis   • Ischemic heart disease   • Essential hypertension   • Chronic systolic heart failure (CMS/HCC)   • VT (ventricular tachycardia) (CMS/HCC)   • Mixed hyperlipidemia   • Type 2 diabetes mellitus (CMS/HCC)   • Tobacco dependence   • Coronary artery disease of native artery of native heart with stable angina pectoris (CMS/HCC)   • Cardiomyopathy (CMS/HCC)   • Shortness of breath   • COPD (chronic obstructive pulmonary disease) (CMS/HCC)   • Lung nodule   • PAD (peripheral artery disease) (CMS/HCC)   • Claudication (CMS/HCC)   • Other emphysema (CMS/HCC)   • Type 2 diabetes mellitus without complication (CMS/[facility])   • Rectal pain         Current Outpatient Medications:   •  albuterol (PROVENTIL HFA;VENTOLIN HFA) 108 (90 Base) MCG/ACT inhaler, Inhale 2 puffs Every 6 (Six) Hours As Needed for Wheezing., Disp: , Rfl:   •  apixaban (ELIQUIS) 5 MG tablet tablet, Take 1 tablet by mouth Every 12 (Twelve) Hours for 120 days., Disp: 180 tablet, Rfl: 3  •  BREO ELLIPTA 100-25 MCG/INH aerosol powder , Inhale 1 puff Daily., Disp: , Rfl: 5  •  bumetanide (BUMEX) 0.5 MG tablet, Take 2 tablets by mouth Daily., Disp: 30 tablet, Rfl: 11  •  carvedilol (COREG) 6.25 MG tablet, Take 1 tablet by mouth 2 (Two) Times a Day With Meals. (Patient taking  differently: Take 6.25 mg by mouth 2 (Two) Times a Day With Meals. Pt takes 1/4 of a tablet twice a day), Disp: 60 tablet, Rfl: 11  •  clopidogrel (PLAVIX) 75 MG tablet, Take 1 tablet by mouth Daily. Stop Effient, Disp: 90 tablet, Rfl: 3  •  digoxin (LANOXIN) 125 MCG tablet, Take 1 tablet by mouth Daily., Disp: 90 tablet, Rfl: 3  •  ENTRESTO 49-51 MG tablet, TAKE 1 TABLET TWO TIMES A DAY FOR BLOOD PRESSURE (MAY CAUSE DROWSINESS) **STOP LISINOPRIL 40MG**, Disp: 60 tablet, Rfl: 11  •  ezetimibe (ZETIA) 10 MG tablet, TAKE 1 TABLET ONCE A DAY TO LOWER CHOLESTEROL, Disp: 30 tablet, Rfl: 11  •  GLIPIZIDE XL 2.5 MG 24 hr tablet, Take 2.5 mg by mouth Daily., Disp: , Rfl: 11  •  nitroglycerin (NITROSTAT) 0.4 MG SL tablet, Place 0.4 mg under the tongue Every 5 (Five) Minutes As Needed for Chest Pain. Take no more than 3 doses in 15 minutes., Disp: , Rfl:   •  rOPINIRole (REQUIP) 2 MG tablet, Take 2 mg by mouth every night., Disp: , Rfl:   •  spironolactone (ALDACTONE) 25 MG tablet, Take 25 mg by mouth daily., Disp: , Rfl:   •  tiZANidine (ZANAFLEX) 4 MG tablet, Take 8 mg by mouth Every Night., Disp: , Rfl:   •  Evolocumab with Infusor 420 MG/3.5ML solution cartridge, Inject 420 mg under the skin into the appropriate area as directed Every 30 (Thirty) Days., Disp: 1 cartridge., Rfl: 11  No current facility-administered medications for this visit.     Allergies   Allergen Reactions   • Crestor [Rosuvastatin Calcium] Myalgia   • Lipitor [Atorvastatin] Myalgia     Joint pain myalgias   • Plavix [Clopidogrel Bisulfate] Unknown (See Comments)      resistant.   • Adhesive Tape Itching and Rash        reports that he has been smoking cigarettes.  He has a 22.50 pack-year smoking history. He has never used smokeless tobacco.    Family History   Problem Relation Age of Onset   • Hypertension Mother    • Sick sinus syndrome Father    • Coronary artery disease Father        Review of Systems:  Positive for chest pain, cough, abdominal  "distention, dyspnea, and lightheadedness.  Negative for exertional chest pain, palpitations, syncope.       Objective:   Blood pressure 110/60, pulse 72, height 175.3 cm (69\"), weight 78 kg (172 lb).  CONSTITUTIONAL: No acute distress, normal affect  RESPIRATORY: Normal effort. Clear to auscultation bilaterally without wheezing or rales  CARDIOVASCULAR: Regular rate and rhythm with normal S1 and S2. Without murmur, gallop or rub.  PERIPHERAL VASCULAR: Normal radial pulses bilaterally. There is no peripheral edema bilaterally. 1-2+ bilateral PT pulses    Labs:  Lab Results   Component Value Date    ALT 17 09/27/2018    AST 20 09/27/2018     Lab Results   Component Value Date    CHOL 211 (H) 11/26/2018    TRIG 151 (H) 11/26/2018    HDL 54 (L) 11/26/2018    CREATININE 0.95 03/18/2019       Diagnostic Data:    Procedures    Peripheral intervention 7/2018  · The 100% mid left SFA chronic total occlusion is now status post dissection and reentry revascularization with deployment of a Zilver PTX 6 x 80 mm drug-eluting stent with 0% residual stenosis.    Peripheral Angiogram 6/2018  · There was diffuse atherosclerosis including a 70% discrete stenosis of the proximal left common iliac artery as well as a 100% chronic total occlusion of the left superficial femoral artery.    [facility] 1/2017  · Severe multivessel disease including a previously known occluded proximal left circumflex and mid right coronary artery.  There was a de hao 60% discrete stenosis in a medium sized second diagonal that supplies much of the anterolateral wall that was found to be functionally significant with an iFR of 0.60.  The LAD was otherwise patent, the SVG to a distal OM was widely patent, the circumflex artery is supplied by septal collaterals, the RCA was supplied by moderate graft to OM left to right collaterals.  · The diagonal stenosis is now status post resolute integrity 2.25 x 14 mm drug-eluting stent placement with 0% residual " stenosis.  · Left ventricular ejection fraction of 49% with an akinetic inferior wall and hypokinesis of the anterolateral wall    TTE 5/2018  · Left ventricular systolic function is mildly decreased. Estimated EF appears to be in the range of 41 - 45%.  · The following left ventricular wall segments are hypokinetic: mid anterolateral. The following left ventricular wall segments are akinetic: mid inferolateral, basal inferior and mid inferior.  · Left ventricular diastolic dysfunction (grade I a) consistent with impaired relaxation.  · There is mild calcification of the aortic valve.  · Trace-to-mild aortic valve regurgitation is present.  · Estimated right ventricular systolic pressure from tricuspid regurgitation is mildly elevated (35-45 mmHg).    TTE 1/2017  · Left ventricular function is moderately decreased. Estimated EF appears to be in the range of 36 - 40%. Calculated EF = 37%.  · Left ventricular wall thickness is consistent with mild-to-moderate concentric hypertrophy.  · Left ventricular diastolic dysfunction (grade I a) consistent with impaired relaxation.  · Left ventricular wall segments contract abnormally. Akinesis of the inferior wall    DEVICE INTERROGATION:  St Ricky, Interrogation date 3/2019-   RA pacing 60%, RV pacing 2.2%, underlying rhythm sinus mary in the 50s.  P wave is 1.0 mV with a threshold of 1 V at 0.5 msec and an impedance of 490 ohms. R wave is >12 mV with a threshold of 1 V at 0.5 msec and an impedance of 590 ohms. Battery voltage is 5 yrs. AFib noted in mid Feb (time of hospitalization). No VT/VF. Mode switch 6.5% but due to competitive atrial pacing (not AFib)     DEVICE INTERROGATION: [place] [employer] BiV ICD, Interrogation date 7/15/2019-   RA pacing XT 9 %, RV pacing less than 1 %. P wave is greater than 3.0 mV with a threshold of 1.0 V at 0.5 msec and an impedance of 490 ohms. R wave is greater than 12.0 mV with a threshold of 0.75 V at 0.5 msec and an impedance of 630 ohms.  Battery voltage is 56%.  Less than 1% mode switching.      Assessment and Plan:   Derek was seen today for ischemic heart disease, hypertension and chronic systolic chf.    Diagnoses and all orders for this visit:    Coronary artery disease of native artery of native heart with stable angina pectoris  Hyperlipidemia  -CCS III symptoms  -Continue carvedilol as well as nitro sublingual as needed  -Continue clopidogrel.  Not on aspirin to avoid triple therapy.  -Recommend stress testing    -Intolerance to atorvastatin and rosuvastatin. Continue Zetia.  -Patient stopped taking Repatha due to not wanting to inject himself patient open to restarting Repatha if can be approved for an autoinjector.    Chronic systolic heart failure  Status post dual-chamber ICD  -NYHA class 2-3  -Continue Bumex, carvedilol, Entresto, spironolactone, digoxin    Atrial fibrillation  -Continue beta blocker, digoxin, Eliquis  -We will consider weaning digoxin off in the future    VT (ventricular tachycardia), VF  Possible pulmonary toxicity due to amiodarone around 2002  -VF episode 9/2017, was on sotalol at 120 mg twice a day without recurrent events at that time.   -Stable device interrogation today.    PAD  -s/p KAILASH to Left SFA  -Continue current related medications    Advanced COPD and pulmonary nodule  -Management per pulmonary and CT surgery teams    - Return in about 6 months (around 1/15/2020).    Scribed for Vlad Bravo MD by Vlad Bravo MD   7/15/2019  12:59 PM    I, Vlad Bravo MD, personally performed the services as scribed by the above named individual. I have made any necessary edits and it is both accurate and complete.     Vlad Bravo MD, MSc, FACC  Interventional Cardiology  [facility] at [facility]

## 2019-07-17 ENCOUNTER — TELEPHONE (OUTPATIENT)
Dept: CARDIOLOGY | Facility: CLINIC | Age: 69
End: 2019-07-17

## 2019-07-18 RX ORDER — APIXABAN 5 MG/1
TABLET, FILM COATED ORAL
Qty: 180 TABLET | Refills: 1 | Status: ON HOLD | OUTPATIENT
Start: 2019-07-18 | End: 2019-07-22

## 2019-07-22 ENCOUNTER — APPOINTMENT (OUTPATIENT)
Dept: CARDIOLOGY | Facility: HOSPITAL | Age: 69
End: 2019-07-22

## 2019-07-22 ENCOUNTER — HOSPITAL ENCOUNTER (INPATIENT)
Facility: HOSPITAL | Age: 69
LOS: 3 days | Discharge: HOME | End: 2019-07-25
Attending: INTERNAL MEDICINE | Admitting: INTERNAL MEDICINE

## 2019-07-22 ENCOUNTER — APPOINTMENT (OUTPATIENT)
Dept: GENERAL RADIOLOGY | Facility: HOSPITAL | Age: 69
End: 2019-07-22

## 2019-07-22 DIAGNOSIS — I25.5 ISCHEMIC CARDIOMYOPATHY: ICD-10-CM

## 2019-07-22 DIAGNOSIS — I25.9 ISCHEMIC HEART DISEASE: Primary | ICD-10-CM

## 2019-07-22 DIAGNOSIS — I47.20 VT (VENTRICULAR TACHYCARDIA) (HCC): ICD-10-CM

## 2019-07-22 LAB
ALBUMIN SERPL-MCNC: 4 G/DL (ref 3.5–5.2)
ANION GAP SERPL CALCULATED.3IONS-SCNC: 10 MMOL/L (ref 5–15)
BUN BLD-MCNC: 15 MG/DL (ref 8–23)
BUN/CREAT SERPL: 14.7 (ref 7–25)
CALCIUM SPEC-SCNC: 9 MG/DL (ref 8.6–10.5)
CHLORIDE SERPL-SCNC: 102 MMOL/L (ref 98–107)
CK MB SERPL-CCNC: 6.44 NG/ML
CO2 SERPL-SCNC: 27 MMOL/L (ref 22–29)
CREAT BLD-MCNC: 1.02 MG/DL (ref 0.76–1.27)
DEPRECATED RDW RBC AUTO: 46.7 FL (ref 37–54)
ERYTHROCYTE [DISTWIDTH] IN BLOOD BY AUTOMATED COUNT: 13.1 % (ref 12.3–15.4)
GFR SERPL CREATININE-BSD FRML MDRD: 72 ML/MIN/1.73
GLUCOSE BLD-MCNC: 167 MG/DL (ref 65–99)
GLUCOSE BLDC GLUCOMTR-MCNC: 156 MG/DL (ref 70–130)
GLUCOSE BLDC GLUCOMTR-MCNC: 235 MG/DL (ref 70–130)
HCT VFR BLD AUTO: 49.6 % (ref 37.5–51)
HGB BLD-MCNC: 15.4 G/DL (ref 13–17.7)
HOLD SPECIMEN: NORMAL
INR PPP: 1.18 (ref 0.85–1.16)
MAGNESIUM SERPL-MCNC: 2.5 MG/DL (ref 1.6–2.4)
MCH RBC QN AUTO: 29.7 PG (ref 26.6–33)
MCHC RBC AUTO-ENTMCNC: 31 G/DL (ref 31.5–35.7)
MCV RBC AUTO: 95.6 FL (ref 79–97)
PHOSPHATE SERPL-MCNC: 3.8 MG/DL (ref 2.5–4.5)
PLATELET # BLD AUTO: 197 10*3/MM3 (ref 140–450)
PMV BLD AUTO: 10.3 FL (ref 6–12)
POTASSIUM BLD-SCNC: 4.3 MMOL/L (ref 3.5–5.2)
PROTHROMBIN TIME: 14.4 SECONDS (ref 11.2–14.3)
RBC # BLD AUTO: 5.19 10*6/MM3 (ref 4.14–5.8)
SODIUM BLD-SCNC: 139 MMOL/L (ref 136–145)
TROPONIN T SERPL-MCNC: 0.08 NG/ML (ref 0–0.03)
WBC NRBC COR # BLD: 9.14 10*3/MM3 (ref 3.4–10.8)

## 2019-07-22 PROCEDURE — 80069 RENAL FUNCTION PANEL: CPT | Performed by: INTERNAL MEDICINE

## 2019-07-22 PROCEDURE — 94640 AIRWAY INHALATION TREATMENT: CPT

## 2019-07-22 PROCEDURE — 82553 CREATINE MB FRACTION: CPT | Performed by: INTERNAL MEDICINE

## 2019-07-22 PROCEDURE — 93010 ELECTROCARDIOGRAM REPORT: CPT | Performed by: INTERNAL MEDICINE

## 2019-07-22 PROCEDURE — 85027 COMPLETE CBC AUTOMATED: CPT | Performed by: INTERNAL MEDICINE

## 2019-07-22 PROCEDURE — 25010000002 AMIODARONE IN DEXTROSE 5% 360-4.14 MG/200ML-% SOLUTION: Performed by: INTERNAL MEDICINE

## 2019-07-22 PROCEDURE — 99223 1ST HOSP IP/OBS HIGH 75: CPT | Performed by: INTERNAL MEDICINE

## 2019-07-22 PROCEDURE — 93005 ELECTROCARDIOGRAM TRACING: CPT | Performed by: INTERNAL MEDICINE

## 2019-07-22 PROCEDURE — 93306 TTE W/DOPPLER COMPLETE: CPT | Performed by: INTERNAL MEDICINE

## 2019-07-22 PROCEDURE — 82962 GLUCOSE BLOOD TEST: CPT

## 2019-07-22 PROCEDURE — 71045 X-RAY EXAM CHEST 1 VIEW: CPT

## 2019-07-22 PROCEDURE — 85610 PROTHROMBIN TIME: CPT | Performed by: INTERNAL MEDICINE

## 2019-07-22 PROCEDURE — 94799 UNLISTED PULMONARY SVC/PX: CPT

## 2019-07-22 PROCEDURE — 93306 TTE W/DOPPLER COMPLETE: CPT

## 2019-07-22 PROCEDURE — 99291 CRITICAL CARE FIRST HOUR: CPT | Performed by: INTERNAL MEDICINE

## 2019-07-22 PROCEDURE — 84484 ASSAY OF TROPONIN QUANT: CPT | Performed by: INTERNAL MEDICINE

## 2019-07-22 PROCEDURE — 25010000002 HEPARIN (PORCINE) PER 1000 UNITS: Performed by: INTERNAL MEDICINE

## 2019-07-22 PROCEDURE — 83735 ASSAY OF MAGNESIUM: CPT | Performed by: INTERNAL MEDICINE

## 2019-07-22 PROCEDURE — 82550 ASSAY OF CK (CPK): CPT | Performed by: INTERNAL MEDICINE

## 2019-07-22 RX ORDER — SODIUM CHLORIDE 0.9 % (FLUSH) 0.9 %
3-10 SYRINGE (ML) INJECTION AS NEEDED
Status: DISCONTINUED | OUTPATIENT
Start: 2019-07-22 | End: 2019-07-25 | Stop reason: HOSPADM

## 2019-07-22 RX ORDER — ACETAMINOPHEN 325 MG/1
650 TABLET ORAL EVERY 4 HOURS PRN
Status: DISCONTINUED | OUTPATIENT
Start: 2019-07-22 | End: 2019-07-25 | Stop reason: HOSPADM

## 2019-07-22 RX ORDER — MAGNESIUM SULFATE HEPTAHYDRATE 40 MG/ML
2 INJECTION, SOLUTION INTRAVENOUS AS NEEDED
Status: DISCONTINUED | OUTPATIENT
Start: 2019-07-22 | End: 2019-07-25 | Stop reason: HOSPADM

## 2019-07-22 RX ORDER — POTASSIUM CHLORIDE 1.5 G/1.77G
40 POWDER, FOR SOLUTION ORAL AS NEEDED
Status: DISCONTINUED | OUTPATIENT
Start: 2019-07-22 | End: 2019-07-25 | Stop reason: HOSPADM

## 2019-07-22 RX ORDER — MORPHINE SULFATE 2 MG/ML
2 INJECTION, SOLUTION INTRAMUSCULAR; INTRAVENOUS
Status: DISCONTINUED | OUTPATIENT
Start: 2019-07-22 | End: 2019-07-25 | Stop reason: HOSPADM

## 2019-07-22 RX ORDER — ROPINIROLE 2 MG/1
2 TABLET, FILM COATED ORAL NIGHTLY
Status: DISCONTINUED | OUTPATIENT
Start: 2019-07-22 | End: 2019-07-25 | Stop reason: HOSPADM

## 2019-07-22 RX ORDER — PROMETHAZINE HYDROCHLORIDE 12.5 MG/1
12.5 TABLET ORAL EVERY 6 HOURS PRN
Status: DISCONTINUED | OUTPATIENT
Start: 2019-07-22 | End: 2019-07-25 | Stop reason: HOSPADM

## 2019-07-22 RX ORDER — BUMETANIDE 0.5 MG/1
1 TABLET ORAL DAILY
COMMUNITY
End: 2019-07-25 | Stop reason: HOSPADM

## 2019-07-22 RX ORDER — MAGNESIUM SULFATE HEPTAHYDRATE 40 MG/ML
4 INJECTION, SOLUTION INTRAVENOUS AS NEEDED
Status: DISCONTINUED | OUTPATIENT
Start: 2019-07-22 | End: 2019-07-25 | Stop reason: HOSPADM

## 2019-07-22 RX ORDER — CLOPIDOGREL BISULFATE 75 MG/1
75 TABLET ORAL DAILY
COMMUNITY
End: 2019-07-25 | Stop reason: HOSPADM

## 2019-07-22 RX ORDER — NITROGLYCERIN 0.4 MG/1
0.4 TABLET SUBLINGUAL
Status: DISCONTINUED | OUTPATIENT
Start: 2019-07-22 | End: 2019-07-25 | Stop reason: HOSPADM

## 2019-07-22 RX ORDER — TIZANIDINE 4 MG/1
8 TABLET ORAL NIGHTLY
Status: DISCONTINUED | OUTPATIENT
Start: 2019-07-22 | End: 2019-07-25 | Stop reason: HOSPADM

## 2019-07-22 RX ORDER — POTASSIUM CHLORIDE 7.45 MG/ML
10 INJECTION INTRAVENOUS
Status: DISCONTINUED | OUTPATIENT
Start: 2019-07-22 | End: 2019-07-25 | Stop reason: HOSPADM

## 2019-07-22 RX ORDER — CLOPIDOGREL BISULFATE 75 MG/1
75 TABLET ORAL DAILY
Status: DISCONTINUED | OUTPATIENT
Start: 2019-07-22 | End: 2019-07-23

## 2019-07-22 RX ORDER — IPRATROPIUM BROMIDE AND ALBUTEROL SULFATE 2.5; .5 MG/3ML; MG/3ML
3 SOLUTION RESPIRATORY (INHALATION)
Status: DISCONTINUED | OUTPATIENT
Start: 2019-07-22 | End: 2019-07-25 | Stop reason: HOSPADM

## 2019-07-22 RX ORDER — EZETIMIBE 10 MG/1
10 TABLET ORAL DAILY
COMMUNITY
End: 2020-05-15

## 2019-07-22 RX ORDER — DOFETILIDE 0.5 MG/1
500 CAPSULE ORAL ONCE
Status: COMPLETED | OUTPATIENT
Start: 2019-07-23 | End: 2019-07-23

## 2019-07-22 RX ORDER — PROMETHAZINE HYDROCHLORIDE 25 MG/ML
12.5 INJECTION, SOLUTION INTRAMUSCULAR; INTRAVENOUS EVERY 6 HOURS PRN
Status: DISCONTINUED | OUTPATIENT
Start: 2019-07-22 | End: 2019-07-25 | Stop reason: HOSPADM

## 2019-07-22 RX ORDER — DOFETILIDE 0.5 MG/1
500 CAPSULE ORAL ONCE
Status: COMPLETED | OUTPATIENT
Start: 2019-07-22 | End: 2019-07-22

## 2019-07-22 RX ORDER — SPIRONOLACTONE 25 MG/1
25 TABLET ORAL DAILY
Status: DISCONTINUED | OUTPATIENT
Start: 2019-07-22 | End: 2019-07-25 | Stop reason: HOSPADM

## 2019-07-22 RX ORDER — HEPARIN SODIUM 5000 [USP'U]/ML
5000 INJECTION, SOLUTION INTRAVENOUS; SUBCUTANEOUS EVERY 8 HOURS SCHEDULED
Status: DISCONTINUED | OUTPATIENT
Start: 2019-07-22 | End: 2019-07-23

## 2019-07-22 RX ORDER — CARVEDILOL 6.25 MG/1
6.25 TABLET ORAL 2 TIMES DAILY WITH MEALS
Status: DISCONTINUED | OUTPATIENT
Start: 2019-07-22 | End: 2019-07-25 | Stop reason: HOSPADM

## 2019-07-22 RX ORDER — SODIUM CHLORIDE 0.9 % (FLUSH) 0.9 %
3 SYRINGE (ML) INJECTION EVERY 12 HOURS SCHEDULED
Status: DISCONTINUED | OUTPATIENT
Start: 2019-07-22 | End: 2019-07-25 | Stop reason: HOSPADM

## 2019-07-22 RX ORDER — POTASSIUM CHLORIDE 750 MG/1
40 CAPSULE, EXTENDED RELEASE ORAL AS NEEDED
Status: DISCONTINUED | OUTPATIENT
Start: 2019-07-22 | End: 2019-07-25 | Stop reason: HOSPADM

## 2019-07-22 RX ORDER — DOFETILIDE 0.5 MG/1
500 CAPSULE ORAL EVERY 12 HOURS
Status: DISCONTINUED | OUTPATIENT
Start: 2019-07-22 | End: 2019-07-22

## 2019-07-22 RX ORDER — PROMETHAZINE HYDROCHLORIDE 12.5 MG/1
12.5 SUPPOSITORY RECTAL EVERY 6 HOURS PRN
Status: DISCONTINUED | OUTPATIENT
Start: 2019-07-22 | End: 2019-07-25 | Stop reason: HOSPADM

## 2019-07-22 RX ORDER — DOFETILIDE 0.5 MG/1
500 CAPSULE ORAL EVERY 12 HOURS SCHEDULED
Status: DISCONTINUED | OUTPATIENT
Start: 2019-07-23 | End: 2019-07-25 | Stop reason: HOSPADM

## 2019-07-22 RX ORDER — DIGOXIN 125 MCG
125 TABLET ORAL DAILY
Status: DISCONTINUED | OUTPATIENT
Start: 2019-07-22 | End: 2019-07-22

## 2019-07-22 RX ADMIN — SODIUM CHLORIDE, PRESERVATIVE FREE 3 ML: 5 INJECTION INTRAVENOUS at 20:37

## 2019-07-22 RX ADMIN — CLOPIDOGREL BISULFATE 75 MG: 75 TABLET ORAL at 15:18

## 2019-07-22 RX ADMIN — SACUBITRIL AND VALSARTAN 1 TABLET: 49; 51 TABLET, FILM COATED ORAL at 15:18

## 2019-07-22 RX ADMIN — HEPARIN SODIUM 5000 UNITS: 5000 INJECTION INTRAVENOUS; SUBCUTANEOUS at 15:18

## 2019-07-22 RX ADMIN — DOFETILIDE 500 MCG: 0.5 CAPSULE ORAL at 16:11

## 2019-07-22 RX ADMIN — SACUBITRIL AND VALSARTAN 1 TABLET: 49; 51 TABLET, FILM COATED ORAL at 20:36

## 2019-07-22 RX ADMIN — IPRATROPIUM BROMIDE AND ALBUTEROL SULFATE 3 ML: 2.5; .5 SOLUTION RESPIRATORY (INHALATION) at 12:37

## 2019-07-22 RX ADMIN — HEPARIN SODIUM 5000 UNITS: 5000 INJECTION INTRAVENOUS; SUBCUTANEOUS at 21:36

## 2019-07-22 RX ADMIN — IPRATROPIUM BROMIDE AND ALBUTEROL SULFATE 3 ML: 2.5; .5 SOLUTION RESPIRATORY (INHALATION) at 19:09

## 2019-07-22 RX ADMIN — AMIODARONE HYDROCHLORIDE 1 MG/MIN: 1.8 INJECTION, SOLUTION INTRAVENOUS at 12:01

## 2019-07-22 RX ADMIN — ROPINIROLE 2 MG: 0.5 TABLET, FILM COATED ORAL at 20:35

## 2019-07-22 RX ADMIN — TIZANIDINE 8 MG: 4 TABLET ORAL at 20:36

## 2019-07-22 RX ADMIN — SPIRONOLACTONE 25 MG: 25 TABLET ORAL at 15:18

## 2019-07-23 LAB
ANION GAP SERPL CALCULATED.3IONS-SCNC: 9 MMOL/L (ref 5–15)
BH CV ECHO MEAS - AO ROOT AREA (BSA CORRECTED): 1.6
BH CV ECHO MEAS - AO ROOT AREA: 7.3 CM^2
BH CV ECHO MEAS - AO ROOT DIAM: 3.1 CM
BH CV ECHO MEAS - BSA(HAYCOCK): 2 M^2
BH CV ECHO MEAS - BSA: 1.9 M^2
BH CV ECHO MEAS - BZI_BMI: 25.4 KILOGRAMS/M^2
BH CV ECHO MEAS - BZI_METRIC_HEIGHT: 175.3 CM
BH CV ECHO MEAS - BZI_METRIC_WEIGHT: 78 KG
BH CV ECHO MEAS - EDV(CUBED): 185.4 ML
BH CV ECHO MEAS - EDV(MOD-SP2): 74 ML
BH CV ECHO MEAS - EDV(MOD-SP4): 64 ML
BH CV ECHO MEAS - EDV(TEICH): 160.1 ML
BH CV ECHO MEAS - EF(CUBED): 56.6 %
BH CV ECHO MEAS - EF(MOD-BP): 48 %
BH CV ECHO MEAS - EF(MOD-SP2): 20.3 %
BH CV ECHO MEAS - EF(MOD-SP4): 70.3 %
BH CV ECHO MEAS - EF(TEICH): 47.7 %
BH CV ECHO MEAS - ESV(CUBED): 80.4 ML
BH CV ECHO MEAS - ESV(MOD-SP2): 59 ML
BH CV ECHO MEAS - ESV(MOD-SP4): 19 ML
BH CV ECHO MEAS - ESV(TEICH): 83.8 ML
BH CV ECHO MEAS - FS: 24.3 %
BH CV ECHO MEAS - IVS/LVPW: 1
BH CV ECHO MEAS - IVSD: 0.83 CM
BH CV ECHO MEAS - LA DIMENSION: 3.9 CM
BH CV ECHO MEAS - LA/AO: 1.3
BH CV ECHO MEAS - LAD MAJOR: 5.4 CM
BH CV ECHO MEAS - LAT PEAK E' VEL: 8.3 CM/SEC
BH CV ECHO MEAS - LATERAL E/E' RATIO: 11.8
BH CV ECHO MEAS - LV DIASTOLIC VOL/BSA (35-75): 33 ML/M^2
BH CV ECHO MEAS - LV MASS(C)D: 173 GRAMS
BH CV ECHO MEAS - LV MASS(C)DI: 89.3 GRAMS/M^2
BH CV ECHO MEAS - LV SYSTOLIC VOL/BSA (12-30): 9.8 ML/M^2
BH CV ECHO MEAS - LVIDD: 5.7 CM
BH CV ECHO MEAS - LVIDS: 4.3 CM
BH CV ECHO MEAS - LVLD AP2: 6.3 CM
BH CV ECHO MEAS - LVLD AP4: 6.5 CM
BH CV ECHO MEAS - LVLS AP2: 6.6 CM
BH CV ECHO MEAS - LVLS AP4: 5.4 CM
BH CV ECHO MEAS - LVPWD: 0.79 CM
BH CV ECHO MEAS - MED PEAK E' VEL: 5.8 CM/SEC
BH CV ECHO MEAS - MEDIAL E/E' RATIO: 16.8
BH CV ECHO MEAS - MV A MAX VEL: 107.1 CM/SEC
BH CV ECHO MEAS - MV DEC TIME: 0.18 SEC
BH CV ECHO MEAS - MV E MAX VEL: 99.6 CM/SEC
BH CV ECHO MEAS - MV E/A: 0.93
BH CV ECHO MEAS - RAP SYSTOLE: 3 MMHG
BH CV ECHO MEAS - RVSP: 42 MMHG
BH CV ECHO MEAS - SI(CUBED): 54.2 ML/M^2
BH CV ECHO MEAS - SI(MOD-SP2): 7.7 ML/M^2
BH CV ECHO MEAS - SI(MOD-SP4): 23.2 ML/M^2
BH CV ECHO MEAS - SI(TEICH): 39.4 ML/M^2
BH CV ECHO MEAS - SV(CUBED): 105 ML
BH CV ECHO MEAS - SV(MOD-SP2): 15 ML
BH CV ECHO MEAS - SV(MOD-SP4): 45 ML
BH CV ECHO MEAS - SV(TEICH): 76.4 ML
BH CV ECHO MEAS - TAPSE (>1.6): 0.8 CM2
BH CV ECHO MEAS - TR MAX PG: 39 MMHG
BH CV ECHO MEAS - TR MAX VEL: 310.5 CM/SEC
BH CV ECHO MEASUREMENTS AVERAGE E/E' RATIO: 14.13
BH CV VAS BP RIGHT ARM: NORMAL MMHG
BH CV XLRA - RV BASE: 2.8 CM
BH CV XLRA - RV LENGTH: 7.5 CM
BH CV XLRA - RV MID: 2.2 CM
BH CV XLRA - TDI S': 6.2 CM/SEC
BUN BLD-MCNC: 11 MG/DL (ref 8–23)
BUN/CREAT SERPL: 13.6 (ref 7–25)
CA-I SERPL ISE-MCNC: 1.27 MMOL/L (ref 1.12–1.32)
CALCIUM SPEC-SCNC: 8.5 MG/DL (ref 8.6–10.5)
CHLORIDE SERPL-SCNC: 103 MMOL/L (ref 98–107)
CK SERPL-CCNC: 78 U/L (ref 20–200)
CO2 SERPL-SCNC: 29 MMOL/L (ref 22–29)
CREAT BLD-MCNC: 0.81 MG/DL (ref 0.76–1.27)
DEPRECATED RDW RBC AUTO: 47.1 FL (ref 37–54)
ERYTHROCYTE [DISTWIDTH] IN BLOOD BY AUTOMATED COUNT: 13.2 % (ref 12.3–15.4)
GFR SERPL CREATININE-BSD FRML MDRD: 94 ML/MIN/1.73
GLUCOSE BLD-MCNC: 105 MG/DL (ref 65–99)
GLUCOSE BLDC GLUCOMTR-MCNC: 101 MG/DL (ref 70–130)
GLUCOSE BLDC GLUCOMTR-MCNC: 126 MG/DL (ref 70–130)
GLUCOSE BLDC GLUCOMTR-MCNC: 165 MG/DL (ref 70–130)
GLUCOSE BLDC GLUCOMTR-MCNC: 89 MG/DL (ref 70–130)
HBA1C MFR BLD: 7.5 % (ref 4.8–5.6)
HCT VFR BLD AUTO: 45.9 % (ref 37.5–51)
HGB BLD-MCNC: 14.6 G/DL (ref 13–17.7)
LEFT ATRIUM VOLUME INDEX: 18.6 ML/M^2
LEFT ATRIUM VOLUME: 36 ML
LV EF 2D ECHO EST: 45 %
MAGNESIUM SERPL-MCNC: 2.2 MG/DL (ref 1.6–2.4)
MAXIMAL PREDICTED HEART RATE: 150 BPM
MCH RBC QN AUTO: 30.8 PG (ref 26.6–33)
MCHC RBC AUTO-ENTMCNC: 31.8 G/DL (ref 31.5–35.7)
MCV RBC AUTO: 96.8 FL (ref 79–97)
PLATELET # BLD AUTO: 168 10*3/MM3 (ref 140–450)
PMV BLD AUTO: 10.8 FL (ref 6–12)
POTASSIUM BLD-SCNC: 3.7 MMOL/L (ref 3.5–5.2)
RBC # BLD AUTO: 4.74 10*6/MM3 (ref 4.14–5.8)
SODIUM BLD-SCNC: 141 MMOL/L (ref 136–145)
STRESS TARGET HR: 128 BPM
T4 FREE SERPL-MCNC: 0.88 NG/DL (ref 0.93–1.7)
TSH SERPL DL<=0.05 MIU/L-ACNC: 1.26 MIU/ML (ref 0.27–4.2)
WBC NRBC COR # BLD: 9.81 10*3/MM3 (ref 3.4–10.8)

## 2019-07-23 PROCEDURE — 25010000002 HEPARIN (PORCINE) PER 1000 UNITS: Performed by: INTERNAL MEDICINE

## 2019-07-23 PROCEDURE — 83735 ASSAY OF MAGNESIUM: CPT | Performed by: INTERNAL MEDICINE

## 2019-07-23 PROCEDURE — 80048 BASIC METABOLIC PNL TOTAL CA: CPT | Performed by: INTERNAL MEDICINE

## 2019-07-23 PROCEDURE — 99232 SBSQ HOSP IP/OBS MODERATE 35: CPT | Performed by: INTERNAL MEDICINE

## 2019-07-23 PROCEDURE — 94640 AIRWAY INHALATION TREATMENT: CPT

## 2019-07-23 PROCEDURE — C1894 INTRO/SHEATH, NON-LASER: HCPCS | Performed by: INTERNAL MEDICINE

## 2019-07-23 PROCEDURE — B2111ZZ FLUOROSCOPY OF MULTIPLE CORONARY ARTERIES USING LOW OSMOLAR CONTRAST: ICD-10-PCS | Performed by: INTERNAL MEDICINE

## 2019-07-23 PROCEDURE — 85027 COMPLETE CBC AUTOMATED: CPT | Performed by: INTERNAL MEDICINE

## 2019-07-23 PROCEDURE — 4A023N7 MEASUREMENT OF CARDIAC SAMPLING AND PRESSURE, LEFT HEART, PERCUTANEOUS APPROACH: ICD-10-PCS | Performed by: INTERNAL MEDICINE

## 2019-07-23 PROCEDURE — 82330 ASSAY OF CALCIUM: CPT | Performed by: INTERNAL MEDICINE

## 2019-07-23 PROCEDURE — 84439 ASSAY OF FREE THYROXINE: CPT | Performed by: INTERNAL MEDICINE

## 2019-07-23 PROCEDURE — B2151ZZ FLUOROSCOPY OF LEFT HEART USING LOW OSMOLAR CONTRAST: ICD-10-PCS | Performed by: INTERNAL MEDICINE

## 2019-07-23 PROCEDURE — 84443 ASSAY THYROID STIM HORMONE: CPT | Performed by: INTERNAL MEDICINE

## 2019-07-23 PROCEDURE — 99406 BEHAV CHNG SMOKING 3-10 MIN: CPT | Performed by: INTERNAL MEDICINE

## 2019-07-23 PROCEDURE — 82962 GLUCOSE BLOOD TEST: CPT

## 2019-07-23 PROCEDURE — 93005 ELECTROCARDIOGRAM TRACING: CPT | Performed by: INTERNAL MEDICINE

## 2019-07-23 PROCEDURE — 0 IOPAMIDOL PER 1 ML: Performed by: INTERNAL MEDICINE

## 2019-07-23 PROCEDURE — 83036 HEMOGLOBIN GLYCOSYLATED A1C: CPT | Performed by: INTERNAL MEDICINE

## 2019-07-23 PROCEDURE — 94799 UNLISTED PULMONARY SVC/PX: CPT

## 2019-07-23 PROCEDURE — 25010000003 LIDOCAINE 1 % SOLUTION: Performed by: INTERNAL MEDICINE

## 2019-07-23 PROCEDURE — 25010000002 FENTANYL CITRATE (PF) 100 MCG/2ML SOLUTION: Performed by: INTERNAL MEDICINE

## 2019-07-23 PROCEDURE — 63710000001 INSULIN REGULAR HUMAN PER 5 UNITS: Performed by: INTERNAL MEDICINE

## 2019-07-23 PROCEDURE — C1769 GUIDE WIRE: HCPCS | Performed by: INTERNAL MEDICINE

## 2019-07-23 PROCEDURE — 93459 L HRT ART/GRFT ANGIO: CPT | Performed by: INTERNAL MEDICINE

## 2019-07-23 PROCEDURE — 63710000001 INSULIN LISPRO (HUMAN) PER 5 UNITS: Performed by: INTERNAL MEDICINE

## 2019-07-23 PROCEDURE — 25010000002 MIDAZOLAM PER 1 MG: Performed by: INTERNAL MEDICINE

## 2019-07-23 RX ORDER — DEXTROSE MONOHYDRATE 25 G/50ML
25 INJECTION, SOLUTION INTRAVENOUS
Status: DISCONTINUED | OUTPATIENT
Start: 2019-07-23 | End: 2019-07-25 | Stop reason: HOSPADM

## 2019-07-23 RX ORDER — DEXTROSE AND SODIUM CHLORIDE 5; .45 G/100ML; G/100ML
75 INJECTION, SOLUTION INTRAVENOUS CONTINUOUS
Status: ACTIVE | OUTPATIENT
Start: 2019-07-23 | End: 2019-07-23

## 2019-07-23 RX ORDER — BUDESONIDE AND FORMOTEROL FUMARATE DIHYDRATE 160; 4.5 UG/1; UG/1
2 AEROSOL RESPIRATORY (INHALATION)
Status: DISCONTINUED | OUTPATIENT
Start: 2019-07-23 | End: 2019-07-25 | Stop reason: HOSPADM

## 2019-07-23 RX ORDER — ASPIRIN 81 MG/1
81 TABLET ORAL DAILY
Status: DISCONTINUED | OUTPATIENT
Start: 2019-07-23 | End: 2019-07-23 | Stop reason: HOSPADM

## 2019-07-23 RX ORDER — LIDOCAINE HYDROCHLORIDE 10 MG/ML
INJECTION, SOLUTION INFILTRATION; PERINEURAL AS NEEDED
Status: DISCONTINUED | OUTPATIENT
Start: 2019-07-23 | End: 2019-07-23 | Stop reason: HOSPADM

## 2019-07-23 RX ORDER — NICOTINE POLACRILEX 4 MG
15 LOZENGE BUCCAL
Status: DISCONTINUED | OUTPATIENT
Start: 2019-07-23 | End: 2019-07-25 | Stop reason: HOSPADM

## 2019-07-23 RX ORDER — FENTANYL CITRATE 50 UG/ML
INJECTION, SOLUTION INTRAMUSCULAR; INTRAVENOUS AS NEEDED
Status: DISCONTINUED | OUTPATIENT
Start: 2019-07-23 | End: 2019-07-23 | Stop reason: HOSPADM

## 2019-07-23 RX ORDER — ASPIRIN 81 MG/1
81 TABLET ORAL DAILY
Status: DISCONTINUED | OUTPATIENT
Start: 2019-07-24 | End: 2019-07-25 | Stop reason: HOSPADM

## 2019-07-23 RX ORDER — MIDAZOLAM HYDROCHLORIDE 1 MG/ML
INJECTION INTRAMUSCULAR; INTRAVENOUS AS NEEDED
Status: DISCONTINUED | OUTPATIENT
Start: 2019-07-23 | End: 2019-07-23 | Stop reason: HOSPADM

## 2019-07-23 RX ORDER — DEXTROSE AND SODIUM CHLORIDE 5; .45 G/100ML; G/100ML
75 INJECTION, SOLUTION INTRAVENOUS CONTINUOUS
Status: DISCONTINUED | OUTPATIENT
Start: 2019-07-23 | End: 2019-07-23

## 2019-07-23 RX ADMIN — ACETAMINOPHEN 650 MG: 325 TABLET, FILM COATED ORAL at 06:31

## 2019-07-23 RX ADMIN — HEPARIN SODIUM 5000 UNITS: 5000 INJECTION INTRAVENOUS; SUBCUTANEOUS at 06:23

## 2019-07-23 RX ADMIN — DEXTROSE AND SODIUM CHLORIDE 75 ML/HR: 5; 450 INJECTION, SOLUTION INTRAVENOUS at 09:39

## 2019-07-23 RX ADMIN — SACUBITRIL AND VALSARTAN 1 TABLET: 24; 26 TABLET, FILM COATED ORAL at 09:39

## 2019-07-23 RX ADMIN — TIZANIDINE 8 MG: 4 TABLET ORAL at 21:21

## 2019-07-23 RX ADMIN — CARVEDILOL 6.25 MG: 6.25 TABLET, FILM COATED ORAL at 08:14

## 2019-07-23 RX ADMIN — INSULIN LISPRO 2 UNITS: 100 INJECTION, SOLUTION INTRAVENOUS; SUBCUTANEOUS at 17:51

## 2019-07-23 RX ADMIN — CLOPIDOGREL BISULFATE 75 MG: 75 TABLET ORAL at 08:14

## 2019-07-23 RX ADMIN — INSULIN HUMAN 3 UNITS: 100 INJECTION, SOLUTION PARENTERAL at 01:39

## 2019-07-23 RX ADMIN — IPRATROPIUM BROMIDE AND ALBUTEROL SULFATE 3 ML: 2.5; .5 SOLUTION RESPIRATORY (INHALATION) at 19:17

## 2019-07-23 RX ADMIN — APIXABAN 5 MG: 5 TABLET, FILM COATED ORAL at 21:22

## 2019-07-23 RX ADMIN — ROPINIROLE 2 MG: 0.5 TABLET, FILM COATED ORAL at 21:21

## 2019-07-23 RX ADMIN — CARVEDILOL 6.25 MG: 6.25 TABLET, FILM COATED ORAL at 17:51

## 2019-07-23 RX ADMIN — SODIUM CHLORIDE, PRESERVATIVE FREE 3 ML: 5 INJECTION INTRAVENOUS at 21:22

## 2019-07-23 RX ADMIN — DOFETILIDE 500 MCG: 0.5 CAPSULE ORAL at 04:53

## 2019-07-23 RX ADMIN — DOFETILIDE 500 MCG: 0.5 CAPSULE ORAL at 21:21

## 2019-07-23 RX ADMIN — SODIUM CHLORIDE, PRESERVATIVE FREE 3 ML: 5 INJECTION INTRAVENOUS at 08:19

## 2019-07-23 RX ADMIN — SPIRONOLACTONE 25 MG: 25 TABLET ORAL at 08:14

## 2019-07-23 RX ADMIN — BUDESONIDE AND FORMOTEROL FUMARATE DIHYDRATE 2 PUFF: 160; 4.5 AEROSOL RESPIRATORY (INHALATION) at 09:48

## 2019-07-23 RX ADMIN — DOFETILIDE 500 MCG: 0.5 CAPSULE ORAL at 11:24

## 2019-07-23 RX ADMIN — IPRATROPIUM BROMIDE AND ALBUTEROL SULFATE 3 ML: 2.5; .5 SOLUTION RESPIRATORY (INHALATION) at 01:11

## 2019-07-23 RX ADMIN — SACUBITRIL AND VALSARTAN 1 TABLET: 24; 26 TABLET, FILM COATED ORAL at 21:21

## 2019-07-24 LAB
ANION GAP SERPL CALCULATED.3IONS-SCNC: 10 MMOL/L (ref 5–15)
BUN BLD-MCNC: 11 MG/DL (ref 8–23)
BUN/CREAT SERPL: 14.1 (ref 7–25)
CALCIUM SPEC-SCNC: 8.6 MG/DL (ref 8.6–10.5)
CHLORIDE SERPL-SCNC: 105 MMOL/L (ref 98–107)
CO2 SERPL-SCNC: 27 MMOL/L (ref 22–29)
CREAT BLD-MCNC: 0.78 MG/DL (ref 0.76–1.27)
DEPRECATED RDW RBC AUTO: 47.5 FL (ref 37–54)
ERYTHROCYTE [DISTWIDTH] IN BLOOD BY AUTOMATED COUNT: 13.2 % (ref 12.3–15.4)
GFR SERPL CREATININE-BSD FRML MDRD: 98 ML/MIN/1.73
GLUCOSE BLD-MCNC: 98 MG/DL (ref 65–99)
GLUCOSE BLDC GLUCOMTR-MCNC: 102 MG/DL (ref 70–130)
GLUCOSE BLDC GLUCOMTR-MCNC: 103 MG/DL (ref 70–130)
GLUCOSE BLDC GLUCOMTR-MCNC: 132 MG/DL (ref 70–130)
GLUCOSE BLDC GLUCOMTR-MCNC: 180 MG/DL (ref 70–130)
HCT VFR BLD AUTO: 46.9 % (ref 37.5–51)
HGB BLD-MCNC: 14.6 G/DL (ref 13–17.7)
MAGNESIUM SERPL-MCNC: 2.2 MG/DL (ref 1.6–2.4)
MCH RBC QN AUTO: 30.3 PG (ref 26.6–33)
MCHC RBC AUTO-ENTMCNC: 31.1 G/DL (ref 31.5–35.7)
MCV RBC AUTO: 97.3 FL (ref 79–97)
PLATELET # BLD AUTO: 153 10*3/MM3 (ref 140–450)
PMV BLD AUTO: 10.9 FL (ref 6–12)
POTASSIUM BLD-SCNC: 4.2 MMOL/L (ref 3.5–5.2)
RBC # BLD AUTO: 4.82 10*6/MM3 (ref 4.14–5.8)
SODIUM BLD-SCNC: 142 MMOL/L (ref 136–145)
WBC NRBC COR # BLD: 6.88 10*3/MM3 (ref 3.4–10.8)

## 2019-07-24 PROCEDURE — 94799 UNLISTED PULMONARY SVC/PX: CPT

## 2019-07-24 PROCEDURE — 80048 BASIC METABOLIC PNL TOTAL CA: CPT | Performed by: INTERNAL MEDICINE

## 2019-07-24 PROCEDURE — 93010 ELECTROCARDIOGRAM REPORT: CPT | Performed by: INTERNAL MEDICINE

## 2019-07-24 PROCEDURE — 93005 ELECTROCARDIOGRAM TRACING: CPT | Performed by: INTERNAL MEDICINE

## 2019-07-24 PROCEDURE — 82962 GLUCOSE BLOOD TEST: CPT

## 2019-07-24 PROCEDURE — 99231 SBSQ HOSP IP/OBS SF/LOW 25: CPT | Performed by: INTERNAL MEDICINE

## 2019-07-24 PROCEDURE — 85027 COMPLETE CBC AUTOMATED: CPT | Performed by: INTERNAL MEDICINE

## 2019-07-24 PROCEDURE — 83735 ASSAY OF MAGNESIUM: CPT | Performed by: INTERNAL MEDICINE

## 2019-07-24 RX ORDER — DIAPER,BRIEF,INFANT-TODD,DISP
EACH MISCELLANEOUS EVERY 12 HOURS SCHEDULED
Status: DISCONTINUED | OUTPATIENT
Start: 2019-07-24 | End: 2019-07-25 | Stop reason: HOSPADM

## 2019-07-24 RX ADMIN — CARVEDILOL 6.25 MG: 6.25 TABLET, FILM COATED ORAL at 17:51

## 2019-07-24 RX ADMIN — SODIUM CHLORIDE, PRESERVATIVE FREE 3 ML: 5 INJECTION INTRAVENOUS at 09:45

## 2019-07-24 RX ADMIN — HYDROCORTISONE: 1 CREAM TOPICAL at 17:51

## 2019-07-24 RX ADMIN — IPRATROPIUM BROMIDE AND ALBUTEROL SULFATE 3 ML: 2.5; .5 SOLUTION RESPIRATORY (INHALATION) at 07:21

## 2019-07-24 RX ADMIN — SACUBITRIL AND VALSARTAN 1 TABLET: 24; 26 TABLET, FILM COATED ORAL at 09:46

## 2019-07-24 RX ADMIN — APIXABAN 5 MG: 5 TABLET, FILM COATED ORAL at 20:21

## 2019-07-24 RX ADMIN — INSULIN LISPRO 2 UNITS: 100 INJECTION, SOLUTION INTRAVENOUS; SUBCUTANEOUS at 20:21

## 2019-07-24 RX ADMIN — BUDESONIDE AND FORMOTEROL FUMARATE DIHYDRATE 2 PUFF: 160; 4.5 AEROSOL RESPIRATORY (INHALATION) at 07:21

## 2019-07-24 RX ADMIN — IPRATROPIUM BROMIDE AND ALBUTEROL SULFATE 3 ML: 2.5; .5 SOLUTION RESPIRATORY (INHALATION) at 13:40

## 2019-07-24 RX ADMIN — CARVEDILOL 6.25 MG: 6.25 TABLET, FILM COATED ORAL at 09:47

## 2019-07-24 RX ADMIN — SACUBITRIL AND VALSARTAN 1 TABLET: 24; 26 TABLET, FILM COATED ORAL at 20:23

## 2019-07-24 RX ADMIN — ASPIRIN 81 MG: 81 TABLET, COATED ORAL at 09:46

## 2019-07-24 RX ADMIN — TIZANIDINE 8 MG: 4 TABLET ORAL at 20:21

## 2019-07-24 RX ADMIN — ROPINIROLE 2 MG: 0.5 TABLET, FILM COATED ORAL at 20:21

## 2019-07-24 RX ADMIN — HYDROCORTISONE: 1 CREAM TOPICAL at 20:21

## 2019-07-24 RX ADMIN — SODIUM CHLORIDE, PRESERVATIVE FREE 3 ML: 5 INJECTION INTRAVENOUS at 20:22

## 2019-07-24 RX ADMIN — DOFETILIDE 500 MCG: 0.5 CAPSULE ORAL at 22:43

## 2019-07-24 RX ADMIN — SPIRONOLACTONE 25 MG: 25 TABLET ORAL at 09:47

## 2019-07-24 RX ADMIN — DOFETILIDE 500 MCG: 0.5 CAPSULE ORAL at 09:46

## 2019-07-24 RX ADMIN — APIXABAN 5 MG: 5 TABLET, FILM COATED ORAL at 09:47

## 2019-07-25 VITALS
DIASTOLIC BLOOD PRESSURE: 77 MMHG | WEIGHT: 167.6 LBS | HEIGHT: 68 IN | TEMPERATURE: 98 F | RESPIRATION RATE: 18 BRPM | SYSTOLIC BLOOD PRESSURE: 127 MMHG | OXYGEN SATURATION: 97 % | BODY MASS INDEX: 25.4 KG/M2 | HEART RATE: 76 BPM

## 2019-07-25 LAB — GLUCOSE BLDC GLUCOMTR-MCNC: 119 MG/DL (ref 70–130)

## 2019-07-25 PROCEDURE — 93005 ELECTROCARDIOGRAM TRACING: CPT | Performed by: INTERNAL MEDICINE

## 2019-07-25 PROCEDURE — 99238 HOSP IP/OBS DSCHRG MGMT 30/<: CPT | Performed by: INTERNAL MEDICINE

## 2019-07-25 PROCEDURE — 94799 UNLISTED PULMONARY SVC/PX: CPT

## 2019-07-25 PROCEDURE — 82962 GLUCOSE BLOOD TEST: CPT

## 2019-07-25 PROCEDURE — 93010 ELECTROCARDIOGRAM REPORT: CPT | Performed by: INTERNAL MEDICINE

## 2019-07-25 RX ORDER — ASPIRIN 81 MG/1
81 TABLET ORAL DAILY
Status: CANCELLED
Start: 2019-07-26

## 2019-07-25 RX ORDER — DOFETILIDE 0.5 MG/1
500 CAPSULE ORAL EVERY 12 HOURS SCHEDULED
Qty: 60 CAPSULE | Refills: 11 | Status: SHIPPED | OUTPATIENT
Start: 2019-07-25 | End: 2019-07-26

## 2019-07-25 RX ORDER — ASPIRIN 81 MG/1
81 TABLET ORAL DAILY
Qty: 30 TABLET | Refills: 11 | Status: SHIPPED | OUTPATIENT
Start: 2019-07-26 | End: 2019-07-26

## 2019-07-25 RX ADMIN — IPRATROPIUM BROMIDE AND ALBUTEROL SULFATE 3 ML: 2.5; .5 SOLUTION RESPIRATORY (INHALATION) at 07:41

## 2019-07-25 RX ADMIN — SPIRONOLACTONE 25 MG: 25 TABLET ORAL at 08:13

## 2019-07-25 RX ADMIN — SODIUM CHLORIDE, PRESERVATIVE FREE 3 ML: 5 INJECTION INTRAVENOUS at 08:16

## 2019-07-25 RX ADMIN — ASPIRIN 81 MG: 81 TABLET, COATED ORAL at 08:13

## 2019-07-25 RX ADMIN — HYDROCORTISONE: 1 CREAM TOPICAL at 08:18

## 2019-07-25 RX ADMIN — APIXABAN 5 MG: 5 TABLET, FILM COATED ORAL at 08:13

## 2019-07-25 RX ADMIN — CARVEDILOL 6.25 MG: 6.25 TABLET, FILM COATED ORAL at 08:13

## 2019-07-25 RX ADMIN — DOFETILIDE 500 MCG: 0.5 CAPSULE ORAL at 08:13

## 2019-07-25 RX ADMIN — BUDESONIDE AND FORMOTEROL FUMARATE DIHYDRATE 2 PUFF: 160; 4.5 AEROSOL RESPIRATORY (INHALATION) at 07:41

## 2019-07-25 RX ADMIN — SACUBITRIL AND VALSARTAN 1 TABLET: 24; 26 TABLET, FILM COATED ORAL at 08:13

## 2019-07-26 ENCOUNTER — READMISSION MANAGEMENT (OUTPATIENT)
Dept: CALL CENTER | Facility: HOSPITAL | Age: 69
End: 2019-07-26

## 2019-07-26 RX ORDER — DOFETILIDE 0.5 MG/1
500 CAPSULE ORAL EVERY 12 HOURS SCHEDULED
Qty: 60 CAPSULE | Refills: 10 | Status: SHIPPED | OUTPATIENT
Start: 2019-07-26 | End: 2019-08-20 | Stop reason: SDUPTHER

## 2019-07-26 RX ORDER — ASPIRIN 81 MG/1
81 TABLET ORAL DAILY
Qty: 30 TABLET | Refills: 10 | Status: SHIPPED | OUTPATIENT
Start: 2019-07-26

## 2019-07-27 NOTE — OUTREACH NOTE
Prep Survey      Responses   Facility patient discharged from?  [facility]   Is patient eligible?  Yes   Discharge diagnosis  Ventricular tachycardia, SP Left heart cath,  Tikosyn therapy   Does the patient have one of the following disease processes/diagnoses(primary or secondary)?  Other   Does the patient have Home health ordered?  No   Is there a DME ordered?  No   Medication alerts for this patient  Used meds to beds,  home pharmacy confirmed $0 copay for Tikosyn   Prep survey completed?  Yes          Laura Welsh RN

## 2019-07-29 ENCOUNTER — READMISSION MANAGEMENT (OUTPATIENT)
Dept: CALL CENTER | Facility: HOSPITAL | Age: 69
End: 2019-07-29

## 2019-07-29 NOTE — OUTREACH NOTE
Medical Week 1 Survey      Responses   Facility patient discharged from?  [facility]   Does the patient have one of the following disease processes/diagnoses(primary or secondary)?  Other   Is there a successful TCM telephone encounter documented?  No   Week 1 attempt successful?  Yes   Call start time  1616   Call end time  1618   Discharge diagnosis  Ventricular tachycardia, SP Left heart cath,  Tikosyn therapy   Is patient permission given to speak with other caregiver?  Yes   Person spoke with today (if not patient) and relationship  Amanda/ spouse   Medication alerts for this patient  Used meds to beds,  home pharmacy confirmed $0 copay for Tikosyn   Meds reviewed with patient/caregiver?  Yes   Is the patient having any side effects they believe may be caused by any medication additions or changes?  No   Does the patient have all medications ordered at discharge?  Yes   Does the patient have a primary care provider?   Yes   Does the patient have an appointment with their PCP within 7 days of discharge?  No   What is preventing the patient from scheduling follow up appointments within 7 days of discharge?  Haven't had time   Nursing Interventions  Educated patient on importance of making appointment, Advised patient to make appointment   Has the patient kept scheduled appointments due by today?  N/A   Has home health visited the patient within 72 hours of discharge?  N/A   Psychosocial issues?  No   Did the patient receive a copy of their discharge instructions?  Yes   Nursing interventions  Reviewed instructions with patient   What is the patient's perception of their health status since discharge?  Improving   Is the patient/caregiver able to teach back signs and symptoms related to disease process for when to call PCP?  Yes   Is the patient/caregiver able to teach back signs and symptoms related to disease process for when to call 911?  Yes   Is the patient/caregiver able to teach back the hierarchy of who to  call/visit for symptoms/problems? PCP, Specialist, Home health nurse, Urgent Care, ED, 911  Yes   Week 1 call completed?  Yes          Bowen Long RN

## 2019-08-01 ENCOUNTER — CLINICAL SUPPORT NO REQUIREMENTS (OUTPATIENT)
Dept: CARDIOLOGY | Facility: CLINIC | Age: 69
End: 2019-08-01

## 2019-08-01 DIAGNOSIS — I48.91 ATRIAL FIBRILLATION AND FLUTTER (HCC): ICD-10-CM

## 2019-08-01 DIAGNOSIS — I48.92 ATRIAL FIBRILLATION AND FLUTTER (HCC): ICD-10-CM

## 2019-08-01 DIAGNOSIS — I25.9 ISCHEMIC HEART DISEASE: Primary | ICD-10-CM

## 2019-08-01 DIAGNOSIS — I42.9 CARDIOMYOPATHY, UNSPECIFIED TYPE (HCC): ICD-10-CM

## 2019-08-01 PROCEDURE — 93296 REM INTERROG EVL PM/IDS: CPT | Performed by: INTERNAL MEDICINE

## 2019-08-01 PROCEDURE — 93295 DEV INTERROG REMOTE 1/2/MLT: CPT | Performed by: INTERNAL MEDICINE

## 2019-08-02 ENCOUNTER — DOCUMENTATION (OUTPATIENT)
Dept: CARDIOLOGY | Facility: CLINIC | Age: 69
End: 2019-08-02

## 2019-08-02 ENCOUNTER — APPOINTMENT (OUTPATIENT)
Dept: CARDIOLOGY | Facility: HOSPITAL | Age: 69
End: 2019-08-02

## 2019-08-02 ENCOUNTER — TELEPHONE (OUTPATIENT)
Dept: CARDIOLOGY | Facility: CLINIC | Age: 69
End: 2019-08-02

## 2019-08-02 DIAGNOSIS — I47.20 VT (VENTRICULAR TACHYCARDIA) (HCC): Primary | ICD-10-CM

## 2019-08-02 RX ORDER — METOPROLOL TARTRATE 50 MG/1
50 TABLET, FILM COATED ORAL 2 TIMES DAILY
Qty: 60 TABLET | Refills: 5 | Status: SHIPPED | OUTPATIENT
Start: 2019-08-02 | End: 2019-09-11 | Stop reason: ALTCHOICE

## 2019-08-02 NOTE — TELEPHONE ENCOUNTER
Pt returned my call and stated he felt good the first 2 or 3 days he was home from the hospital. However, now he feels tired and has no energy. He states his back muscles have been sore the last couple of days and he is also confused about his medication. Medication was added and some were discontinued while he was in the hospital and he is a little concerned about being on the correct medication.Mr Morris is concerned for him self and Is asking if there is anything he needs to be doing or anything he needs to stop doing.

## 2019-08-02 NOTE — PROGRESS NOTES
I spoke to Dr. Bravo in regards to pt having more VT while on Tikosyn.  Recommened switching coreg to metoprolol tartrate 50mg BID and having pt see GFT ASAP for his VT.  Chetan in the device clinic to call pt.

## 2019-08-02 NOTE — TELEPHONE ENCOUNTER
Spoke with Mr Morris and let him know to stop his Coreg and start on Metoprolol tartrate 50mg bid per Dr Bravo. Scheduling will also call him next week with an appointment with Gaston Dickinson. Brii is calling in the new medication to his pharmacy in [facility].

## 2019-08-02 NOTE — TELEPHONE ENCOUNTER
Called to check on pt due to receiving abnormal home transmission yesterday and today. Wife says he is not home right now but will have him call me. She states he has not feeling well since coming home from the hospital.

## 2019-08-05 ENCOUNTER — TELEPHONE (OUTPATIENT)
Dept: CARDIOLOGY | Facility: CLINIC | Age: 69
End: 2019-08-05

## 2019-08-05 ENCOUNTER — READMISSION MANAGEMENT (OUTPATIENT)
Dept: CALL CENTER | Facility: HOSPITAL | Age: 69
End: 2019-08-05

## 2019-08-05 NOTE — OUTREACH NOTE
Medical Week 2 Survey      Responses   Facility patient discharged from?  [place]   Does the patient have one of the following disease processes/diagnoses(primary or secondary)?  Other   Week 2 attempt successful?  Yes   Call start time  1439   Discharge diagnosis  Ventricular tachycardia, SP Left heart cath,  Tikosyn therapy   Call end time  1440   Is patient permission given to speak with other caregiver?  Yes   List who call center can speak with  spouse- Amanda   Person spoke with today (if not patient) and relationship  spouse- Amanda   Has the patient kept scheduled appointments due by today?  Yes   What is the patient's perception of their health status since discharge?  Improving   Additional teach back comments  Per spouse, pt is doing good, he is on his way to his follow up appt.   Week 2 Call Completed?  Yes   Wrap up additional comments  quick phone call, pt was going to MD appt          Aline Bird RN

## 2019-08-06 ENCOUNTER — TELEPHONE (OUTPATIENT)
Dept: CARDIOLOGY | Facility: CLINIC | Age: 69
End: 2019-08-06

## 2019-08-06 NOTE — TELEPHONE ENCOUNTER
Wife called asking if Mr Morris had any palpitations today. While taking her to a doctors appointment he told her he felt a little flutter.After speaking with Mr Morris he stated the felling he had was very short in duration and did not cause him pain. He stated he thought it was just a little flutter and he was not concerned. Mr Morris is confused about his medication. He stated after being in the hospital they had decrease,increase and added new medication. I ask Blaine to call him and go over his medication list and she said she would be happy to.

## 2019-08-13 ENCOUNTER — READMISSION MANAGEMENT (OUTPATIENT)
Dept: CALL CENTER | Facility: HOSPITAL | Age: 69
End: 2019-08-13

## 2019-08-13 NOTE — OUTREACH NOTE
Medical Week 3 Survey      Responses   Facility patient discharged from?  [facility]   Does the patient have one of the following disease processes/diagnoses(primary or secondary)?  Other   Week 3 attempt successful?  Yes   Call start time  1513   Call end time  1515   Discharge diagnosis  Ventricular tachycardia, SP Left heart cath,  Tikosyn therapy   Is patient permission given to speak with other caregiver?  Yes   Person spoke with today (if not patient) and relationship  spouse- Amanda Carrascos reviewed with patient/caregiver?  Yes   Is the patient having any side effects they believe may be caused by any medication additions or changes?  No   Does the patient have all medications ordered at discharge?  Yes   Is the patient taking all medications as directed (includes completed medication regime)?  Yes   Does the patient have a primary care provider?   Yes   Does the patient have an appointment with their PCP within 7 days of discharge?  Yes   Has the patient kept scheduled appointments due by today?  Yes   Has home health visited the patient within 72 hours of discharge?  N/A   Psychosocial issues?  No   Did the patient receive a copy of their discharge instructions?  Yes   Nursing interventions  Reviewed instructions with patient   What is the patient's perception of their health status since discharge?  Improving   Is the patient/caregiver able to teach back signs and symptoms related to disease process for when to call PCP?  Yes   Is the patient/caregiver able to teach back signs and symptoms related to disease process for when to call 911?  Yes   Is the patient/caregiver able to teach back the hierarchy of who to call/visit for symptoms/problems? PCP, Specialist, Home health nurse, Urgent Care, ED, 911  Yes   Additional teach back comments  Palpations at times, encouraged to notify MD.   Week 3 Call Completed?  Yes   Graduated  Yes   Did the patient feel the follow up calls were helpful during their recovery  period?  Yes   Was the number of calls appropriate?  Yes          Elina Price RN

## 2019-08-20 ENCOUNTER — LAB REQUISITION (OUTPATIENT)
Dept: LAB | Facility: HOSPITAL | Age: 69
End: 2019-08-20

## 2019-08-20 ENCOUNTER — OFFICE VISIT (OUTPATIENT)
Dept: CARDIOLOGY | Facility: CLINIC | Age: 69
End: 2019-08-20

## 2019-08-20 VITALS
BODY MASS INDEX: 25.31 KG/M2 | OXYGEN SATURATION: 93 % | SYSTOLIC BLOOD PRESSURE: 110 MMHG | DIASTOLIC BLOOD PRESSURE: 74 MMHG | HEIGHT: 68 IN | WEIGHT: 167 LBS | HEART RATE: 73 BPM

## 2019-08-20 DIAGNOSIS — I25.5 ISCHEMIC CARDIOMYOPATHY: Primary | ICD-10-CM

## 2019-08-20 DIAGNOSIS — I47.20 VT (VENTRICULAR TACHYCARDIA) (HCC): ICD-10-CM

## 2019-08-20 DIAGNOSIS — R06.02 SHORTNESS OF BREATH: ICD-10-CM

## 2019-08-20 DIAGNOSIS — Z00.00 ROUTINE GENERAL MEDICAL EXAMINATION AT A HEALTH CARE FACILITY: ICD-10-CM

## 2019-08-20 DIAGNOSIS — I50.22 CHRONIC SYSTOLIC HEART FAILURE (HCC): ICD-10-CM

## 2019-08-20 DIAGNOSIS — I25.118 CORONARY ARTERY DISEASE OF NATIVE ARTERY OF NATIVE HEART WITH STABLE ANGINA PECTORIS (HCC): ICD-10-CM

## 2019-08-20 LAB
ANION GAP SERPL CALCULATED.3IONS-SCNC: 12 MMOL/L (ref 5–15)
BUN BLD-MCNC: 9 MG/DL (ref 8–23)
BUN/CREAT SERPL: 10.2 (ref 7–25)
CALCIUM SPEC-SCNC: 8.9 MG/DL (ref 8.6–10.5)
CHLORIDE SERPL-SCNC: 104 MMOL/L (ref 98–107)
CO2 SERPL-SCNC: 25 MMOL/L (ref 22–29)
CREAT BLD-MCNC: 0.88 MG/DL (ref 0.76–1.27)
GFR SERPL CREATININE-BSD FRML MDRD: 86 ML/MIN/1.73
GLUCOSE BLD-MCNC: 125 MG/DL (ref 65–99)
MAGNESIUM SERPL-MCNC: 2.5 MG/DL (ref 1.6–2.4)
POTASSIUM BLD-SCNC: 4.3 MMOL/L (ref 3.5–5.2)
SODIUM BLD-SCNC: 141 MMOL/L (ref 136–145)

## 2019-08-20 PROCEDURE — 93283 PRGRMG EVAL IMPLANTABLE DFB: CPT | Performed by: PHYSICIAN ASSISTANT

## 2019-08-20 PROCEDURE — 83735 ASSAY OF MAGNESIUM: CPT | Performed by: PHYSICIAN ASSISTANT

## 2019-08-20 PROCEDURE — 99213 OFFICE O/P EST LOW 20 MIN: CPT | Performed by: PHYSICIAN ASSISTANT

## 2019-08-20 PROCEDURE — 80048 BASIC METABOLIC PNL TOTAL CA: CPT | Performed by: PHYSICIAN ASSISTANT

## 2019-08-20 RX ORDER — DOFETILIDE 0.5 MG/1
500 CAPSULE ORAL EVERY 12 HOURS SCHEDULED
Qty: 60 CAPSULE | Refills: 10 | Status: SHIPPED | OUTPATIENT
Start: 2019-08-20 | End: 2020-04-27

## 2019-08-20 NOTE — PROGRESS NOTES
"[facility]   OFFICE NOTE      Derek Johnson Jr.  1950  PCP: Lyndsey Ahumada MD    SUBJECTIVE:   Derek Johnson Jr. is a 68 y.o. male seen for a follow up visit regarding the following:     CC:VT    HPI:   68-year-old gentleman seen in follow-up regarding history of ventricular tachycardia, ischemic cardiomyopathy, hypertension, chronic congestive heart failure, and Saint Ricky ICD.  Mr. johnson had an episode of palpitations and remote monitoring confirming particular tachycardia that was treated successfully with ATP pacing per the ICD device.  When he called our office his beta-blocker therapy was increased.  He has not had any recurrent events since this occurred.  He denies any chest pain or chest tightness with exertion.  He states overall I \"feel pretty good\".  He denies any worsening heart failure symptoms such as orthopnea, PND, peripheral edema.  He does report that he has had some sinus allergies with some \"congestion\" and has had some more wheezing and coughing with the symptoms.  He unfortunately continues to smoke up to 2 packs of cigarettes a day.  He denies any ICD shocks or syncope events.    Cardiac PMH: (Old records have been reviewed and summarized below)  1. Ischemic heart disease:  1. MI x3, 1990, 1993, and 1995.  2. CABG x3 by Dr. Noble Cuello, 1995:  SVG to OM1 and OM2, SVG to PDA.  3. Normal LV.  4. Questionable LAD stent, incomplete  database.  5. STEMI, April 2009.  6. LHC by Dr. Glen Baker, April 2009:  EF approximately 40%, 1 of 3 patent grafts; data deficit.  7. LHC by Dr. Bird, 07/30/2010:  EF 30%, occluded vein graft to occluded RCA, minor plaque in LAD, 70% SVG -  circumflex treated with 4 x 18 mm Cypher KAILASH.  8. Echocardiogram, 02/08/2012:  EF 40% to 45%, diastolic dysfunction, mild TR.  9. Echocardiogram, 01/29/2015:  EF 30% to 35%, mild TR.  10. Jan 2017 EF 35-40%  11. LHC in Jan 2017 as noted in the results below. Resolute Integrity " KAILASH to D2  12. Echocardiogram 5/23/2108: EF 41-45%, trace-mild AR.  13. C 7/19, MVCAD unchanged from previus cath, Medical therapy Dr. Bravo.   2. Hypertension.  3. Chronic systolic CHF.  4. Nonsustained VT:  1. Inducible VT by EPS, January 2002.  2. Pacesetter ICD implant by Dr. Dickinson, January 2002.  3. Chronic amiodarone, discontinued fall of 2010:  Cannot  exclude amiodarone pulmonary toxicity.  4. ICD generator change-out with enrollment in the ANALYZE ST SEGMENT protocol, 08/16/2012.  5.  Hyperlipidemia; intolerance to multiple statins.  6. Type 2 diabetes mellitus.  7. Chronic tobacco; home O2.  8. Surgical history:  9.  CABG             Past Medical History, Past Surgical History, Family history, Social History, and Medications were all reviewed with the patient today and updated as necessary.       Current Outpatient Medications:   •  albuterol (PROVENTIL HFA;VENTOLIN HFA) 108 (90 Base) MCG/ACT inhaler, Inhale 1-2 puffs Every 6 (Six) Hours As Needed for Wheezing., Disp: , Rfl:   •  apixaban (ELIQUIS) 5 MG tablet tablet, Take 5 mg by mouth 2 (Two) Times a Day., Disp: , Rfl:   •  aspirin 81 MG EC tablet, Take 1 tablet by mouth Daily., Disp: 30 tablet, Rfl: 10  •  dofetilide (TIKOSYN) 500 MCG capsule, Take 1 capsule by mouth Every 12 (Twelve) Hours., Disp: 60 capsule, Rfl: 10  •  ezetimibe (ZETIA) 10 MG tablet, Take 10 mg by mouth Daily., Disp: , Rfl:   •  Fluticasone-Umeclidin-Vilant (TRELEGY ELLIPTA IN), Inhale 1 puff Daily., Disp: , Rfl:   •  GLIPIZIDE XL 2.5 MG 24 hr tablet, Take 2.5 mg by mouth Daily., Disp: , Rfl: 11  •  metoprolol tartrate (LOPRESSOR) 25 MG tablet, 25 mg As Needed., Disp: , Rfl:   •  metoprolol tartrate (LOPRESSOR) 50 MG tablet, Take 1 tablet by mouth 2 (Two) Times a Day., Disp: 60 tablet, Rfl: 5  •  nitroglycerin (NITROSTAT) 0.4 MG SL tablet, Place 0.4 mg under the tongue Every 5 (Five) Minutes As Needed for Chest Pain. Take no more than 3 doses in 15 minutes., Disp: , Rfl:   •   rOPINIRole (REQUIP) 1 MG tablet, Take 2 mg by mouth Every Night., Disp: , Rfl:   •  sacubitril-valsartan (ENTRESTO) 24-26 MG tablet, Take 1 tablet by mouth Every 12 (Twelve) Hours., Disp: 60 tablet, Rfl: 10  •  spironolactone (ALDACTONE) 25 MG tablet, Take 25 mg by mouth daily., Disp: , Rfl:   •  tiZANidine (ZANAFLEX) 4 MG tablet, Take 8 mg by mouth Every Night., Disp: , Rfl:       Allergies   Allergen Reactions   • Crestor [Rosuvastatin Calcium] Myalgia   • Lipitor [Atorvastatin] Myalgia     Joint pain myalgias   • Plavix [Clopidogrel Bisulfate] Unknown (See Comments)     Previously thought to be resistant; placed back on clopidogrel per Dr. Bravo earlier this year.  Confirmed with patient   • Adhesive Tape Itching and Rash     Patient Active Problem List   Diagnosis   • Ischemic heart disease   • Essential hypertension   • Chronic systolic heart failure (CMS/HCC)   • VT (ventricular tachycardia) (CMS/[facility])   • Mixed hyperlipidemia   • Type 2 diabetes mellitus (CMS/[facility])   • Tobacco dependence   • Coronary artery disease of native artery of native heart with stable angina pectoris (CMS/HCC)   • Cardiomyopathy (CMS/HCC)   • Shortness of breath   • COPD (chronic obstructive pulmonary disease) (CMS/HCC)   • Lung nodule   • PAD (peripheral artery disease) (CMS/HCC)   • Claudication (CMS/HCC)   • Other emphysema (CMS/HCC)   • Type 2 diabetes mellitus without complication (CMS/HCC)   • Rectal pain   • Ventricular tachycardia (CMS/[facility])   • Ischemic cardiomyopathy     Past Medical History:   Diagnosis Date   • Arthritis    • Cardiomyopathy (CMS/HCC)    • Carotid stenosis    • CHF (congestive heart failure) (CMS/HCC)    • COPD (chronic obstructive pulmonary disease) (CMS/HCC)    • Coronary artery disease    • Diabetes mellitus (CMS/HCC)    • Enlarged prostate    • GERD (gastroesophageal reflux disease)    • Heart attack (CMS/HCC)     X3 1990, 1993, 1995   • Hyperlipidemia    • Hypertension    • Lung nodule    • On home O2      prn   • Peptic ulcer disease    • Urinary hesitancy    • Ventricular tachycardia (CMS/HCC)    • Wears dentures    • Wears glasses      Past Surgical History:   Procedure Laterality Date   • CARDIAC CATHETERIZATION Left 04/2009    by Dr. Glen Marcelo I. EF approx 40% II one of three patent grafts iii, Data deficit    • CARDIAC CATHETERIZATION N/A 1/26/2017    Procedure: Left Heart Cath;  Surgeon: Vlad Bravo MD;  Location:  [facility] CATH INVASIVE LOC[facility];  Service:    • CARDIAC CATHETERIZATION Left 07/30/2010    i. EF 30% ii occluded vein graft to occluded RCS iii minor plaque in the LAD iv. 70% SVG- circumfelx, treated with 4x18 mm. Cypher KAILASH.   • CARDIAC CATHETERIZATION N/A 6/5/2018    Procedure: Peripheral angiography;  Surgeon: Vlad Bravo MD;  Location:  [facility]VAS[facility];  Service: Cardiovascular   • CARDIAC CATHETERIZATION N/A 7/23/2019    Procedure: Left Heart Cath;  Surgeon: Vlad Bravo MD;  Location:  [facility];  Service: Cardiology   • CARDIAC DEFIBRILLATOR PLACEMENT     • COLONOSCOPY     • COLONOSCOPY N/A 9/26/2018    Procedure: COLONOSCOPY;  Surgeon: Alfred Shah MD;  Location:  [facility] OR;  Service: General   • CORONARY ANGIOPLASTY     • CORONARY ARTERY BYPASS GRAFT  1995 1995, w/ subsequent stents 2009/2010 X3; svg TO om1 AND om2, svg TO pda    • CYST REMOVAL      Tailbone   • EXAM UNDER ANESTHESIA N/A 9/26/2018    Procedure: EXAM UNDER ANESTHESIA;  Surgeon: Alfred Shah MD;  Location:  [facility] OR;  Service: General   • HEMORRHOIDECTOMY N/A 9/28/2018    Procedure: HEMORRHOIDECTOMY;  Surgeon: Alfred Shah MD;  Location:  [facility] OR;  Service: General   • INSERT / REPLACE / REMOVE PACEMAKER     • INTERVENTIONAL RADIOLOGY PROCEDURE N/A 6/5/2018    Procedure: Abdominal Aortagram with Runoff;  Surgeon: Vlad Bravo MD;  Location:  [facility] INVASIVE [facility];  Service: Cardiovascular   • INTERVENTIONAL RADIOLOGY PROCEDURE N/A 7/6/2018    Procedure: Abdominal  "Aortogram;  Surgeon: Vlad Bravo MD;  Location: [facility] CATH INVASIVE LOCATION;  Service: Cardiology   • PACEMAKER IMPLANTATION  01/2002    Implant by Dr. Dickinson      Family History   Problem Relation Age of Onset   • Hypertension Mother    • Sick sinus syndrome Father    • Coronary artery disease Father      Social History     Tobacco Use   • Smoking status: Current Every Day Smoker     Packs/day: 0.50     Years: 45.00     Pack years: 22.50     Types: Cigarettes   • Smokeless tobacco: Never Used   Substance Use Topics   • Alcohol use: No       ROS:  Review of Symptoms:  General: no recent weight loss/gain, weakness or fatigue  Skin: no rashes, lumps, or other skin changes  HEENT: no dizziness, lightheadedness, or vision changes  Respiratory: no cough or hemoptysis  Cardiovascular: no palpitations, and tachycardia  Gastrointestinal: no black/tarry stools or diarrhea  Urinary: no change in frequency or urgency  Peripheral Vascular: no claudication or leg cramps  Musculoskeletal: no muscle or joint pain/stiffness  Psychiatric: no depression or excessive stress  Neurological: no sensory or motor loss, no syncope  Hematologic: no anemia, easy bruising or bleeding  Endocrine: no thyroid problems, nor heat or cold intolerance    PHYSICAL EXAM:    /74 (BP Location: Left arm, Patient Position: Sitting)   Pulse 73   Ht 172.7 cm (68\")   Wt 75.8 kg (167 lb)   SpO2 93%   BMI 25.39 kg/m²        Wt Readings from Last 5 Encounters:   08/20/19 75.8 kg (167 lb)   07/25/19 76 kg (167 lb 9.6 oz)   07/15/19 78 kg (172 lb)   03/18/19 80.5 kg (177 lb 6.4 oz)   11/19/18 78.9 kg (174 lb)       BP Readings from Last 5 Encounters:   08/20/19 110/74   07/25/19 127/77   07/15/19 110/60   03/18/19 140/84   11/19/18 114/66       General appearance - Alert, well appearing, and in no distress   Mental status - Affect appropriate to mood.  Eyes - Sclerae anicteric,  ENMT - Hearing grossly normal bilaterally, Dental hygiene " good.  Neck - Carotids upstroke normal bilaterally, no bruits, no JVD.  Resp - Rhonchi, wheezes.   Heart - Normal rate, regular rhythm, normal S1, S2, no murmurs, rubs, clicks or gallops.  GI - Soft, nontender, nondistended, no masses or organomegaly.  Neurological - Grossly intact - normal speech, no focal findings  Musculoskeletal - No joint tenderness, deformity or swelling, no muscular tenderness noted.  Extremities - Peripheral pulses normal, no pedal edema, no clubbing or cyanosis.  Skin - Normal coloration and turgor.  Psych -  oriented to person, place, and time.    Medical problems and test results were reviewed with the patient today.     Recent Results (from the past 672 hour(s))   POC Glucose Once    Collection Time: 07/23/19  8:45 PM   Result Value Ref Range    Glucose 89 70 - 130 mg/dL   Basic Metabolic Panel    Collection Time: 07/24/19  3:48 AM   Result Value Ref Range    Glucose 98 65 - 99 mg/dL    BUN 11 8 - 23 mg/dL    Creatinine 0.78 0.76 - 1.27 mg/dL    Sodium 142 136 - 145 mmol/L    Potassium 4.2 3.5 - 5.2 mmol/L    Chloride 105 98 - 107 mmol/L    CO2 27.0 22.0 - 29.0 mmol/L    Calcium 8.6 8.6 - 10.5 mg/dL    eGFR Non African Amer 98 >60 mL/min/1.73    BUN/Creatinine Ratio 14.1 7.0 - 25.0    Anion Gap 10.0 5.0 - 15.0 mmol/L   CBC (No Diff)    Collection Time: 07/24/19  3:48 AM   Result Value Ref Range    WBC 6.88 3.40 - 10.80 10*3/mm3    RBC 4.82 4.14 - 5.80 10*6/mm3    Hemoglobin 14.6 13.0 - 17.7 g/dL    Hematocrit 46.9 37.5 - 51.0 %    MCV 97.3 (H) 79.0 - 97.0 fL    MCH 30.3 26.6 - 33.0 pg    MCHC 31.1 (L) 31.5 - 35.7 g/dL    RDW 13.2 12.3 - 15.4 %    RDW-SD 47.5 37.0 - 54.0 fl    MPV 10.9 6.0 - 12.0 fL    Platelets 153 140 - 450 10*3/mm3   Magnesium    Collection Time: 07/24/19  3:48 AM   Result Value Ref Range    Magnesium 2.2 1.6 - 2.4 mg/dL   POC Glucose Once    Collection Time: 07/24/19  7:35 AM   Result Value Ref Range    Glucose 102 70 - 130 mg/dL   POC Glucose Once    Collection Time:  07/24/19 12:02 PM   Result Value Ref Range    Glucose 103 70 - 130 mg/dL   POC Glucose Once    Collection Time: 07/24/19  4:30 PM   Result Value Ref Range    Glucose 132 (H) 70 - 130 mg/dL   POC Glucose Once    Collection Time: 07/24/19  8:06 PM   Result Value Ref Range    Glucose 180 (H) 70 - 130 mg/dL   POC Glucose Once    Collection Time: 07/25/19  7:30 AM   Result Value Ref Range    Glucose 119 70 - 130 mg/dL   Magnesium    Collection Time: 08/20/19  3:07 PM   Result Value Ref Range    Magnesium 2.5 (H) 1.6 - 2.4 mg/dL   Basic Metabolic Panel    Collection Time: 08/20/19  3:07 PM   Result Value Ref Range    Glucose 125 (H) 65 - 99 mg/dL    BUN 9 8 - 23 mg/dL    Creatinine 0.88 0.76 - 1.27 mg/dL    Sodium 141 136 - 145 mmol/L    Potassium 4.3 3.5 - 5.2 mmol/L    Chloride 104 98 - 107 mmol/L    CO2 25.0 22.0 - 29.0 mmol/L    Calcium 8.9 8.6 - 10.5 mg/dL    eGFR Non African Amer 86 >60 mL/min/1.73    BUN/Creatinine Ratio 10.2 7.0 - 25.0    Anion Gap 12.0 5.0 - 15.0 mmol/L         EKG: (EKG has been independently visualized by me and summarized below)  7/25/19    ECG 12 Lead  Date/Time: 8/20/2019 4:35 PM  Performed by: Yury Rodriguez PA  Authorized by: Yury Rodriguez PA   Comparison: compared with previous ECG from 7/26/2019  Similar to previous ECG  Rhythm: sinus rhythm and paced  Rate: normal  Conduction: non-specific intraventricular conduction delay  QRS axis: normal  Other: no other findings    Clinical impression: normal ECG          Device Interrogation:  Please see device interrogation form which has been signed and updated for full details.  Saint Ricky dual-chamber device.  DDDR 70 a paced a percent.  RV paced less than 1%.  Acceptable sensitivity thresholds impedances.  Battery voltage 55% with 4 years remaining.  Charge time 9.8 seconds.    ASSESSMENT   1. VT: Successful ATP for ventricular tachycardia proximal 150 bpm 8/18/19.  Patient has started metoprolol therapy 50 mg every 12 hours and 25  mg q. noon.  He is tolerating his medication well has had no recurrent events since his most recent episode. He remains on Tikosyn with Acceptable EKG today.   2. ICM/MVCAD: [facility] 7/19 revealing severe CAD, Medical therapy only. EF 35%,  Continue BB, Entresto, Zetia.   3. Chronic SHF:  Restrict sodium, monitor fluid intake, daily weights.   4. COPD/Tobacco abuse:  2PPD. Dicussed need for cessation.   5. St. Ricky ICD:  Normal Function.     PLAN  · Continue current medical therapy including Tikosyn and  recently added metoprolol.  · BMP magnesium level today  · Encouraged tobacco cessation  · Return for follow-up with Dr. Dickinson in Dr. Bravo as scheduled.    8/20/2019  4:39 PM    Will Michael JUAREZ

## 2019-08-26 ENCOUNTER — CLINICAL SUPPORT NO REQUIREMENTS (OUTPATIENT)
Dept: CARDIOLOGY | Facility: CLINIC | Age: 69
End: 2019-08-26

## 2019-08-26 DIAGNOSIS — I50.22 CHRONIC SYSTOLIC HEART FAILURE (HCC): Primary | ICD-10-CM

## 2019-08-26 DIAGNOSIS — I47.20 VT (VENTRICULAR TACHYCARDIA) (HCC): ICD-10-CM

## 2019-08-26 DIAGNOSIS — I42.9 CARDIOMYOPATHY, UNSPECIFIED TYPE (HCC): ICD-10-CM

## 2019-08-28 ENCOUNTER — TRANSCRIBE ORDERS (OUTPATIENT)
Dept: LAB | Facility: HOSPITAL | Age: 69
End: 2019-08-28

## 2019-08-28 ENCOUNTER — HOSPITAL ENCOUNTER (OUTPATIENT)
Dept: GENERAL RADIOLOGY | Facility: HOSPITAL | Age: 69
Discharge: HOME | End: 2019-08-28
Admitting: NURSE PRACTITIONER

## 2019-08-28 DIAGNOSIS — R05.9 COUGH: ICD-10-CM

## 2019-08-28 DIAGNOSIS — I50.43 CHF (CONGESTIVE HEART FAILURE), NYHA CLASS I, ACUTE ON CHRONIC, COMBINED (HCC): ICD-10-CM

## 2019-08-28 DIAGNOSIS — I50.43 CHF (CONGESTIVE HEART FAILURE), NYHA CLASS I, ACUTE ON CHRONIC, COMBINED (HCC): Primary | ICD-10-CM

## 2019-08-28 PROCEDURE — 71046 X-RAY EXAM CHEST 2 VIEWS: CPT | Performed by: RADIOLOGY

## 2019-08-28 PROCEDURE — 71046 X-RAY EXAM CHEST 2 VIEWS: CPT

## 2019-09-11 ENCOUNTER — CONSULT (OUTPATIENT)
Dept: CARDIOLOGY | Facility: CLINIC | Age: 69
End: 2019-09-11

## 2019-09-11 VITALS
HEIGHT: 68 IN | BODY MASS INDEX: 25.01 KG/M2 | HEART RATE: 83 BPM | WEIGHT: 165 LBS | OXYGEN SATURATION: 92 % | SYSTOLIC BLOOD PRESSURE: 116 MMHG | DIASTOLIC BLOOD PRESSURE: 62 MMHG

## 2019-09-11 DIAGNOSIS — I25.118 CORONARY ARTERY DISEASE OF NATIVE ARTERY OF NATIVE HEART WITH STABLE ANGINA PECTORIS (HCC): ICD-10-CM

## 2019-09-11 DIAGNOSIS — I47.20 VT (VENTRICULAR TACHYCARDIA) (HCC): Primary | ICD-10-CM

## 2019-09-11 DIAGNOSIS — I25.5 ISCHEMIC CARDIOMYOPATHY: ICD-10-CM

## 2019-09-11 DIAGNOSIS — I50.22 CHRONIC SYSTOLIC HEART FAILURE (HCC): ICD-10-CM

## 2019-09-11 PROCEDURE — 93283 PRGRMG EVAL IMPLANTABLE DFB: CPT | Performed by: INTERNAL MEDICINE

## 2019-09-11 PROCEDURE — 99214 OFFICE O/P EST MOD 30 MIN: CPT | Performed by: INTERNAL MEDICINE

## 2019-09-11 RX ORDER — CARVEDILOL 6.25 MG/1
6.25 TABLET ORAL 2 TIMES DAILY
Qty: 60 TABLET | Refills: 11 | Status: SHIPPED | OUTPATIENT
Start: 2019-09-11 | End: 2019-12-17 | Stop reason: DRUGHIGH

## 2019-09-11 NOTE — PROGRESS NOTES
Derek Morris   1950  [phone]    09/11/2019    [facility] CARDIOLOGY     Lyndsey Ahumada MD  [address]  [facility] [address]    Chief Complaint   Patient presents with   • VT (ventricular tachycardia)       Problem List:   1. Ischemic heart disease:  a. MI x3, 1990, 1993, and 1995.  b. CABG x3 by Dr. Noble Cuello, 1995:  SVG to OM1 and OM2, SVG to PDA.  c. Normal LV.  d. Questionable LAD stent, incomplete  database.  e. STEMI, April 2009.  f. LHC by Dr. Glen Baker, April 2009:  EF approximately 40%, 1 of 3 patent grafts; data deficit.  g. LHC by Dr. Bird, 07/30/2010:  EF 30%, occluded vein graft to occluded RCA, minor plaque in LAD, 70% SVG -  circumflex treated with 4 x 18 mm Cypher KAILASH.  h. Echocardiogram, 02/08/2012:  EF 40% to 45%, diastolic dysfunction, mild TR.  i. Echocardiogram, 01/29/2015:  EF 30% to 35%, mild TR.  j. Jan 2017 EF 35-40%  k. LHC in Jan 2017 as noted in the results below. Resolute Integrity KAILASH to D2  l. C 7/23/2019: severe multivessel coronary artery disease without significant change when compared to cardiac catheterization in January 2017.   m. Echocardiogram 7/22/19: EF 45%, trace to mild AVR, mild TR, RVSP 35-45 mmHg   2. Hypertension.  3. Chronic systolic CHF.  4. Ventricular Tachycardia:  a. Inducible VT by EPS, January 2002.  b. Pacesetter ICD implant by Dr. Dickinson, January 2002.  c. Chronic amiodarone, discontinued fall of 2010:  Cannot  exclude amiodarone pulmonary toxicity.  d. ICD generator change-out with enrollment in the ANALYZE ST SEGMENT protocol, 08/16/2012.  e. VT/VFlutter with ICD shocks 7/22/19 - Sotalol discontinued and started on Tikosyn  5. PAF - on Eliquis  6. PAD s/p PTA 7/2018 to left SFA  7. Hyperlipidemia; intolerance to multiple statins.  8. Type 2 diabetes mellitus.  9. Chronic tobacco; home O2 prn.  10. Surgical history:  a.  CABG    Allergies  Allergies   Allergen Reactions   • Crestor  [Rosuvastatin Calcium] Myalgia   • Lipitor [Atorvastatin] Myalgia     Joint pain myalgias   • Plavix [Clopidogrel Bisulfate] Unknown (See Comments)     Previously thought to be resistant; placed back on clopidogrel per Dr. Bravo earlier this year.  Confirmed with patient   • Adhesive Tape Itching and Rash       Current Medications    Current Outpatient Medications:   •  albuterol (PROVENTIL HFA;VENTOLIN HFA) 108 (90 Base) MCG/ACT inhaler, Inhale 1-2 puffs Every 6 (Six) Hours As Needed for Wheezing., Disp: , Rfl:   •  apixaban (ELIQUIS) 5 MG tablet tablet, Take 5 mg by mouth 2 (Two) Times a Day., Disp: , Rfl:   •  aspirin 81 MG EC tablet, Take 1 tablet by mouth Daily., Disp: 30 tablet, Rfl: 10  •  dofetilide (TIKOSYN) 500 MCG capsule, Take 1 capsule by mouth Every 12 (Twelve) Hours., Disp: 60 capsule, Rfl: 10  •  ezetimibe (ZETIA) 10 MG tablet, Take 10 mg by mouth Daily., Disp: , Rfl:   •  Fluticasone-Umeclidin-Vilant (TRELEGY ELLIPTA IN), Inhale 1 puff Daily., Disp: , Rfl:   •  GLIPIZIDE XL 2.5 MG 24 hr tablet, Take 2.5 mg by mouth Daily., Disp: , Rfl: 11  •  metoprolol tartrate (LOPRESSOR) 50 MG tablet, Take 1 tablet by mouth 2 (Two) Times a Day., Disp: 60 tablet, Rfl: 5  •  nitroglycerin (NITROSTAT) 0.4 MG SL tablet, Place 0.4 mg under the tongue Every 5 (Five) Minutes As Needed for Chest Pain. Take no more than 3 doses in 15 minutes., Disp: , Rfl:   •  rOPINIRole (REQUIP) 1 MG tablet, Take 2 mg by mouth Every Night., Disp: , Rfl:   •  sacubitril-valsartan (ENTRESTO) 24-26 MG tablet, Take 1 tablet by mouth Every 12 (Twelve) Hours., Disp: 60 tablet, Rfl: 10  •  spironolactone (ALDACTONE) 25 MG tablet, Take 25 mg by mouth daily., Disp: , Rfl:   •  tiZANidine (ZANAFLEX) 4 MG tablet, Take 8 mg by mouth Every Night., Disp: , Rfl:     History of Present Illness     Pt with above stated past medical history presents for consultation for VT and history of ICM/Chronic Systolic CHF with ICD check referred by Dr. Bravo. In  "July, he was hospitalized for VT/VF with ICD shocks. He underwent LHC 7/23/19 and was found to have severe MVCAD but no change since LHC in 2017. His sotalol was changed to Tikosyn at that time. Since then, he has had other episodes of VT with ATP and was started on Lopressor in the last couple of weeks. He is having side effects to Lopressor has he has had in the past. He has had some dizziness. He denies CP but has had worsened LIMON since his hospitalization. He has been coughing and smothering as well. He saw his PCP and was told to use Bumex prn which does help him when he takes it for a couple of days. He has been watching his weight as well. He did gain about 7 lbs of fluid which he has now lost. He has some dizziness but denies syncope. He did have a \"weird feeling\" a couple weeks ago, which may correspond to when he had ATP for VT. He also complains of visual disturbances like seeing double, intermittently, lasting up to 3 days, which has been going on for about one week, associated with numbness in his face and top of his head. He is on Eliquis. He is not sure it is a side effect of his Metoprolol.     ROS:  General:  +  fatigue, weight gain or loss  Cardiovascular:  Denies CP, PND, syncope, near syncope, + edema -  palpitations.  Pulmonary:  +  LIMON,  + cough, - wheezing      Vitals:    09/11/19 1032   BP: 116/62   BP Location: Right arm   Patient Position: Sitting   Pulse: 83   SpO2: 92%   Weight: 74.8 kg (165 lb)   Height: 172.7 cm (68\")     Body mass index is 25.09 kg/m².  PE:  General: NAD  Neck: no JVD, no carotid bruits, no TM  Heart RRR, NL S1, S2, S4 present, no rubs, murmurs  Lungs: CTA, no wheezes, rhonchi, or rales  Abd: soft, non-tender, NL BS  Ext: No musculoskeletal deformities, no edema, cyanosis, or clubbing  Psych: normal mood and affect    Diagnostic Data:    ICD Manual Interrogation: normal function. 4 episodes of VT with successful ATP. 4-4.3 years on battery. Less than 1% AMS (10 " seconds)      ECG 12 Lead  Date/Time: 9/11/2019 11:04 AM  Performed by: Zaki Dickinson MD  Authorized by: Zaki Dickinson MD   Comparison: compared with previous ECG from 8/20/2019  Similar to previous ECG  Comparison to previous ECG: QTc WNL  Rhythm: sinus rhythm  BPM: 83              1. VT (ventricular tachycardia) (CMS/[facility])    2. Ischemic cardiomyopathy    3. Chronic systolic heart failure (CMS/HCC)    4. Coronary artery disease of native artery of native heart with stable angina pectoris (CMS/[facility])          Plan:  1. Ventricular Tachycardia:   - on Tikosyn 500 mcg BID. EKG shows normal QTc.   - overall well controlled. He did have VT terminated with ATP   - suspect he is having side effects to Metoprolol. Will stop Lopressor and restart Coreg 6.25 mg BID    2. ICM/Chronic Systolic CHF:  - normal ICD function  - EF 45% on BB and Entresto  - prn Bumex.   - Class III Symptoms    3. CAD:  -Left heart catheterization 1/23/2019, severe multivessel disease no significant change compared to left heart catheterization January 2017, EF 30 to 35%  -medical therapy per Dr. Lawrence LEIVA/up in 6 months    Scribed for Zaki Dickinson MD by Katherin Mcclain PA-C. 9/11/2019  11:05 AM     IZaki MD, personally performed the services described in this documentation as scribed by the above named individual in my presence, and it is both accurate and complete.  9/11/2019  11:31 AM

## 2019-09-16 ENCOUNTER — OFFICE VISIT (OUTPATIENT)
Dept: CARDIOLOGY | Facility: CLINIC | Age: 69
End: 2019-09-16

## 2019-09-16 VITALS
DIASTOLIC BLOOD PRESSURE: 78 MMHG | HEIGHT: 68 IN | WEIGHT: 167 LBS | SYSTOLIC BLOOD PRESSURE: 134 MMHG | OXYGEN SATURATION: 93 % | BODY MASS INDEX: 25.31 KG/M2 | HEART RATE: 73 BPM

## 2019-09-16 DIAGNOSIS — I10 ESSENTIAL HYPERTENSION: ICD-10-CM

## 2019-09-16 DIAGNOSIS — F17.200 TOBACCO DEPENDENCE: ICD-10-CM

## 2019-09-16 DIAGNOSIS — I73.9 PAD (PERIPHERAL ARTERY DISEASE) (HCC): ICD-10-CM

## 2019-09-16 DIAGNOSIS — I47.20 VENTRICULAR TACHYCARDIA (HCC): ICD-10-CM

## 2019-09-16 DIAGNOSIS — I25.118 CORONARY ARTERY DISEASE OF NATIVE ARTERY OF NATIVE HEART WITH STABLE ANGINA PECTORIS (HCC): Primary | ICD-10-CM

## 2019-09-16 DIAGNOSIS — I47.20 VT (VENTRICULAR TACHYCARDIA) (HCC): ICD-10-CM

## 2019-09-16 DIAGNOSIS — E78.2 MIXED HYPERLIPIDEMIA: ICD-10-CM

## 2019-09-16 DIAGNOSIS — R42 DIZZINESS: ICD-10-CM

## 2019-09-16 PROCEDURE — 99214 OFFICE O/P EST MOD 30 MIN: CPT | Performed by: INTERNAL MEDICINE

## 2019-09-16 NOTE — PROGRESS NOTES
[facility] AT [address], [address]  [place], [place], [address] [phone]  WWW[facility]           OUTPATIENT CLINIC FOLLOW UP NOTE    Patient Care Team:  Patient Care Team:  Soraida Maher APRN as PCP - General (Family Medicine)  Vlad Bravo MD as PCP - Claims Attributed    Subjective:   Reason for consultation:   Chief Complaint   Patient presents with   • Coronary Artery Disease       HPI:    Derek Morris Jr. is a 68 y.o. male.  The patient has a history of severe ischemic heart disease with prior bypass and most recently PCI in 1/2017, ischemic cardiomyopathy with an EF of about 35%, dual-chamber ICD, VT, atrial fibrillation, PAD s/p left SFA stenting, long smoking history, COPD, hypertension, diabetes, hyperlipidemia, who presents for follow-up.    Since the patient was seen by EP and was switched from Lopressor to carvedilol, his symptoms of visual disturbances, dizziness, dyspnea have improved.  He denies active chest pain.  He denies claudication.  Still smoking half a pack per day.    PFSH:  PROBLEM LIST:  1. Ischemic heart disease:  a. MI x3, 1990, 1993, and 1995.  b. CABG x3 by Dr. Noble Cuello, 1995:  SVG to OM1 and OM2, SVG to PDA.  c. Normal LV.  d. Questionable LAD stent, incomplete  database.  e. STEMI, April 2009.  f. LHC by Dr. Glen Baker, April 2009:  EF approximately 40%, 1 of 3 patent grafts; data deficit.  g. LHC by Dr. Bird, 07/30/2010:  EF 30%, occluded vein graft to occluded RCA, minor plaque in LAD, 70% SVG -  circumflex treated with 4 x 18 mm Cypher KAILASH.  h. Echocardiogram, 02/08/2012:  EF 40% to 45%, diastolic dysfunction, mild TR.  i. Echocardiogram, 01/29/2015:  EF 30% to 35%, mild TR.  j. Jan 2017 EF 35-40%  k. LHC in Jan 2017 as noted in the results below. Resolute Integrity KAILASH to D2  2. Hypertension.  3. Chronic systolic CHF.  4. Nonsustained VT:  a. Inducible VT by EPS, January 2002.  b. Pacesetter ICD implant by  Dr. Dickinson, January 2002.  c. Chronic amiodarone, discontinued fall of 2010:  Cannot  exclude amiodarone pulmonary toxicity.  d. ICD generator change-out with enrollment in the ANALYZE ST SEGMENT protocol, 08/16/2012.  5. PAD s/p PTA 7/2018 to left SFA  6. Hyperlipidemia; intolerance to multiple statins.  7. Type 2 diabetes mellitus.  8. Chronic tobacco; home O2.  9. Surgical history:  a.  CABG    Patient Active Problem List   Diagnosis   • Ischemic heart disease   • Essential hypertension   • Chronic systolic heart failure (CMS/HCC)   • VT (ventricular tachycardia) (CMS/HCC)   • Mixed hyperlipidemia   • Type 2 diabetes mellitus (CMS/HCC)   • Tobacco dependence   • Coronary artery disease of native artery of native heart with stable angina pectoris (CMS/HCC)   • Cardiomyopathy (CMS/HCC)   • Shortness of breath   • COPD (chronic obstructive pulmonary disease) (CMS/HCC)   • Lung nodule   • PAD (peripheral artery disease) (CMS/HCC)   • Claudication (CMS/HCC)   • Other emphysema (CMS/HCC)   • Type 2 diabetes mellitus without complication (CMS/HCC)   • Rectal pain   • Ventricular tachycardia (CMS/HCC)   • Ischemic cardiomyopathy         Current Outpatient Medications:   •  albuterol (PROVENTIL HFA;VENTOLIN HFA) 108 (90 Base) MCG/ACT inhaler, Inhale 1-2 puffs Every 6 (Six) Hours As Needed for Wheezing., Disp: , Rfl:   •  apixaban (ELIQUIS) 5 MG tablet tablet, Take 5 mg by mouth 2 (Two) Times a Day., Disp: , Rfl:   •  aspirin 81 MG EC tablet, Take 1 tablet by mouth Daily., Disp: 30 tablet, Rfl: 10  •  carvedilol (COREG) 6.25 MG tablet, Take 1 tablet by mouth 2 (Two) Times a Day., Disp: 60 tablet, Rfl: 11  •  dofetilide (TIKOSYN) 500 MCG capsule, Take 1 capsule by mouth Every 12 (Twelve) Hours., Disp: 60 capsule, Rfl: 10  •  ezetimibe (ZETIA) 10 MG tablet, Take 10 mg by mouth Daily., Disp: , Rfl:   •  Fluticasone-Umeclidin-Vilant (TRELEGY ELLIPTA IN), Inhale 1 puff Daily., Disp: , Rfl:   •  GLIPIZIDE XL 2.5 MG 24 hr  "tablet, Take 2.5 mg by mouth Daily., Disp: , Rfl: 11  •  nitroglycerin (NITROSTAT) 0.4 MG SL tablet, Place 0.4 mg under the tongue Every 5 (Five) Minutes As Needed for Chest Pain. Take no more than 3 doses in 15 minutes., Disp: , Rfl:   •  rOPINIRole (REQUIP) 1 MG tablet, Take 2 mg by mouth Every Night., Disp: , Rfl:   •  sacubitril-valsartan (ENTRESTO) 24-26 MG tablet, Take 1 tablet by mouth Every 12 (Twelve) Hours., Disp: 60 tablet, Rfl: 10  •  spironolactone (ALDACTONE) 25 MG tablet, Take 25 mg by mouth daily., Disp: , Rfl:   •  tiZANidine (ZANAFLEX) 4 MG tablet, Take 8 mg by mouth Every Night., Disp: , Rfl:   •  Evolocumab with Infusor (REPATHA PUSHTRONEX SYSTEM) 420 MG/3.5ML solution cartridge, Inject 420 mg under the skin into the appropriate area as directed Every 30 (Thirty) Days., Disp: 3 cartridge., Rfl: 5    Allergies   Allergen Reactions   • Crestor [Rosuvastatin Calcium] Myalgia   • Lipitor [Atorvastatin] Myalgia     Joint pain myalgias   • Lopressor [Metoprolol Tartrate] Myalgia and Unknown (See Comments)     Side of face went numb     • Plavix [Clopidogrel Bisulfate] Unknown (See Comments)     Previously thought to be resistant; placed back on clopidogrel per Dr. Bravo earlier this year.  Confirmed with patient   • Adhesive Tape Itching and Rash        reports that he has been smoking cigarettes.  He has a 22.50 pack-year smoking history. He has never used smokeless tobacco.    Family History   Problem Relation Age of Onset   • Hypertension Mother    • Sick sinus syndrome Father    • Coronary artery disease Father        Review of Systems:  Positive for lightheadedness  Negative for exertional chest pain, palpitations, lightheadedness, syncope.       Objective:   Blood pressure 134/78, pulse 73, height 172.7 cm (68\"), weight 75.8 kg (167 lb), SpO2 93 %.  CONSTITUTIONAL: No acute distress, normal affect  RESPIRATORY: Normal effort. Clear to auscultation bilaterally without wheezing or " rales  CARDIOVASCULAR: Regular rate and rhythm with normal S1 and S2. Without murmur, gallop or rub.  No carotid bruit heard today  PERIPHERAL VASCULAR: Normal radial pulses bilaterally. There is mild peripheral edema bilaterally. 1-2+ bilateral PT pulses    Labs:  Lab Results   Component Value Date    ALT 17 09/27/2018    AST 20 09/27/2018     Lab Results   Component Value Date    CHOL 211 (H) 11/26/2018    TRIG 151 (H) 11/26/2018    HDL 54 (L) 11/26/2018    CREATININE 0.88 08/20/2019       Diagnostic Data:    Procedures    Peripheral intervention 7/2018  · The 100% mid left SFA chronic total occlusion is now status post dissection and reentry revascularization with deployment of a Zilver PTX 6 x 80 mm drug-eluting stent with 0% residual stenosis.    Peripheral Angiogram 6/2018  · There was diffuse atherosclerosis including a 70% discrete stenosis of the proximal left common iliac artery as well as a 100% chronic total occlusion of the left superficial femoral artery.    [facility] 1/2017  · Severe multivessel disease including a previously known occluded proximal left circumflex and mid right coronary artery.  There was a de hao 60% discrete stenosis in a medium sized second diagonal that supplies much of the anterolateral wall that was found to be functionally significant with an iFR of 0.60.  The LAD was otherwise patent, the SVG to a distal OM was widely patent, the circumflex artery is supplied by septal collaterals, the RCA was supplied by moderate graft to OM left to right collaterals.  · The diagonal stenosis is now status post resolute integrity 2.25 x 14 mm drug-eluting stent placement with 0% residual stenosis.  · Left ventricular ejection fraction of 49% with an akinetic inferior wall and hypokinesis of the anterolateral wall    TTE 5/2018  · Left ventricular systolic function is mildly decreased. Estimated EF appears to be in the range of 41 - 45%.  · The following left ventricular wall segments are  hypokinetic: mid anterolateral. The following left ventricular wall segments are akinetic: mid inferolateral, basal inferior and mid inferior.  · Left ventricular diastolic dysfunction (grade I a) consistent with impaired relaxation.  · There is mild calcification of the aortic valve.  · Trace-to-mild aortic valve regurgitation is present.  · Estimated right ventricular systolic pressure from tricuspid regurgitation is mildly elevated (35-45 mmHg).    TTE 1/2017  · Left ventricular function is moderately decreased. Estimated EF appears to be in the range of 36 - 40%. Calculated EF = 37%.  · Left ventricular wall thickness is consistent with mild-to-moderate concentric hypertrophy.  · Left ventricular diastolic dysfunction (grade I a) consistent with impaired relaxation.  · Left ventricular wall segments contract abnormally. Akinesis of the inferior wall    DEVICE INTERROGATION:  [employer], Interrogation date 11/19/2018-   RA pacing 88 %, RV pacing less than 1 %. P wave is 1.2 mV with a threshold of 1 V at 0.5 msec and an impedance of 490 ohms. R wave is greater than 12 mV with a threshold of 0.75 V at 0.5 msec and an impedance of 640 ohms. Battery voltage is 4.9-5.2 years.  <1% AMS    DEVICE INTERROGATION:  [employer] Ricky, Interrogation date 3/2019-   RA pacing 60%, RV pacing 2.2%, underlying rhythm sinus mary in the 50s.  P wave is 1.0 mV with a threshold of 1 V at 0.5 msec and an impedance of 490 ohms. R wave is >12 mV with a threshold of 1 V at 0.5 msec and an impedance of 590 ohms. Battery voltage is 5 yrs. AFib noted in mid Feb (time of hospitalization). No VT/VF. Mode switch 6.5% but due to competitive atrial pacing (not AFib)       Assessment and Plan:   Derek was seen today for ischemic heart disease, hypertension and chronic systolic chf.    Diagnoses and all orders for this visit:    Coronary artery disease of native artery of native heart with stable angina pectoris  Hyperlipidemia  -CCS I  -Continue aspirin 81  mg daily, carvedilol  -Intolerance to atorvastatin and rosuvastatin   -Start Repatha with the auto-injector, sample provided  -Continue Zetia  -Repeat lipids next visit  -Will consider discontinuing Zetia if LDL improved    Chronic systolic heart failure  Status post dual-chamber ICD  -NYHA class 2  -Continue carvedilol, Entresto, spironolactone    Atrial fibrillation  -Beta blocker, Tikosyn, Eliquis  -Follows with EP    VT (ventricular tachycardia), VF  Possible pulmonary toxicity due to amiodarone around 2002  -VF episode 9/2017, was on sotalol at 120 mg twice a day without recurrent events at that time.   -Tikosyn, beta blocker  -Follows with EP    PAD  -s/p KAILASH to Left SFA  -Continue current related medications    Advanced COPD and pulmonary nodule  -Management per pulmonary and CT surgery teams    Prior right arm and hand numbness  Lightheadedness  -Carotid Duplex US    - Return in about 6 months (around 3/16/2020).    Vlda Bravo MD, MSc, [facility]  Interventional Cardiology  [facility] at [facility]

## 2019-09-18 ENCOUNTER — TELEPHONE (OUTPATIENT)
Dept: CARDIOLOGY | Facility: CLINIC | Age: 69
End: 2019-09-18

## 2019-09-18 RX ORDER — NITROGLYCERIN 0.4 MG/1
0.4 TABLET SUBLINGUAL
Qty: 25 TABLET | Refills: 3 | Status: SHIPPED | OUTPATIENT
Start: 2019-09-18 | End: 2020-01-01

## 2019-09-18 NOTE — TELEPHONE ENCOUNTER
Patient contacted to review medications per his request. Reviewed medications per last office note. Pt verbalizes understanding, all questions answered at this time.

## 2019-09-25 ENCOUNTER — HOSPITAL ENCOUNTER (OUTPATIENT)
Dept: CARDIOLOGY | Facility: HOSPITAL | Age: 69
Discharge: HOME | End: 2019-09-25
Admitting: INTERNAL MEDICINE

## 2019-09-25 DIAGNOSIS — R42 DIZZINESS: ICD-10-CM

## 2019-09-25 LAB
BH CV ECHO MEAS - BSA(HAYCOCK): 1.9 M^2
BH CV ECHO MEAS - BSA: 1.9 M^2
BH CV ECHO MEAS - BZI_BMI: 25.4 KILOGRAMS/M^2
BH CV ECHO MEAS - BZI_METRIC_HEIGHT: 172.7 CM
BH CV ECHO MEAS - BZI_METRIC_WEIGHT: 75.8 KG
BH CV XLRA MEAS LEFT DIST CCA EDV: 15 CM/SEC
BH CV XLRA MEAS LEFT DIST CCA PSV: 72 CM/SEC
BH CV XLRA MEAS LEFT DIST ICA EDV: 26 CM/SEC
BH CV XLRA MEAS LEFT DIST ICA PSV: 66 CM/SEC
BH CV XLRA MEAS LEFT ICA/CCA RATIO: 0.83
BH CV XLRA MEAS LEFT MID CCA EDV: 13 CM/SEC
BH CV XLRA MEAS LEFT MID CCA PSV: 64 CM/SEC
BH CV XLRA MEAS LEFT MID ICA EDV: 22 CM/SEC
BH CV XLRA MEAS LEFT MID ICA PSV: 60 CM/SEC
BH CV XLRA MEAS LEFT PROX CCA EDV: 18 CM/SEC
BH CV XLRA MEAS LEFT PROX CCA PSV: 103 CM/SEC
BH CV XLRA MEAS LEFT PROX ECA EDV: 0 CM/SEC
BH CV XLRA MEAS LEFT PROX ECA PSV: 185 CM/SEC
BH CV XLRA MEAS LEFT PROX ICA EDV: 19 CM/SEC
BH CV XLRA MEAS LEFT PROX ICA PSV: 51 CM/SEC
BH CV XLRA MEAS LEFT VERTEBRAL A EDV: 20.6 CM/SEC
BH CV XLRA MEAS LEFT VERTEBRAL A PSV: 68.3 CM/SEC
BH CV XLRA MEAS RIGHT DIST CCA EDV: 14.9 CM/SEC
BH CV XLRA MEAS RIGHT DIST CCA PSV: 72.2 CM/SEC
BH CV XLRA MEAS RIGHT DIST ICA EDV: 31.4 CM/SEC
BH CV XLRA MEAS RIGHT DIST ICA PSV: 98.7 CM/SEC
BH CV XLRA MEAS RIGHT ICA/CCA RATIO: 1.4
BH CV XLRA MEAS RIGHT MID CCA EDV: 13.2 CM/SEC
BH CV XLRA MEAS RIGHT MID CCA PSV: 64 CM/SEC
BH CV XLRA MEAS RIGHT MID ICA EDV: 29.8 CM/SEC
BH CV XLRA MEAS RIGHT MID ICA PSV: 92.1 CM/SEC
BH CV XLRA MEAS RIGHT PROX CCA EDV: 12.6 CM/SEC
BH CV XLRA MEAS RIGHT PROX CCA PSV: 86.1 CM/SEC
BH CV XLRA MEAS RIGHT PROX ECA EDV: 0 CM/SEC
BH CV XLRA MEAS RIGHT PROX ECA PSV: 179 CM/SEC
BH CV XLRA MEAS RIGHT PROX ICA EDV: 39.3 CM/SEC
BH CV XLRA MEAS RIGHT PROX ICA PSV: 102 CM/SEC
BH CV XLRA MEAS RIGHT PROX SCLA EDV: 0 CM/SEC
BH CV XLRA MEAS RIGHT PROX SCLA PSV: 84.2 CM/SEC
BH CV XLRA MEAS RIGHT VERTEBRAL A EDV: 17.6 CM/SEC
BH CV XLRA MEAS RIGHT VERTEBRAL A PSV: 52.9 CM/SEC

## 2019-09-25 PROCEDURE — 93880 EXTRACRANIAL BILAT STUDY: CPT

## 2019-09-25 PROCEDURE — 93880 EXTRACRANIAL BILAT STUDY: CPT | Performed by: INTERNAL MEDICINE

## 2019-09-26 ENCOUNTER — TELEPHONE (OUTPATIENT)
Dept: CARDIOLOGY | Facility: CLINIC | Age: 69
End: 2019-09-26

## 2019-09-26 NOTE — TELEPHONE ENCOUNTER
"Patient returned call. Discussed carotid duplex results. Patient states that he has had a headache with numbness/ tingling in his face and right hand that's lasted for \"a few weeks\". Patient advised to get PCP appt to be evaluated for this. Pt agreeable to plan.   "

## 2019-10-02 ENCOUNTER — HOSPITAL ENCOUNTER (OUTPATIENT)
Dept: CARDIOLOGY | Facility: HOSPITAL | Age: 69
Discharge: HOME | End: 2019-10-02

## 2019-10-02 DIAGNOSIS — Z00.6 ENCOUNTER FOR EXAMINATION FOR NORMAL COMPARISON OR CONTROL IN CLINICAL RESEARCH PROGRAM: ICD-10-CM

## 2019-10-18 NOTE — PATIENT INSTRUCTIONS
Chronic Obstructive Pulmonary Disease  Chronic obstructive pulmonary disease (COPD) is a common lung condition in which airflow from the lungs is limited. COPD is a general term that can be used to describe many different lung problems that limit airflow, including both chronic bronchitis and emphysema. If you have COPD, your lung function will probably never return to normal, but there are measures you can take to improve lung function and make yourself feel better.  CAUSES   · Smoking (common).  · Exposure to secondhand smoke.  · Genetic problems.  · Chronic inflammatory lung diseases or recurrent infections.  SYMPTOMS  · Shortness of breath, especially with physical activity.  · Deep, persistent (chronic) cough with a large amount of thick mucus.  · Wheezing.  · Rapid breaths (tachypnea).  · Gray or bluish discoloration (cyanosis) of the skin, especially in your fingers, toes, or lips.  · Fatigue.  · Weight loss.  · Frequent infections or episodes when breathing symptoms become much worse (exacerbations).  · Chest tightness.  DIAGNOSIS  Your health care provider will take a medical history and perform a physical examination to diagnose COPD. Additional tests for COPD may include:  · Lung (pulmonary) function tests.  · Chest X-ray.  · CT scan.  · Blood tests.  TREATMENT   Treatment for COPD may include:  · Inhaler and nebulizer medicines. These help manage the symptoms of COPD and make your breathing more comfortable.  · Supplemental oxygen. Supplemental oxygen is only helpful if you have a low oxygen level in your blood.  · Exercise and physical activity. These are beneficial for nearly all people with COPD.  · Lung surgery or transplant.  · Nutrition therapy to gain weight, if you are underweight.  · Pulmonary rehabilitation. This may involve working with a team of health care providers and specialists, such as respiratory, occupational, and physical therapists.  HOME CARE INSTRUCTIONS  · Take all medicines  (inhaled or pills) as directed by your health care provider.  · Avoid over-the-counter medicines or cough syrups that dry up your airway (such as antihistamines) and slow down the elimination of secretions unless instructed otherwise by your health care provider.  · If you are a smoker, the most important thing that you can do is stop smoking. Continuing to smoke will cause further lung damage and breathing trouble. Ask your health care provider for help with quitting smoking. He or she can direct you to community resources or hospitals that provide support.  · Avoid exposure to irritants such as smoke, chemicals, and fumes that aggravate your breathing.  · Use oxygen therapy and pulmonary rehabilitation if directed by your health care provider. If you require home oxygen therapy, ask your health care provider whether you should purchase a pulse oximeter to measure your oxygen level at home.  · Avoid contact with individuals who have a contagious illness.  · Avoid extreme temperature and humidity changes.  · Eat healthy foods. Eating smaller, more frequent meals and resting before meals may help you maintain your strength.  · Stay active, but balance activity with periods of rest. Exercise and physical activity will help you maintain your ability to do things you want to do.  · Preventing infection and hospitalization is very important when you have COPD. Make sure to receive all the vaccines your health care provider recommends, especially the pneumococcal and influenza vaccines. Ask your health care provider whether you need a pneumonia vaccine.  · Learn and use relaxation techniques to manage stress.  · Learn and use controlled breathing techniques as directed by your health care provider. Controlled breathing techniques include:    Pursed lip breathing. Start by breathing in (inhaling) through your nose for 1 second. Then, purse your lips as if you were going to whistle and breathe out (exhale) through the  pursed lips for 2 seconds.    Diaphragmatic breathing. Start by putting one hand on your abdomen just above your waist. Inhale slowly through your nose. The hand on your abdomen should move out. Then purse your lips and exhale slowly. You should be able to feel the hand on your abdomen moving in as you exhale.  · Learn and use controlled coughing to clear mucus from your lungs. Controlled coughing is a series of short, progressive coughs. The steps of controlled coughing are:  . Lean your head slightly forward.  . Breathe in deeply using diaphragmatic breathing.  . Try to hold your breath for 3 seconds.  . Keep your mouth slightly open while coughing twice.  . Spit any mucus out into a tissue.  . Rest and repeat the steps once or twice as needed.  SEEK MEDICAL CARE IF:  · You are coughing up more mucus than usual.  · There is a change in the color or thickness of your mucus.  · Your breathing is more labored than usual.  · Your breathing is faster than usual.  SEEK IMMEDIATE MEDICAL CARE IF:  · You have shortness of breath while you are resting.  · You have shortness of breath that prevents you from:    Being able to talk.    Performing your usual physical activities.  · You have chest pain lasting longer than 5 minutes.  · Your skin color is more cyanotic than usual.  · You measure low oxygen saturations for longer than 5 minutes with a pulse oximeter.  MAKE SURE YOU:  · Understand these instructions.  · Will watch your condition.  · Will get help right away if you are not doing well or get worse.     This information is not intended to replace advice given to you by your health care provider. Make sure you discuss any questions you have with your health care provider.     Document Released: 09/27/2006 Document Revised: 01/08/2016 Document Reviewed: 08/14/2014  [employer] Patient Education ©2016 [employer]     [Food Allergies] : food allergies [Venom Reactions] : venom reactions [de-identified] : Patient with acute vomiting - nauseas - rash - diarrhea - not all symptoms at the same time.after the ingestion of dairy products x  2 years.   Patient has improved with dairy avoidance but she will have occasional symptoms after the ingestion of tuna and salmon.   These symptoms will occur less than 1x per month.  She will take antihistamine when she has the symptoms of hives only - when she has vomiting she will not take antihistamines.  \par \par Patient has occasional flushing of her face - lasts for a few minutes at a time.  \par \par EIA - she will use her Proair prior to sports.   Patient with PERLA - skin itches at times.

## 2019-10-30 LAB
BUN SERPL-MCNC: 9 MG/DL (ref 8–23)
BUN/CREAT SERPL: 10.2 (ref 7–25)
CALCIUM SERPL-MCNC: 8.9 MG/DL (ref 8.6–10.5)
CHLORIDE SERPL-SCNC: 104 MMOL/L (ref 98–107)
CO2 SERPL-SCNC: 25 MMOL/L (ref 22–29)
CREAT SERPL-MCNC: 0.88 MG/DL (ref 0.76–1.27)
GLUCOSE SERPL-MCNC: 125 MG/DL (ref 65–99)
MAGNESIUM SERPL-MCNC: 2.5 MG/DL (ref 1.6–2.4)
POTASSIUM SERPL-SCNC: 4.3 MMOL/L (ref 3.5–5.2)
SODIUM SERPL-SCNC: 141 MMOL/L (ref 136–145)

## 2019-11-05 ENCOUNTER — CLINICAL SUPPORT NO REQUIREMENTS (OUTPATIENT)
Dept: CARDIOLOGY | Facility: CLINIC | Age: 69
End: 2019-11-05

## 2019-11-05 DIAGNOSIS — I42.9 CARDIOMYOPATHY, UNSPECIFIED TYPE (HCC): ICD-10-CM

## 2019-11-05 PROCEDURE — 93296 REM INTERROG EVL PM/IDS: CPT | Performed by: INTERNAL MEDICINE

## 2019-11-05 PROCEDURE — 93295 DEV INTERROG REMOTE 1/2/MLT: CPT | Performed by: INTERNAL MEDICINE

## 2019-11-17 ENCOUNTER — CLINICAL SUPPORT NO REQUIREMENTS (OUTPATIENT)
Dept: CARDIOLOGY | Facility: CLINIC | Age: 69
End: 2019-11-17

## 2019-11-17 DIAGNOSIS — I25.5 ISCHEMIC CARDIOMYOPATHY: ICD-10-CM

## 2019-11-17 DIAGNOSIS — I47.20 VT (VENTRICULAR TACHYCARDIA) (HCC): Primary | ICD-10-CM

## 2019-11-17 DIAGNOSIS — I50.22 CHRONIC SYSTOLIC HEART FAILURE (HCC): ICD-10-CM

## 2019-12-07 ENCOUNTER — CLINICAL SUPPORT NO REQUIREMENTS (OUTPATIENT)
Dept: CARDIOLOGY | Facility: CLINIC | Age: 69
End: 2019-12-07

## 2019-12-07 DIAGNOSIS — I50.22 CHRONIC SYSTOLIC HEART FAILURE (HCC): ICD-10-CM

## 2019-12-07 DIAGNOSIS — I25.5 ISCHEMIC CARDIOMYOPATHY: Primary | ICD-10-CM

## 2019-12-07 DIAGNOSIS — I47.20 VT (VENTRICULAR TACHYCARDIA) (HCC): ICD-10-CM

## 2019-12-10 RX ORDER — APIXABAN 5 MG/1
TABLET, FILM COATED ORAL
Qty: 180 TABLET | Refills: 0 | Status: SHIPPED | OUTPATIENT
Start: 2019-12-10 | End: 2020-07-09 | Stop reason: SDUPTHER

## 2019-12-17 ENCOUNTER — TELEPHONE (OUTPATIENT)
Dept: CARDIOLOGY | Facility: CLINIC | Age: 69
End: 2019-12-17

## 2019-12-17 RX ORDER — CARVEDILOL 12.5 MG/1
12.5 TABLET ORAL 2 TIMES DAILY
Qty: 180 TABLET | Refills: 3 | Status: SHIPPED | OUTPATIENT
Start: 2019-12-17 | End: 2020-10-19 | Stop reason: HOSPADM

## 2019-12-17 NOTE — TELEPHONE ENCOUNTER
----- Message from Zaki Dickinson MD sent at 12/17/2019  2:12 PM EST -----  Please call and increase Coreg to 12.5 mg p.o. twice daily due to recurrent VT on his ICD interrogation

## 2019-12-17 NOTE — TELEPHONE ENCOUNTER
Patient notified and aware. RX for Coreg 12.5 mg BID sent to [facility] pharmacy.

## 2019-12-19 ENCOUNTER — CLINICAL SUPPORT NO REQUIREMENTS (OUTPATIENT)
Dept: CARDIOLOGY | Facility: CLINIC | Age: 69
End: 2019-12-19

## 2019-12-19 DIAGNOSIS — I47.20 VENTRICULAR TACHYCARDIA (HCC): Primary | ICD-10-CM

## 2020-01-01 ENCOUNTER — TRANSCRIBE ORDERS (OUTPATIENT)
Dept: ADMINISTRATIVE | Facility: HOSPITAL | Age: 70
End: 2020-01-01

## 2020-01-01 ENCOUNTER — READMISSION MANAGEMENT (OUTPATIENT)
Dept: CALL CENTER | Facility: HOSPITAL | Age: 70
End: 2020-01-01

## 2020-01-01 DIAGNOSIS — Z87.891 PERSONAL HISTORY OF TOBACCO USE, PRESENTING HAZARDS TO HEALTH: Primary | ICD-10-CM

## 2020-01-01 RX ORDER — NITROGLYCERIN 0.4 MG/1
TABLET SUBLINGUAL
Qty: 25 TABLET | Refills: 11 | Status: SHIPPED | OUTPATIENT
Start: 2020-01-01

## 2020-01-15 ENCOUNTER — TELEPHONE (OUTPATIENT)
Dept: CARDIOLOGY | Facility: CLINIC | Age: 70
End: 2020-01-15

## 2020-01-15 NOTE — TELEPHONE ENCOUNTER
Patient called to report that his for about 1 week he has had some tenderness, warmth, swelling, in his left leg. Pt reports that from his knee down his leg is double the size of his right leg. Pt does report pain with flexion. Pt denies change in skin color below ankle.     Pt advised to call pcp for appt as soon as possible. If appt is not available by tomorrow morning, pt advised to go to ER to be evaluated.

## 2020-02-08 ENCOUNTER — CLINICAL SUPPORT NO REQUIREMENTS (OUTPATIENT)
Dept: CARDIOLOGY | Facility: CLINIC | Age: 70
End: 2020-02-08

## 2020-02-08 DIAGNOSIS — I25.5 ISCHEMIC CARDIOMYOPATHY: ICD-10-CM

## 2020-02-08 DIAGNOSIS — I47.20 VT (VENTRICULAR TACHYCARDIA) (HCC): Primary | ICD-10-CM

## 2020-02-08 PROCEDURE — 93295 DEV INTERROG REMOTE 1/2/MLT: CPT | Performed by: INTERNAL MEDICINE

## 2020-02-08 PROCEDURE — 93296 REM INTERROG EVL PM/IDS: CPT | Performed by: INTERNAL MEDICINE

## 2020-02-27 ENCOUNTER — CLINICAL SUPPORT NO REQUIREMENTS (OUTPATIENT)
Dept: CARDIOLOGY | Facility: CLINIC | Age: 70
End: 2020-02-27

## 2020-02-27 DIAGNOSIS — I25.5 ISCHEMIC CARDIOMYOPATHY: ICD-10-CM

## 2020-02-27 DIAGNOSIS — I50.22 CHRONIC SYSTOLIC HEART FAILURE (HCC): ICD-10-CM

## 2020-02-27 DIAGNOSIS — I47.20 VT (VENTRICULAR TACHYCARDIA) (HCC): Primary | ICD-10-CM

## 2020-03-02 ENCOUNTER — TRANSCRIBE ORDERS (OUTPATIENT)
Dept: LAB | Facility: HOSPITAL | Age: 70
End: 2020-03-02

## 2020-03-02 DIAGNOSIS — Z13.6 SCREENING FOR AAA (ABDOMINAL AORTIC ANEURYSM): Primary | ICD-10-CM

## 2020-03-02 DIAGNOSIS — I73.9 PVD (PERIPHERAL VASCULAR DISEASE) (HCC): Primary | ICD-10-CM

## 2020-03-04 ENCOUNTER — HOSPITAL ENCOUNTER (OUTPATIENT)
Dept: ULTRASOUND IMAGING | Facility: HOSPITAL | Age: 70
Discharge: HOME | End: 2020-03-04

## 2020-03-04 ENCOUNTER — HOSPITAL ENCOUNTER (OUTPATIENT)
Dept: ULTRASOUND IMAGING | Facility: HOSPITAL | Age: 70
Discharge: HOME | End: 2020-03-04
Admitting: FAMILY MEDICINE

## 2020-03-04 DIAGNOSIS — I73.9 PVD (PERIPHERAL VASCULAR DISEASE) (HCC): ICD-10-CM

## 2020-03-04 DIAGNOSIS — Z13.6 SCREENING FOR AAA (ABDOMINAL AORTIC ANEURYSM): ICD-10-CM

## 2020-03-04 PROCEDURE — 76706 US ABDL AORTA SCREEN AAA: CPT | Performed by: RADIOLOGY

## 2020-03-04 PROCEDURE — 93923 UPR/LXTR ART STDY 3+ LVLS: CPT

## 2020-03-04 PROCEDURE — 76706 US ABDL AORTA SCREEN AAA: CPT

## 2020-03-04 PROCEDURE — 93923 UPR/LXTR ART STDY 3+ LVLS: CPT | Performed by: RADIOLOGY

## 2020-03-18 ENCOUNTER — CLINICAL SUPPORT NO REQUIREMENTS (OUTPATIENT)
Dept: CARDIOLOGY | Facility: CLINIC | Age: 70
End: 2020-03-18

## 2020-03-18 DIAGNOSIS — I25.5 ISCHEMIC CARDIOMYOPATHY: ICD-10-CM

## 2020-03-18 DIAGNOSIS — I50.22 CHRONIC SYSTOLIC HEART FAILURE (HCC): ICD-10-CM

## 2020-03-18 DIAGNOSIS — I47.20 VT (VENTRICULAR TACHYCARDIA) (HCC): Primary | ICD-10-CM

## 2020-03-27 ENCOUNTER — OFFICE VISIT (OUTPATIENT)
Dept: CARDIOLOGY | Facility: CLINIC | Age: 70
End: 2020-03-27

## 2020-03-27 ENCOUNTER — TELEPHONE (OUTPATIENT)
Dept: CARDIOLOGY | Facility: CLINIC | Age: 70
End: 2020-03-27

## 2020-03-27 VITALS
HEIGHT: 68 IN | DIASTOLIC BLOOD PRESSURE: 84 MMHG | SYSTOLIC BLOOD PRESSURE: 142 MMHG | HEART RATE: 78 BPM | OXYGEN SATURATION: 96 % | WEIGHT: 171.2 LBS | BODY MASS INDEX: 25.94 KG/M2

## 2020-03-27 DIAGNOSIS — I47.20 VT (VENTRICULAR TACHYCARDIA) (HCC): ICD-10-CM

## 2020-03-27 DIAGNOSIS — I25.9 ISCHEMIC HEART DISEASE: Primary | ICD-10-CM

## 2020-03-27 DIAGNOSIS — I47.20 VENTRICULAR TACHYCARDIA (HCC): ICD-10-CM

## 2020-03-27 PROCEDURE — 99214 OFFICE O/P EST MOD 30 MIN: CPT | Performed by: INTERNAL MEDICINE

## 2020-03-27 PROCEDURE — 93283 PRGRMG EVAL IMPLANTABLE DFB: CPT | Performed by: INTERNAL MEDICINE

## 2020-03-27 RX ORDER — MEXILETINE HYDROCHLORIDE 150 MG/1
150 CAPSULE ORAL 3 TIMES DAILY
Qty: 90 CAPSULE | Refills: 2 | Status: SHIPPED | OUTPATIENT
Start: 2020-03-27 | End: 2020-05-14 | Stop reason: SDUPTHER

## 2020-03-27 RX ORDER — BUMETANIDE 0.5 MG/1
1 TABLET ORAL AS NEEDED
COMMUNITY
End: 2021-01-01 | Stop reason: SDUPTHER

## 2020-03-27 NOTE — PROGRESS NOTES
Cardiac Electrophysiology Outpatient Follow Up Note            [facility]     Follow Up Office Visit      Derek Morris Jr.  [id number]  03/27/2020  [unfilled]  [unfilled]    Primary Care Physician: Soraida Maher APRN    Referred By: No ref. provider found    Subjective     Chief Complaint:   Chief Complaint   Patient presents with   • Cardiomyopathy   • Coronary Artery Disease       History of Present Illness:   Mr. Derek Morris Jr. is a 69 y.o. male who presents to my electrophysiology clinic for follow up of his history of monomorphic VT and ICD therapy.    He says he is feeling well.  He does note that his heart goes at a rhythm sometimes and this happened on 26 March when this occurs he feels palpitations and presyncope and he will hit himself in the chest.  Did not have any ICD shocks.  He did not feel like he was going to pass out.  He did not lose consciousness.  He did not have presyncope.    He denies any heart failure symptoms nausea vomiting fevers chills.    He has not had any new complaints.  He reports taking all of his medications faithfully and reliably and is completely compliant irregular with taking his Tikosyn in particular..      Review of Systems:   Constitutional: No fevers or chills, no recent weight gain or weight loss or fatigue  Eyes: No visual loss, blurred vision, double vision, yellow sclerae.  ENT: No headaches, hearing loss, vertigo, congestion or sore throat.   Cardiovascular: Per HPI  Respiratory: No cough or wheezing, no sputum production, no hematemesis   Gastrointestinal: No abdominal pain, no nausea, vomiting, constipation, diarrhea, melena.   Genitourinary: No dysuria, hematuria or increased frequency.  Musculoskeletal:  No gait disturbance, weakness or joint pain or stiffness  Integumentary: No rashes, urticaria, ulcers or sores.   Neurological: No headache, dizziness, syncope, paralysis, ataxia, no prior  CVA/TIA  Psychiatric: No anxiety, or depression  Endocrine: No diaphoresis, cold or heat intolerance. No polyuria or polydipsia.   Hematologic/Lymphatic: No anemia, abnormal bruising or bleeding. No history of DVT/PE.       Past Medical History:   Past Medical History:   Diagnosis Date   • Arthritis    • Cardiomyopathy (CMS/[facility])    • Carotid stenosis    • CHF (congestive heart failure) (CMS/[facility])    • COPD (chronic obstructive pulmonary disease) (CMS/[facility])    • Coronary artery disease    • Diabetes mellitus (CMS/[facility])    • Enlarged prostate    • GERD (gastroesophageal reflux disease)    • Heart attack (CMS/[facility])     X3 1990, 1993, 1995   • Hyperlipidemia    • Hypertension    • Lung nodule    • On home O2     prn   • Peptic ulcer disease    • Urinary hesitancy    • Ventricular tachycardia (CMS/[facility])    • Wears dentures    • Wears glasses        Past Surgical History:   Past Surgical History:   Procedure Laterality Date   • CARDIAC CATHETERIZATION Left 04/2009    by Dr. Glen Baker- I. EF approx 40% II one of three patent grafts iii, Data deficit    • CARDIAC CATHETERIZATION N/A 1/26/2017    Procedure: Left Heart Cath;  Surgeon: Vlad Bravo MD;  Location:  [facility];  Service:    • CARDIAC CATHETERIZATION Left 07/30/2010    i. EF 30% ii occluded vein graft to occluded RCS iii minor plaque in the LAD iv. 70% SVG- circumfelx, treated with 4x18 mm. Cypher KAILASH.   • CARDIAC CATHETERIZATION N/A 6/5/2018    Procedure: Peripheral angiography;  Surgeon: Vlad Bravo MD;  Location:  [facility];  Service: Cardiovascular   • CARDIAC CATHETERIZATION N/A 7/23/2019    Procedure: Left Heart Cath;  Surgeon: Vlad Bravo MD;  Location:  [facility];  Service: Cardiology   • CARDIAC DEFIBRILLATOR PLACEMENT     • COLONOSCOPY     • COLONOSCOPY N/A 9/26/2018    Procedure: COLONOSCOPY;  Surgeon: Alfred Shah MD;  Location:  [facility] OR;  Service: General   • CORONARY ANGIOPLASTY     •  CORONARY ARTERY BYPASS GRAFT  1995 1995, w/ subsequent stents 2009/2010 X3; svg TO om1 AND om2, svg TO pda    • CYST REMOVAL      Tailbone   • EXAM UNDER ANESTHESIA N/A 9/26/2018    Procedure: EXAM UNDER ANESTHESIA;  Surgeon: Alfred Shah MD;  Location:  [facility] OR;  Service: General   • HEMORRHOIDECTOMY N/A 9/28/2018    Procedure: HEMORRHOIDECTOMY;  Surgeon: Alfred Shah MD;  Location:  [facility];  Service: General   • INSERT / REPLACE / REMOVE PACEMAKER     • INTERVENTIONAL RADIOLOGY PROCEDURE N/A 6/5/2018    Procedure: Abdominal Aortagram with Runoff;  Surgeon: Vlad Bravo MD;  Location:  [facility];  Service: Cardiovascular   • INTERVENTIONAL RADIOLOGY PROCEDURE N/A 7/6/2018    Procedure: Abdominal Aortogram;  Surgeon: Vlad Bravo MD;  Location:  [facility];  Service: Cardiology   • PACEMAKER IMPLANTATION  01/2002    Implant by Dr. Dickinson        Family History:   Family History   Problem Relation Age of Onset   • Hypertension Mother    • Sick sinus syndrome Father    • Coronary artery disease Father        Social History:   Social History     Socioeconomic History   • Marital status:      Spouse name: Not on file   • Number of children: 2   • Years of education: Not on file   • Highest education level: Not on file   Occupational History   • Occupation:      Comment: Retired   Tobacco Use   • Smoking status: Current Every Day Smoker     Packs/day: 0.50     Years: 45.00     Pack years: 22.50     Types: Cigarettes   • Smokeless tobacco: Never Used   Substance and Sexual Activity   • Alcohol use: No   • Drug use: Yes     Types: Marijuana     Comment: occasional use - maybe once a month    • Sexual activity: Defer   Social History Narrative    Caffeine use: 5-6 serving daily.            Medications:     Current Outpatient Medications:   •  albuterol (PROVENTIL HFA;VENTOLIN HFA) 108 (90 Base) MCG/ACT inhaler, Inhale 1-2 puffs Every 6 (Six) Hours  As Needed for Wheezing., Disp: , Rfl:   •  aspirin 81 MG EC tablet, Take 1 tablet by mouth Daily., Disp: 30 tablet, Rfl: 10  •  bumetanide (BUMEX) 0.5 MG tablet, Take 0.5 mg by mouth Daily., Disp: , Rfl:   •  carvedilol (COREG) 12.5 MG tablet, Take 1 tablet by mouth 2 (Two) Times a Day., Disp: 180 tablet, Rfl: 3  •  dofetilide (TIKOSYN) 500 MCG capsule, Take 1 capsule by mouth Every 12 (Twelve) Hours., Disp: 60 capsule, Rfl: 10  •  ELIQUIS 5 MG tablet tablet, TAKE 1 TABLET EVERY 12 HOURS, Disp: 180 tablet, Rfl: 0  •  Evolocumab with Infusor (REPATHA PUSHTRONEX SYSTEM) 420 MG/3.5ML solution cartridge, Inject 420 mg under the skin into the appropriate area as directed Every 30 (Thirty) Days., Disp: 3 cartridge., Rfl: 5  •  ezetimibe (ZETIA) 10 MG tablet, Take 10 mg by mouth Daily., Disp: , Rfl:   •  Fluticasone-Umeclidin-Vilant (TRELEGY ELLIPTA IN), Inhale 1 puff Daily., Disp: , Rfl:   •  GLIPIZIDE XL 2.5 MG 24 hr tablet, Take 2.5 mg by mouth Daily., Disp: , Rfl: 11  •  nitroglycerin (NITROSTAT) 0.4 MG SL tablet, Place 1 tablet under the tongue Every 5 (Five) Minutes As Needed for Chest Pain. Take no more than 3 doses in 15 minutes., Disp: 25 tablet, Rfl: 3  •  rOPINIRole (REQUIP) 1 MG tablet, Take 2 mg by mouth Every Night., Disp: , Rfl:   •  sacubitril-valsartan (ENTRESTO) 24-26 MG tablet, Take 1 tablet by mouth Every 12 (Twelve) Hours., Disp: 60 tablet, Rfl: 10  •  spironolactone (ALDACTONE) 25 MG tablet, Take 25 mg by mouth daily., Disp: , Rfl:   •  tiZANidine (ZANAFLEX) 4 MG tablet, Take 8 mg by mouth Every Night., Disp: , Rfl:   •  mexiletine (MEXITIL) 150 MG capsule, Take 1 capsule by mouth 3 (Three) Times a Day for 90 days., Disp: 90 capsule, Rfl: 2    Allergies:   Allergies   Allergen Reactions   • Crestor [Rosuvastatin Calcium] Myalgia   • Lipitor [Atorvastatin] Myalgia     Joint pain myalgias   • Lopressor [Metoprolol Tartrate] Myalgia and Unknown (See Comments)     Side of face went numb     • Plavix  "[Clopidogrel Bisulfate] Unknown (See Comments)     Previously thought to be resistant; placed back on clopidogrel per Dr. Bravo earlier this year.  Confirmed with patient   • Adhesive Tape Itching and Rash       Objective     Physical Exam:  Vital Signs:   Vitals:    03/27/20 1111   BP: 142/84   BP Location: Left arm   Patient Position: Sitting   Pulse: 78   SpO2: 96%   Weight: 77.7 kg (171 lb 3.2 oz)   Height: 172.7 cm (68\")     GEN: Well nourished, well-developed, no acute distress  HEENT: Normocephalic, atraumatic, moist mucous membranes  NECK: Supple, no JVD, no thyromegaly, no lymphadenopathy  CARD: Regular rate and rhythm, normal S1 & S2 are present.  No murmur, gallop or rubs are appreciated.  LUNGS: Clear to auscultation bilateraly, normal respiratory effort  ABDOMEN: Soft, nontender, normal bowel sounds  EXTREMITIES: No gross deformities, no clubbing, cyanosis.  Edema none  SKIN: Warm, dry  NEURO: No focal deficits, alert and oriented x 3  PSYCHIATRIC: Normal affect and mood, appropriate use of semantics and logic.    Lab Results   Component Value Date    GLUCOSE 125 (H) 08/20/2019    CALCIUM 8.9 08/20/2019     08/20/2019    K 4.3 08/20/2019    CO2 25.0 08/20/2019     08/20/2019    BUN 9 08/20/2019    CREATININE 0.88 08/20/2019    EGFRIFNONA 86 08/20/2019    BCR 10.2 08/20/2019    ANIONGAP 12.0 08/20/2019     Lab Results   Component Value Date    WBC 6.88 07/24/2019    HGB 14.6 07/24/2019    HCT 46.9 07/24/2019    MCV 97.3 (H) 07/24/2019     07/24/2019     Lab Results   Component Value Date    INR 1.18 (H) 07/22/2019    INR 1.01 08/07/2016    INR 0.97 01/17/2016    PROTIME 14.4 (H) 07/22/2019    PROTIME 11.0 08/07/2016    PROTIME 10.6 01/17/2016     Lab Results   Component Value Date    TSH 1.260 07/23/2019       Cardiac Testing:     I personally viewed and interpreted the patient's EKG/Telemetry/lab data    Procedures    Tobacco Cessation: N/A  Obstructive Sleep Apnea Screening: " N/A    Assessment & Plan        Derek was seen today for cardiomyopathy and coronary artery disease.    Diagnoses and all orders for this visit:    Ischemic heart disease    VT (ventricular tachycardia) (CMS/HCC)    Ventricular tachycardia (CMS/HCC)    Other orders  -     mexiletine (MEXITIL) 150 MG capsule; Take 1 capsule by mouth 3 (Three) Times a Day for 90 days.      Hemodynamically this gentleman is doing well.  His heart failure symptoms are stable.  That said however he is having recurrent ventricular tachycardia requiring ICD therapy in the form of antitachycardia pacing thus far he is avoided ICD shocks however this may not always be the case.  This patient is at high risk for receiving ICD shocks.  For this reason I recommended that he add mexiletine 150 mg orally 3 times daily to his current medical regimen.  We discussed this drug at length.  I discussed with him that there really is no risk of toxicity with this medication and that it should be taken 3 times daily with food.  He agrees to start this medication today.    I discussed with him that he is at high risk for morbidity and mortality due to the coronavirus should he require it.  Our first and foremost priority is to keep him away from hospitals and medical facilities.  Additionally he understands that he is to shelter and home and avoid any exposure.    We will continue to monitor this patient carefully via remote telemetry.        Follow Up:       Thank you for allowing me to participate in the care of your patient. Please to not hesitate to contact me with additional questions or concerns.        Calin Dior DO, FACC, [facility]  Cardiac Electrophysiologist

## 2020-04-03 ENCOUNTER — TELEPHONE (OUTPATIENT)
Dept: CARDIOLOGY | Facility: CLINIC | Age: 70
End: 2020-04-03

## 2020-04-27 ENCOUNTER — TELEPHONE (OUTPATIENT)
Dept: CARDIOLOGY | Facility: CLINIC | Age: 70
End: 2020-04-27

## 2020-04-27 ENCOUNTER — DOCUMENTATION (OUTPATIENT)
Dept: CARDIOLOGY | Facility: CLINIC | Age: 70
End: 2020-04-27

## 2020-04-27 DIAGNOSIS — I47.20 VT (VENTRICULAR TACHYCARDIA) (HCC): Primary | ICD-10-CM

## 2020-04-27 DIAGNOSIS — I49.01 VENTRICULAR FIBRILLATION (HCC): ICD-10-CM

## 2020-04-27 DIAGNOSIS — I25.118 CORONARY ARTERY DISEASE OF NATIVE ARTERY OF NATIVE HEART WITH STABLE ANGINA PECTORIS (HCC): ICD-10-CM

## 2020-04-27 DIAGNOSIS — I73.9 PAD (PERIPHERAL ARTERY DISEASE) (HCC): ICD-10-CM

## 2020-04-27 RX ORDER — AMIODARONE HYDROCHLORIDE 200 MG/1
TABLET ORAL
Qty: 264 TABLET | Refills: 0 | Status: SHIPPED | OUTPATIENT
Start: 2020-04-30 | End: 2020-05-12 | Stop reason: HOSPADM

## 2020-04-27 NOTE — TELEPHONE ENCOUNTER
I contacted Mr. Morris today via telephone after remote device interrogation revealed 7 treated episodes of sustained ventricular tachycardia all at approximately 142 bpm and all terminated with antitachycardia pacing.  The episodes all occurred between 24 April and 26 April.    He confirmed to me that he was doing okay but did note these episodes occurring and they were not pleasant.  He confirmed to me that he is indeed taking his Tikosyn and mexiletine.  Please recall that I had added mexiletine to his medication regimen recently.  Initially he felt like the mexiletine afforded him a certain benefit.    He is not had any severe symptoms of presyncope or syncope.  He is not had any chest pain.    The patient's primary electrophysiologist historically has been Dr. Dickinson my partner and colleague.  I discussed the case with Dr. Dickinson.    I discussed with the patient 3 options as we see it.:    1.  We can continue the current course.  Continuing his Tikosyn and mexiletine combination.  It is likely he would continue to have more episodes of VT.  This is my least favorite option.  I think it is the highest risk option.    2.  We can discontinue his Tikosyn allow for a 48-hour washout period and then initiate amiodarone therapy.  Amiodarone is a drug that he had been on previously and it was discontinued because of some lung disease.  He had a diffusion capacity of 68% of predicted maximum in 2017 by pulmonary function test.  I discussed with him that amiodarone is of course length with lung toxicity.  We discussed that the risk is potentially 10% or higher.    3.  I discussed with him the option of a catheter ablation procedure of the left ventricle.  I discussed this procedure in detail.  His wife has had an ablation however discussed with him in detail that this would likely be quite a different experience from what his wife had.  I quoted him a 5 to 10% chance of significant procedure complication including  but not limited to death stroke myocardial infarction worsening heart failure damage to valves damage to extracardiac organs such as the phrenic nerve risk of cardiac perforation tamponade and other complications.  I quoted him approximately 60 to 70% chance of significantly reducing his ventricular tachycardia burden with the procedure I discussed with him in detail that this procedure would take several hours and would require a general anesthesia.    After extensive discussion conversation the patient made an informed decision to proceed with option #2.  He is not interested at this time in a procedure.    I will have him stop his dofetilide today.  He will start taking amiodarone on the 30th of this month in the following fashion: 400 mg by mouth 3 times daily for 14 days and then on the 15th day reduce the dose to maintenance dose of 400 mg by mouth daily for the indeterminate future.    I will bring him back into the office to see him in approximately 4 to 6 weeks time.  At that juncture we will likely order pulmonary function tests and a full battery of amiodarone surveillance studies.    We will of course continue to monitor him closely on the remote monitoring system provided by his ICD which is a [employer] device.  If he has any questions issues concerns or uncertainty he is to call us and he understands this.    Calin Dior, DO, FAC, [facility]  Cardiac Electrophysiologist  [facility]

## 2020-05-09 ENCOUNTER — HOSPITAL ENCOUNTER (INPATIENT)
Facility: HOSPITAL | Age: 70
LOS: 3 days | Discharge: HOME | End: 2020-05-12
Attending: INTERNAL MEDICINE | Admitting: INTERNAL MEDICINE

## 2020-05-09 PROBLEM — R55 SYNCOPE: Status: ACTIVE | Noted: 2020-05-09

## 2020-05-09 PROBLEM — I47.1 SUSTAINED SVT (HCC): Status: ACTIVE | Noted: 2020-05-09

## 2020-05-09 LAB
ALBUMIN SERPL-MCNC: 4.2 G/DL (ref 3.5–5.2)
ALBUMIN/GLOB SERPL: 2 G/DL
ALP SERPL-CCNC: 58 U/L (ref 39–117)
ALT SERPL W P-5'-P-CCNC: 11 U/L (ref 1–41)
ANION GAP SERPL CALCULATED.3IONS-SCNC: 10 MMOL/L (ref 5–15)
AST SERPL-CCNC: 13 U/L (ref 1–40)
BASOPHILS # BLD AUTO: 0.06 10*3/MM3 (ref 0–0.2)
BASOPHILS NFR BLD AUTO: 0.9 % (ref 0–1.5)
BILIRUB SERPL-MCNC: 0.4 MG/DL (ref 0.2–1.2)
BILIRUB UR QL STRIP: NEGATIVE
BUN BLD-MCNC: 8 MG/DL (ref 8–23)
BUN/CREAT SERPL: 8.2 (ref 7–25)
CALCIUM SPEC-SCNC: 8.4 MG/DL (ref 8.6–10.5)
CHLORIDE SERPL-SCNC: 102 MMOL/L (ref 98–107)
CLARITY UR: CLEAR
CO2 SERPL-SCNC: 28 MMOL/L (ref 22–29)
COLOR UR: YELLOW
CREAT BLD-MCNC: 0.98 MG/DL (ref 0.76–1.27)
DEPRECATED RDW RBC AUTO: 44.7 FL (ref 37–54)
EOSINOPHIL # BLD AUTO: 0.14 10*3/MM3 (ref 0–0.4)
EOSINOPHIL NFR BLD AUTO: 2 % (ref 0.3–6.2)
ERYTHROCYTE [DISTWIDTH] IN BLOOD BY AUTOMATED COUNT: 12.5 % (ref 12.3–15.4)
GFR SERPL CREATININE-BSD FRML MDRD: 75 ML/MIN/1.73
GLOBULIN UR ELPH-MCNC: 2.1 GM/DL
GLUCOSE BLD-MCNC: 126 MG/DL (ref 65–99)
GLUCOSE BLDC GLUCOMTR-MCNC: 102 MG/DL (ref 70–130)
GLUCOSE BLDC GLUCOMTR-MCNC: 235 MG/DL (ref 70–130)
GLUCOSE BLDC GLUCOMTR-MCNC: 89 MG/DL (ref 70–130)
GLUCOSE UR STRIP-MCNC: ABNORMAL MG/DL
HBA1C MFR BLD: 6.9 % (ref 4.8–5.6)
HCT VFR BLD AUTO: 48.1 % (ref 37.5–51)
HGB BLD-MCNC: 15.4 G/DL (ref 13–17.7)
HGB UR QL STRIP.AUTO: NEGATIVE
IMM GRANULOCYTES # BLD AUTO: 0.02 10*3/MM3 (ref 0–0.05)
IMM GRANULOCYTES NFR BLD AUTO: 0.3 % (ref 0–0.5)
KETONES UR QL STRIP: NEGATIVE
LEUKOCYTE ESTERASE UR QL STRIP.AUTO: NEGATIVE
LYMPHOCYTES # BLD AUTO: 2.44 10*3/MM3 (ref 0.7–3.1)
LYMPHOCYTES NFR BLD AUTO: 34.9 % (ref 19.6–45.3)
MAGNESIUM SERPL-MCNC: 2.2 MG/DL (ref 1.6–2.4)
MCH RBC QN AUTO: 30.7 PG (ref 26.6–33)
MCHC RBC AUTO-ENTMCNC: 32 G/DL (ref 31.5–35.7)
MCV RBC AUTO: 96 FL (ref 79–97)
MONOCYTES # BLD AUTO: 0.56 10*3/MM3 (ref 0.1–0.9)
MONOCYTES NFR BLD AUTO: 8 % (ref 5–12)
NEUTROPHILS # BLD AUTO: 3.78 10*3/MM3 (ref 1.7–7)
NEUTROPHILS NFR BLD AUTO: 53.9 % (ref 42.7–76)
NITRITE UR QL STRIP: NEGATIVE
NRBC BLD AUTO-RTO: 0 /100 WBC (ref 0–0.2)
PH UR STRIP.AUTO: 7 [PH] (ref 5–8)
PHOSPHATE SERPL-MCNC: 3.5 MG/DL (ref 2.5–4.5)
PLATELET # BLD AUTO: 145 10*3/MM3 (ref 140–450)
PMV BLD AUTO: 10.7 FL (ref 6–12)
POTASSIUM BLD-SCNC: 4.8 MMOL/L (ref 3.5–5.2)
PROCALCITONIN SERPL-MCNC: <0.02 NG/ML (ref 0.1–0.25)
PROT SERPL-MCNC: 6.3 G/DL (ref 6–8.5)
PROT UR QL STRIP: NEGATIVE
RBC # BLD AUTO: 5.01 10*6/MM3 (ref 4.14–5.8)
SODIUM BLD-SCNC: 140 MMOL/L (ref 136–145)
SP GR UR STRIP: 1.02 (ref 1–1.03)
T4 FREE SERPL-MCNC: 1.04 NG/DL (ref 0.93–1.7)
TROPONIN T SERPL-MCNC: <0.01 NG/ML (ref 0–0.03)
TSH SERPL DL<=0.05 MIU/L-ACNC: 4.9 UIU/ML (ref 0.27–4.2)
UROBILINOGEN UR QL STRIP: ABNORMAL
WBC NRBC COR # BLD: 7 10*3/MM3 (ref 3.4–10.8)

## 2020-05-09 PROCEDURE — 83735 ASSAY OF MAGNESIUM: CPT | Performed by: INTERNAL MEDICINE

## 2020-05-09 PROCEDURE — 82962 GLUCOSE BLOOD TEST: CPT

## 2020-05-09 PROCEDURE — 93010 ELECTROCARDIOGRAM REPORT: CPT | Performed by: INTERNAL MEDICINE

## 2020-05-09 PROCEDURE — 99223 1ST HOSP IP/OBS HIGH 75: CPT | Performed by: INTERNAL MEDICINE

## 2020-05-09 PROCEDURE — 84145 PROCALCITONIN (PCT): CPT | Performed by: INTERNAL MEDICINE

## 2020-05-09 PROCEDURE — 84484 ASSAY OF TROPONIN QUANT: CPT | Performed by: INTERNAL MEDICINE

## 2020-05-09 PROCEDURE — 83036 HEMOGLOBIN GLYCOSYLATED A1C: CPT | Performed by: INTERNAL MEDICINE

## 2020-05-09 PROCEDURE — 94799 UNLISTED PULMONARY SVC/PX: CPT

## 2020-05-09 PROCEDURE — 84100 ASSAY OF PHOSPHORUS: CPT | Performed by: INTERNAL MEDICINE

## 2020-05-09 PROCEDURE — 80053 COMPREHEN METABOLIC PANEL: CPT | Performed by: INTERNAL MEDICINE

## 2020-05-09 PROCEDURE — 84443 ASSAY THYROID STIM HORMONE: CPT | Performed by: INTERNAL MEDICINE

## 2020-05-09 PROCEDURE — 94640 AIRWAY INHALATION TREATMENT: CPT

## 2020-05-09 PROCEDURE — 93005 ELECTROCARDIOGRAM TRACING: CPT | Performed by: INTERNAL MEDICINE

## 2020-05-09 PROCEDURE — 81003 URINALYSIS AUTO W/O SCOPE: CPT | Performed by: INTERNAL MEDICINE

## 2020-05-09 PROCEDURE — 84439 ASSAY OF FREE THYROXINE: CPT | Performed by: INTERNAL MEDICINE

## 2020-05-09 PROCEDURE — 85025 COMPLETE CBC W/AUTO DIFF WBC: CPT | Performed by: INTERNAL MEDICINE

## 2020-05-09 RX ORDER — SPIRONOLACTONE 25 MG/1
25 TABLET ORAL DAILY
Status: DISCONTINUED | OUTPATIENT
Start: 2020-05-09 | End: 2020-05-12 | Stop reason: HOSPADM

## 2020-05-09 RX ORDER — SODIUM CHLORIDE 0.9 % (FLUSH) 0.9 %
10 SYRINGE (ML) INJECTION AS NEEDED
Status: DISCONTINUED | OUTPATIENT
Start: 2020-05-09 | End: 2020-05-12 | Stop reason: HOSPADM

## 2020-05-09 RX ORDER — HYDROCODONE BITARTRATE AND ACETAMINOPHEN 5; 325 MG/1; MG/1
1 TABLET ORAL EVERY 4 HOURS PRN
Status: DISCONTINUED | OUTPATIENT
Start: 2020-05-09 | End: 2020-05-12 | Stop reason: HOSPADM

## 2020-05-09 RX ORDER — MEXILETINE HYDROCHLORIDE 150 MG/1
150 CAPSULE ORAL 3 TIMES DAILY
Status: DISCONTINUED | OUTPATIENT
Start: 2020-05-09 | End: 2020-05-12 | Stop reason: HOSPADM

## 2020-05-09 RX ORDER — ARFORMOTEROL TARTRATE 15 UG/2ML
15 SOLUTION RESPIRATORY (INHALATION)
Status: DISCONTINUED | OUTPATIENT
Start: 2020-05-09 | End: 2020-05-12 | Stop reason: HOSPADM

## 2020-05-09 RX ORDER — BUDESONIDE 0.5 MG/2ML
0.5 INHALANT ORAL
Status: DISCONTINUED | OUTPATIENT
Start: 2020-05-09 | End: 2020-05-12 | Stop reason: HOSPADM

## 2020-05-09 RX ORDER — ACETAMINOPHEN 650 MG/1
650 SUPPOSITORY RECTAL EVERY 4 HOURS PRN
Status: DISCONTINUED | OUTPATIENT
Start: 2020-05-09 | End: 2020-05-12 | Stop reason: HOSPADM

## 2020-05-09 RX ORDER — ACETAMINOPHEN 160 MG/5ML
650 SOLUTION ORAL EVERY 4 HOURS PRN
Status: DISCONTINUED | OUTPATIENT
Start: 2020-05-09 | End: 2020-05-12 | Stop reason: HOSPADM

## 2020-05-09 RX ORDER — DOCUSATE SODIUM 100 MG/1
100 CAPSULE, LIQUID FILLED ORAL 2 TIMES DAILY PRN
Status: DISCONTINUED | OUTPATIENT
Start: 2020-05-09 | End: 2020-05-12 | Stop reason: HOSPADM

## 2020-05-09 RX ORDER — FAMOTIDINE 20 MG/1
20 TABLET, FILM COATED ORAL 2 TIMES DAILY PRN
Status: DISCONTINUED | OUTPATIENT
Start: 2020-05-09 | End: 2020-05-12 | Stop reason: HOSPADM

## 2020-05-09 RX ORDER — CHOLECALCIFEROL (VITAMIN D3) 125 MCG
5 CAPSULE ORAL NIGHTLY PRN
Status: DISCONTINUED | OUTPATIENT
Start: 2020-05-09 | End: 2020-05-12 | Stop reason: HOSPADM

## 2020-05-09 RX ORDER — TIZANIDINE 4 MG/1
8 TABLET ORAL NIGHTLY
Status: DISCONTINUED | OUTPATIENT
Start: 2020-05-09 | End: 2020-05-12 | Stop reason: HOSPADM

## 2020-05-09 RX ORDER — ONDANSETRON 2 MG/ML
4 INJECTION INTRAMUSCULAR; INTRAVENOUS EVERY 6 HOURS PRN
Status: DISCONTINUED | OUTPATIENT
Start: 2020-05-09 | End: 2020-05-12 | Stop reason: HOSPADM

## 2020-05-09 RX ORDER — IPRATROPIUM BROMIDE AND ALBUTEROL SULFATE 2.5; .5 MG/3ML; MG/3ML
3 SOLUTION RESPIRATORY (INHALATION) EVERY 4 HOURS PRN
Status: DISCONTINUED | OUTPATIENT
Start: 2020-05-09 | End: 2020-05-12 | Stop reason: HOSPADM

## 2020-05-09 RX ORDER — ONDANSETRON 4 MG/1
4 TABLET, FILM COATED ORAL EVERY 6 HOURS PRN
Status: DISCONTINUED | OUTPATIENT
Start: 2020-05-09 | End: 2020-05-12 | Stop reason: HOSPADM

## 2020-05-09 RX ORDER — GLIPIZIDE 5 MG/1
1.25 TABLET ORAL
Status: DISCONTINUED | OUTPATIENT
Start: 2020-05-09 | End: 2020-05-11

## 2020-05-09 RX ORDER — NICOTINE POLACRILEX 4 MG
15 LOZENGE BUCCAL
Status: DISCONTINUED | OUTPATIENT
Start: 2020-05-09 | End: 2020-05-12 | Stop reason: HOSPADM

## 2020-05-09 RX ORDER — ROPINIROLE 2 MG/1
2 TABLET, FILM COATED ORAL NIGHTLY
Status: DISCONTINUED | OUTPATIENT
Start: 2020-05-09 | End: 2020-05-12 | Stop reason: HOSPADM

## 2020-05-09 RX ORDER — SODIUM CHLORIDE 0.9 % (FLUSH) 0.9 %
10 SYRINGE (ML) INJECTION EVERY 12 HOURS SCHEDULED
Status: DISCONTINUED | OUTPATIENT
Start: 2020-05-09 | End: 2020-05-12 | Stop reason: HOSPADM

## 2020-05-09 RX ORDER — ASPIRIN 81 MG/1
81 TABLET ORAL DAILY
Status: DISCONTINUED | OUTPATIENT
Start: 2020-05-09 | End: 2020-05-12 | Stop reason: HOSPADM

## 2020-05-09 RX ORDER — ACETAMINOPHEN 325 MG/1
650 TABLET ORAL EVERY 4 HOURS PRN
Status: DISCONTINUED | OUTPATIENT
Start: 2020-05-09 | End: 2020-05-12 | Stop reason: HOSPADM

## 2020-05-09 RX ORDER — BUMETANIDE 0.5 MG/1
0.5 TABLET ORAL DAILY
Status: DISCONTINUED | OUTPATIENT
Start: 2020-05-09 | End: 2020-05-12 | Stop reason: HOSPADM

## 2020-05-09 RX ORDER — BUSPIRONE HYDROCHLORIDE 10 MG/1
10 TABLET ORAL 2 TIMES DAILY PRN
Status: DISCONTINUED | OUTPATIENT
Start: 2020-05-09 | End: 2020-05-12 | Stop reason: HOSPADM

## 2020-05-09 RX ORDER — AMIODARONE HYDROCHLORIDE 200 MG/1
400 TABLET ORAL NIGHTLY
Status: DISCONTINUED | OUTPATIENT
Start: 2020-05-09 | End: 2020-05-10

## 2020-05-09 RX ORDER — NICOTINE 21 MG/24HR
1 PATCH, TRANSDERMAL 24 HOURS TRANSDERMAL
Status: DISCONTINUED | OUTPATIENT
Start: 2020-05-09 | End: 2020-05-12 | Stop reason: HOSPADM

## 2020-05-09 RX ORDER — DEXTROSE MONOHYDRATE 25 G/50ML
25 INJECTION, SOLUTION INTRAVENOUS
Status: DISCONTINUED | OUTPATIENT
Start: 2020-05-09 | End: 2020-05-12 | Stop reason: HOSPADM

## 2020-05-09 RX ORDER — NITROGLYCERIN 0.4 MG/1
0.4 TABLET SUBLINGUAL
Status: DISCONTINUED | OUTPATIENT
Start: 2020-05-09 | End: 2020-05-12 | Stop reason: HOSPADM

## 2020-05-09 RX ORDER — CARVEDILOL 12.5 MG/1
12.5 TABLET ORAL 2 TIMES DAILY
Status: DISCONTINUED | OUTPATIENT
Start: 2020-05-09 | End: 2020-05-12 | Stop reason: HOSPADM

## 2020-05-09 RX ADMIN — ASPIRIN 81 MG: 81 TABLET, COATED ORAL at 09:59

## 2020-05-09 RX ADMIN — ACETAMINOPHEN 650 MG: 325 TABLET, FILM COATED ORAL at 10:15

## 2020-05-09 RX ADMIN — CARVEDILOL 12.5 MG: 12.5 TABLET, FILM COATED ORAL at 09:59

## 2020-05-09 RX ADMIN — SODIUM CHLORIDE, PRESERVATIVE FREE 10 ML: 5 INJECTION INTRAVENOUS at 10:03

## 2020-05-09 RX ADMIN — MELATONIN TAB 5 MG 5 MG: 5 TAB at 23:10

## 2020-05-09 RX ADMIN — BUSPIRONE HYDROCHLORIDE 10 MG: 10 TABLET ORAL at 18:50

## 2020-05-09 RX ADMIN — TIZANIDINE 8 MG: 4 TABLET ORAL at 23:10

## 2020-05-09 RX ADMIN — MEXILETINE HYDROCHLORIDE 150 MG: 150 CAPSULE ORAL at 18:50

## 2020-05-09 RX ADMIN — MEXILETINE HYDROCHLORIDE 150 MG: 150 CAPSULE ORAL at 23:08

## 2020-05-09 RX ADMIN — BUMETANIDE 0.5 MG: 0.5 TABLET ORAL at 09:59

## 2020-05-09 RX ADMIN — BUDESONIDE 0.5 MG: 0.5 INHALANT RESPIRATORY (INHALATION) at 19:51

## 2020-05-09 RX ADMIN — ROPINIROLE HYDROCHLORIDE 2 MG: 2 TABLET, FILM COATED ORAL at 23:10

## 2020-05-09 RX ADMIN — GLIPIZIDE 1.25 MG: 5 TABLET ORAL at 18:23

## 2020-05-09 RX ADMIN — CARVEDILOL 12.5 MG: 12.5 TABLET, FILM COATED ORAL at 23:11

## 2020-05-09 RX ADMIN — SODIUM CHLORIDE, PRESERVATIVE FREE 10 ML: 5 INJECTION INTRAVENOUS at 23:11

## 2020-05-09 RX ADMIN — ACETAMINOPHEN 650 MG: 325 TABLET, FILM COATED ORAL at 15:36

## 2020-05-09 RX ADMIN — SACUBITRIL AND VALSARTAN 1 TABLET: 24; 26 TABLET, FILM COATED ORAL at 10:00

## 2020-05-09 RX ADMIN — ARFORMOTEROL TARTRATE 15 MCG: 15 SOLUTION RESPIRATORY (INHALATION) at 19:51

## 2020-05-09 RX ADMIN — APIXABAN 5 MG: 5 TABLET, FILM COATED ORAL at 23:11

## 2020-05-09 RX ADMIN — SPIRONOLACTONE 25 MG: 25 TABLET ORAL at 09:59

## 2020-05-09 RX ADMIN — MEXILETINE HYDROCHLORIDE 150 MG: 150 CAPSULE ORAL at 10:00

## 2020-05-09 RX ADMIN — SACUBITRIL AND VALSARTAN 1 TABLET: 24; 26 TABLET, FILM COATED ORAL at 23:10

## 2020-05-09 RX ADMIN — APIXABAN 5 MG: 5 TABLET, FILM COATED ORAL at 10:00

## 2020-05-09 RX ADMIN — AMIODARONE HYDROCHLORIDE 400 MG: 200 TABLET ORAL at 23:09

## 2020-05-09 RX ADMIN — NICOTINE 1 PATCH: 21 PATCH TRANSDERMAL at 18:50

## 2020-05-09 RX ADMIN — HYDROCODONE BITARTRATE AND ACETAMINOPHEN 1 TABLET: 5; 325 TABLET ORAL at 20:43

## 2020-05-09 NOTE — NURSING NOTE
ACC REVIEW REPORT: [facility]        PATIENT NAME: Derek Morris Jr.    PATIENT ID: [id number]        COVID-19 ACC SCREENING       DOES THE PATIENT HAVE A FEVER GREATER THAN OR EQUAL .4: no    IS THE PATIENT EXPERIENCING SHORTNESS OF BREATH: no    DOES THE PATIENT HAVE A COUGH: no    DOES THE PATIENT HAVE ANY OF THE FOLLOWING RISK FACTORS:    EXPOSURE TO SUSPECTED OR KNOWN COVID-19: no    RECENT TRAVEL HISTORY TO ENDEMIC AREA (DOMESTIC/LOCAL): no    IS THE PATIENT A HEALTHCARE WORKER: no    HAS THE PATIENT BEEN TESTED FOR COVID-19: no    DATE TESTED: n/a    LAB TESTING SENT TO: n/a          BED: S502    BED TYPE: Tele    BED GIVEN TO: Beverly Mahoney RN    TIME BED GIVEN: 0538    YOB: 1950    AGE: 69    GENDER: male    PREVIOUS ADMIT TO [facility]:     PREVIOUS ADMISSION DATE:     PATIENT CLASS: inpatient    TODAY'S DATE: 5/9/2020    TRANSFER DATE: 5/9/2020    ETA: 0800    TRANSFERRING FACILITY: [facility]    TRANSFERRING FACILITY PHONE # : [phone]    TRANSFERRING MD: Gavin    DATE/TIME REQUEST RECEIVED: 5/9 @ 0348    [facility] RN: Krysta Last RN    REPORT FROM: Beverly Mahoney RN    TIME REPORT TAKEN: 0520    DIAGNOSIS: Non-Sustained VT    REASON FOR TRANSFER TO [facility]: Cardiologist is here    TRANSPORTATION: local ground    CLINICAL REASON FOR TRANSFER TO [facility]: higher level of care      CLINICAL INFORMATION    HEIGHT: 68 inches    WEIGHT: 170 lbs    ALLERGIES: no    SUGGS: no    INFECTIOUS DISEASE: no    ISOLATION: no    LAST VITAL SIGNS:  TIME: 0300  TEMP: afebrile  PULSE: 73  B/P: 120/61  RESP: 18    LAB INFORMATION: Troponin - negative,     CULTURE INFORMATION: no    MEDS/IV FLUIDS: #20 Left AC - SL  Not received any medications      CARDIAC SYSTEM:    CHEST PAIN: no    RATE:     SCALE:     RHYTHM: NSR    Is patient taking or has patient been given any drugs that could increase bleeding? unknown  (Plavix, Brilinta, Effient, Eliquis, Xarelto, Warfarin, Integrilin,  "Angiomax)    DRUG:      DOSE/FREQUENCY:       CARDIAC NOTES: device interogated tby [employer]. Had 60+ episodes of cardiac events on device      RESPIRATORY SYSTEM:    LUNG SOUNDS:    CLEAR: Y  CRACKLES: n  WHEEZES: n  RHONCHI: n  DIMINISHED: n      OXYGEN: no     O2 SAT: 90%    ADMINISTRATION ROUTE: room air    RADIOLOGY RESULTS: CXR - unremarkable    RESPIRATORY STATUS: stable      CNS/MUSCULOSKELETAL    ALERT AND ORIENTED:    PERSON: y  PLACE: y  TIME: y    LUIS COMA SCALE:  15    MRI RESULTS: no     CNS/MUSCULOSKELETAL NOTES: patient is independent at home       GI//GY      ABDOMINAL PAIN: n    VOMITING: n    DIARRHEA: n    NAUSEA: Y      GI//GY NOTES: n/a    PAST MEDICAL HISTORY: PPM and ICD, CAD COPD, 1ppd smoker    OTHER SYMPTOM NOTES:    ADDITIONAL NOTES: Patient reports EMS was called due having chest discomfort, a feeling of \"fluttering\" in his chest and nauseated. His PPM device was interrogated and revealed several episodes occurring the last week of April.           Belen Last RN  5/9/2020  05:22  "

## 2020-05-09 NOTE — H&P
[facility]  HISTORY AND PHYSICAL    Patient Name: Derek Morris Jr.  : 1950  MRN: [id number]  Primary Care Physician: Soraida Maher APRN  Date of admission: 2020      Subjective   Subjective     Chief Complaint:  Arrhythmia transfer    HPI:  Derek Morris Jr. is a 69 y.o. male with past medical history of ventricular tachycardia, pacemaker, atrial fibrillation, diabetes, and multiple other medical problems as listed below presents to outside hospital [facility] following severe diaphoresis that occurred last night at 11 PM associated with chest fluttering, nausea and vomiting and presyncopal type symptoms that lasted for a prolonged period of time.  He presented to outside hospital emergency department he was found to have ventricular tachycardia and reportedly device interrogation showed several episodes over the last week.  Patient notes several episodes of symptoms over the last few days additionally.  Eventually his symptoms did resolve and he was transferred here as his electrophysiology cardiologist is here.  He denies any cough, shortness of breath, fevers, chills, COVID contacts, contacts with healthcare workers, loss of smell or other COVID-19 related symptoms.  He notes his wife tested negative just a couple days ago and that they have been isolating themselves together.      Review of Systems   Constitutional: Positive for diaphoresis and fatigue. Negative for chills and fever.   HENT: Negative for sinus pressure and sinus pain.    Eyes: Negative.    Respiratory: Negative for cough and shortness of breath.    Cardiovascular: Positive for palpitations. Negative for leg swelling.   Gastrointestinal: Positive for nausea and vomiting. Negative for diarrhea.   Endocrine: Negative.    Genitourinary: Negative for dysuria and hematuria.   Musculoskeletal: Negative for neck pain and neck stiffness.   Skin: Negative for rash and wound.      Allergic/Immunologic: Negative.    Neurological: Positive for light-headedness. Negative for headaches.   Hematological: Negative for adenopathy. Does not bruise/bleed easily.   Psychiatric/Behavioral: Negative for agitation and behavioral problems.           Personal History     Past Medical History:   Diagnosis Date   • Arthritis    • Cardiomyopathy (CMS/HCC)    • Carotid stenosis    • CHF (congestive heart failure) (CMS/[facility])    • COPD (chronic obstructive pulmonary disease) (CMS/[facility])    • Coronary artery disease    • Diabetes mellitus (CMS/HCC)    • Enlarged prostate    • GERD (gastroesophageal reflux disease)    • Heart attack (CMS/[facility])     X3 1990, 1993, 1995   • Hyperlipidemia    • Hypertension    • Lung nodule    • On home O2     prn   • Peptic ulcer disease    • Urinary hesitancy    • Ventricular tachycardia (CMS/HCC)    • Wears dentures    • Wears glasses        Past Surgical History:   Procedure Laterality Date   • CARDIAC CATHETERIZATION Left 04/2009    by Dr. Glen Marcelo I. EF approx 40% II one of three patent grafts iii, Data deficit    • CARDIAC CATHETERIZATION N/A 1/26/2017    Procedure: Left Heart Cath;  Surgeon: Vlad Bravo MD;  Location:  [facility];  Service:    • CARDIAC CATHETERIZATION Left 07/30/2010    i. EF 30% ii occluded vein graft to occluded RCS iii minor plaque in the LAD iv. 70% SVG- circumfelx, treated with 4x18 mm. Cypher KAILASH.   • CARDIAC CATHETERIZATION N/A 6/5/2018    Procedure: Peripheral angiography;  Surgeon: Vlad Bravo MD;  Location:  [facility];  Service: Cardiovascular   • CARDIAC CATHETERIZATION N/A 7/23/2019    Procedure: Left Heart Cath;  Surgeon: Vlad Bravo MD;  Location:  [facility];  Service: Cardiology   • CARDIAC DEFIBRILLATOR PLACEMENT     • COLONOSCOPY     • COLONOSCOPY N/A 9/26/2018    Procedure: COLONOSCOPY;  Surgeon: Alfred Shah MD;  Location:  [facility] OR;  Service: General   • CORONARY  ANGIOPLASTY     • CORONARY ARTERY BYPASS GRAFT  1995 1995, w/ subsequent stents 2009/2010 X3; svg TO om1 AND om2, svg TO pda    • CYST REMOVAL      Tailbone   • EXAM UNDER ANESTHESIA N/A 9/26/2018    Procedure: EXAM UNDER ANESTHESIA;  Surgeon: Alfred Shah MD;  Location:  [facility] OR;  Service: General   • HEMORRHOIDECTOMY N/A 9/28/2018    Procedure: HEMORRHOIDECTOMY;  Surgeon: Alfred Shah MD;  Location:  [facility] OR;  Service: General   • INSERT / REPLACE / REMOVE PACEMAKER     • INTERVENTIONAL RADIOLOGY PROCEDURE N/A 6/5/2018    Procedure: Abdominal Aortagram with Runoff;  Surgeon: Vlad Bravo MD;  Location:  [facility];  Service: Cardiovascular   • INTERVENTIONAL RADIOLOGY PROCEDURE N/A 7/6/2018    Procedure: Abdominal Aortogram;  Surgeon: Vlad Bravo MD;  Location:  [facility];  Service: Cardiology   • PACEMAKER IMPLANTATION  01/2002    Implant by Dr. Dickinson        Family History: family history includes Coronary artery disease in his father; Hypertension in his mother; Sick sinus syndrome in his father.      Social History:  reports that he has been smoking cigarettes. He has a 22.50 pack-year smoking history. He has never used smokeless tobacco. He reports that he has current or past drug history. Drug: Marijuana. He reports that he does not drink alcohol.  Social History     Social History Narrative    Caffeine use: 5-6 serving daily.            Medications:  Available home medication information reviewed.  Medications Prior to Admission   Medication Sig Dispense Refill Last Dose   • albuterol (PROVENTIL HFA;VENTOLIN HFA) 108 (90 Base) MCG/ACT inhaler Inhale 1-2 puffs Every 6 (Six) Hours As Needed for Wheezing.   Taking   • amiodarone (Pacerone) 200 MG tablet Take 2 tablets by mouth 3 (Three) Times a Day Before Meals for 14 days, THEN 2 tablets Daily for 90 days. 264 tablet 0    • aspirin 81 MG EC tablet Take 1 tablet by mouth Daily. 30 tablet 10 Taking   •  bumetanide (BUMEX) 0.5 MG tablet Take 0.5 mg by mouth Daily.   Taking   • carvedilol (COREG) 12.5 MG tablet Take 1 tablet by mouth 2 (Two) Times a Day. 180 tablet 3 Taking   • ELIQUIS 5 MG tablet tablet TAKE 1 TABLET EVERY 12 HOURS 180 tablet 0 Taking   • Evolocumab with Infusor (REPATHA PUSHTRONEX SYSTEM) 420 MG/3.5ML solution cartridge Inject 420 mg under the skin into the appropriate area as directed Every 30 (Thirty) Days. 3 cartridge. 5 Taking   • ezetimibe (ZETIA) 10 MG tablet Take 10 mg by mouth Daily.   Taking   • Fluticasone-Umeclidin-Vilant (TRELEGY ELLIPTA IN) Inhale 1 puff Daily.   Taking   • GLIPIZIDE XL 2.5 MG 24 hr tablet Take 2.5 mg by mouth Daily.  11 Taking   • mexiletine (MEXITIL) 150 MG capsule Take 1 capsule by mouth 3 (Three) Times a Day for 90 days. 90 capsule 2    • nitroglycerin (NITROSTAT) 0.4 MG SL tablet Place 1 tablet under the tongue Every 5 (Five) Minutes As Needed for Chest Pain. Take no more than 3 doses in 15 minutes. 25 tablet 3 Taking   • rOPINIRole (REQUIP) 1 MG tablet Take 2 mg by mouth Every Night.   Taking   • sacubitril-valsartan (ENTRESTO) 24-26 MG tablet Take 1 tablet by mouth Every 12 (Twelve) Hours. 60 tablet 10 Taking   • spironolactone (ALDACTONE) 25 MG tablet Take 25 mg by mouth daily.   Taking   • tiZANidine (ZANAFLEX) 4 MG tablet Take 8 mg by mouth Every Night.   Taking       Allergies   Allergen Reactions   • Crestor [Rosuvastatin Calcium] Myalgia   • Lipitor [Atorvastatin] Myalgia     Joint pain myalgias   • Lopressor [Metoprolol Tartrate] Myalgia and Unknown (See Comments)     Side of face went numb     • Plavix [Clopidogrel Bisulfate] Unknown (See Comments)     Previously thought to be resistant; placed back on clopidogrel per Dr. Bravo earlier this year.  Confirmed with patient   • Adhesive Tape Itching and Rash       Objective   Objective     Vital Signs:   Temp:  [97.3 °F (36.3 °C)] 97.3 °F (36.3 °C)  Heart Rate:  [96] 96  Resp:  [18] 18  BP: (139)/(82) 139/82          Physical Exam   Constitutional: Awake, alert  Eyes: PERRLA, sclerae anicteric, no conjunctival injection  HENT: NCAT, mucous membranes moist  Neck: Supple, no thyromegaly, no lymphadenopathy, trachea midline  Respiratory: Clear to auscultation bilaterally, nonlabored respirations   Cardiovascular: Regular, telemetry shows paced rhythm, palpable radial pulses bilaterally   Gastrointestinal: Positive bowel sounds, soft, nontender, nondistended  Musculoskeletal: No bilateral ankle edema, no clubbing or cyanosis to extremities  Psychiatric: Appropriate affect, cooperative  Neurologic: Oriented x 3, strength symmetric in all extremities, Cranial Nerves grossly intact to confrontation, speech clear  Skin: No rashes or jaundice    Results Reviewed:  I have personally reviewed current lab and radiology data.              Invalid input(s):  ALKPHOS  CrCl cannot be calculated (Patient's most recent lab result is older than the maximum 30 days allowed.).  Brief Urine Lab Results     None        Imaging Results (Last 24 Hours)     ** No results found for the last 24 hours. **        Results for orders placed during the hospital encounter of 07/22/19   Adult Transthoracic Echo Complete W/ Cont if Necessary Per Protocol    Addendum · Left ventricular systolic function is mildly decreased. Estimated EF =  45%. · The following left ventricular wall segments are hypokinetic: basal  anterolateral, mid anterolateral, apical lateral and mid inferolateral. · Left ventricular diastolic dysfunction (grade I a) consistent with  impaired relaxation. · There is calcification of the aortic valve. · Trace-to-mild aortic valve regurgitation is present · Mild tricuspid valve regurgitation is present. · Estimated right ventricular systolic pressure from tricuspid  regurgitation is mildly elevated (35-45 mmHg).        Vlad Bravo MD 7/23/2019  1:45 PM          Narrative · Left ventricular systolic function is normal. Estimated EF =  50%.  · The following left ventricular wall segments are hypokinetic: basal   anterolateral, mid anterolateral, apical lateral and mid inferolateral.  · Left ventricular diastolic dysfunction (grade I a) consistent with   impaired relaxation.  · There is calcification of the aortic valve.  · Trace-to-mild aortic valve regurgitation is present  · Mild tricuspid valve regurgitation is present.  · Estimated right ventricular systolic pressure from tricuspid   regurgitation is mildly elevated (35-45 mmHg).          Assessment/Plan   Assessment & Plan     Active Hospital Problems    Diagnosis POA   • **VT (ventricular tachycardia) (CMS/[facility]) [I47.2] Yes     Non sustained:    a. Inducible VT by EPS, January 2002.  b. Pacesetter ICD implant by Dr. Dickinson, January 2002.  c. Chronic amiodarone, discontinued fall of 2010:  Cannot exclude amiodarone pulmonary toxicity.  d. ICD generator change-out with enrollment in the ANALYZE ST SEGMENT protocol, 08/16/2012.     • Pre-syncope related to ventricular tachycardia [R55] Yes   • Other emphysema (CMS/HCC) [J43.8] Yes     Added automatically from request for surgery [id number]     • Type 2 diabetes mellitus without complication (CMS/[facility]) [E11.9] Yes     Added automatically from request for surgery [id number]     • PAD (peripheral artery disease) (CMS/[facility]) [I73.9] Yes     Added automatically from request for surgery [id number]     • COPD (chronic obstructive pulmonary disease) (CMS/[facility]) [J44.9] Yes   • Coronary artery disease of native artery of native heart with stable angina pectoris (CMS/[facility]) [I25.118] Yes   • Cardiomyopathy (CMS/HCC) [I42.9] Yes     EF 12%     • Mixed hyperlipidemia [E78.2] Yes     Intolerance to multiple statins     • Essential hypertension [I10] Yes   • Chronic systolic heart failure (CMS/HCC) [I50.22] Yes     Device Interoggation: Demonstrates normal DD ICD function. No significant  Atrial or ventricular arrhytmias. [facility] is at [facility] as of today        69-year-old male  with past medical history of ventricular tachycardia presents the hospital with recent episodes of diaphoresis nausea and vomiting associated with ventricular tachycardia.  Device interrogation reportedly showed multiple episodes of ventricular tachycardia.  Patient was transferred from outside hospital for cardiology evaluation.    Ventricular tachycardia:  Consult electrophysiology.  Telemetry monitor.  Continue amiodarone.  Continue beta-blocker.  Check troponin.  Outside hospital records reviewed and report ventricular tachycardia.    Presyncope:  Likely related to ventricular tachycardia.  Monitor.    COPD:  Continue breathing treatments and nebulizers.  Monitor pulse oximetry.    Chronic systolic heart failure:  Continue beta-blocker and Entresto.  Compensated.    PAD: Continue blood thinner.  Continue home medications.    Essential hypertension: Monitor blood pressure.  Continue home medications.      COVID-19 RISK SCREEN     1. Has the patient had close contact without PPE with a lab confirmed COVID-19 (+) person or a person under investigation (PUI) for COVID-19 infection?  --  /NO     2. Has the patient had respiratory symptoms, worsened/new cough and/or SOA, unexplained fever, or sudden loss of smell and/or taste in the past 7 days? --   /NO     3. Does the patient have baseline higher exposure risk such as working in healthcare field, currently residing in healthcare facility, or ongoing hemodialysis?  --   /NO        DVT prophylaxis:  AC      Admission Communication  Due to current limited visitation policies, an attempt will be made to update patient's identified best point-of-contact(s) at admission to discuss plan of care.   Contact: No call requested   Relation:    Time of communication:     Notes (if applicable):           CODE STATUS:    Code Status and Medical Interventions:   Ordered at: 05/09/20 0917     Code Status:    CPR     Medical Interventions (Level of Support Prior to Arrest):    Full        Admission Status:  I believe this patient meets INPATIENT status due to multiple episodes of ventricular tachycardia despite rhythm control medications, multiple comorbid conditions, need for careful cardiac monitoring in the hospital setting on telemetry..  I feel patient’s risk for adverse outcomes and need for care warrant INPATIENT evaluation and I predict the patient’s care encounter to likely last beyond 2 midnights.      Electronically signed by Robert Gonzalez MD, 05/09/20, 9:18 AM.

## 2020-05-10 ENCOUNTER — APPOINTMENT (OUTPATIENT)
Dept: CARDIOLOGY | Facility: HOSPITAL | Age: 70
End: 2020-05-10

## 2020-05-10 ENCOUNTER — APPOINTMENT (OUTPATIENT)
Dept: CT IMAGING | Facility: HOSPITAL | Age: 70
End: 2020-05-10

## 2020-05-10 LAB
ANION GAP SERPL CALCULATED.3IONS-SCNC: 11 MMOL/L (ref 5–15)
BUN BLD-MCNC: 11 MG/DL (ref 8–23)
BUN/CREAT SERPL: 10.6 (ref 7–25)
CALCIUM SPEC-SCNC: 8.6 MG/DL (ref 8.6–10.5)
CHLORIDE SERPL-SCNC: 99 MMOL/L (ref 98–107)
CO2 SERPL-SCNC: 27 MMOL/L (ref 22–29)
CREAT BLD-MCNC: 1.04 MG/DL (ref 0.76–1.27)
DEPRECATED RDW RBC AUTO: 45 FL (ref 37–54)
ERYTHROCYTE [DISTWIDTH] IN BLOOD BY AUTOMATED COUNT: 12.5 % (ref 12.3–15.4)
GFR SERPL CREATININE-BSD FRML MDRD: 70 ML/MIN/1.73
GLUCOSE BLD-MCNC: 106 MG/DL (ref 65–99)
GLUCOSE BLDC GLUCOMTR-MCNC: 111 MG/DL (ref 70–130)
GLUCOSE BLDC GLUCOMTR-MCNC: 174 MG/DL (ref 70–130)
GLUCOSE BLDC GLUCOMTR-MCNC: 187 MG/DL (ref 70–130)
GLUCOSE BLDC GLUCOMTR-MCNC: 97 MG/DL (ref 70–130)
HCT VFR BLD AUTO: 45.7 % (ref 37.5–51)
HGB BLD-MCNC: 14.5 G/DL (ref 13–17.7)
MCH RBC QN AUTO: 30.8 PG (ref 26.6–33)
MCHC RBC AUTO-ENTMCNC: 31.7 G/DL (ref 31.5–35.7)
MCV RBC AUTO: 97 FL (ref 79–97)
PLATELET # BLD AUTO: 122 10*3/MM3 (ref 140–450)
PMV BLD AUTO: 11.1 FL (ref 6–12)
POTASSIUM BLD-SCNC: 4.4 MMOL/L (ref 3.5–5.2)
RBC # BLD AUTO: 4.71 10*6/MM3 (ref 4.14–5.8)
SODIUM BLD-SCNC: 137 MMOL/L (ref 136–145)
WBC NRBC COR # BLD: 7.24 10*3/MM3 (ref 3.4–10.8)

## 2020-05-10 PROCEDURE — 93306 TTE W/DOPPLER COMPLETE: CPT | Performed by: INTERNAL MEDICINE

## 2020-05-10 PROCEDURE — 99222 1ST HOSP IP/OBS MODERATE 55: CPT | Performed by: INTERNAL MEDICINE

## 2020-05-10 PROCEDURE — 93005 ELECTROCARDIOGRAM TRACING: CPT | Performed by: NURSE PRACTITIONER

## 2020-05-10 PROCEDURE — 82962 GLUCOSE BLOOD TEST: CPT

## 2020-05-10 PROCEDURE — 80048 BASIC METABOLIC PNL TOTAL CA: CPT | Performed by: INTERNAL MEDICINE

## 2020-05-10 PROCEDURE — 93010 ELECTROCARDIOGRAM REPORT: CPT | Performed by: INTERNAL MEDICINE

## 2020-05-10 PROCEDURE — 94799 UNLISTED PULMONARY SVC/PX: CPT

## 2020-05-10 PROCEDURE — 93306 TTE W/DOPPLER COMPLETE: CPT

## 2020-05-10 PROCEDURE — 99232 SBSQ HOSP IP/OBS MODERATE 35: CPT | Performed by: FAMILY MEDICINE

## 2020-05-10 PROCEDURE — 85027 COMPLETE CBC AUTOMATED: CPT | Performed by: INTERNAL MEDICINE

## 2020-05-10 PROCEDURE — 70450 CT HEAD/BRAIN W/O DYE: CPT

## 2020-05-10 PROCEDURE — 25010000002 AMIODARONE IN DEXTROSE 5% 150-4.21 MG/100ML-% SOLUTION: Performed by: NURSE PRACTITIONER

## 2020-05-10 PROCEDURE — 25010000002 AMIODARONE IN DEXTROSE 5% 360-4.14 MG/200ML-% SOLUTION: Performed by: NURSE PRACTITIONER

## 2020-05-10 RX ORDER — BUTALBITAL, ACETAMINOPHEN AND CAFFEINE 50; 325; 40 MG/1; MG/1; MG/1
1 TABLET ORAL ONCE
Status: COMPLETED | OUTPATIENT
Start: 2020-05-10 | End: 2020-05-10

## 2020-05-10 RX ORDER — AMIODARONE HYDROCHLORIDE 200 MG/1
400 TABLET ORAL 3 TIMES DAILY
Status: DISCONTINUED | OUTPATIENT
Start: 2020-05-10 | End: 2020-05-12 | Stop reason: HOSPADM

## 2020-05-10 RX ADMIN — SACUBITRIL AND VALSARTAN 1 TABLET: 24; 26 TABLET, FILM COATED ORAL at 23:02

## 2020-05-10 RX ADMIN — MEXILETINE HYDROCHLORIDE 150 MG: 150 CAPSULE ORAL at 23:03

## 2020-05-10 RX ADMIN — TIZANIDINE 8 MG: 4 TABLET ORAL at 23:03

## 2020-05-10 RX ADMIN — APIXABAN 5 MG: 5 TABLET, FILM COATED ORAL at 23:02

## 2020-05-10 RX ADMIN — AMIODARONE HYDROCHLORIDE 150 MG: 1.5 INJECTION, SOLUTION INTRAVENOUS at 13:53

## 2020-05-10 RX ADMIN — ARFORMOTEROL TARTRATE 15 MCG: 15 SOLUTION RESPIRATORY (INHALATION) at 08:34

## 2020-05-10 RX ADMIN — AMIODARONE HYDROCHLORIDE 400 MG: 200 TABLET ORAL at 17:42

## 2020-05-10 RX ADMIN — BUDESONIDE 0.5 MG: 0.5 INHALANT RESPIRATORY (INHALATION) at 08:34

## 2020-05-10 RX ADMIN — CARVEDILOL 12.5 MG: 12.5 TABLET, FILM COATED ORAL at 23:03

## 2020-05-10 RX ADMIN — NICOTINE 1 PATCH: 21 PATCH TRANSDERMAL at 08:21

## 2020-05-10 RX ADMIN — GLIPIZIDE 1.25 MG: 5 TABLET ORAL at 17:42

## 2020-05-10 RX ADMIN — CARVEDILOL 12.5 MG: 12.5 TABLET, FILM COATED ORAL at 08:21

## 2020-05-10 RX ADMIN — BUMETANIDE 0.5 MG: 0.5 TABLET ORAL at 08:20

## 2020-05-10 RX ADMIN — AMIODARONE HYDROCHLORIDE 0.5 MG/MIN: 1.8 INJECTION, SOLUTION INTRAVENOUS at 20:02

## 2020-05-10 RX ADMIN — MEXILETINE HYDROCHLORIDE 150 MG: 150 CAPSULE ORAL at 17:42

## 2020-05-10 RX ADMIN — SPIRONOLACTONE 25 MG: 25 TABLET ORAL at 08:21

## 2020-05-10 RX ADMIN — SODIUM CHLORIDE, PRESERVATIVE FREE 10 ML: 5 INJECTION INTRAVENOUS at 08:24

## 2020-05-10 RX ADMIN — ROPINIROLE HYDROCHLORIDE 2 MG: 2 TABLET, FILM COATED ORAL at 23:03

## 2020-05-10 RX ADMIN — SODIUM CHLORIDE, PRESERVATIVE FREE 10 ML: 5 INJECTION INTRAVENOUS at 23:03

## 2020-05-10 RX ADMIN — ASPIRIN 81 MG: 81 TABLET, COATED ORAL at 08:20

## 2020-05-10 RX ADMIN — ARFORMOTEROL TARTRATE 15 MCG: 15 SOLUTION RESPIRATORY (INHALATION) at 20:03

## 2020-05-10 RX ADMIN — AMIODARONE HYDROCHLORIDE 400 MG: 200 TABLET ORAL at 23:02

## 2020-05-10 RX ADMIN — BUDESONIDE 0.5 MG: 0.5 INHALANT RESPIRATORY (INHALATION) at 20:03

## 2020-05-10 RX ADMIN — SACUBITRIL AND VALSARTAN 1 TABLET: 24; 26 TABLET, FILM COATED ORAL at 08:21

## 2020-05-10 RX ADMIN — HYDROCODONE BITARTRATE AND ACETAMINOPHEN 1 TABLET: 5; 325 TABLET ORAL at 14:04

## 2020-05-10 RX ADMIN — APIXABAN 5 MG: 5 TABLET, FILM COATED ORAL at 08:21

## 2020-05-10 RX ADMIN — GLIPIZIDE 1.25 MG: 5 TABLET ORAL at 08:20

## 2020-05-10 RX ADMIN — AMIODARONE HYDROCHLORIDE 1 MG/MIN: 1.8 INJECTION, SOLUTION INTRAVENOUS at 14:05

## 2020-05-10 RX ADMIN — MEXILETINE HYDROCHLORIDE 150 MG: 150 CAPSULE ORAL at 08:21

## 2020-05-10 RX ADMIN — BUTALBITAL, ACETAMINOPHEN, AND CAFFEINE 1 TABLET: 50; 325; 40 TABLET ORAL at 17:42

## 2020-05-10 NOTE — PROGRESS NOTES
[facility] Medicine Services  PROGRESS NOTE    Patient Name: Derek Mroris Jr.  : 1950  MRN: [id number]    Date of Admission: 2020  Primary Care Physician: Soraida Maher APRN    Subjective   Subjective     CC:  F/U V Tach    HPI:  Pt seen and examined. Nursing notes reviewed. No acute events overnight. Pt sitting up in bed, currently receiving a breathing treatment. He denies CP, SOA, or palpitations. No episodes of VT overnight.    Review of Systems  Gen- No fevers, chills  CV- No chest pain, palpitations  Resp- No cough, dyspnea  GI- No N/V/D, abd pain    Objective   Objective     Vital Signs:   Temp:  [97.9 °F (36.6 °C)-98.4 °F (36.9 °C)] 97.9 °F (36.6 °C)  Heart Rate:  [69-88] 69  Resp:  [16-20] 16  BP: (103-155)/(57-86) 132/72        Physical Exam:  Constitutional: No acute distress, awake, alert  HENT: NCAT, mucous membranes moist  Respiratory: Occasional expiratory wheeze, respiratory effort normal   Cardiovascular: RRR (paced), no murmurs, rubs, or gallops, palpable pedal pulses bilaterally  Gastrointestinal: Positive bowel sounds, soft, nontender, nondistended  Musculoskeletal: No bilateral ankle edema  Psychiatric: Appropriate affect, cooperative  Neurologic: Oriented x 3, strength symmetric in all extremities, Cranial Nerves grossly intact to confrontation, speech clear  Skin: No rashes    Results Reviewed:  Results from last 7 days   Lab Units 05/10/20  0337 05/09/20  0946   WBC 10*3/mm3 7.24 7.00   HEMOGLOBIN g/dL 14.5 15.4   HEMATOCRIT % 45.7 48.1   PLATELETS 10*3/mm3 122* 145   PROCALCITONIN ng/mL  --  <0.02*     Results from last 7 days   Lab Units 05/10/20  0337 20  0946   SODIUM mmol/L 137 140   POTASSIUM mmol/L 4.4 4.8   CHLORIDE mmol/L 99 102   CO2 mmol/L 27.0 28.0   BUN mg/dL 11 8   CREATININE mg/dL 1.04 0.98   GLUCOSE mg/dL 106* 126*   CALCIUM mg/dL 8.6 8.4*   ALT (SGPT) U/L  --  11   AST (SGOT) U/L  --  13   TROPONIN T ng/mL  --  <0.010         Estimated Creatinine Clearance: 70.8 mL/min (by C-G formula based on SCr of 1.04 mg/dL).    Microbiology Results Abnormal     None          Imaging Results (Last 24 Hours)     ** No results found for the last 24 hours. **          Results for orders placed during the hospital encounter of 07/22/19   Adult Transthoracic Echo Complete W/ Cont if Necessary Per Protocol    Addendum · Left ventricular systolic function is mildly decreased. Estimated EF =  45%. · The following left ventricular wall segments are hypokinetic: basal  anterolateral, mid anterolateral, apical lateral and mid inferolateral. · Left ventricular diastolic dysfunction (grade I a) consistent with  impaired relaxation. · There is calcification of the aortic valve. · Trace-to-mild aortic valve regurgitation is present · Mild tricuspid valve regurgitation is present. · Estimated right ventricular systolic pressure from tricuspid  regurgitation is mildly elevated (35-45 mmHg).        Vlad Bravo MD 7/23/2019  1:45 PM          Narrative · Left ventricular systolic function is normal. Estimated EF = 50%.  · The following left ventricular wall segments are hypokinetic: basal   anterolateral, mid anterolateral, apical lateral and mid inferolateral.  · Left ventricular diastolic dysfunction (grade I a) consistent with   impaired relaxation.  · There is calcification of the aortic valve.  · Trace-to-mild aortic valve regurgitation is present  · Mild tricuspid valve regurgitation is present.  · Estimated right ventricular systolic pressure from tricuspid   regurgitation is mildly elevated (35-45 mmHg).          I have reviewed the medications:  Scheduled Meds:  amiodarone 400 mg Oral Nightly   apixaban 5 mg Oral Q12H   arformoterol 15 mcg Nebulization BID - RT   aspirin 81 mg Oral Daily   budesonide 0.5 mg Nebulization BID - RT   bumetanide 0.5 mg Oral Daily   carvedilol 12.5 mg Oral BID   glipizide 1.25 mg Oral BID AC   insulin lispro 0-7 Units  Subcutaneous TID With Meals   mexiletine 150 mg Oral TID   nicotine 1 patch Transdermal Q24H   rOPINIRole 2 mg Oral Nightly   sacubitril-valsartan 1 tablet Oral Q12H   sodium chloride 10 mL Intravenous Q12H   spironolactone 25 mg Oral Daily   tiZANidine 8 mg Oral Nightly     Continuous Infusions:   PRN Meds:.•  acetaminophen **OR** acetaminophen **OR** acetaminophen  •  busPIRone  •  dextrose  •  dextrose  •  docusate sodium  •  famotidine  •  glucagon (human recombinant)  •  HYDROcodone-acetaminophen  •  ipratropium-albuterol  •  melatonin  •  nitroglycerin  •  ondansetron **OR** ondansetron  •  sodium chloride    Assessment/Plan   Assessment & Plan     Active Hospital Problems    Diagnosis  POA   • **VT (ventricular tachycardia) (CMS/[facility]) [I47.2]  Yes   • Pre-syncope related to ventricular tachycardia [R55]  Yes   • Other emphysema (CMS/[facility]) [J43.8]  Yes   • Type 2 diabetes mellitus without complication (CMS/[facility]) [E11.9]  Yes   • PAD (peripheral artery disease) (CMS/[facility]) [I73.9]  Yes   • COPD (chronic obstructive pulmonary disease) (CMS/[facility]) [J44.9]  Yes   • Coronary artery disease of native artery of native heart with stable angina pectoris (CMS/[facility]) [I25.118]  Yes   • Cardiomyopathy (CMS/[facility]) [I42.9]  Yes   • Mixed hyperlipidemia [E78.2]  Yes   • Essential hypertension [I10]  Yes   • Chronic systolic heart failure (CMS/[facility]) [I50.22]  Yes      Resolved Hospital Problems   No resolved problems to display.        Brief Hospital Course to date:  Derek Morris Jr. is a 69 y.o. male with past medical history of ventricular tachycardia presents the hospital with recent episodes of diaphoresis, nausea, and vomiting associated with ventricular tachycardia.  Device interrogation reportedly showed multiple episodes of ventricular tachycardia.  Patient was transferred from outside hospital for cardiology evaluation.    All problems are new to today as this is my first encounter with the patient  This patient's problems and  plans were partially entered by my partner and updated as appropriate by me 05/10/20.    Ventricular tachycardia:  -Pt follows with Dr. Dickinson and Dr. Bravo outpatient  -Cardiology to evaluate today  -Continue telemetry monitor.    -Continue amiodarone.  Continue beta-blocker.  Continue Mexiletine.  -Outside hospital records reviewed and report ventricular tachycardia.  -Troponin negative  -Mg, Phos WNL     Presyncope:  -Likely related to ventricular tachycardia.    -Will obtain Echo (most recent one 7/2019)     COPD:  -Continue breathing treatments and nebulizers.    -Monitor pulse oximetry.  -Pt states he has home O2 but rarely uses it     Chronic systolic heart failure:  -Continue beta-blocker and Entresto.    -Compensated.     PAD:   -Continue Eliquis.  Continue home medications.     Essential hypertension:   -Monitor blood pressure.  Continue home medications  -BP stable    Thrombocytopenia  -Platelet count normal on admission, now 120  -Repeat CBC in AM to trend    DM2  Glucosuria  -Hga1c 6.9  -Glipizide held  -Continue LDSSI    Elevated TSH  -TSH 4.9  -Markell T4 WNL at 1.04  -Repeat thyroid panel outpatient 4-6 weeks      DVT Prophylaxis:  Eliquis    Daily Care Communication  Due to current limited visitation policies, an attempt will be made daily to update patient's identified best point-of-contact(s)   Contact: Amanda   Relation: wife   Type of communication (phone or televideo): Phone [phone]   Time of communication: 1200   Notes (if applicable): Update given, all questions answered. Told her I am waiting for cardiology plan. Appreciative of call.     Disposition: I expect the patient to be discharged pending Cardiology eval    CODE STATUS:   Code Status and Medical Interventions:   Ordered at: 05/09/20 0917     Code Status:    CPR     Medical Interventions (Level of Support Prior to Arrest):    Full         Electronically signed by Miriam Sands DO, 05/10/20, 09:56.

## 2020-05-10 NOTE — PLAN OF CARE
Problem: Patient Care Overview  Goal: Plan of Care Review  Outcome: Ongoing (interventions implemented as appropriate)  Flowsheets (Taken 5/10/2020 9782)  Progress: no change  Plan of Care Reviewed With: patient  Note:   Pt A&O, 2L NC, couple runs of vtach, cards consulted, amio gtt started, EP to see tomorrow, CT of head due to acute headache, PRN meds given,

## 2020-05-10 NOTE — PLAN OF CARE
Problem: Patient Care Overview  Goal: Plan of Care Review  Outcome: Ongoing (interventions implemented as appropriate)  Flowsheets (Taken 5/10/2020 0642)  Progress: no change  Outcome Summary: VSS on 2L, paced rhythm, pt has rested well tonight,  no complaints from pt, will continue to monitor.

## 2020-05-10 NOTE — CONSULTS
[facility] at [facility]   Consult Note    Referring Provider: Dr. Gonzalez    Reason for Consultation: VT    Patient Care Team:  Soraida Maher APRN as PCP - General (Family Medicine)     Problem List:   1. Ischemic heart disease:  a. MI x3, 1990, 1993, and 1995.  b. CABG x3 by Dr. Noble Cuello, 1995:  SVG to OM1 and OM2, SVG to PDA.  c. Normal LV.  d. Questionable LAD stent, incomplete  database.  e. STEMI, April 2009.  f. LHC by Dr. Glen Baker, April 2009:  EF approximately 40%, 1 of 3 patent grafts; data deficit.  g. LHC by Dr. Bird, 07/30/2010:  EF 30%, occluded vein graft to occluded RCA, minor plaque in LAD, 70% SVG -  circumflex treated with 4 x 18 mm Cypher KAILASH.  h. Echocardiogram, 02/08/2012:  EF 40% to 45%, diastolic dysfunction, mild TR.  i. Echocardiogram, 01/29/2015:  EF 30% to 35%, mild TR.  j. Jan 2017 EF 35-40%  k. LHC in Jan 2017 as noted in the results below. Resolute Integrity KAILASH to D2  l. [facility] 7/23/2019: severe multivessel coronary artery disease without significant change when compared to cardiac catheterization in January 2017.   m. Echocardiogram 7/22/19: EF 45%, trace to mild AVR, mild TR, RVSP 35-45 mmHg   2. Hypertension.  3. Chronic systolic CHF.  4. Ventricular Tachycardia:  a. Inducible VT by EPS, January 2002.  b. Pacesetter ICD implant by Dr. Dickinson, January 2002.  c. Chronic amiodarone, discontinued fall of 2010:  Cannot  exclude amiodarone pulmonary toxicity.  d. ICD generator change-out with enrollment in the ANALYZE ST SEGMENT protocol, 08/16/2012.  e. VT/VFlutter with ICD shocks 7/22/19 - Sotalol discontinued and started on Tikosyn  f. 4/2020 Tikosyn discontinued and amiodarone initiated.  5. PAF - on Eliquis  6. PAD s/p PTA 7/2018 to left SFA  7. Hyperlipidemia; intolerance to multiple statins.  8. Type 2 diabetes mellitus.  9. Chronic tobacco; home O2 prn.  10. Arthritis  11. COPD  12. GERD  13. Surgical history:  a.  CABG  b. Pilonidal cyst  removal  c. Hemorrhoidectomy        Allergies   Allergen Reactions   • Crestor [Rosuvastatin Calcium] Myalgia   • Lipitor [Atorvastatin] Myalgia     Joint pain myalgias   • Lopressor [Metoprolol Tartrate] Myalgia and Unknown (See Comments)     Side of face went numb     • Plavix [Clopidogrel Bisulfate] Unknown (See Comments)     Previously thought to be resistant; placed back on clopidogrel per Dr. rBavo earlier this year.  Confirmed with patient   • Adhesive Tape Itching and Rash           Current Facility-Administered Medications:   •  acetaminophen (TYLENOL) tablet 650 mg, 650 mg, Oral, Q4H PRN, 650 mg at 05/09/20 1536 **OR** acetaminophen (TYLENOL) 160 MG/5ML solution 650 mg, 650 mg, Oral, Q4H PRN **OR** acetaminophen (TYLENOL) suppository 650 mg, 650 mg, Rectal, Q4H PRN, Robert Gonzalez MD  •  amiodarone (PACERONE) tablet 400 mg, 400 mg, Oral, Nightly, Robert Gonzalez MD, 400 mg at 05/09/20 2309  •  apixaban (ELIQUIS) tablet 5 mg, 5 mg, Oral, Q12H, Robert Gonzalez MD, 5 mg at 05/10/20 0821  •  arformoterol (BROVANA) nebulizer solution 15 mcg, 15 mcg, Nebulization, BID - RT, Robert Gonzalez MD, 15 mcg at 05/10/20 0834  •  aspirin EC tablet 81 mg, 81 mg, Oral, Daily, Robert Gonzalez MD, 81 mg at 05/10/20 0820  •  budesonide (PULMICORT) nebulizer solution 0.5 mg, 0.5 mg, Nebulization, BID - RT, Robert Gonzalez MD, 0.5 mg at 05/10/20 0834  •  bumetanide (BUMEX) tablet 0.5 mg, 0.5 mg, Oral, Daily, Robert Gonzalez MD, 0.5 mg at 05/10/20 0820  •  busPIRone (BUSPAR) tablet 10 mg, 10 mg, Oral, BID PRN, Robert Gonzalez MD, 10 mg at 05/09/20 1850  •  carvedilol (COREG) tablet 12.5 mg, 12.5 mg, Oral, BID, Robert Gonzalez MD, 12.5 mg at 05/10/20 0821  •  dextrose (D50W) 25 g/ 50mL Intravenous Solution 25 g, 25 g, Intravenous, Q15 Min PRN, Robert Gonzalez MD  •  dextrose (GLUTOSE) oral gel 15 g, 15 g, Oral, Q15 Min PRN, Robert Gonzalez  MD [place]  •  docusate sodium (COLACE) capsule 100 mg, 100 mg, Oral, BID PRN, Robert Gonzalez MD  •  famotidine (PEPCID) tablet 20 mg, 20 mg, Oral, BID PRN, Robert Gonzalez MD  •  glipizide (GLUCOTROL) tablet 1.25 mg, 1.25 mg, Oral, BID AC, Robert Gonzalez MD, 1.25 mg at 05/10/20 0820  •  glucagon (human recombinant) (GLUCAGEN DIAGNOSTIC) injection 1 mg, 1 mg, Subcutaneous, Q15 Min PRN, Robert Gonzalez MD  •  HYDROcodone-acetaminophen (NORCO) 5-325 MG per tablet 1 tablet, 1 tablet, Oral, Q4H PRN, Robert Gonzalez MD, 1 tablet at 05/09/20 2043  •  insulin lispro (humaLOG) injection 0-7 Units, 0-7 Units, Subcutaneous, TID With Meals, Robert Gonzalez MD, 3 Units at 05/09/20 1232  •  ipratropium-albuterol (DUO-NEB) nebulizer solution 3 mL, 3 mL, Nebulization, Q4H PRN, Robert Gonzalez MD  •  melatonin tablet 5 mg, 5 mg, Oral, Nightly PRN, Robert Gonzalez MD, 5 mg at 05/09/20 2310  •  mexiletine (MEXITIL) capsule 150 mg, 150 mg, Oral, TID, Robert Gonzalez MD, 150 mg at 05/10/20 0821  •  nicotine (NICODERM CQ) 21 MG/24HR patch 1 patch, 1 patch, Transdermal, Q24H, Robert Gonzalez MD, 1 patch at 05/10/20 0821  •  nitroglycerin (NITROSTAT) SL tablet 0.4 mg, 0.4 mg, Sublingual, Q5 Min PRN, Robert Gonzalez MD  •  ondansetron (ZOFRAN) tablet 4 mg, 4 mg, Oral, Q6H PRN **OR** ondansetron (ZOFRAN) injection 4 mg, 4 mg, Intravenous, Q6H PRN, Robert Gonzalez MD  •  rOPINIRole (REQUIP) tablet 2 mg, 2 mg, Oral, Nightly, Robert Gonzalez MD, 2 mg at 05/09/20 2310  •  sacubitril-valsartan (ENTRESTO) 24-26 MG tablet 1 tablet, 1 tablet, Oral, Q12H, Robert Gonzalez MD, 1 tablet at 05/10/20 0821  •  sodium chloride 0.9 % flush 10 mL, 10 mL, Intravenous, Q12H, Robert Gonzalez MD, 10 mL at 05/10/20 0824  •  sodium chloride 0.9 % flush 10 mL, 10 mL, Intravenous, PRN, Robert Gonzalez MD  •   spironolactone (ALDACTONE) tablet 25 mg, 25 mg, Oral, Daily, Robert Gonzalez MD, 25 mg at 05/10/20 0821  •  tiZANidine (ZANAFLEX) tablet 8 mg, 8 mg, Oral, Nightly, Robert Gonzalez MD, 8 mg at 05/09/20 2310         Medications Prior to Admission   Medication Sig Dispense Refill Last Dose   • albuterol (PROVENTIL HFA;VENTOLIN HFA) 108 (90 Base) MCG/ACT inhaler Inhale 1-2 puffs Every 6 (Six) Hours As Needed for Wheezing.   Taking   • amiodarone (Pacerone) 200 MG tablet Take 2 tablets by mouth 3 (Three) Times a Day Before Meals for 14 days, THEN 2 tablets Daily for 90 days. 264 tablet 0    • aspirin 81 MG EC tablet Take 1 tablet by mouth Daily. 30 tablet 10 Taking   • bumetanide (BUMEX) 0.5 MG tablet Take 0.5 mg by mouth Daily.   Taking   • carvedilol (COREG) 12.5 MG tablet Take 1 tablet by mouth 2 (Two) Times a Day. 180 tablet 3 Taking   • ELIQUIS 5 MG tablet tablet TAKE 1 TABLET EVERY 12 HOURS 180 tablet 0 Taking   • Evolocumab with Infusor (REPATHA PUSHTRONEX SYSTEM) 420 MG/3.5ML solution cartridge Inject 420 mg under the skin into the appropriate area as directed Every 30 (Thirty) Days. 3 cartridge. 5 Taking   • ezetimibe (ZETIA) 10 MG tablet Take 10 mg by mouth Daily.   Taking   • Fluticasone-Umeclidin-Vilant (TRELEGY ELLIPTA IN) Inhale 1 puff Daily.   Taking   • GLIPIZIDE XL 2.5 MG 24 hr tablet Take 2.5 mg by mouth Daily.  11 Taking   • mexiletine (MEXITIL) 150 MG capsule Take 1 capsule by mouth 3 (Three) Times a Day for 90 days. 90 capsule 2    • nitroglycerin (NITROSTAT) 0.4 MG SL tablet Place 1 tablet under the tongue Every 5 (Five) Minutes As Needed for Chest Pain. Take no more than 3 doses in 15 minutes. 25 tablet 3 Taking   • rOPINIRole (REQUIP) 1 MG tablet Take 2 mg by mouth Every Night.   Taking   • sacubitril-valsartan (ENTRESTO) 24-26 MG tablet Take 1 tablet by mouth Every 12 (Twelve) Hours. 60 tablet 10 Taking   • spironolactone (ALDACTONE) 25 MG tablet Take 25 mg by mouth daily.    "Taking   • tiZANidine (ZANAFLEX) 4 MG tablet Take 8 mg by mouth Every Night.   Taking         Subjective .   History of present illness:    Patient is a 69-year-old  male who we are seeing today for further evaluation of sustained ventricular tachycardia.  Patient has noted previous history of this and has been previously on Tikosyn he has a noted previous ICD placement as well.  He has been in contact with his electrophysiologist over the last few weeks with recent medication changes.  His Tikosyn was recently discontinued and he was initiated on amiodarone which he took his first dose of on Friday My 1st.  Patient notes recently he had been doing well with exertional activity.  Denies any recent exertional chest pain, pressure, tightness.  Denies any increasing shortness of breath.  No syncope, near-syncope, or edema.  He notes that on the evening of May 8 around 1115 he had sudden onset of a heated sensation, diaphoresis, nausea, as well as feeling of needing to have a bowel movement and urinate. Did not have his usual palpitations with this episode.  States he felt as if he was \"dying\".  Due to this he presented to the outside hospital for further evaluation.  By record review it was reported that on arrival he was in ventricular tachycardia.  His outside records are currently unavailable.  They are not on the floor nor have they been scanned into the medical record.  Patient notes since that time overall doing well.  He is supposed to be on amiodarone 200 mg 3 times daily for loading dose.  Patient's other current complaint is a right-sided headache and right-sided facial numbness over the last 2-3 days.  No numbness or weakness in the hands or legs.  Has not had history of stroke.  He is on Eliquis for history of paroxysmal atrial fibrillation.  He had a carotid duplex done last year showing 0 to 49% stenosis bilaterally      Social History     Socioeconomic History   • Marital status:      " "Spouse name: Not on file   • Number of children: 2   • Years of education: Not on file   • Highest education level: Not on file   Occupational History   • Occupation:      Comment: Retired   Tobacco Use   • Smoking status: Current Every Day Smoker     Packs/day: 0.50     Years: 45.00     Pack years: 22.50     Types: Cigarettes   • Smokeless tobacco: Never Used   Substance and Sexual Activity   • Alcohol use: No   • Drug use: Yes     Types: Marijuana     Comment: occasional use - maybe once a month    • Sexual activity: Defer   Social History Narrative    Caffeine use: 5-6 serving daily.          Family History   Problem Relation Age of Onset   • Hypertension Mother    • Sick sinus syndrome Father    • Coronary artery disease Father          Review of Systems:  Review of Systems   Constitution: Positive for malaise/fatigue. Negative for fever.   HENT: Negative for nosebleeds.    Eyes: Negative for redness and visual disturbance.   Cardiovascular: Positive for palpitations. Negative for orthopnea and paroxysmal nocturnal dyspnea.   Respiratory: Positive for shortness of breath (chronic). Negative for cough, snoring, sputum production and wheezing.    Hematologic/Lymphatic: Negative for bleeding problem.   Skin: Negative for flushing, itching and rash.   Musculoskeletal: Positive for arthritis. Negative for falls, joint pain and muscle cramps.   Gastrointestinal: Negative for abdominal pain, diarrhea, heartburn, nausea and vomiting.   Genitourinary: Negative for hematuria.   Neurological: Negative for excessive daytime sleepiness, dizziness, headaches, tremors and weakness.   Psychiatric/Behavioral: Negative for substance abuse. The patient is not nervous/anxious.         Objective   Vitals:  /72   Pulse 69   Temp 97.9 °F (36.6 °C) (Oral)   Resp 16   Ht 172.7 cm (68\")   Wt 76.8 kg (169 lb 6.4 oz)   SpO2 95%   BMI 25.76 kg/m²      Intake/Output Summary (Last 24 hours) at 5/10/2020 0940  Last " data filed at 5/10/2020 0500  Gross per 24 hour   Intake 840 ml   Output 750 ml   Net 90 ml       Physical Exam   Constitutional: He is oriented to person, place, and time. He appears well-developed and well-nourished.   Neck: No JVD present. No tracheal deviation present.   Cardiovascular: Normal rate, regular rhythm and normal heart sounds. Exam reveals no friction rub.   No murmur heard.  Pulmonary/Chest: Effort normal. No respiratory distress.   Bilateral wheeze and rhonchi   Abdominal: Soft. Bowel sounds are normal. There is no tenderness.   Musculoskeletal: He exhibits no edema or deformity.   Neurological: He is alert and oriented to person, place, and time.   Skin: Skin is warm and dry.            Results Review:  I reviewed the patient's new clinical results.  Results from last 7 days   Lab Units 05/10/20  0337   WBC 10*3/mm3 7.24   HEMOGLOBIN g/dL 14.5   HEMATOCRIT % 45.7   PLATELETS 10*3/mm3 122*     Results from last 7 days   Lab Units 05/10/20  0337 05/09/20  0946   SODIUM mmol/L 137 140   POTASSIUM mmol/L 4.4 4.8   CHLORIDE mmol/L 99 102   CO2 mmol/L 27.0 28.0   BUN mg/dL 11 8   CREATININE mg/dL 1.04 0.98   CALCIUM mg/dL 8.6 8.4*   BILIRUBIN mg/dL  --  0.4   ALK PHOS U/L  --  58   ALT (SGPT) U/L  --  11   AST (SGOT) U/L  --  13   GLUCOSE mg/dL 106* 126*     Results from last 7 days   Lab Units 05/10/20  0337   SODIUM mmol/L 137   POTASSIUM mmol/L 4.4   CHLORIDE mmol/L 99   CO2 mmol/L 27.0   BUN mg/dL 11   CREATININE mg/dL 1.04   GLUCOSE mg/dL 106*   CALCIUM mg/dL 8.6         Lab Results   Lab Value Date/Time    TROPONINT <0.010 05/09/2020 0946    TROPONINT 0.076 (C) 07/22/2019 1206     Results from last 7 days   Lab Units 05/09/20  0946   TSH uIU/mL 4.900*   FREE T4 ng/dL 1.04                   Tele:  Apaced    Episodes of antitachycardia pacing noted on telemetry    EKG: Apaced 5/9/2020      Assessment/Plan     1. Frequent episodes of non-sustained VT, significant episode noted during current  medication adjustment and attempt to load with PO amiodarone. Still with some intermittent episodes during admission.  No ICD shocks  2. CAD, no current angina, troponin negative  3. Cardiomyopathy, last EF 45%  4. Hypertension, stable  5. Dyslipidemia, statin intolerant  6. Diabetes, per attending service.      Plan:    1. Will continue amiodarone loading with PO as well as IV amiodarone.  2. Continue to monitor rhythm.   3. Will have EP evaluate the patient tomorrow.      VANDANA Romero obtained past medical, family history, social history, review of systems and functioned as a scribe for the remainder of the dictation for Dr. Cowart.      Dictated utilizing Dragon dictation    69-year-old man with known ischemic cardiomyopathy and recurrent nonsustained ventricular tachycardia with ICD in place.  Presents with symptomatic arrhythmias but no ICD shock.  Telemetry has shown some episodes of antitachycardia pacing most recent device interrogation shows multiple episodes.  He just recently started transitioning from tikosyn to amiodarone and has been doing a load and is about 1 week into a planned two-week load.  At present we will continue his oral loading dose and give supplemental IV amiodarone at this time.  His troponin here is negative without evidence of new ischemia and he had a heart cath done last year showing no additional targets for revascularization.  The patient continues to have persistent VT despite adequate amiodarone load he may be a candidate for VT ablation, this is been discussed with him previously.  Right now he was agreeable to giving the medication a little bit more time to take effect.  Continue mexiletine and other meds for his cardiomyopathy including Entresto and Coreg    Patient also complaining of right-sided headache with some facial numbness.  On exam it looks like he does have a mild right facial droop.  This is been going on for 2-3 days.  He is on Eliquis at home for  atrial fibrillation.  We will get a CT of the head, I discussed this with Dr. Wicho REDDY,Yonis Cowart M.D., personally performed the services described in this documentation as scribed by the above named individual in my presence, and it is both accurate and complete.  5/10/2020  14:19

## 2020-05-11 LAB
ANION GAP SERPL CALCULATED.3IONS-SCNC: 9 MMOL/L (ref 5–15)
BH CV ECHO MEAS - BSA(HAYCOCK): 1.9 M^2
BH CV ECHO MEAS - BSA: 1.9 M^2
BH CV ECHO MEAS - BZI_BMI: 25.7 KILOGRAMS/M^2
BH CV ECHO MEAS - BZI_METRIC_HEIGHT: 172.7 CM
BH CV ECHO MEAS - BZI_METRIC_WEIGHT: 76.7 KG
BH CV ECHO MEAS - EDV(CUBED): 69.9 ML
BH CV ECHO MEAS - EDV(TEICH): 75 ML
BH CV ECHO MEAS - IVS/LVPW: 1.1
BH CV ECHO MEAS - IVSD: 1.5 CM
BH CV ECHO MEAS - LA DIMENSION: 3.4 CM
BH CV ECHO MEAS - LAD MAJOR: 5.7 CM
BH CV ECHO MEAS - LAT PEAK E' VEL: 8.7 CM/SEC
BH CV ECHO MEAS - LATERAL E/E' RATIO: 10.2
BH CV ECHO MEAS - LV MASS(C)D: 222.4 GRAMS
BH CV ECHO MEAS - LV MASS(C)DI: 116.9 GRAMS/M^2
BH CV ECHO MEAS - LVIDD: 4.1 CM
BH CV ECHO MEAS - LVPWD: 1.4 CM
BH CV ECHO MEAS - MED PEAK E' VEL: 3.9 CM/SEC
BH CV ECHO MEAS - MEDIAL E/E' RATIO: 22.1
BH CV ECHO MEAS - MV A MAX VEL: 86.9 CM/SEC
BH CV ECHO MEAS - MV DEC SLOPE: 452.5 CM/SEC^2
BH CV ECHO MEAS - MV DEC TIME: 0.22 SEC
BH CV ECHO MEAS - MV E MAX VEL: 90.3 CM/SEC
BH CV ECHO MEAS - MV E/A: 1
BH CV ECHO MEAS - MV P1/2T MAX VEL: 109.9 CM/SEC
BH CV ECHO MEAS - MV P1/2T: 71.2 MSEC
BH CV ECHO MEAS - MVA P1/2T LCG: 2 CM^2
BH CV ECHO MEAS - MVA(P1/2T): 3.1 CM^2
BH CV ECHO MEAS - PA ACC SLOPE: 713.3 CM/SEC^2
BH CV ECHO MEAS - PA ACC TIME: 0.1 SEC
BH CV ECHO MEAS - PA PR(ACCEL): 33.1 MMHG
BH CV ECHO MEAS - RAP SYSTOLE: 3 MMHG
BH CV ECHO MEAS - RVSP: 27 MMHG
BH CV ECHO MEAS - TAPSE (>1.6): 1.6 CM2
BH CV ECHO MEAS - TR MAX PG: 24 MMHG
BH CV ECHO MEAS - TR MAX VEL: 244.1 CM/SEC
BH CV ECHO MEASUREMENTS AVERAGE E/E' RATIO: 14.33
BH CV VAS BP RIGHT ARM: NORMAL MMHG
BH CV XLRA - RV BASE: 2.8 CM
BH CV XLRA - RV LENGTH: 6.2 CM
BH CV XLRA - RV MID: 2 CM
BUN BLD-MCNC: 12 MG/DL (ref 8–23)
BUN/CREAT SERPL: 10.8 (ref 7–25)
CALCIUM SPEC-SCNC: 8.4 MG/DL (ref 8.6–10.5)
CHLORIDE SERPL-SCNC: 99 MMOL/L (ref 98–107)
CO2 SERPL-SCNC: 28 MMOL/L (ref 22–29)
CREAT BLD-MCNC: 1.11 MG/DL (ref 0.76–1.27)
DEPRECATED RDW RBC AUTO: 44.9 FL (ref 37–54)
ERYTHROCYTE [DISTWIDTH] IN BLOOD BY AUTOMATED COUNT: 12.5 % (ref 12.3–15.4)
GFR SERPL CREATININE-BSD FRML MDRD: 65 ML/MIN/1.73
GLUCOSE BLD-MCNC: 149 MG/DL (ref 65–99)
GLUCOSE BLDC GLUCOMTR-MCNC: 107 MG/DL (ref 70–130)
GLUCOSE BLDC GLUCOMTR-MCNC: 108 MG/DL (ref 70–130)
GLUCOSE BLDC GLUCOMTR-MCNC: 109 MG/DL (ref 70–130)
GLUCOSE BLDC GLUCOMTR-MCNC: 112 MG/DL (ref 70–130)
HCT VFR BLD AUTO: 47.6 % (ref 37.5–51)
HGB BLD-MCNC: 14.9 G/DL (ref 13–17.7)
LEFT ATRIUM VOLUME INDEX: 35.2 ML/M2
MAXIMAL PREDICTED HEART RATE: 149 BPM
MCH RBC QN AUTO: 30.4 PG (ref 26.6–33)
MCHC RBC AUTO-ENTMCNC: 31.3 G/DL (ref 31.5–35.7)
MCV RBC AUTO: 97.1 FL (ref 79–97)
PLATELET # BLD AUTO: 117 10*3/MM3 (ref 140–450)
PMV BLD AUTO: 11 FL (ref 6–12)
POTASSIUM BLD-SCNC: 4.4 MMOL/L (ref 3.5–5.2)
RBC # BLD AUTO: 4.9 10*6/MM3 (ref 4.14–5.8)
SODIUM BLD-SCNC: 136 MMOL/L (ref 136–145)
STRESS TARGET HR: 127 BPM
WBC NRBC COR # BLD: 6.76 10*3/MM3 (ref 3.4–10.8)

## 2020-05-11 PROCEDURE — 94799 UNLISTED PULMONARY SVC/PX: CPT

## 2020-05-11 PROCEDURE — 80048 BASIC METABOLIC PNL TOTAL CA: CPT | Performed by: FAMILY MEDICINE

## 2020-05-11 PROCEDURE — 93005 ELECTROCARDIOGRAM TRACING: CPT | Performed by: NURSE PRACTITIONER

## 2020-05-11 PROCEDURE — 82962 GLUCOSE BLOOD TEST: CPT

## 2020-05-11 PROCEDURE — 99232 SBSQ HOSP IP/OBS MODERATE 35: CPT | Performed by: INTERNAL MEDICINE

## 2020-05-11 PROCEDURE — 85027 COMPLETE CBC AUTOMATED: CPT | Performed by: FAMILY MEDICINE

## 2020-05-11 PROCEDURE — 99232 SBSQ HOSP IP/OBS MODERATE 35: CPT | Performed by: PHYSICIAN ASSISTANT

## 2020-05-11 PROCEDURE — 93010 ELECTROCARDIOGRAM REPORT: CPT | Performed by: INTERNAL MEDICINE

## 2020-05-11 RX ORDER — BUTALBITAL, ACETAMINOPHEN AND CAFFEINE 50; 325; 40 MG/1; MG/1; MG/1
1 TABLET ORAL EVERY 6 HOURS PRN
Status: DISCONTINUED | OUTPATIENT
Start: 2020-05-11 | End: 2020-05-12 | Stop reason: HOSPADM

## 2020-05-11 RX ADMIN — ARFORMOTEROL TARTRATE 15 MCG: 15 SOLUTION RESPIRATORY (INHALATION) at 20:50

## 2020-05-11 RX ADMIN — APIXABAN 5 MG: 5 TABLET, FILM COATED ORAL at 08:33

## 2020-05-11 RX ADMIN — SACUBITRIL AND VALSARTAN 1 TABLET: 24; 26 TABLET, FILM COATED ORAL at 08:33

## 2020-05-11 RX ADMIN — MELATONIN TAB 5 MG 5 MG: 5 TAB at 22:20

## 2020-05-11 RX ADMIN — SODIUM CHLORIDE, PRESERVATIVE FREE 10 ML: 5 INJECTION INTRAVENOUS at 22:24

## 2020-05-11 RX ADMIN — SPIRONOLACTONE 25 MG: 25 TABLET ORAL at 08:33

## 2020-05-11 RX ADMIN — BUDESONIDE 0.5 MG: 0.5 INHALANT RESPIRATORY (INHALATION) at 20:49

## 2020-05-11 RX ADMIN — ARFORMOTEROL TARTRATE 15 MCG: 15 SOLUTION RESPIRATORY (INHALATION) at 09:25

## 2020-05-11 RX ADMIN — BUMETANIDE 0.5 MG: 0.5 TABLET ORAL at 08:33

## 2020-05-11 RX ADMIN — SACUBITRIL AND VALSARTAN 1 TABLET: 24; 26 TABLET, FILM COATED ORAL at 22:21

## 2020-05-11 RX ADMIN — CARVEDILOL 12.5 MG: 12.5 TABLET, FILM COATED ORAL at 08:33

## 2020-05-11 RX ADMIN — MEXILETINE HYDROCHLORIDE 150 MG: 150 CAPSULE ORAL at 08:32

## 2020-05-11 RX ADMIN — ROPINIROLE HYDROCHLORIDE 2 MG: 2 TABLET, FILM COATED ORAL at 22:22

## 2020-05-11 RX ADMIN — BUTALBITAL, ACETAMINOPHEN, AND CAFFEINE 1 TABLET: 50; 325; 40 TABLET ORAL at 13:55

## 2020-05-11 RX ADMIN — APIXABAN 5 MG: 5 TABLET, FILM COATED ORAL at 22:19

## 2020-05-11 RX ADMIN — NICOTINE 1 PATCH: 21 PATCH TRANSDERMAL at 08:36

## 2020-05-11 RX ADMIN — AMIODARONE HYDROCHLORIDE 400 MG: 200 TABLET ORAL at 22:21

## 2020-05-11 RX ADMIN — TIZANIDINE 8 MG: 4 TABLET ORAL at 22:20

## 2020-05-11 RX ADMIN — SODIUM CHLORIDE, PRESERVATIVE FREE 10 ML: 5 INJECTION INTRAVENOUS at 08:36

## 2020-05-11 RX ADMIN — MEXILETINE HYDROCHLORIDE 150 MG: 150 CAPSULE ORAL at 19:02

## 2020-05-11 RX ADMIN — ASPIRIN 81 MG: 81 TABLET, COATED ORAL at 08:33

## 2020-05-11 RX ADMIN — BUSPIRONE HYDROCHLORIDE 10 MG: 10 TABLET ORAL at 22:20

## 2020-05-11 RX ADMIN — BUDESONIDE 0.5 MG: 0.5 INHALANT RESPIRATORY (INHALATION) at 09:25

## 2020-05-11 RX ADMIN — MEXILETINE HYDROCHLORIDE 150 MG: 150 CAPSULE ORAL at 22:29

## 2020-05-11 RX ADMIN — AMIODARONE HYDROCHLORIDE 400 MG: 200 TABLET ORAL at 12:02

## 2020-05-11 RX ADMIN — SODIUM CHLORIDE 500 ML: 9 INJECTION, SOLUTION INTRAVENOUS at 01:49

## 2020-05-11 RX ADMIN — AMIODARONE HYDROCHLORIDE 400 MG: 200 TABLET ORAL at 19:01

## 2020-05-11 RX ADMIN — CARVEDILOL 12.5 MG: 12.5 TABLET, FILM COATED ORAL at 22:21

## 2020-05-11 NOTE — PROGRESS NOTES
Discharge Planning Assessment  [facility]     Patient Name: Derek Morris Jr.  MRN: [id number]  Today's Date: 5/11/2020    Admit Date: 5/9/2020    Discharge Needs Assessment     Row Name 05/11/20 1422       Living Environment    Lives With  spouse;child(harpreet), adult    Current Living Arrangements  home/apartment/condo    Primary Care Provided by  self    Provides Primary Care For  no one    Family Caregiver if Needed  child(harpreet), adult;spouse    Quality of Family Relationships  supportive;involved;helpful    Able to Return to Prior Arrangements  yes       Resource/Environmental Concerns    Transportation Concerns  car, none       Transition Planning    Patient/Family Anticipates Transition to  home with family    Patient/Family Anticipated Services at Transition      Transportation Anticipated  family or friend will provide       Discharge Needs Assessment    Readmission Within the Last 30 Days  no previous admission in last 30 days    Concerns to be Addressed  no discharge needs identified;denies needs/concerns at this time;discharge planning    Equipment Currently Used at Home  none    Anticipated Changes Related to Illness  none    Equipment Needed After Discharge  none    Current Discharge Risk  chronically ill        Discharge Plan     Row Name 05/11/20 1422       Plan    Plan  HOME    Patient/Family in Agreement with Plan  yes    Plan Comments  Met with pt at bedside.  He resides with his wife and adult daughter in [facility].  He is independent with ADLs.  No current DME or HH services.  Confirmed he has [employer] Medicare with Rx coverage.  Goal is to return home upon DC.  No immediate needs identified/voiced.  CM will cont to follow.    Final Discharge Disposition Code  01 - home or self-care        Destination      Coordination has not been started for this encounter.      Durable Medical Equipment      Coordination has not been started for this encounter.      Dialysis/Infusion      Coordination  has not been started for this encounter.      Home Medical Care      Coordination has not been started for this encounter.      Therapy      Coordination has not been started for this encounter.      Community Resources      Coordination has not been started for this encounter.          Demographic Summary     Row Name 05/11/20 1421       General Information    Admission Type  inpatient    Referral Source  admission list    Reason for Consult  discharge planning    Preferred Language  English       Contact Information    Permission Granted to Share Info With      Contact Information Obtained for          Functional Status     Row Name 05/11/20 1422       Functional Status    Usual Activity Tolerance  good       Functional Status, IADL    Medications  independent    Meal Preparation  independent    Housekeeping  independent    Laundry  independent    Shopping  independent        Psychosocial    No documentation.       Abuse/Neglect    No documentation.       Legal    No documentation.       Substance Abuse    No documentation.       Patient Forms    No documentation.           Silke Juan RN

## 2020-05-11 NOTE — PLAN OF CARE
Problem: Patient Care Overview  Goal: Plan of Care Review  Outcome: Ongoing (interventions implemented as appropriate)  Flowsheets  Taken 5/11/2020 1847  Progress: no change  Outcome Summary: A/O, VSS, c/o headache - resolved with newly prescreibed fioricet.  Plan for discharge tomorrow if heart rhythm is stable.  Taken 5/11/2020 0800  Plan of Care Reviewed With: patient

## 2020-05-11 NOTE — PLAN OF CARE
Problem: Patient Care Overview  Goal: Plan of Care Review  Outcome: Ongoing (interventions implemented as appropriate)  Flowsheets (Taken 5/11/2020 0406)  Progress: declining  Plan of Care Reviewed With: patient  Outcome Summary: pt on amiodarone gtt per cardiology, throughout the night pt's BP dropped to sbp in 80's, amiodarone stopped and 500ml bolus given. BP stable with sbp in 90's and amio gtt off, monitoring MAP and BP per cardiology, pt asymptomatic and other VS stable. Will continue to monitor pt. no new complaints from pt

## 2020-05-11 NOTE — PROGRESS NOTES
"CARDIOLOGY PROGRESS NOTE           2020 07:33    Admit Date: 2020    Admit Diagnosis: Sustained SVT (CMS/HCC) [I47.1]    Chief Complaint: Follow up for VT    Subjective:   Patient's status has remained stable.  Sitting up eating breakfast, No chest pain. Walked in hallways yesterday . \"Feel pretty good\".       ROS:  GEN:  No fever or chills  Cardiovascular:  No CP or SOB  Pulmonary:  No Cough  Neuro:  No new focal motor or sensory loss      Objective:     Vitals:    20 0230 20 0300 20 0500 20 0700   BP: 98/59 93/69 108/58 115/63   BP Location:  Left arm Right arm Right arm   Patient Position:  Lying Lying Lying   Pulse: 73 73 73 74   Resp:  16 16 16   Temp:  97.7 °F (36.5 °C) 98 °F (36.7 °C)    TempSrc:  Oral Oral    SpO2: 97% 98% 95% 95%   Weight:       Height:           Physical Exam:  General-Well Nourished, Well developed  Eyes - PERRLA  Neck- supple, No mass  CV- regular rate and rhythm, no MRG  Lung- Course breath sounds   Abd- soft, +BS  Musc/skel - Norm strength and range of motion  Skin- warm and dry  Neuro - Alert & Oriented x 3, appropriate mood.        Current Facility-Administered Medications:   •  acetaminophen (TYLENOL) tablet 650 mg, 650 mg, Oral, Q4H PRN, 650 mg at 20 1536 **OR** acetaminophen (TYLENOL) 160 MG/5ML solution 650 mg, 650 mg, Oral, Q4H PRN **OR** acetaminophen (TYLENOL) suppository 650 mg, 650 mg, Rectal, Q4H PRN, Robert Gonzalez MD  •  [] amiodarone (NEXTERONE) 360 mg/200 mL (1.8 mg/mL) infusion, 1 mg/min, Intravenous, Continuous, Last Rate: 33.3 mL/hr at 05/10/20 1406, 1 mg/min at 05/10/20 1406 **FOLLOWED BY** amiodarone (NEXTERONE) 360 mg/200 mL (1.8 mg/mL) infusion, 0.5 mg/min, Intravenous, Continuous, Perla Haq APRN, Stopped at 20 0658  •  amiodarone (PACERONE) tablet 400 mg, 400 mg, Oral, TID, Perla Haq APRN, 400 mg at 05/10/20 2302  •  apixaban (ELIQUIS) tablet 5 mg, 5 mg, Oral, Q12H, Robert Gonzalez " MD [place], 5 mg at 05/10/20 2302  •  arformoterol (BROVANA) nebulizer solution 15 mcg, 15 mcg, Nebulization, BID - RT, Robert Gonzalez MD, 15 mcg at 05/10/20 2003  •  aspirin EC tablet 81 mg, 81 mg, Oral, Daily, Robert Gonzalez MD, 81 mg at 05/10/20 0820  •  budesonide (PULMICORT) nebulizer solution 0.5 mg, 0.5 mg, Nebulization, BID - RT, Robert Gonzalez MD, 0.5 mg at 05/10/20 2003  •  bumetanide (BUMEX) tablet 0.5 mg, 0.5 mg, Oral, Daily, Robert Gonzalez MD, 0.5 mg at 05/10/20 0820  •  busPIRone (BUSPAR) tablet 10 mg, 10 mg, Oral, BID PRN, Robert Gonzalez MD, 10 mg at 05/09/20 1850  •  carvedilol (COREG) tablet 12.5 mg, 12.5 mg, Oral, BID, Robert Gonzalez MD, 12.5 mg at 05/10/20 2303  •  dextrose (D50W) 25 g/ 50mL Intravenous Solution 25 g, 25 g, Intravenous, Q15 Min PRN, Robert Gonzalez MD  •  dextrose (GLUTOSE) oral gel 15 g, 15 g, Oral, Q15 Min PRN, Robert Gonzalez MD  •  docusate sodium (COLACE) capsule 100 mg, 100 mg, Oral, BID PRN, Robert Gonzalez MD  •  famotidine (PEPCID) tablet 20 mg, 20 mg, Oral, BID PRN, Robert Gonzalez MD  •  glipizide (GLUCOTROL) tablet 1.25 mg, 1.25 mg, Oral, BID AC, Robert Gonzalez MD, 1.25 mg at 05/10/20 1742  •  glucagon (human recombinant) (GLUCAGEN DIAGNOSTIC) injection 1 mg, 1 mg, Subcutaneous, Q15 Min PRN, Robert Gonzalez MD  •  HYDROcodone-acetaminophen (NORCO) 5-325 MG per tablet 1 tablet, 1 tablet, Oral, Q4H PRN, Robert Gonzalez MD, 1 tablet at 05/10/20 1404  •  insulin lispro (humaLOG) injection 0-7 Units, 0-7 Units, Subcutaneous, TID With Meals, Robert Gonzalez MD, 2 Units at 05/10/20 1144  •  ipratropium-albuterol (DUO-NEB) nebulizer solution 3 mL, 3 mL, Nebulization, Q4H PRN, Robert Gonzalez MD  •  melatonin tablet 5 mg, 5 mg, Oral, Nightly PRN, Robert Gonzalez MD, 5 mg at 05/09/20 2310  •  mexiletine (MEXITIL) capsule 150 mg,  150 mg, Oral, TID, Robert Gonzalez MD, 150 mg at 05/10/20 2303  •  nicotine (NICODERM CQ) 21 MG/24HR patch 1 patch, 1 patch, Transdermal, Q24H, Robert Gonzalez MD, 1 patch at 05/10/20 0821  •  nitroglycerin (NITROSTAT) SL tablet 0.4 mg, 0.4 mg, Sublingual, Q5 Min PRN, Robert Gonzalez MD  •  ondansetron (ZOFRAN) tablet 4 mg, 4 mg, Oral, Q6H PRN **OR** ondansetron (ZOFRAN) injection 4 mg, 4 mg, Intravenous, Q6H PRN, Robert Gonzalez MD  •  rOPINIRole (REQUIP) tablet 2 mg, 2 mg, Oral, Nightly, Robert Gonzalez MD, 2 mg at 05/10/20 2303  •  sacubitril-valsartan (ENTRESTO) 24-26 MG tablet 1 tablet, 1 tablet, Oral, Q12H, Robert Gonzalez MD, 1 tablet at 05/10/20 2302  •  sodium chloride 0.9 % flush 10 mL, 10 mL, Intravenous, Q12H, Robert Gonzalez MD, 10 mL at 05/10/20 2303  •  sodium chloride 0.9 % flush 10 mL, 10 mL, Intravenous, PRN, Robert Gonzalez MD  •  spironolactone (ALDACTONE) tablet 25 mg, 25 mg, Oral, Daily, Robert Gonzalez MD, 25 mg at 05/10/20 0821  •  tiZANidine (ZANAFLEX) tablet 8 mg, 8 mg, Oral, Nightly, Robert Gonzalez MD, 8 mg at 05/10/20 2303        Assessment:   1.Persistent VT: Recurrent symptomatic VT on Tikosyn. Tikosyn discontinued and now Amiodarone loading starting 5/2/2020 with Mexiletine. NO ICD shocks.  IV Amio over last night. Currently on Amiodarone 400mg BID. Episode of slow VT this am short in duration.   2. ICM: EF 45%, Last Cath 7/19 No options for CBI.  Medical therapy.   3. Chronic SHF, Stable. Coreg, Entresto and aldactone.   4. HTN: Controlled on Medical therapy.   5. HLD: Reported statin intolerance  6. COPD, Ongoing Tobacco abuse. Nicotine patch. Discussed again need for cessation.   7. Right sided headache now resoled. CT of the head reveals no acute changes. Hospitalist following.   8. [facility] [facility] ICD.       Plan:   1. Continue Amiodarone loading and Mexiletine.   2. Continue medical therapy, for  ICM    Electronically signed by VERO Llamas, 05/11/20, 7:49 AM.

## 2020-05-11 NOTE — PROGRESS NOTES
[facility] Medicine Services  PROGRESS NOTE    Patient Name: Derek Morris Jr.  : 1950  MRN: [id number]    Date of Admission: 2020  Primary Care Physician: Soraida Maher APRN    Subjective   Subjective     CC: Follow-up VT    HPI:No acute events overnight, patient states that he is doing alright, does endorse a HA.    Review of Systems  Gen- No fevers, chills  CV- No chest pain, palpitations  Resp- No cough, dyspnea  GI- No N/V/D, abd pain    All systems reviewed and are currently negative except as mentioned in HPI above  Objective   Objective     Vital Signs:   Temp:  [97.7 °F (36.5 °C)-98.2 °F (36.8 °C)] 98 °F (36.7 °C)  Heart Rate:  [69-98] 74  Resp:  [16-20] 16  BP: ()/() 115/63        Physical Exam:  Constitutional: Elderly gentleman, in no acute distress, awake, alert  HENT: NCAT, mucous membranes moist  Respiratory: Clear to auscultation bilaterally, respiratory effort normal   Cardiovascular: paced rythm, no murmurs, rubs, or gallops, palpable pedal pulses bilaterally  Gastrointestinal: Positive bowel sounds, soft, nontender, nondistended  Musculoskeletal: No bilateral ankle edema  Psychiatric: Appropriate affect, cooperative  Neurologic: Oriented x 3, no focal deficit  Skin: No rashes    Results Reviewed:  Results from last 7 days   Lab Units 05/11/20  0459 05/10/20  0337 05/09/20  0946   WBC 10*3/mm3 6.76 7.24 7.00   HEMOGLOBIN g/dL 14.9 14.5 15.4   HEMATOCRIT % 47.6 45.7 48.1   PLATELETS 10*3/mm3 117* 122* 145   PROCALCITONIN ng/mL  --   --  <0.02*     Results from last 7 days   Lab Units 05/11/20  0458 05/10/20  03320  0946   SODIUM mmol/L 136 137 140   POTASSIUM mmol/L 4.4 4.4 4.8   CHLORIDE mmol/L 99 99 102   CO2 mmol/L 28.0 27.0 28.0   BUN mg/dL 12 11 8   CREATININE mg/dL 1.11 1.04 0.98   GLUCOSE mg/dL 149* 106* 126*   CALCIUM mg/dL 8.4* 8.6 8.4*   ALT (SGPT) U/L  --   --  11   AST (SGOT) U/L  --   --  13   TROPONIN T ng/mL  --   --   <0.010     Estimated Creatinine Clearance: 66.2 mL/min (by C-G formula based on SCr of 1.11 mg/dL).    Microbiology Results Abnormal     None          Imaging Results (Last 24 Hours)     Procedure Component Value Units Date/Time    CT Head Without Contrast [[id number]] Collected:  05/10/20 1601     Updated:  05/10/20 1824    Narrative:       EXAMINATION: CT HEAD WO CONTRAST - 05/10/2020     INDICATION: Right-sided headache and numbness.     TECHNIQUE: Multiple axial CT imaging is obtained of the head from skull  base to skull vertex without the administration of intravenous contrast.     The radiation dose reduction device was turned on for each scan per the  ALARA (As Low as Reasonably Achievable) protocol.     COMPARISON: 09/24/2019     FINDINGS: There is a small less than 1 cm area of fat signal intensity  within the posterior aspect of the ventricular system. Findings are  stable and unchanged. No hydrocephalus or hemorrhage. No mass, mass  effect, or midline shift. No abnormal extra-axial fluid collections  identified. The bony structures reveal no evidence of osseous  abnormality. Visualized paranasal sinuses are clear. The mastoid air  cells are patent.       Impression:       Stable examination with no acute intracranial abnormality  identified.     DICTATED:   05/10/2020  EDITED/ls :   05/10/2020              Results for orders placed during the hospital encounter of 07/22/19   Adult Transthoracic Echo Complete W/ Cont if Necessary Per Protocol    Addendum · Left ventricular systolic function is mildly decreased. Estimated EF =  45%. · The following left ventricular wall segments are hypokinetic: basal  anterolateral, mid anterolateral, apical lateral and mid inferolateral. · Left ventricular diastolic dysfunction (grade I a) consistent with  impaired relaxation. · There is calcification of the aortic valve. · Trace-to-mild aortic valve regurgitation is present · Mild tricuspid valve regurgitation is  present. · Estimated right ventricular systolic pressure from tricuspid  regurgitation is mildly elevated (35-45 mmHg).        Vlad Bravo MD 7/23/2019  1:45 PM          Narrative · Left ventricular systolic function is normal. Estimated EF = 50%.  · The following left ventricular wall segments are hypokinetic: basal   anterolateral, mid anterolateral, apical lateral and mid inferolateral.  · Left ventricular diastolic dysfunction (grade I a) consistent with   impaired relaxation.  · There is calcification of the aortic valve.  · Trace-to-mild aortic valve regurgitation is present  · Mild tricuspid valve regurgitation is present.  · Estimated right ventricular systolic pressure from tricuspid   regurgitation is mildly elevated (35-45 mmHg).          I have reviewed the medications:  Scheduled Meds:  amiodarone 400 mg Oral TID   apixaban 5 mg Oral Q12H   arformoterol 15 mcg Nebulization BID - RT   aspirin 81 mg Oral Daily   budesonide 0.5 mg Nebulization BID - RT   bumetanide 0.5 mg Oral Daily   carvedilol 12.5 mg Oral BID   glipizide 1.25 mg Oral BID AC   insulin lispro 0-7 Units Subcutaneous TID With Meals   mexiletine 150 mg Oral TID   nicotine 1 patch Transdermal Q24H   rOPINIRole 2 mg Oral Nightly   sacubitril-valsartan 1 tablet Oral Q12H   sodium chloride 10 mL Intravenous Q12H   spironolactone 25 mg Oral Daily   tiZANidine 8 mg Oral Nightly     Continuous Infusions:  amiodarone 0.5 mg/min Last Rate: Stopped (05/11/20 0658)     PRN Meds:.•  acetaminophen **OR** acetaminophen **OR** acetaminophen  •  busPIRone  •  dextrose  •  dextrose  •  docusate sodium  •  famotidine  •  glucagon (human recombinant)  •  HYDROcodone-acetaminophen  •  ipratropium-albuterol  •  melatonin  •  nitroglycerin  •  ondansetron **OR** ondansetron  •  sodium chloride    Assessment/Plan   Assessment & Plan     Active Hospital Problems    Diagnosis  POA   • **VT (ventricular tachycardia) (CMS/[facility]) [I47.2]  Yes   • Pre-syncope related  to ventricular tachycardia [R55]  Yes   • Other emphysema (CMS/[facility]) [J43.8]  Yes   • Type 2 diabetes mellitus without complication (CMS/[facility]) [E11.9]  Yes   • PAD (peripheral artery disease) (CMS/[facility]) [I73.9]  Yes   • COPD (chronic obstructive pulmonary disease) (CMS/[facility]) [J44.9]  Yes   • Coronary artery disease of native artery of native heart with stable angina pectoris (CMS/[facility]) [I25.118]  Yes   • Cardiomyopathy (CMS/[facility]) [I42.9]  Yes   • Mixed hyperlipidemia [E78.2]  Yes   • Essential hypertension [I10]  Yes   • Chronic systolic heart failure (CMS/[facility]) [I50.22]  Yes      Resolved Hospital Problems   No resolved problems to display.        Brief Hospital Course to date:  Derek Morris Jr. is a 69 y.o. male past medical history of type 2 diabetes, COPD with ongoing tobacco abuse, hyperlipidemia, hypertension, chronic systolic heart failure, CAD/PAD.  Patient presented to [facility] with episodes of diaphoresis, nausea and vomiting found to be in VT, currently with ongoing medical management.    Plan  Persistent ventricular tachycardia  -Cardiology following, s/p Tikosyn, now on amiodarone and mexiletine  -Monitor and replete electrolytes per protocol    Ischemic cardiomyopathy, with chronic systolic heart failure  -Patient is currently compensated  -EF 45% continue Coreg, Entresto and Aldactone    Hypertension  -BP currently stable and controlled, continue meds as above    COPD current tobacco abuse  -Not in exacerbation, continue duo nebs  -Extensively counseled on cessation, continue NRT    PAD-stable continue Eliquis    Well-controlled type 2 diabetes with A1c 6.9%  -FSBG reviewed and appropriate, continue SSI, hold glipizide.    Elevated TSH-however free T4 is appropriate, needs repeat thyroid panel 4 to 6 weeks after discharge    Thrombocytopenia  -Platelets on lower limits of normal on presentation, ~ 145, currently 117  -Continue to monitor, patient currently on Eliquis    DVT Prophylaxis: Eliquis    Daily Care  Communication  Due to current limited visitation policies, an attempt will be made daily to update patient's identified best point-of-contact(s)   Contact: Amanda   Relation: Spouse   Type of communication (phone or televideo): Phone [phone]   Time of communication: 1:33pm   Notes (if applicable): updated and all answered     Disposition: anticipate d/c in AM if ok per cards.CM following.    CODE STATUS:   Code Status and Medical Interventions:   Ordered at: 05/09/20 0917     Code Status:    CPR     Medical Interventions (Level of Support Prior to Arrest):    Full       Electronically signed by Adama Avendano MD, 05/11/20, 1:35 PM.

## 2020-05-12 VITALS
WEIGHT: 169 LBS | BODY MASS INDEX: 25.61 KG/M2 | HEIGHT: 68 IN | RESPIRATION RATE: 18 BRPM | SYSTOLIC BLOOD PRESSURE: 125 MMHG | HEART RATE: 74 BPM | DIASTOLIC BLOOD PRESSURE: 60 MMHG | OXYGEN SATURATION: 97 % | TEMPERATURE: 98.2 F

## 2020-05-12 PROBLEM — R55 SYNCOPE: Status: RESOLVED | Noted: 2020-05-09 | Resolved: 2020-05-12

## 2020-05-12 LAB
GLUCOSE BLDC GLUCOMTR-MCNC: 108 MG/DL (ref 70–130)
GLUCOSE BLDC GLUCOMTR-MCNC: 120 MG/DL (ref 70–130)

## 2020-05-12 PROCEDURE — 93010 ELECTROCARDIOGRAM REPORT: CPT | Performed by: INTERNAL MEDICINE

## 2020-05-12 PROCEDURE — 94799 UNLISTED PULMONARY SVC/PX: CPT

## 2020-05-12 PROCEDURE — 82962 GLUCOSE BLOOD TEST: CPT

## 2020-05-12 PROCEDURE — 99239 HOSP IP/OBS DSCHRG MGMT >30: CPT | Performed by: INTERNAL MEDICINE

## 2020-05-12 PROCEDURE — 93005 ELECTROCARDIOGRAM TRACING: CPT | Performed by: NURSE PRACTITIONER

## 2020-05-12 PROCEDURE — 99232 SBSQ HOSP IP/OBS MODERATE 35: CPT | Performed by: PHYSICIAN ASSISTANT

## 2020-05-12 RX ORDER — AMIODARONE HYDROCHLORIDE 400 MG/1
TABLET ORAL
Qty: 57 TABLET | Refills: 0 | Status: SHIPPED | OUTPATIENT
Start: 2020-05-12 | End: 2020-06-20

## 2020-05-12 RX ORDER — AMIODARONE HYDROCHLORIDE 400 MG/1
TABLET ORAL
Qty: 57 TABLET | Refills: 0 | Status: SHIPPED | OUTPATIENT
Start: 2020-05-12 | End: 2020-05-12

## 2020-05-12 RX ADMIN — AMIODARONE HYDROCHLORIDE 400 MG: 200 TABLET ORAL at 08:52

## 2020-05-12 RX ADMIN — SACUBITRIL AND VALSARTAN 1 TABLET: 24; 26 TABLET, FILM COATED ORAL at 08:51

## 2020-05-12 RX ADMIN — CARVEDILOL 12.5 MG: 12.5 TABLET, FILM COATED ORAL at 08:53

## 2020-05-12 RX ADMIN — APIXABAN 5 MG: 5 TABLET, FILM COATED ORAL at 08:51

## 2020-05-12 RX ADMIN — BUDESONIDE 0.5 MG: 0.5 INHALANT RESPIRATORY (INHALATION) at 07:42

## 2020-05-12 RX ADMIN — SODIUM CHLORIDE, PRESERVATIVE FREE 10 ML: 5 INJECTION INTRAVENOUS at 08:55

## 2020-05-12 RX ADMIN — BUMETANIDE 0.5 MG: 0.5 TABLET ORAL at 08:52

## 2020-05-12 RX ADMIN — SPIRONOLACTONE 25 MG: 25 TABLET ORAL at 08:51

## 2020-05-12 RX ADMIN — ASPIRIN 81 MG: 81 TABLET, COATED ORAL at 08:51

## 2020-05-12 RX ADMIN — MEXILETINE HYDROCHLORIDE 150 MG: 150 CAPSULE ORAL at 08:52

## 2020-05-12 RX ADMIN — ARFORMOTEROL TARTRATE 15 MCG: 15 SOLUTION RESPIRATORY (INHALATION) at 07:42

## 2020-05-12 NOTE — PLAN OF CARE
Patient has rested well with no complaints. Vss, paced, Plan is for discharge today if hr remains stable. Will continue to monitor.

## 2020-05-12 NOTE — PROGRESS NOTES
CARDIOLOGY PROGRESS NOTE           5/12/2020 07:53    Admit Date: 5/9/2020    Admit Diagnosis: Sustained SVT (CMS/HCC) [I47.1]    Chief Compliant: Follow up for VT     Subjective:   Patient's status has been stable overnight. He has not had any further VT overnight. Patient is eager to get home. He denies any cp, sob, edema, palps.       Objective:     Vitals:    05/11/20 1900 05/12/20 0314 05/12/20 0717 05/12/20 0742   BP: 123/68 103/62 105/81    BP Location: Right arm Left arm Left arm    Patient Position: Lying Lying Lying    Pulse: 72 70 74    Resp: 16 16 18 18   Temp: 98.2 °F (36.8 °C) 98.2 °F (36.8 °C) 98.2 °F (36.8 °C)    TempSrc: Oral Oral Oral    SpO2:       Weight:       Height:           Physical Exam:  General-Well Nourished, Well developed  Eyes - PERRLA  Neck- supple, No mass  CV- regular rate and rhythm, no MRG  Lung- clear bilaterally  Abd- soft, +BS  Musc/skel - Norm strength and range of motion  Skin- warm and dry  Neuro - Alert & Oriented x 3, appropriate mood.      Current Facility-Administered Medications:   •  acetaminophen (TYLENOL) tablet 650 mg, 650 mg, Oral, Q4H PRN, 650 mg at 05/09/20 1536 **OR** acetaminophen (TYLENOL) 160 MG/5ML solution 650 mg, 650 mg, Oral, Q4H PRN **OR** acetaminophen (TYLENOL) suppository 650 mg, 650 mg, Rectal, Q4H PRN, Robert Gonzalez MD  •  amiodarone (PACERONE) tablet 400 mg, 400 mg, Oral, TID, Perla Haq APRN, 400 mg at 05/11/20 2221  •  apixaban (ELIQUIS) tablet 5 mg, 5 mg, Oral, Q12H, Robert Gonzalez MD, 5 mg at 05/11/20 2219  •  arformoterol (BROVANA) nebulizer solution 15 mcg, 15 mcg, Nebulization, BID - RT, Robert Gonzalez MD, 15 mcg at 05/12/20 0742  •  aspirin EC tablet 81 mg, 81 mg, Oral, Daily, Robert Gonzalez MD, 81 mg at 05/11/20 0833  •  budesonide (PULMICORT) nebulizer solution 0.5 mg, 0.5 mg, Nebulization, BID - RT, Robert Gonzalez MD, 0.5 mg at 05/12/20 0742  •  bumetanide (BUMEX) tablet 0.5 mg,  0.5 mg, Oral, Daily, Robert Gonzalez MD, 0.5 mg at 05/11/20 0833  •  busPIRone (BUSPAR) tablet 10 mg, 10 mg, Oral, BID PRN, Robert Gonzalez MD, 10 mg at 05/11/20 2220  •  butalbital-acetaminophen-caffeine (FIORICET, ESGIC) -40 MG per tablet 1 tablet, 1 tablet, Oral, Q6H PRN, Adama Avendano MD, 1 tablet at 05/11/20 1355  •  carvedilol (COREG) tablet 12.5 mg, 12.5 mg, Oral, BID, Robert Gonzalez MD, 12.5 mg at 05/11/20 2221  •  dextrose (D50W) 25 g/ 50mL Intravenous Solution 25 g, 25 g, Intravenous, Q15 Min PRN, Robert Gonzalez MD  •  dextrose (GLUTOSE) oral gel 15 g, 15 g, Oral, Q15 Min PRN, Robert Gonzalez MD  •  docusate sodium (COLACE) capsule 100 mg, 100 mg, Oral, BID PRN, Robert Gonzalez MD  •  famotidine (PEPCID) tablet 20 mg, 20 mg, Oral, BID PRN, Robert Gonzalez MD  •  glucagon (human recombinant) (GLUCAGEN DIAGNOSTIC) injection 1 mg, 1 mg, Subcutaneous, Q15 Min PRN, Robert Gonzalez MD  •  HYDROcodone-acetaminophen (NORCO) 5-325 MG per tablet 1 tablet, 1 tablet, Oral, Q4H PRN, Robert Gonzalez MD, 1 tablet at 05/10/20 1404  •  insulin lispro (humaLOG) injection 0-7 Units, 0-7 Units, Subcutaneous, TID With Meals, Robert Gonzalez MD, 2 Units at 05/10/20 1144  •  ipratropium-albuterol (DUO-NEB) nebulizer solution 3 mL, 3 mL, Nebulization, Q4H PRN, Robert Gonzalez MD  •  melatonin tablet 5 mg, 5 mg, Oral, Nightly PRN, Robert Gonzalez MD, 5 mg at 05/11/20 2220  •  mexiletine (MEXITIL) capsule 150 mg, 150 mg, Oral, TID, Robert Gonzalez MD, 150 mg at 05/11/20 2229  •  nicotine (NICODERM CQ) 21 MG/24HR patch 1 patch, 1 patch, Transdermal, Q24H, Robert Gonzalez MD, 1 patch at 05/11/20 0836  •  nitroglycerin (NITROSTAT) SL tablet 0.4 mg, 0.4 mg, Sublingual, Q5 Min PRN, Robert Gonzalez MD  •  ondansetron (ZOFRAN) tablet 4 mg, 4 mg, Oral, Q6H PRN **OR** ondansetron (ZOFRAN) injection  4 mg, 4 mg, Intravenous, Q6H PRN, Robert Gonzalez MD  •  rOPINIRole (REQUIP) tablet 2 mg, 2 mg, Oral, Nightly, Robert Gonzalez MD, 2 mg at 05/11/20 2222  •  sacubitril-valsartan (ENTRESTO) 24-26 MG tablet 1 tablet, 1 tablet, Oral, Q12H, Robert Gonzalez MD, 1 tablet at 05/11/20 2221  •  sodium chloride 0.9 % flush 10 mL, 10 mL, Intravenous, Q12H, Robert Gonzalez MD, 10 mL at 05/11/20 2224  •  sodium chloride 0.9 % flush 10 mL, 10 mL, Intravenous, PRN, Robert Gonzalez MD  •  spironolactone (ALDACTONE) tablet 25 mg, 25 mg, Oral, Daily, Robert Gonzalez MD, 25 mg at 05/11/20 0833  •  tiZANidine (ZANAFLEX) tablet 8 mg, 8 mg, Oral, Nightly, Robert Gonzalez MD, 8 mg at 05/11/20 2220    Data Review:   Recent Results (from the past 24 hour(s))   POC Glucose Once    Collection Time: 05/11/20  8:11 AM   Result Value Ref Range    Glucose 108 70 - 130 mg/dL   POC Glucose Once    Collection Time: 05/11/20 11:36 AM   Result Value Ref Range    Glucose 112 70 - 130 mg/dL   POC Glucose Once    Collection Time: 05/11/20  4:42 PM   Result Value Ref Range    Glucose 109 70 - 130 mg/dL   POC Glucose Once    Collection Time: 05/11/20  8:54 PM   Result Value Ref Range    Glucose 107 70 - 130 mg/dL   POC Glucose Once    Collection Time: 05/12/20  7:15 AM   Result Value Ref Range    Glucose 108 70 - 130 mg/dL     Assessment:       Essential hypertension    Chronic systolic heart failure (CMS/HCC)    Mixed hyperlipidemia    Coronary artery disease of native artery of native heart with stable angina pectoris (CMS/HCC)    Cardiomyopathy (CMS/HCC)    COPD (chronic obstructive pulmonary disease) (CMS/HCC)    PAD (peripheral artery disease) (CMS/[facility])    Other emphysema (CMS/[facility])    Type 2 diabetes mellitus without complication (CMS/[facility])    Plan:   1.Persistent VT: Recurrent symptomatic VT on Tikosyn. Tikosyn discontinued and now Amiodarone loading starting 5/2/2020 with Mexiletine. NO  ICD shocks.  IV Amio over last night. Currently on Amiodarone 400mg TID. Episode of slow VT yesterday morning, but none since that time.    2. ICM: EF 45%, Last Cath 7/19 No options for CBI.  Medical therapy.   3. Chronic SHF, Stable. Coreg, Entresto and aldactone.   4. HTN: Controlled on Medical therapy.   5. HLD: Reported statin intolerance  6. COPD, Ongoing Tobacco abuse. Nicotine patch. Discussed again need for cessation.   7. Right sided headache now resoled. CT of the head reveals no acute changes. Hospitalist following.   8. [facility] [facility] ICD.     Patient is clear for discharge from EP standpoint. Would continue Amiodarone 400mg TID x 9 days, then decrease to 400mg daily. Continue Coreg, Entresto, Aldactone. Follow-up with Dr. Dior in 3-4 weeks.        Electronically signed by VERO Johns, 05/12/20, 7:53 AM.

## 2020-05-12 NOTE — DISCHARGE SUMMARY
[facility] Medicine Services  DISCHARGE SUMMARY    Patient Name: Derek Morris Jr.  : 1950  MRN: [id number]    Date of Admission: 2020  7:39 AM  Date of Discharge:  2020  Primary Care Physician: Soraida Maher APRN    Consults     Date and Time Order Name Status Description    2020 0917 Inpatient Cardiology Consult Completed           Hospital Course     Presenting Problem:   Sustained SVT (CMS/[facility]) [I47.1]    Active Hospital Problems    Diagnosis  POA   • **VT (ventricular tachycardia) (CMS/[facility]) [I47.2]  Yes   • Pre-syncope related to ventricular tachycardia [R55]  Yes   • Other emphysema (CMS/[facility]) [J43.8]  Yes   • Type 2 diabetes mellitus without complication (CMS/[facility]) [E11.9]  Yes   • PAD (peripheral artery disease) (CMS/[facility]) [I73.9]  Yes   • COPD (chronic obstructive pulmonary disease) (CMS/[facility]) [J44.9]  Yes   • Coronary artery disease of native artery of native heart with stable angina pectoris (CMS/[facility]) [I25.118]  Yes   • Cardiomyopathy (CMS/[facility]) [I42.9]  Yes   • Mixed hyperlipidemia [E78.2]  Yes   • Essential hypertension [I10]  Yes   • Chronic systolic heart failure (CMS/[facility]) [I50.22]  Yes      Resolved Hospital Problems   No resolved problems to display.          Hospital Course:  Derek Morris Jr. is a 69 y.o. male with history of of type 2 diabetes, COPD with ongoing tobacco abuse, hyperlipidemia, hypertension, chronic systolic heart failure, CAD/PAD.  Patient presented to [facility] with episodes of diaphoresis, nausea and vomiting found to be in VT.     Persistent ventricular tachycardia  -Cardiology was consulted, Tikosyn was discontinued, and he was started on amiodarone and mexiletine for which he tolerated well.  -Plan to discharge home with 400 mg of amiodarone 3 times daily for 9 days, followed by 400 mg daily thereafter, plan to follow-up with Dr. Dior in 3-4 weeks     Ischemic cardiomyopathy, with chronic systolic heart failure  -Patient is  currently compensated  -EF 45% continue Coreg, Entresto and Aldactone     Hypertension  -BP was stable stable and controlled, continue home meds     COPD current tobacco abuse  -Not in exacerbation, extensively counseled on cessation, was given NRT     PAD-stable continue Eliquis     Well-controlled type 2 diabetes with A1c 6.9%  -FSBG r were reviewed eviewed and were appropriate, okay to resume home glipizide.     Elevated TSH-however free T4 was appropriate, needs repeat thyroid panel 4 to 6 weeks after discharge with PCP     Thrombocytopenia  -Platelets on lower limits of normal on presentation, ~ 145, currently 117  -Continue to monitor, patient currently on Eliquis, will need repeat labs with PCP in 1 week     Discharge Follow Up Recommendations for outpatient labs/diagnostics:  Follow-up with PCP in 1 week  Follow-up with cardiology Dr. Dior in 3 to 4 weeks    Day of Discharge     HPI: No acute events overnight, patient that he slept well, no new complaints, itching to go home.    Review of Systems  Gen- No fevers, chills  CV- No chest pain, palpitations  Resp- No cough, dyspnea  GI- No N/V/D, abd pain    All systems reviewed and currently negative except mentioned HPI above    Vital Signs:   Temp:  [98.2 °F (36.8 °C)] 98.2 °F (36.8 °C)  Heart Rate:  [69-76] 74  Resp:  [16-18] 18  BP: (103-123)/(59-81) 105/81     Physical Exam:  Constitutional: No acute distress, awake, alert  HENT: NCAT, mucous membranes moist  Respiratory: Clear to auscultation bilaterally, respiratory effort normal   Cardiovascular: RRR, no murmurs, rubs, or gallops, palpable pedal pulses bilaterally  Gastrointestinal: Positive bowel sounds, soft, nontender, nondistended  Musculoskeletal: No bilateral ankle edema  Psychiatric: Appropriate affect, cooperative  Neurologic: Oriented x 3, no focal deficits  Skin: No rashes    Pertinent  and/or Most Recent Results     Results from last 7 days   Lab Units 05/11/20  0459 05/11/20  0458  05/10/20  0337 05/09/20  0946   WBC 10*3/mm3 6.76  --  7.24 7.00   HEMOGLOBIN g/dL 14.9  --  14.5 15.4   HEMATOCRIT % 47.6  --  45.7 48.1   PLATELETS 10*3/mm3 117*  --  122* 145   SODIUM mmol/L  --  136 137 140   POTASSIUM mmol/L  --  4.4 4.4 4.8   CHLORIDE mmol/L  --  99 99 102   CO2 mmol/L  --  28.0 27.0 28.0   BUN mg/dL  --  12 11 8   CREATININE mg/dL  --  1.11 1.04 0.98   GLUCOSE mg/dL  --  149* 106* 126*   CALCIUM mg/dL  --  8.4* 8.6 8.4*     Results from last 7 days   Lab Units 05/09/20  0946   BILIRUBIN mg/dL 0.4   ALK PHOS U/L 58   ALT (SGPT) U/L 11   AST (SGOT) U/L 13           Invalid input(s): TG, LDLCALC, LDLREALC  Results from last 7 days   Lab Units 05/09/20  0946   TSH uIU/mL 4.900*   HEMOGLOBIN A1C % 6.90*   TROPONIN T ng/mL <0.010   PROCALCITONIN ng/mL <0.02*       Brief Urine Lab Results  (Last result in the past 365 days)      Color   Clarity   Blood   Leuk Est   Nitrite   Protein   CREAT   Urine HCG        05/09/20 1114 Yellow Clear Negative Negative Negative Negative               Microbiology Results Abnormal     None          Imaging Results (All)     Procedure Component Value Units Date/Time    CT Head Without Contrast [[id number]] Collected:  05/10/20 1601     Updated:  05/11/20 0837    Narrative:       EXAMINATION: CT HEAD WO CONTRAST - 05/10/2020     INDICATION: Right-sided headache and numbness.     TECHNIQUE: Multiple axial CT imaging is obtained of the head from skull  base to skull vertex without the administration of intravenous contrast.     The radiation dose reduction device was turned on for each scan per the  ALARA (As Low as Reasonably Achievable) protocol.     COMPARISON: 09/24/2019     FINDINGS: There is a small less than 1 cm area of fat signal intensity  within the posterior aspect of the ventricular system. Findings are  stable and unchanged. No hydrocephalus or hemorrhage. No mass, mass  effect, or midline shift. No abnormal extra-axial fluid collections  identified. The  bony structures reveal no evidence of osseous  abnormality. Visualized paranasal sinuses are clear. The mastoid air  cells are patent.       Impression:       Stable examination with no acute intracranial abnormality  identified.     DICTATED:   05/10/2020  EDITED/ls :   05/10/2020      This report was finalized on 5/11/2020 8:34 AM by Dr. Barb Nieto MD.             Results for orders placed during the hospital encounter of 09/25/19   Duplex Carotid Ultrasound CAR    Narrative · Right internal carotid artery stenosis of 0-49%.  · Left internal carotid artery stenosis of 0-49%.          Results for orders placed during the hospital encounter of 09/25/19   Duplex Carotid Ultrasound CAR    Narrative · Right internal carotid artery stenosis of 0-49%.  · Left internal carotid artery stenosis of 0-49%.          Results for orders placed during the hospital encounter of 05/09/20   Adult Transthoracic Echo Complete W/ Cont if Necessary Per Protocol    Narrative · Left ventricular systolic function is normal. Estimated EF appears to be   in the range of 51 - 55%.  · The following left ventricular wall segments are hypokinetic: basal   anterolateral, mid anterolateral, basal inferolateral, basal inferoseptal,   basal inferior and basal inferoseptal.  · Left ventricular wall thickness is consistent with moderate concentric   hypertrophy.  · Left ventricular diastolic dysfunction (grade II) consistent with   pseudonormalization.  · Left atrial volume is mildly increased.  · There is moderate calcification of the aortic valve.  · Trace-to-mild aortic valve regurgitation is present.          Plan for Follow-up of Pending Labs/Results: None    Discharge Details        Discharge Medications      ASK your doctor about these medications      Instructions Start Date   albuterol sulfate  (90 Base) MCG/ACT inhaler  Commonly known as:  PROVENTIL HFA;VENTOLIN HFA;PROAIR HFA   1-2 puffs, Inhalation, Every 6 Hours PRN       amiodarone 200 MG tablet  Commonly known as:  Pacerone   Take 2 tablets by mouth 3 (Three) Times a Day Before Meals for 14 days, THEN 2 tablets Daily for 90 days.   Start Date:  April 30, 2020     aspirin 81 MG EC tablet   81 mg, Oral, Daily      bumetanide 0.5 MG tablet  Commonly known as:  BUMEX   0.5 mg, Oral, Daily      carvedilol 12.5 MG tablet  Commonly known as:  COREG   12.5 mg, Oral, 2 Times Daily      Eliquis 5 MG tablet tablet  Generic drug:  apixaban   TAKE 1 TABLET EVERY 12 HOURS      Evolocumab with Infusor 420 MG/3.5ML solution cartridge  Commonly known as:  Repatha Pushtronex System   420 mg, Subcutaneous, Every 30 Days      ezetimibe 10 MG tablet  Commonly known as:  ZETIA   10 mg, Oral, Daily      glipiZIDE XL 2.5 MG 24 hr tablet  Generic drug:  glipizide   2.5 mg, Oral, Daily      mexiletine 150 MG capsule  Commonly known as:  MEXITIL   150 mg, Oral, 3 Times Daily      nitroglycerin 0.4 MG SL tablet  Commonly known as:  NITROSTAT   0.4 mg, Sublingual, Every 5 Minutes PRN, Take no more than 3 doses in 15 minutes.       rOPINIRole 1 MG tablet  Commonly known as:  REQUIP   2 mg, Oral, Nightly      sacubitril-valsartan 24-26 MG tablet  Commonly known as:  ENTRESTO   1 tablet, Oral, Every 12 Hours Scheduled      spironolactone 25 MG tablet  Commonly known as:  ALDACTONE   25 mg, Oral, Daily      tiZANidine 4 MG tablet  Commonly known as:  ZANAFLEX   8 mg, Oral, Nightly      TRELEGY ELLIPTA IN   1 puff, Inhalation, Daily             Allergies   Allergen Reactions   • Crestor [Rosuvastatin Calcium] Myalgia   • Lipitor [Atorvastatin] Myalgia     Joint pain myalgias   • Lopressor [Metoprolol Tartrate] Myalgia and Unknown (See Comments)     Side of face went numb     • Plavix [Clopidogrel Bisulfate] Unknown (See Comments)     Previously thought to be resistant; placed back on clopidogrel per Dr. Bravo earlier this year.  Confirmed with patient   • Adhesive Tape Itching and Rash         Discharge  Disposition: Home      Diet:  Hospital:  Diet Order   Procedures   • Diet Regular; Consistent Carbohydrate, Cardiac, Low Sodium; 2,000 mg Na       Activity: As tolerated    Restrictions or Other Recommendations:  None       CODE STATUS:    Code Status and Medical Interventions:   Ordered at: 05/09/20 0917     Code Status:    CPR     Medical Interventions (Level of Support Prior to Arrest):    Full       Future Appointments   Date Time Provider Department Center   6/8/2020 11:45 AM Calin Dior DO [facility] None   10/5/2020 11:15 AM Calin Dior DO [facility] None       Time Spent on Discharge:  35 minutes    Electronically signed by Adama Avendano MD, 05/12/20, 9:34 AM.

## 2020-05-13 ENCOUNTER — READMISSION MANAGEMENT (OUTPATIENT)
Dept: CALL CENTER | Facility: HOSPITAL | Age: 70
End: 2020-05-13

## 2020-05-13 NOTE — OUTREACH NOTE
Prep Survey      Responses   [facility] facility patient discharged from?  [place]   Is LACE score < 7 ?  No   Eligibility  Readm Mgmt   Discharge diagnosis  VT, pre-syncope related to VT, emphysema, DM II, PAD, COPD, CAD with stabel angina, cardiomhyopathy, mixed HLD, essential HTN, chronic systolic HF   COVID-19 Test Status  Not tested   Does the patient have one of the following disease processes/diagnoses(primary or secondary)?  Other   Does the patient have Home health ordered?  No   Is there a DME ordered?  No   Comments regarding appointments  See AVS   Prep survey completed?  Yes          Tata Ortiz RN

## 2020-05-14 ENCOUNTER — TELEPHONE (OUTPATIENT)
Dept: CARDIOLOGY | Facility: CLINIC | Age: 70
End: 2020-05-14

## 2020-05-14 RX ORDER — MEXILETINE HYDROCHLORIDE 150 MG/1
150 CAPSULE ORAL 3 TIMES DAILY
Qty: 90 CAPSULE | Refills: 0 | Status: SHIPPED | OUTPATIENT
Start: 2020-05-14 | End: 2021-01-01 | Stop reason: SDUPTHER

## 2020-05-14 NOTE — TELEPHONE ENCOUNTER
Patients pharmacy ([facility]) called to notify us that they were out of Mexiletine. I called 5 different local pharmacies and only found that the [facility] in [place] had 5 pills. [facility] mail order has in stock. I called and gave a verbal order for 90-day supply and patient is to call them to pay for rush shipping over the phone and get the 5 pills from [place] to get him through the next day until it arrives because he only has 2 more pills left for today.

## 2020-05-15 RX ORDER — EZETIMIBE 10 MG/1
TABLET ORAL
Qty: 90 TABLET | Refills: 3 | Status: SHIPPED | OUTPATIENT
Start: 2020-05-15 | End: 2021-01-01 | Stop reason: SDUPTHER

## 2020-05-18 ENCOUNTER — READMISSION MANAGEMENT (OUTPATIENT)
Dept: CALL CENTER | Facility: HOSPITAL | Age: 70
End: 2020-05-18

## 2020-05-18 NOTE — OUTREACH NOTE
Medical Week 1 Survey      Responses   [facility] patient discharged from?  [place]   COVID-19 Test Status  Not tested   Does the patient have one of the following disease processes/diagnoses(primary or secondary)?  Other   Is there a successful TCM telephone encounter documented?  No   Week 1 attempt successful?  Yes   Call start time  1108   Rescheduled  Rescheduled-pt requested [wife stated to call pt back]   Call end time  1110   Discharge diagnosis  VT, pre-syncope related to VT, emphysema, DM II, PAD, COPD, CAD with stabel angina, cardiomhyopathy, mixed HLD, essential HTN, chronic systolic HF   Is patient permission given to speak with other caregiver?  Yes   Person spoke with today (if not patient) and relationship  Wife          Evelia Johnson RN

## 2020-05-20 ENCOUNTER — READMISSION MANAGEMENT (OUTPATIENT)
Dept: CALL CENTER | Facility: HOSPITAL | Age: 70
End: 2020-05-20

## 2020-05-20 NOTE — OUTREACH NOTE
Medical Week 1 Survey      Responses   [facility] patient discharged from?  [place]   COVID-19 Test Status  Not tested   Does the patient have one of the following disease processes/diagnoses(primary or secondary)?  Other   Is there a successful TCM telephone encounter documented?  No   Week 1 attempt successful?  Yes   Call start time  0900   Call end time  0908   Is patient permission given to speak with other caregiver?  Yes   List who call center can speak with  Amanda Michel reviewed with patient/caregiver?  Yes   Is the patient having any side effects they believe may be caused by any medication additions or changes?  No   Does the patient have all medications ordered at discharge?  Yes   Is the patient taking all medications as directed (includes completed medication regime)?  Yes   Does the patient have a primary care provider?   Yes   Does the patient have an appointment with their PCP within 7 days of discharge?  No   Comments regarding PCP  Sindi Maher NP   What is preventing the patient from scheduling follow up appointments within 7 days of discharge?  Haven't had time   Nursing Interventions  Educated patient on importance of making appointment   Has the patient kept scheduled appointments due by today?  N/A   Comments  Told to call PCP to set up appt.    Has home health visited the patient within 72 hours of discharge?  N/A   Pulse Ox monitoring  Intermittent   Pulse Ox device source  Patient   O2 Sat comments  Oxyen sat 95% on 2 liters, just us oxygen at night    O2 Sat: education provided  Sat levels   O2 Sat education comments  if sats run lower than 90% call MD or be seen if cannot get it up and having SOB   Psychosocial issues?  No   Comments  Being anxious, worried will happen again, hard time sleeping he states. told to address with PCP maybe get something for sleep   Did the patient receive a copy of their discharge instructions?  Yes   Nursing interventions  Reviewed instructions  with patient, Educated on MyChart   What is the patient's perception of their health status since discharge?  Improving   Is the patient/caregiver able to teach back signs and symptoms related to disease process for when to call PCP?  Yes   Is the patient/caregiver able to teach back signs and symptoms related to disease process for when to call 911?  Yes   Is the patient/caregiver able to teach back the hierarchy of who to call/visit for symptoms/problems? PCP, Specialist, Home health nurse, Urgent Care, ED, 911  Yes   Additional teach back comments  no new issues other than anxious and not sleeping   Wrap up additional comments  Getting his strength back, just anxious will happen again, and Pacemaker/Defib.  will not work,  but feeling fine other than that.           Christa Matias, RN

## 2020-05-29 ENCOUNTER — APPOINTMENT (OUTPATIENT)
Dept: CT IMAGING | Facility: HOSPITAL | Age: 70
End: 2020-05-29

## 2020-05-29 ENCOUNTER — HOSPITAL ENCOUNTER (EMERGENCY)
Facility: HOSPITAL | Age: 70
Discharge: HOME | End: 2020-05-29
Attending: FAMILY MEDICINE | Admitting: FAMILY MEDICINE

## 2020-05-29 ENCOUNTER — READMISSION MANAGEMENT (OUTPATIENT)
Dept: CALL CENTER | Facility: HOSPITAL | Age: 70
End: 2020-05-29

## 2020-05-29 VITALS
WEIGHT: 160 LBS | DIASTOLIC BLOOD PRESSURE: 84 MMHG | SYSTOLIC BLOOD PRESSURE: 121 MMHG | OXYGEN SATURATION: 98 % | RESPIRATION RATE: 18 BRPM | TEMPERATURE: 98 F | BODY MASS INDEX: 24.25 KG/M2 | HEART RATE: 76 BPM | HEIGHT: 68 IN

## 2020-05-29 DIAGNOSIS — G45.3 AMAUROSIS FUGAX: Primary | ICD-10-CM

## 2020-05-29 LAB
ALBUMIN SERPL-MCNC: 4.1 G/DL (ref 3.5–5.2)
ALBUMIN/GLOB SERPL: 1.6 G/DL
ALP SERPL-CCNC: 72 U/L (ref 39–117)
ALT SERPL W P-5'-P-CCNC: 22 U/L (ref 1–41)
ANION GAP SERPL CALCULATED.3IONS-SCNC: 10 MMOL/L (ref 5–15)
APAP SERPL-MCNC: <5 MCG/ML (ref 10–30)
AST SERPL-CCNC: 17 U/L (ref 1–40)
BASOPHILS # BLD AUTO: 0.05 10*3/MM3 (ref 0–0.2)
BASOPHILS NFR BLD AUTO: 0.6 % (ref 0–1.5)
BILIRUB SERPL-MCNC: 0.4 MG/DL (ref 0.2–1.2)
BUN BLD-MCNC: 17 MG/DL (ref 8–23)
BUN/CREAT SERPL: 13.6 (ref 7–25)
CALCIUM SPEC-SCNC: 9.2 MG/DL (ref 8.6–10.5)
CHLORIDE SERPL-SCNC: 101 MMOL/L (ref 98–107)
CK SERPL-CCNC: 71 U/L (ref 20–200)
CO2 SERPL-SCNC: 27 MMOL/L (ref 22–29)
CREAT BLD-MCNC: 1.25 MG/DL (ref 0.76–1.27)
CRP SERPL-MCNC: 0.38 MG/DL (ref 0–0.5)
D-LACTATE SERPL-SCNC: 1.1 MMOL/L (ref 0.5–2)
DEPRECATED RDW RBC AUTO: 45.6 FL (ref 37–54)
EOSINOPHIL # BLD AUTO: 0.19 10*3/MM3 (ref 0–0.4)
EOSINOPHIL NFR BLD AUTO: 2.3 % (ref 0.3–6.2)
ERYTHROCYTE [DISTWIDTH] IN BLOOD BY AUTOMATED COUNT: 12.9 % (ref 12.3–15.4)
ETHANOL BLD-MCNC: <10 MG/DL (ref 0–10)
ETHANOL UR QL: <0.01 %
GFR SERPL CREATININE-BSD FRML MDRD: 57 ML/MIN/1.73
GLOBULIN UR ELPH-MCNC: 2.6 GM/DL
GLUCOSE BLD-MCNC: 195 MG/DL (ref 65–99)
HCT VFR BLD AUTO: 50.5 % (ref 37.5–51)
HGB BLD-MCNC: 16.2 G/DL (ref 13–17.7)
IMM GRANULOCYTES # BLD AUTO: 0.03 10*3/MM3 (ref 0–0.05)
IMM GRANULOCYTES NFR BLD AUTO: 0.4 % (ref 0–0.5)
LYMPHOCYTES # BLD AUTO: 2.46 10*3/MM3 (ref 0.7–3.1)
LYMPHOCYTES NFR BLD AUTO: 29.6 % (ref 19.6–45.3)
MAGNESIUM SERPL-MCNC: 2.3 MG/DL (ref 1.6–2.4)
MCH RBC QN AUTO: 30.5 PG (ref 26.6–33)
MCHC RBC AUTO-ENTMCNC: 32.1 G/DL (ref 31.5–35.7)
MCV RBC AUTO: 95.1 FL (ref 79–97)
MONOCYTES # BLD AUTO: 0.65 10*3/MM3 (ref 0.1–0.9)
MONOCYTES NFR BLD AUTO: 7.8 % (ref 5–12)
NEUTROPHILS # BLD AUTO: 4.93 10*3/MM3 (ref 1.7–7)
NEUTROPHILS NFR BLD AUTO: 59.3 % (ref 42.7–76)
NRBC BLD AUTO-RTO: 0 /100 WBC (ref 0–0.2)
NT-PROBNP SERPL-MCNC: 202.5 PG/ML (ref 5–900)
PLATELET # BLD AUTO: 142 10*3/MM3 (ref 140–450)
PMV BLD AUTO: 10.5 FL (ref 6–12)
POTASSIUM BLD-SCNC: 4.4 MMOL/L (ref 3.5–5.2)
PROT SERPL-MCNC: 6.7 G/DL (ref 6–8.5)
RBC # BLD AUTO: 5.31 10*6/MM3 (ref 4.14–5.8)
SALICYLATES SERPL-MCNC: <0.3 MG/DL
SODIUM BLD-SCNC: 138 MMOL/L (ref 136–145)
TROPONIN T SERPL-MCNC: <0.01 NG/ML (ref 0–0.03)
TROPONIN T SERPL-MCNC: <0.01 NG/ML (ref 0–0.03)
TSH SERPL DL<=0.05 MIU/L-ACNC: 5.15 UIU/ML (ref 0.27–4.2)
WBC NRBC COR # BLD: 8.31 10*3/MM3 (ref 3.4–10.8)

## 2020-05-29 PROCEDURE — 82550 ASSAY OF CK (CPK): CPT | Performed by: FAMILY MEDICINE

## 2020-05-29 PROCEDURE — 84484 ASSAY OF TROPONIN QUANT: CPT | Performed by: FAMILY MEDICINE

## 2020-05-29 PROCEDURE — 80307 DRUG TEST PRSMV CHEM ANLYZR: CPT | Performed by: FAMILY MEDICINE

## 2020-05-29 PROCEDURE — 83735 ASSAY OF MAGNESIUM: CPT | Performed by: FAMILY MEDICINE

## 2020-05-29 PROCEDURE — 93010 ELECTROCARDIOGRAM REPORT: CPT | Performed by: INTERNAL MEDICINE

## 2020-05-29 PROCEDURE — 83880 ASSAY OF NATRIURETIC PEPTIDE: CPT | Performed by: FAMILY MEDICINE

## 2020-05-29 PROCEDURE — 84443 ASSAY THYROID STIM HORMONE: CPT | Performed by: FAMILY MEDICINE

## 2020-05-29 PROCEDURE — 70450 CT HEAD/BRAIN W/O DYE: CPT

## 2020-05-29 PROCEDURE — 99284 EMERGENCY DEPT VISIT MOD MDM: CPT

## 2020-05-29 PROCEDURE — 70496 CT ANGIOGRAPHY HEAD: CPT

## 2020-05-29 PROCEDURE — 83605 ASSAY OF LACTIC ACID: CPT | Performed by: FAMILY MEDICINE

## 2020-05-29 PROCEDURE — 70450 CT HEAD/BRAIN W/O DYE: CPT | Performed by: RADIOLOGY

## 2020-05-29 PROCEDURE — 86140 C-REACTIVE PROTEIN: CPT | Performed by: FAMILY MEDICINE

## 2020-05-29 PROCEDURE — 87040 BLOOD CULTURE FOR BACTERIA: CPT | Performed by: FAMILY MEDICINE

## 2020-05-29 PROCEDURE — 0 IOVERSOL 74 % SOLUTION: Performed by: FAMILY MEDICINE

## 2020-05-29 PROCEDURE — 85025 COMPLETE CBC W/AUTO DIFF WBC: CPT | Performed by: FAMILY MEDICINE

## 2020-05-29 PROCEDURE — 80053 COMPREHEN METABOLIC PANEL: CPT | Performed by: FAMILY MEDICINE

## 2020-05-29 PROCEDURE — 70496 CT ANGIOGRAPHY HEAD: CPT | Performed by: RADIOLOGY

## 2020-05-29 PROCEDURE — 93005 ELECTROCARDIOGRAM TRACING: CPT | Performed by: FAMILY MEDICINE

## 2020-05-29 RX ORDER — SODIUM CHLORIDE 0.9 % (FLUSH) 0.9 %
10 SYRINGE (ML) INJECTION AS NEEDED
Status: DISCONTINUED | OUTPATIENT
Start: 2020-05-29 | End: 2020-05-29 | Stop reason: HOSPADM

## 2020-05-29 RX ADMIN — IOVERSOL 85 ML: 741 INJECTION INTRA-ARTERIAL; INTRAVENOUS at 17:41

## 2020-05-29 NOTE — OUTREACH NOTE
"Medical Week 3 Survey      Responses   [facility] patient discharged from?  [place]   COVID-19 Test Status  Not tested   Does the patient have one of the following disease processes/diagnoses(primary or secondary)?  Other   Week 3 attempt successful?  Yes   Call start time  1303   Rescheduled  Rescheduled-pt requested [Patient with sudden onset of vision disturbance, and complaints of \"head not feeling right\". Advised patient to seek emergency care right away, that these symptoms are symptoms of possible stroke and that he needs to be evaluated. Patient in agreement.]   Call end time  1310   Discharge diagnosis  VT,    Person spoke with today (if not patient) and relationship  Spoke to patient who is going to seek emergency care today.           Tata De La Rosa RN     5/29/20 1315 Notified patient's PCP office that I advised patient to seek emergency room care.   Tata De La Rosa RN  "

## 2020-06-01 ENCOUNTER — READMISSION MANAGEMENT (OUTPATIENT)
Dept: CALL CENTER | Facility: HOSPITAL | Age: 70
End: 2020-06-01

## 2020-06-01 NOTE — OUTREACH NOTE
Medical Week 3 Survey      Responses   [facility] patient discharged from?  [place]   COVID-19 Test Status  Not tested   Does the patient have one of the following disease processes/diagnoses(primary or secondary)?  Other   Week 3 attempt successful?  Yes   Call start time  1617   Call end time  1621   Discharge diagnosis  VT,    Meds reviewed with patient/caregiver?  Yes   Is the patient taking all medications as directed (includes completed medication regime)?  Yes   Has the patient kept scheduled appointments due by today?  N/A   Comments  Did not want a telephone visit but wants to see Dr in person   Pulse Ox monitoring  Intermittent   Pulse Ox device source  Patient   Psychosocial issues?  No   Comments  Went to ED with stroke like symptoms but checked out alright   What is the patient's perception of their health status since discharge?  Improving   Is the patient/caregiver able to teach back signs and symptoms related to disease process for when to call PCP?  Yes   Is the patient/caregiver able to teach back signs and symptoms related to disease process for when to call 911?  Yes   Is the patient/caregiver able to teach back the hierarchy of who to call/visit for symptoms/problems? PCP, Specialist, Home health nurse, Urgent Care, ED, 911  Yes   If the patient is a current smoker, are they able to teach back resources for cessation?  Smoking cessation medications   Additional teach back comments  Pt encouraged to stop smoking   Week 3 Call Completed?  Yes          Evelia Johnson RN

## 2020-06-03 LAB
BACTERIA SPEC AEROBE CULT: NORMAL
BACTERIA SPEC AEROBE CULT: NORMAL

## 2020-06-07 NOTE — TELEPHONE ENCOUNTER
----- Message from Vlad Bravo MD sent at 9/25/2019  5:11 PM EDT -----  Please let the patient know he only has mild plaque on his carotid duplex ultrasound   07-Jun-2020 03:48

## 2020-06-08 ENCOUNTER — OFFICE VISIT (OUTPATIENT)
Dept: CARDIOLOGY | Facility: CLINIC | Age: 70
End: 2020-06-08

## 2020-06-08 VITALS
WEIGHT: 163 LBS | SYSTOLIC BLOOD PRESSURE: 102 MMHG | OXYGEN SATURATION: 96 % | HEART RATE: 72 BPM | TEMPERATURE: 98 F | BODY MASS INDEX: 24.71 KG/M2 | DIASTOLIC BLOOD PRESSURE: 54 MMHG | HEIGHT: 68 IN

## 2020-06-08 DIAGNOSIS — I50.22 CHRONIC SYSTOLIC HEART FAILURE (HCC): ICD-10-CM

## 2020-06-08 DIAGNOSIS — J42 CHRONIC BRONCHITIS, UNSPECIFIED CHRONIC BRONCHITIS TYPE (HCC): ICD-10-CM

## 2020-06-08 DIAGNOSIS — I73.9 CLAUDICATION (HCC): ICD-10-CM

## 2020-06-08 DIAGNOSIS — F17.200 TOBACCO DEPENDENCE: ICD-10-CM

## 2020-06-08 DIAGNOSIS — I10 ESSENTIAL HYPERTENSION: ICD-10-CM

## 2020-06-08 DIAGNOSIS — I73.9 PAD (PERIPHERAL ARTERY DISEASE) (HCC): ICD-10-CM

## 2020-06-08 DIAGNOSIS — I47.20 VENTRICULAR TACHYCARDIA (HCC): ICD-10-CM

## 2020-06-08 DIAGNOSIS — I49.01 VENTRICULAR FIBRILLATION (HCC): ICD-10-CM

## 2020-06-08 DIAGNOSIS — I25.9 ISCHEMIC HEART DISEASE: Primary | ICD-10-CM

## 2020-06-08 DIAGNOSIS — I25.118 CORONARY ARTERY DISEASE OF NATIVE ARTERY OF NATIVE HEART WITH STABLE ANGINA PECTORIS (HCC): ICD-10-CM

## 2020-06-08 DIAGNOSIS — I25.5 ISCHEMIC CARDIOMYOPATHY: ICD-10-CM

## 2020-06-08 PROCEDURE — 99215 OFFICE O/P EST HI 40 MIN: CPT | Performed by: INTERNAL MEDICINE

## 2020-06-08 PROCEDURE — 93283 PRGRMG EVAL IMPLANTABLE DFB: CPT | Performed by: INTERNAL MEDICINE

## 2020-06-08 NOTE — PROGRESS NOTES
Cardiac Electrophysiology Outpatient Follow Up Note            [facility]     Follow Up Office Visit      Derek Morris Jr.  [id number]  06/08/2020  [unfilled]  [unfilled]    Primary Care Physician: Jn Beach MD    Referred By: No ref. provider found    Subjective     Chief Complaint:   Chief Complaint   Patient presents with   • Ischemic Heart Disease   • Coronary Artery Disease     Formerly [facility]:  PROBLEM LIST:  1. Ischemic heart disease:  a. MI x3, 1990, 1993, and 1995.  b. CABG x3 by Dr. Noble Cuello, 1995:  SVG to OM1 and OM2, SVG to PDA.  c. Normal LV.  d. Questionable LAD stent, incomplete  database.  e. STEMI, April 2009.  f. LHC by Dr. Glen Baker, April 2009:  EF approximately 40%, 1 of 3 patent grafts; data deficit.  g. LHC by Dr. Bird, 07/30/2010:  EF 30%, occluded vein graft to occluded RCA, minor plaque in LAD, 70% SVG -  circumflex treated with 4 x 18 mm Cypher KAILASH.  h. Echocardiogram, 02/08/2012:  EF 40% to 45%, diastolic dysfunction, mild TR.  i. Echocardiogram, 01/29/2015:  EF 30% to 35%, mild TR.  j. Jan 2017 EF 35-40%  k. LHC in Jan 2017 as noted in the results below. Resolute Integrity KAILASH to D2  2. Hypertension.  3. Chronic systolic CHF.  4. Nonsustained VT:  a. Inducible VT by EPS, January 2002.  b. Pacesetter ICD implant by Dr. Dickinson, January 2002.  c. Chronic amiodarone, discontinued fall of 2010:  Cannot  exclude amiodarone pulmonary toxicity.  d. ICD generator change-out with enrollment in the ANALYZE ST SEGMENT protocol, 08/16/2012.  5. PAD s/p PTA 7/2018 to left SFA  6. Hyperlipidemia; intolerance to multiple statins.  7. Type 2 diabetes mellitus.  8. Chronic tobacco; home O2.  9. Surgical history:  a.  CABG    History of Present Illness:   Mr. Derek Morris Jr. is a 69 y.o. male who presents to my electrophysiology clinic for follow up of recurrent VT.  The last interacted with him in a telephone clinic visit.  At that time we  changed him from Tikosyn to amiodarone and left him on mexiletine.  This was because of recurrent sustained monomorphic VT requiring antitachycardia pacing.  He also has a history of ICD shocks because of recurrent VT as well.    Since switching the amiodarone he says that he feels better in terms of not having any palpitations or near syncope associated with VT but he does have concern over his breathing.  He says that his COPD will get better and worse variably during the year and now can help with thinking that maybe it is now apparent is perhaps because of amiodarone therapy.  He is not sure that it is because again he seemed to have good days and bad days even in the last month.    He does not have any chest pain.    He does not have any difficulty breathing while lying flat.    He does admit that he continues to smoke he knows that he should quit but is very hard for him to do this..      Review of Systems:   Constitutional: No fevers or chills, no recent weight gain or weight loss or fatigue  Eyes: No visual loss, blurred vision, double vision, yellow sclerae.  ENT: No headaches, hearing loss, vertigo, congestion or sore throat.   Cardiovascular: Per HPI  Respiratory: No cough or wheezing, no sputum production, no hematemesis   Gastrointestinal: No abdominal pain, no nausea, vomiting, constipation, diarrhea, melena.   Genitourinary: No dysuria, hematuria or increased frequency.  Musculoskeletal:  No gait disturbance, weakness or joint pain or stiffness  Integumentary: No rashes, urticaria, ulcers or sores.   Neurological: No headache, dizziness, syncope, paralysis, ataxia, no prior CVA/TIA  Psychiatric: No anxiety, or depression  Endocrine: No diaphoresis, cold or heat intolerance. No polyuria or polydipsia.   Hematologic/Lymphatic: No anemia, abnormal bruising or bleeding. No history of DVT/PE.      Past Medical History:   Past Medical History:   Diagnosis Date   • Arthritis    • Cardiomyopathy (CMS/HCC)     • Carotid stenosis    • CHF (congestive heart failure) (CMS/[facility])    • COPD (chronic obstructive pulmonary disease) (CMS/[facility])    • Coronary artery disease    • Diabetes mellitus (CMS/[facility])    • Enlarged prostate    • GERD (gastroesophageal reflux disease)    • Heart attack (CMS/[facility])     X3 1990, 1993, 1995   • Hyperlipidemia    • Hypertension    • Lung nodule    • On home O2     prn   • Peptic ulcer disease    • Urinary hesitancy    • Ventricular tachycardia (CMS/[facility])    • Wears dentures    • Wears glasses        Past Surgical History:   Past Surgical History:   Procedure Laterality Date   • CARDIAC CATHETERIZATION Left 04/2009    by Dr. Glen Baker- I. EF approx 40% II one of three patent grafts iii, Data deficit    • CARDIAC CATHETERIZATION N/A 1/26/2017    Procedure: Left Heart Cath;  Surgeon: Vlad Bravo MD;  Location:  [facility];  Service:    • CARDIAC CATHETERIZATION Left 07/30/2010    i. EF 30% ii occluded vein graft to occluded RCS iii minor plaque in the LAD iv. 70% SVG- circumfelx, treated with 4x18 mm. Cypher KAILASH.   • CARDIAC CATHETERIZATION N/A 6/5/2018    Procedure: Peripheral angiography;  Surgeon: Vlad Bravo MD;  Location:  [facility];  Service: Cardiovascular   • CARDIAC CATHETERIZATION N/A 7/23/2019    Procedure: Left Heart Cath;  Surgeon: Vlad Bravo MD;  Location:  [facility];  Service: Cardiology   • CARDIAC DEFIBRILLATOR PLACEMENT     • COLONOSCOPY     • COLONOSCOPY N/A 9/26/2018    Procedure: COLONOSCOPY;  Surgeon: Alfred Shah MD;  Location:  [facility] OR;  Service: General   • CORONARY ANGIOPLASTY     • CORONARY ARTERY BYPASS GRAFT  1995 1995, w/ subsequent stents 2009/2010 X3; svg TO om1 AND om2, svg TO pda    • CYST REMOVAL      Tailbone   • EXAM UNDER ANESTHESIA N/A 9/26/2018    Procedure: EXAM UNDER ANESTHESIA;  Surgeon: Alfred Shah MD;  Location:  [facility] OR;  Service: General   • HEMORRHOIDECTOMY N/A 9/28/2018     Procedure: HEMORRHOIDECTOMY;  Surgeon: Alfred Shah MD;  Location:  [facility] OR;  Service: General   • INSERT / REPLACE / REMOVE PACEMAKER     • INTERVENTIONAL RADIOLOGY PROCEDURE N/A 6/5/2018    Procedure: Abdominal Aortagram with Runoff;  Surgeon: Vlad Bravo MD;  Location:  [facility];  Service: Cardiovascular   • INTERVENTIONAL RADIOLOGY PROCEDURE N/A 7/6/2018    Procedure: Abdominal Aortogram;  Surgeon: Vlad Bravo MD;  Location:  [facility];  Service: Cardiology   • PACEMAKER IMPLANTATION  01/2002    Implant by Dr. Dickinson        Family History:   Family History   Problem Relation Age of Onset   • Hypertension Mother    • Sick sinus syndrome Father    • Coronary artery disease Father        Social History:   Social History     Socioeconomic History   • Marital status:      Spouse name: Not on file   • Number of children: 2   • Years of education: Not on file   • Highest education level: Not on file   Occupational History   • Occupation:      Comment: Retired   Tobacco Use   • Smoking status: Current Every Day Smoker     Packs/day: 0.50     Years: 45.00     Pack years: 22.50     Types: Cigarettes   • Smokeless tobacco: Never Used   Substance and Sexual Activity   • Alcohol use: No   • Drug use: Yes     Types: Marijuana     Comment: occasional use - maybe once a month    • Sexual activity: Defer   Social History Narrative    Caffeine use: 5-6 serving daily.            Medications:     Current Outpatient Medications:   •  albuterol (PROVENTIL HFA;VENTOLIN HFA) 108 (90 Base) MCG/ACT inhaler, Inhale 1-2 puffs Every 6 (Six) Hours As Needed for Wheezing., Disp: , Rfl:   •  amiodarone (PACERONE) 400 MG tablet, Take 1 tablet by mouth 3 (Three) Times a Day for 9 days, THEN 1 tablet Daily for 30 days., Disp: 57 tablet, Rfl: 0  •  aspirin 81 MG EC tablet, Take 1 tablet by mouth Daily., Disp: 30 tablet, Rfl: 10  •  bumetanide (BUMEX) 0.5 MG tablet, Take 1 mg  by mouth Daily., Disp: , Rfl:   •  carvedilol (COREG) 12.5 MG tablet, Take 1 tablet by mouth 2 (Two) Times a Day., Disp: 180 tablet, Rfl: 3  •  ELIQUIS 5 MG tablet tablet, TAKE 1 TABLET EVERY 12 HOURS, Disp: 180 tablet, Rfl: 0  •  Evolocumab with Infusor (REPATHA PUSHTRONEX SYSTEM) 420 MG/3.5ML solution cartridge, Inject 420 mg under the skin into the appropriate area as directed Every 30 (Thirty) Days., Disp: 3 cartridge., Rfl: 5  •  ezetimibe (ZETIA) 10 MG tablet, TAKE 1 TABLET EVERY DAY, Disp: 90 tablet, Rfl: 3  •  Fluticasone-Umeclidin-Vilant (TRELEGY ELLIPTA IN), Inhale 1 puff Daily., Disp: , Rfl:   •  GLIPIZIDE XL 2.5 MG 24 hr tablet, Take 2.5 mg by mouth Daily., Disp: , Rfl: 11  •  mexiletine (MEXITIL) 150 MG capsule, Take 1 capsule by mouth 3 (Three) Times a Day., Disp: 90 capsule, Rfl: 0  •  nitroglycerin (NITROSTAT) 0.4 MG SL tablet, Place 1 tablet under the tongue Every 5 (Five) Minutes As Needed for Chest Pain. Take no more than 3 doses in 15 minutes., Disp: 25 tablet, Rfl: 3  •  rOPINIRole (REQUIP) 1 MG tablet, Take 3 mg by mouth Every Night., Disp: , Rfl:   •  sacubitril-valsartan (Entresto) 49-51 MG tablet, Take 1 tablet by mouth 2 (Two) Times a Day., Disp: , Rfl:   •  spironolactone (ALDACTONE) 25 MG tablet, Take 25 mg by mouth Daily As Needed., Disp: , Rfl:   •  tiZANidine (ZANAFLEX) 4 MG tablet, Take 8 mg by mouth At Night As Needed., Disp: , Rfl:     Allergies:   Allergies   Allergen Reactions   • Crestor [Rosuvastatin Calcium] Myalgia   • Lipitor [Atorvastatin] Myalgia     Joint pain myalgias   • Lopressor [Metoprolol Tartrate] Myalgia and Unknown (See Comments)     Side of face went numb     • Plavix [Clopidogrel Bisulfate] Unknown (See Comments)     Previously thought to be resistant; placed back on clopidogrel per Dr. Bravo earlier this year.  Confirmed with patient   • Adhesive Tape Itching and Rash       Objective     Physical Exam:  Vital Signs:   Vitals:    06/08/20 1131   BP: 102/54   BP  "Location: Left arm   Patient Position: Sitting   Pulse: 72   Temp: 98 °F (36.7 °C)   SpO2: 96%   Weight: 73.9 kg (163 lb)   Height: 172.7 cm (68\")     GEN: Well nourished, well-developed, no acute distress  HEENT: Normocephalic, atraumatic, moist mucous membranes  NECK: Supple, no JVD, no thyromegaly, no lymphadenopathy  CARD: Regular rate and rhythm, normal S1 & S2 are present.  No murmur, gallop or rubs are appreciated.  LUNGS: Clear to auscultation bilateraly, normal respiratory effort  ABDOMEN: Soft, nontender, normal bowel sounds  EXTREMITIES: No gross deformities, no clubbing, cyanosis.  Edema none  SKIN: Warm, dry  NEURO: No focal deficits, alert and oriented x 3  PSYCHIATRIC: Normal affect and mood, appropriate use of semantics and logic.        Lab Results   Component Value Date    GLUCOSE 195 (H) 05/29/2020    CALCIUM 9.2 05/29/2020     05/29/2020    K 4.4 05/29/2020    CO2 27.0 05/29/2020     05/29/2020    BUN 17 05/29/2020    CREATININE 1.25 05/29/2020    EGFRIFNONA 57 (L) 05/29/2020    BCR 13.6 05/29/2020    ANIONGAP 10.0 05/29/2020     Lab Results   Component Value Date    WBC 8.31 05/29/2020    HGB 16.2 05/29/2020    HCT 50.5 05/29/2020    MCV 95.1 05/29/2020     05/29/2020     Lab Results   Component Value Date    INR 1.18 (H) 07/22/2019    INR 1.01 08/07/2016    INR 0.97 01/17/2016    PROTIME 14.4 (H) 07/22/2019    PROTIME 11.0 08/07/2016    PROTIME 10.6 01/17/2016     Lab Results   Component Value Date    TSH 5.150 (H) 05/29/2020       Cardiac Testing:     I personally viewed and interpreted the patient's EKG/Telemetry/lab data    Procedures    Tobacco Cessation: N/A  Obstructive Sleep Apnea Screening: N/A    Assessment & Plan      Very pleasant 69-year-old gentleman with history of peripheral arterial disease, coronary artery disease, previously significant systolic dysfunction but his ejection fraction has normalized recently, coronary bypass graft surgery in 1996, recurrent " sustained monomorphic VT.  And a transvenous ICD system by [employer].    It is clear that amiodarone has affected his VT favorably.  Is been 30 days and has not had any significant events on his device interrogation.  Is clearly the case that Tikosyn and mexiletine combination were insufficient to affect this change.  That said we do have concern over amiodarone chronic use in this gentleman.  I did discuss with him at length and offered him a VT ablation procedure.  I detailed with him that this would come with a 5 to 10% chance of significant morbidity risk and a 1% chance or less of mortality during the procedure.  I detailed with him that this would be done during the general anesthesia.  We would use the arrhythmia mapping system in combination with stereotaxis.  The fact that his ejection fraction is preserved I do not think that we would need percutaneous ventricular assist device such as Impella.    We discussed this at length.  We also decided that we will order pulmonary function test.    He will go home and think about what he wants to do and call us back on Wednesday let us know if you wish to proceed with ablation.    Derek was seen today for ischemic heart disease and coronary artery disease.    Diagnoses and all orders for this visit:    Ischemic heart disease  -     Pulmonary Function Test; Future    Chronic systolic heart failure (CMS/HCC)  -     Pulmonary Function Test; Future    Coronary artery disease of native artery of native heart with stable angina pectoris (CMS/HCC)    PAD (peripheral artery disease) (CMS/HCC)  -     Pulmonary Function Test; Future    Ventricular tachycardia (CMS/HCC)  -     Pulmonary Function Test; Future    Ischemic cardiomyopathy  -     Pulmonary Function Test; Future    Ventricular fibrillation (CMS/HCC)  -     Pulmonary Function Test; Future    Chronic bronchitis, unspecified chronic bronchitis type (CMS/HCC)  -     Pulmonary Function Test; Future    Claudication  (CMS/[facility])  -     Pulmonary Function Test; Future    Tobacco dependence  -     Pulmonary Function Test; Future    Essential hypertension  -     Pulmonary Function Test; Future          Follow Up:       Thank you for allowing me to participate in the care of your patient. Please to not hesitate to contact me with additional questions or concerns.        Calin Dior DO, FACC, [facility]  Cardiac Electrophysiologist

## 2020-06-09 ENCOUNTER — READMISSION MANAGEMENT (OUTPATIENT)
Dept: CALL CENTER | Facility: HOSPITAL | Age: 70
End: 2020-06-09

## 2020-06-09 NOTE — OUTREACH NOTE
Medical Week 4 Survey      Responses   [facility] patient discharged from?  [place]   COVID-19 Test Status  Not tested   Does the patient have one of the following disease processes/diagnoses(primary or secondary)?  Other   Week 4 attempt successful?  No   Rescheduled  Revoked   Revoke  Decline to participate          Sissy Mcgowan RN

## 2020-06-23 ENCOUNTER — TELEPHONE (OUTPATIENT)
Dept: CARDIOLOGY | Facility: CLINIC | Age: 70
End: 2020-06-23

## 2020-06-23 DIAGNOSIS — I47.20 VENTRICULAR TACHYCARDIA (HCC): Primary | ICD-10-CM

## 2020-06-23 NOTE — TELEPHONE ENCOUNTER
Patient called to let you know he would like to proceed with the ablation. He seems to be weaker and has a cough since the amiodarone and would prefer to have the ablation so he can get off of it. Would you like me to order the VT ablation- general anesthesia, rhythmia, stereotaxis? Hold any meds prior?

## 2020-06-24 NOTE — TELEPHONE ENCOUNTER
Let us book him with Stereotaxis and Rhythmia ( [employer] ).       Have him hold eliquis x two doses ( not days ).       Have him please stop the amiodarone but continue mexiletine.     Let us book this for next week for either Tuesday first thing in AM or Wednesday first thing in AM.

## 2020-06-26 ENCOUNTER — PREP FOR SURGERY (OUTPATIENT)
Dept: OTHER | Facility: HOSPITAL | Age: 70
End: 2020-06-26

## 2020-06-26 DIAGNOSIS — I47.29 VENTRICULAR TACHYCARDIA (PAROXYSMAL) (HCC): Primary | ICD-10-CM

## 2020-06-26 RX ORDER — ACETAMINOPHEN 325 MG/1
650 TABLET ORAL EVERY 4 HOURS PRN
Status: CANCELLED | OUTPATIENT
Start: 2020-06-26

## 2020-06-26 RX ORDER — NITROGLYCERIN 0.4 MG/1
0.4 TABLET SUBLINGUAL
Status: CANCELLED | OUTPATIENT
Start: 2020-06-26

## 2020-06-26 RX ORDER — SODIUM CHLORIDE 0.9 % (FLUSH) 0.9 %
10 SYRINGE (ML) INJECTION AS NEEDED
Status: CANCELLED | OUTPATIENT
Start: 2020-06-26

## 2020-06-26 RX ORDER — SODIUM CHLORIDE 0.9 % (FLUSH) 0.9 %
3 SYRINGE (ML) INJECTION EVERY 12 HOURS SCHEDULED
Status: CANCELLED | OUTPATIENT
Start: 2020-06-26

## 2020-06-26 RX ORDER — ONDANSETRON 2 MG/ML
4 INJECTION INTRAMUSCULAR; INTRAVENOUS EVERY 6 HOURS PRN
Status: CANCELLED | OUTPATIENT
Start: 2020-06-26

## 2020-06-29 ENCOUNTER — APPOINTMENT (OUTPATIENT)
Dept: PREADMISSION TESTING | Facility: HOSPITAL | Age: 70
End: 2020-06-29

## 2020-06-29 VITALS — WEIGHT: 169 LBS | HEIGHT: 68 IN | BODY MASS INDEX: 25.61 KG/M2

## 2020-06-29 DIAGNOSIS — I47.29 VENTRICULAR TACHYCARDIA (PAROXYSMAL) (HCC): ICD-10-CM

## 2020-06-29 LAB
ANION GAP SERPL CALCULATED.3IONS-SCNC: 7 MMOL/L (ref 5–15)
BUN BLD-MCNC: 11 MG/DL (ref 8–23)
BUN/CREAT SERPL: 9.6 (ref 7–25)
CALCIUM SPEC-SCNC: 8.9 MG/DL (ref 8.6–10.5)
CHLORIDE SERPL-SCNC: 101 MMOL/L (ref 98–107)
CO2 SERPL-SCNC: 34 MMOL/L (ref 22–29)
CREAT BLD-MCNC: 1.15 MG/DL (ref 0.76–1.27)
DEPRECATED RDW RBC AUTO: 50.8 FL (ref 37–54)
ERYTHROCYTE [DISTWIDTH] IN BLOOD BY AUTOMATED COUNT: 13.6 % (ref 12.3–15.4)
GFR SERPL CREATININE-BSD FRML MDRD: 63 ML/MIN/1.73
GLUCOSE BLD-MCNC: 119 MG/DL (ref 65–99)
HCT VFR BLD AUTO: 47.9 % (ref 37.5–51)
HGB BLD-MCNC: 15 G/DL (ref 13–17.7)
MCH RBC QN AUTO: 31.2 PG (ref 26.6–33)
MCHC RBC AUTO-ENTMCNC: 31.3 G/DL (ref 31.5–35.7)
MCV RBC AUTO: 99.6 FL (ref 79–97)
PLATELET # BLD AUTO: 124 10*3/MM3 (ref 140–450)
PMV BLD AUTO: 10.6 FL (ref 6–12)
POTASSIUM BLD-SCNC: 4.3 MMOL/L (ref 3.5–5.2)
RBC # BLD AUTO: 4.81 10*6/MM3 (ref 4.14–5.8)
SODIUM BLD-SCNC: 142 MMOL/L (ref 136–145)
WBC NRBC COR # BLD: 6.29 10*3/MM3 (ref 3.4–10.8)

## 2020-06-29 PROCEDURE — U0002 COVID-19 LAB TEST NON-CDC: HCPCS

## 2020-06-29 PROCEDURE — U0004 COV-19 TEST NON-CDC HGH THRU: HCPCS

## 2020-06-29 PROCEDURE — C9803 HOPD COVID-19 SPEC COLLECT: HCPCS

## 2020-06-29 PROCEDURE — 36415 COLL VENOUS BLD VENIPUNCTURE: CPT

## 2020-06-29 PROCEDURE — 80048 BASIC METABOLIC PNL TOTAL CA: CPT | Performed by: NURSE PRACTITIONER

## 2020-06-29 PROCEDURE — 85027 COMPLETE CBC AUTOMATED: CPT | Performed by: NURSE PRACTITIONER

## 2020-06-30 ENCOUNTER — ANESTHESIA EVENT (OUTPATIENT)
Dept: CARDIOLOGY | Facility: HOSPITAL | Age: 70
End: 2020-06-30

## 2020-06-30 LAB
REF LAB TEST METHOD: NORMAL
SARS-COV-2 RNA RESP QL NAA+PROBE: NOT DETECTED

## 2020-07-01 ENCOUNTER — HOSPITAL ENCOUNTER (OUTPATIENT)
Facility: HOSPITAL | Age: 70
Discharge: HOME | End: 2020-07-03
Attending: INTERNAL MEDICINE | Admitting: INTERNAL MEDICINE

## 2020-07-01 ENCOUNTER — ANESTHESIA (OUTPATIENT)
Dept: CARDIOLOGY | Facility: HOSPITAL | Age: 70
End: 2020-07-01

## 2020-07-01 DIAGNOSIS — I47.20 VENTRICULAR TACHYCARDIA (HCC): ICD-10-CM

## 2020-07-01 DIAGNOSIS — I47.29 VENTRICULAR TACHYCARDIA (PAROXYSMAL) (HCC): ICD-10-CM

## 2020-07-01 LAB
GLUCOSE BLDC GLUCOMTR-MCNC: 117 MG/DL (ref 70–130)
GLUCOSE BLDC GLUCOMTR-MCNC: 134 MG/DL (ref 70–130)
HCT VFR BLD AUTO: 48.7 % (ref 37.5–51)
HGB BLD-MCNC: 14.9 G/DL (ref 13–17.7)

## 2020-07-01 PROCEDURE — 25010000002 PHENYLEPHRINE PER 1 ML: Performed by: NURSE ANESTHETIST, CERTIFIED REGISTERED

## 2020-07-01 PROCEDURE — 25010000002 PROPOFOL 10 MG/ML EMULSION: Performed by: NURSE ANESTHETIST, CERTIFIED REGISTERED

## 2020-07-01 PROCEDURE — 93621 COMP EP EVL L PAC&REC C SINS: CPT | Performed by: INTERNAL MEDICINE

## 2020-07-01 PROCEDURE — 63710000001 ROPINIROLE 1 MG TABLET: Performed by: INTERNAL MEDICINE

## 2020-07-01 PROCEDURE — A9270 NON-COVERED ITEM OR SERVICE: HCPCS | Performed by: INTERNAL MEDICINE

## 2020-07-01 PROCEDURE — 94660 CPAP INITIATION&MGMT: CPT

## 2020-07-01 PROCEDURE — C1893 INTRO/SHEATH, FIXED,NON-PEEL: HCPCS | Performed by: INTERNAL MEDICINE

## 2020-07-01 PROCEDURE — 94799 UNLISTED PULMONARY SVC/PX: CPT

## 2020-07-01 PROCEDURE — 93462 L HRT CATH TRNSPTL PUNCTURE: CPT | Performed by: INTERNAL MEDICINE

## 2020-07-01 PROCEDURE — 63710000001 ROPINIROLE 2 MG TABLET: Performed by: INTERNAL MEDICINE

## 2020-07-01 PROCEDURE — C1894 INTRO/SHEATH, NON-LASER: HCPCS | Performed by: INTERNAL MEDICINE

## 2020-07-01 PROCEDURE — 99213 OFFICE O/P EST LOW 20 MIN: CPT | Performed by: INTERNAL MEDICINE

## 2020-07-01 PROCEDURE — 93662 INTRACARDIAC ECG (ICE): CPT | Performed by: INTERNAL MEDICINE

## 2020-07-01 PROCEDURE — 63710000001 SACUBITRIL-VALSARTAN 49-51 MG TABLET: Performed by: INTERNAL MEDICINE

## 2020-07-01 PROCEDURE — C1730 CATH, EP, 19 OR FEW ELECT: HCPCS | Performed by: INTERNAL MEDICINE

## 2020-07-01 PROCEDURE — 63710000001 APIXABAN 5 MG TABLET: Performed by: INTERNAL MEDICINE

## 2020-07-01 PROCEDURE — 93623 PRGRMD STIMJ&PACG IV RX NFS: CPT | Performed by: INTERNAL MEDICINE

## 2020-07-01 PROCEDURE — 82962 GLUCOSE BLOOD TEST: CPT

## 2020-07-01 PROCEDURE — 85347 COAGULATION TIME ACTIVATED: CPT

## 2020-07-01 PROCEDURE — 93654 COMPRE EP EVAL TX VT: CPT | Performed by: INTERNAL MEDICINE

## 2020-07-01 PROCEDURE — 25010000003 LIDOCAINE 1 % SOLUTION: Performed by: INTERNAL MEDICINE

## 2020-07-01 PROCEDURE — 25010000002 HEPARIN (PORCINE) PER 1000 UNITS: Performed by: INTERNAL MEDICINE

## 2020-07-01 PROCEDURE — C1769 GUIDE WIRE: HCPCS | Performed by: INTERNAL MEDICINE

## 2020-07-01 PROCEDURE — 63710000001 MEXILETINE 150 MG CAPSULE: Performed by: INTERNAL MEDICINE

## 2020-07-01 PROCEDURE — 25010000002 ONDANSETRON PER 1 MG: Performed by: NURSE ANESTHETIST, CERTIFIED REGISTERED

## 2020-07-01 PROCEDURE — 63710000001 GLIPIZIDE 5 MG TABLET: Performed by: INTERNAL MEDICINE

## 2020-07-01 PROCEDURE — 85014 HEMATOCRIT: CPT | Performed by: INTERNAL MEDICINE

## 2020-07-01 PROCEDURE — 25010000002 KETOROLAC TROMETHAMINE PER 15 MG: Performed by: INTERNAL MEDICINE

## 2020-07-01 PROCEDURE — C1732 CATH, EP, DIAG/ABL, 3D/VECT: HCPCS | Performed by: INTERNAL MEDICINE

## 2020-07-01 PROCEDURE — 25010000002 NEOSTIGMINE 10 MG/10ML SOLUTION: Performed by: NURSE ANESTHETIST, CERTIFIED REGISTERED

## 2020-07-01 PROCEDURE — 63710000001 SPIRONOLACTONE 25 MG TABLET: Performed by: INTERNAL MEDICINE

## 2020-07-01 PROCEDURE — 25010000002 FUROSEMIDE PER 20 MG: Performed by: ANESTHESIOLOGY

## 2020-07-01 PROCEDURE — 94640 AIRWAY INHALATION TREATMENT: CPT

## 2020-07-01 PROCEDURE — 85018 HEMOGLOBIN: CPT | Performed by: INTERNAL MEDICINE

## 2020-07-01 PROCEDURE — C1759 CATH, INTRA ECHOCARDIOGRAPHY: HCPCS | Performed by: INTERNAL MEDICINE

## 2020-07-01 PROCEDURE — 25010000002 PROTAMINE SULFATE PER 10 MG: Performed by: INTERNAL MEDICINE

## 2020-07-01 RX ORDER — KETOROLAC TROMETHAMINE 15 MG/ML
15 INJECTION, SOLUTION INTRAMUSCULAR; INTRAVENOUS EVERY 8 HOURS
Status: DISCONTINUED | OUTPATIENT
Start: 2020-07-01 | End: 2020-07-02

## 2020-07-01 RX ORDER — MEXILETINE HYDROCHLORIDE 150 MG/1
150 CAPSULE ORAL 3 TIMES DAILY
Status: DISCONTINUED | OUTPATIENT
Start: 2020-07-01 | End: 2020-07-03 | Stop reason: HOSPADM

## 2020-07-01 RX ORDER — IPRATROPIUM BROMIDE AND ALBUTEROL SULFATE 2.5; .5 MG/3ML; MG/3ML
3 SOLUTION RESPIRATORY (INHALATION)
Status: DISCONTINUED | OUTPATIENT
Start: 2020-07-01 | End: 2020-07-03 | Stop reason: HOSPADM

## 2020-07-01 RX ORDER — SODIUM CHLORIDE 0.9 % (FLUSH) 0.9 %
10 SYRINGE (ML) INJECTION AS NEEDED
Status: DISCONTINUED | OUTPATIENT
Start: 2020-07-01 | End: 2020-07-01 | Stop reason: HOSPADM

## 2020-07-01 RX ORDER — FAMOTIDINE 10 MG/ML
20 INJECTION, SOLUTION INTRAVENOUS ONCE
Status: DISCONTINUED | OUTPATIENT
Start: 2020-07-01 | End: 2020-07-02

## 2020-07-01 RX ORDER — PROPOFOL 10 MG/ML
VIAL (ML) INTRAVENOUS AS NEEDED
Status: DISCONTINUED | OUTPATIENT
Start: 2020-07-01 | End: 2020-07-01 | Stop reason: SURG

## 2020-07-01 RX ORDER — HEPARIN SODIUM 1000 [USP'U]/ML
INJECTION, SOLUTION INTRAVENOUS; SUBCUTANEOUS AS NEEDED
Status: DISCONTINUED | OUTPATIENT
Start: 2020-07-01 | End: 2020-07-01 | Stop reason: HOSPADM

## 2020-07-01 RX ORDER — NEOSTIGMINE METHYLSULFATE 1 MG/ML
INJECTION, SOLUTION INTRAVENOUS AS NEEDED
Status: DISCONTINUED | OUTPATIENT
Start: 2020-07-01 | End: 2020-07-01 | Stop reason: SURG

## 2020-07-01 RX ORDER — BUPIVACAINE HYDROCHLORIDE 5 MG/ML
INJECTION, SOLUTION PERINEURAL AS NEEDED
Status: DISCONTINUED | OUTPATIENT
Start: 2020-07-01 | End: 2020-07-01 | Stop reason: HOSPADM

## 2020-07-01 RX ORDER — FUROSEMIDE 10 MG/ML
80 INJECTION INTRAMUSCULAR; INTRAVENOUS ONCE
Status: COMPLETED | OUTPATIENT
Start: 2020-07-01 | End: 2020-07-01

## 2020-07-01 RX ORDER — ONDANSETRON 2 MG/ML
4 INJECTION INTRAMUSCULAR; INTRAVENOUS EVERY 6 HOURS PRN
Status: DISCONTINUED | OUTPATIENT
Start: 2020-07-01 | End: 2020-07-03 | Stop reason: HOSPADM

## 2020-07-01 RX ORDER — ONDANSETRON 2 MG/ML
4 INJECTION INTRAMUSCULAR; INTRAVENOUS EVERY 6 HOURS PRN
Status: DISCONTINUED | OUTPATIENT
Start: 2020-07-01 | End: 2020-07-01 | Stop reason: HOSPADM

## 2020-07-01 RX ORDER — ONDANSETRON 2 MG/ML
4 INJECTION INTRAMUSCULAR; INTRAVENOUS ONCE AS NEEDED
Status: DISCONTINUED | OUTPATIENT
Start: 2020-07-01 | End: 2020-07-01 | Stop reason: HOSPADM

## 2020-07-01 RX ORDER — GLYCOPYRROLATE 0.2 MG/ML
INJECTION INTRAMUSCULAR; INTRAVENOUS AS NEEDED
Status: DISCONTINUED | OUTPATIENT
Start: 2020-07-01 | End: 2020-07-01 | Stop reason: SURG

## 2020-07-01 RX ORDER — LIDOCAINE HYDROCHLORIDE 10 MG/ML
INJECTION, SOLUTION INFILTRATION; PERINEURAL AS NEEDED
Status: DISCONTINUED | OUTPATIENT
Start: 2020-07-01 | End: 2020-07-01 | Stop reason: HOSPADM

## 2020-07-01 RX ORDER — ONDANSETRON 2 MG/ML
INJECTION INTRAMUSCULAR; INTRAVENOUS AS NEEDED
Status: DISCONTINUED | OUTPATIENT
Start: 2020-07-01 | End: 2020-07-01 | Stop reason: SURG

## 2020-07-01 RX ORDER — HEPARIN SODIUM 10000 [USP'U]/100ML
INJECTION, SOLUTION INTRAVENOUS CONTINUOUS PRN
Status: DISCONTINUED | OUTPATIENT
Start: 2020-07-01 | End: 2020-07-01 | Stop reason: HOSPADM

## 2020-07-01 RX ORDER — SODIUM CHLORIDE, SODIUM LACTATE, POTASSIUM CHLORIDE, CALCIUM CHLORIDE 600; 310; 30; 20 MG/100ML; MG/100ML; MG/100ML; MG/100ML
9 INJECTION, SOLUTION INTRAVENOUS CONTINUOUS
Status: DISCONTINUED | OUTPATIENT
Start: 2020-07-01 | End: 2020-07-01

## 2020-07-01 RX ORDER — NITROGLYCERIN 0.4 MG/1
0.4 TABLET SUBLINGUAL
Status: DISCONTINUED | OUTPATIENT
Start: 2020-07-01 | End: 2020-07-01 | Stop reason: HOSPADM

## 2020-07-01 RX ORDER — FAMOTIDINE 20 MG/1
20 TABLET, FILM COATED ORAL ONCE
Status: COMPLETED | OUTPATIENT
Start: 2020-07-01 | End: 2020-07-01

## 2020-07-01 RX ORDER — FENTANYL CITRATE 50 UG/ML
50 INJECTION, SOLUTION INTRAMUSCULAR; INTRAVENOUS
Status: DISCONTINUED | OUTPATIENT
Start: 2020-07-01 | End: 2020-07-01 | Stop reason: HOSPADM

## 2020-07-01 RX ORDER — IPRATROPIUM BROMIDE AND ALBUTEROL SULFATE 2.5; .5 MG/3ML; MG/3ML
3 SOLUTION RESPIRATORY (INHALATION) ONCE AS NEEDED
Status: DISCONTINUED | OUTPATIENT
Start: 2020-07-01 | End: 2020-07-01 | Stop reason: HOSPADM

## 2020-07-01 RX ORDER — ACETAMINOPHEN 325 MG/1
650 TABLET ORAL EVERY 4 HOURS PRN
Status: DISCONTINUED | OUTPATIENT
Start: 2020-07-01 | End: 2020-07-01 | Stop reason: HOSPADM

## 2020-07-01 RX ORDER — LABETALOL HYDROCHLORIDE 5 MG/ML
5 INJECTION, SOLUTION INTRAVENOUS
Status: DISCONTINUED | OUTPATIENT
Start: 2020-07-01 | End: 2020-07-03 | Stop reason: HOSPADM

## 2020-07-01 RX ORDER — LIDOCAINE HYDROCHLORIDE 10 MG/ML
0.5 INJECTION, SOLUTION EPIDURAL; INFILTRATION; INTRACAUDAL; PERINEURAL ONCE AS NEEDED
Status: DISCONTINUED | OUTPATIENT
Start: 2020-07-01 | End: 2020-07-02

## 2020-07-01 RX ORDER — GLIPIZIDE 5 MG/1
1.25 TABLET ORAL
Status: DISCONTINUED | OUTPATIENT
Start: 2020-07-01 | End: 2020-07-03 | Stop reason: HOSPADM

## 2020-07-01 RX ORDER — SODIUM CHLORIDE 9 MG/ML
INJECTION, SOLUTION INTRAVENOUS CONTINUOUS PRN
Status: DISCONTINUED | OUTPATIENT
Start: 2020-07-01 | End: 2020-07-01 | Stop reason: SURG

## 2020-07-01 RX ORDER — HYDROMORPHONE HYDROCHLORIDE 1 MG/ML
0.5 INJECTION, SOLUTION INTRAMUSCULAR; INTRAVENOUS; SUBCUTANEOUS
Status: DISCONTINUED | OUTPATIENT
Start: 2020-07-01 | End: 2020-07-01 | Stop reason: HOSPADM

## 2020-07-01 RX ORDER — ROCURONIUM BROMIDE 10 MG/ML
INJECTION, SOLUTION INTRAVENOUS AS NEEDED
Status: DISCONTINUED | OUTPATIENT
Start: 2020-07-01 | End: 2020-07-01 | Stop reason: SURG

## 2020-07-01 RX ORDER — SODIUM CHLORIDE 0.9 % (FLUSH) 0.9 %
10 SYRINGE (ML) INJECTION AS NEEDED
Status: DISCONTINUED | OUTPATIENT
Start: 2020-07-01 | End: 2020-07-01

## 2020-07-01 RX ORDER — HYDRALAZINE HYDROCHLORIDE 20 MG/ML
5 INJECTION INTRAMUSCULAR; INTRAVENOUS
Status: DISCONTINUED | OUTPATIENT
Start: 2020-07-01 | End: 2020-07-01 | Stop reason: HOSPADM

## 2020-07-01 RX ORDER — TIZANIDINE 4 MG/1
8 TABLET ORAL EVERY 12 HOURS PRN
Status: DISCONTINUED | OUTPATIENT
Start: 2020-07-01 | End: 2020-07-03 | Stop reason: HOSPADM

## 2020-07-01 RX ORDER — ALBUTEROL SULFATE 90 UG/1
AEROSOL, METERED RESPIRATORY (INHALATION) AS NEEDED
Status: DISCONTINUED | OUTPATIENT
Start: 2020-07-01 | End: 2020-07-01 | Stop reason: SURG

## 2020-07-01 RX ORDER — PROTAMINE SULFATE 10 MG/ML
INJECTION, SOLUTION INTRAVENOUS AS NEEDED
Status: DISCONTINUED | OUTPATIENT
Start: 2020-07-01 | End: 2020-07-01 | Stop reason: HOSPADM

## 2020-07-01 RX ORDER — SODIUM CHLORIDE 9 MG/ML
INJECTION, SOLUTION INTRAVENOUS CONTINUOUS PRN
Status: COMPLETED | OUTPATIENT
Start: 2020-07-01 | End: 2020-07-01

## 2020-07-01 RX ORDER — LIDOCAINE HYDROCHLORIDE 10 MG/ML
INJECTION, SOLUTION EPIDURAL; INFILTRATION; INTRACAUDAL; PERINEURAL AS NEEDED
Status: DISCONTINUED | OUTPATIENT
Start: 2020-07-01 | End: 2020-07-01 | Stop reason: SURG

## 2020-07-01 RX ORDER — SODIUM CHLORIDE 0.9 % (FLUSH) 0.9 %
3 SYRINGE (ML) INJECTION EVERY 12 HOURS SCHEDULED
Status: DISCONTINUED | OUTPATIENT
Start: 2020-07-01 | End: 2020-07-01 | Stop reason: HOSPADM

## 2020-07-01 RX ORDER — SPIRONOLACTONE 25 MG/1
25 TABLET ORAL DAILY
Status: DISCONTINUED | OUTPATIENT
Start: 2020-07-01 | End: 2020-07-03 | Stop reason: HOSPADM

## 2020-07-01 RX ORDER — SODIUM CHLORIDE 0.9 % (FLUSH) 0.9 %
10 SYRINGE (ML) INJECTION EVERY 12 HOURS SCHEDULED
Status: DISCONTINUED | OUTPATIENT
Start: 2020-07-01 | End: 2020-07-01

## 2020-07-01 RX ADMIN — PHENYLEPHRINE HYDROCHLORIDE 0.1 MCG/KG/MIN: 10 INJECTION INTRAVENOUS at 12:52

## 2020-07-01 RX ADMIN — ROCURONIUM BROMIDE 50 MG: 10 SOLUTION INTRAVENOUS at 08:27

## 2020-07-01 RX ADMIN — FAMOTIDINE 20 MG: 20 TABLET, FILM COATED ORAL at 07:52

## 2020-07-01 RX ADMIN — PHENYLEPHRINE HYDROCHLORIDE 160 MCG: 10 INJECTION, SOLUTION INTRAMUSCULAR; INTRAVENOUS; SUBCUTANEOUS at 08:42

## 2020-07-01 RX ADMIN — IPRATROPIUM BROMIDE AND ALBUTEROL SULFATE 3 ML: 2.5; .5 SOLUTION RESPIRATORY (INHALATION) at 15:55

## 2020-07-01 RX ADMIN — SACUBITRIL AND VALSARTAN 0.5 TABLET: 49; 51 TABLET, FILM COATED ORAL at 20:07

## 2020-07-01 RX ADMIN — ROCURONIUM BROMIDE 20 MG: 10 SOLUTION INTRAVENOUS at 11:03

## 2020-07-01 RX ADMIN — PHENYLEPHRINE HYDROCHLORIDE 80 MCG: 10 INJECTION, SOLUTION INTRAMUSCULAR; INTRAVENOUS; SUBCUTANEOUS at 08:39

## 2020-07-01 RX ADMIN — PHENYLEPHRINE HYDROCHLORIDE 160 MCG: 10 INJECTION, SOLUTION INTRAMUSCULAR; INTRAVENOUS; SUBCUTANEOUS at 08:44

## 2020-07-01 RX ADMIN — SODIUM CHLORIDE: 9 INJECTION, SOLUTION INTRAVENOUS at 08:24

## 2020-07-01 RX ADMIN — KETOROLAC TROMETHAMINE 15 MG: 15 INJECTION, SOLUTION INTRAMUSCULAR; INTRAVENOUS at 19:24

## 2020-07-01 RX ADMIN — PHENYLEPHRINE HYDROCHLORIDE 160 MCG: 10 INJECTION, SOLUTION INTRAMUSCULAR; INTRAVENOUS; SUBCUTANEOUS at 14:33

## 2020-07-01 RX ADMIN — SUGAMMADEX 200 MG: 100 INJECTION, SOLUTION INTRAVENOUS at 14:39

## 2020-07-01 RX ADMIN — PROPOFOL 120 MG: 10 INJECTION, EMULSION INTRAVENOUS at 08:27

## 2020-07-01 RX ADMIN — ALBUTEROL SULFATE 8 PUFF: 90 AEROSOL, METERED RESPIRATORY (INHALATION) at 14:45

## 2020-07-01 RX ADMIN — IPRATROPIUM BROMIDE AND ALBUTEROL SULFATE 3 ML: 2.5; .5 SOLUTION RESPIRATORY (INHALATION) at 20:17

## 2020-07-01 RX ADMIN — APIXABAN 5 MG: 5 TABLET, FILM COATED ORAL at 20:08

## 2020-07-01 RX ADMIN — LIDOCAINE HYDROCHLORIDE 50 MG: 10 INJECTION, SOLUTION EPIDURAL; INFILTRATION; INTRACAUDAL; PERINEURAL at 08:27

## 2020-07-01 RX ADMIN — FUROSEMIDE 80 MG: 10 INJECTION, SOLUTION INTRAMUSCULAR; INTRAVENOUS at 15:17

## 2020-07-01 RX ADMIN — IPRATROPIUM BROMIDE AND ALBUTEROL SULFATE 3 ML: 2.5; .5 SOLUTION RESPIRATORY (INHALATION) at 07:45

## 2020-07-01 RX ADMIN — MEXILETINE HYDROCHLORIDE 150 MG: 150 CAPSULE ORAL at 20:07

## 2020-07-01 RX ADMIN — ROCURONIUM BROMIDE 20 MG: 10 SOLUTION INTRAVENOUS at 12:53

## 2020-07-01 RX ADMIN — ONDANSETRON 4 MG: 2 INJECTION INTRAMUSCULAR; INTRAVENOUS at 14:16

## 2020-07-01 RX ADMIN — SPIRONOLACTONE 25 MG: 25 TABLET ORAL at 19:24

## 2020-07-01 RX ADMIN — PHENYLEPHRINE HYDROCHLORIDE 80 MCG: 10 INJECTION, SOLUTION INTRAMUSCULAR; INTRAVENOUS; SUBCUTANEOUS at 12:52

## 2020-07-01 RX ADMIN — GLIPIZIDE 1.25 MG: 5 TABLET ORAL at 20:07

## 2020-07-01 RX ADMIN — GLYCOPYRROLATE 0.6 MG: 0.2 INJECTION INTRAMUSCULAR; INTRAVENOUS at 14:19

## 2020-07-01 RX ADMIN — ROPINIROLE HYDROCHLORIDE 3 MG: 2 TABLET, FILM COATED ORAL at 20:07

## 2020-07-01 RX ADMIN — NEOSTIGMINE METHYLSULFATE 4 MG: 1 INJECTION, SOLUTION INTRAVENOUS at 14:19

## 2020-07-01 NOTE — NURSING NOTE
Aasbo called re: catheter placement and inability to void. Urology consulted. Bladder scan >999 after pt voided 800cc in original catheter bag. Marcio supplies at bedside to insert a coude after unsuccessful nursing attempt.

## 2020-07-01 NOTE — ANESTHESIA PREPROCEDURE EVALUATION
Anesthesia Evaluation     Patient summary reviewed and Nursing notes reviewed   NPO Solid Status: > 8 hours  NPO Liquid Status: > 8 hours           Airway   Mallampati: I  TM distance: >3 FB  Neck ROM: full  No difficulty expected  Dental    (+) edentulous    Pulmonary    (+) a smoker Current, COPD (MDI ) moderate, shortness of breath,   Cardiovascular     ECG reviewed    (+) pacemaker ICD, hypertension, past MI , CAD, CABG, cardiac stents dysrhythmias (VT) Tachycardia, angina, CHF , PVD, hyperlipidemia,  carotid artery disease    ROS comment: ECG Atrial-paced rhythm with prolonged AV conduction  Rightward axis Nonspecific IV block  IMI    ECHO EF 51 %. MOD CONCEN LVH   hypokinetic wall segments  LVDD (grade II) consistent with pseudonormalization.  Moderate calcification AV Mild AVR     CATH EF 30%     MV CAD stent bypasses w some remaining occlusions    Akinesis of the anterolateral segments and hypokinesis of the inferior segments.    Neuro/Psych  GI/Hepatic/Renal/Endo    (+)  GERD, PUD,  diabetes mellitus,     Musculoskeletal     Abdominal    Substance History      OB/GYN          Other   arthritis,      ROS/Med Hx Other: eliquis                   Anesthesia Plan    ASA 4     general   (A line VS Clearsight   Maintain MAP >65 remaining CAD )  intravenous induction     Anesthetic plan, all risks, benefits, and alternatives have been provided, discussed and informed consent has been obtained with: patient.    Plan discussed with CRNA.

## 2020-07-01 NOTE — ANESTHESIA PROCEDURE NOTES
Airway  Urgency: elective    Date/Time: 7/1/2020 8:30 AM  Airway not difficult    General Information and Staff    Patient location during procedure: OR  CRNA: Tima Kunz CRNA    Indications and Patient Condition  Indications for airway management: airway protection    Preoxygenated: yes  MILS not maintained throughout  Mask difficulty assessment: 3 - difficult mask (inadequate, unstable or two providers) +/- NMBA    Final Airway Details  Final airway type: endotracheal airway      Successful airway: ETT  Cuffed: yes   Successful intubation technique: direct laryngoscopy  Facilitating devices/methods: intubating stylet  Endotracheal tube insertion site: oral  Blade: Manzano  Blade size: 2  ETT size (mm): 7.5  Cormack-Lehane Classification: grade I - full view of glottis  Placement verified by: chest auscultation and capnometry   Cuff volume (mL): 10  Measured from: lips  ETT/EBT  to lips (cm): 23  Number of attempts at approach: 1  Assessment: lips, teeth, and gum same as pre-op and atraumatic intubation    Additional Comments  Negative epigastric sounds, Breath sound equal bilaterally with symmetric chest rise and fall

## 2020-07-01 NOTE — CONSULTS
Patient is currently in the recovery room.  Carrera catheter was placed but found to be not draining by the nursing staff.  Bladder scan showed a large volume of urine in the bladder.  Attempt was made to reposition the catheter but was not successful.  I was called to attempt catheterization.  A 16 coudé tip catheter was placed with moderate difficulty.  Bloody urine drained following insertion.

## 2020-07-01 NOTE — INTERVAL H&P NOTE
CC: Recurrent sustained monomorphic VT    HPI update: Mr. johnson is a 69-year-old white male recently seen in EP follow-up for recurrent sustained monomorphic ventricular tachycardia with history of ICD shocks status post ICD implant in the past.  He is noted to have a history of ICM, prior MI, and chronic sustained systolic congestive heart failure.  Options for continued treatment were outlined and reviewed in detail in consultation with plan to pursue VT ablation for which he presents for today.  Upon presentation today patient reports holding his amiodarone therapy as instructed.  Patient reports his last dose of Eliquis was yesterday morning as instructed as well.  Patient reports compliance with medication therapy without noted side effects or signs of bleeding.  Patient denies chest pain, palpitations, dizziness, presyncope, syncope or ICD shocks.  Patient denies recent infections or signs of fever, chills, sweats, or cough.  Patient denies recent sick contacts.  Patient has a negative COVID screening documented on chart within the last 72 hours.    Constitutional: No fevers or chills, no recent weight gain or weight loss or fatigue  Eyes: No visual loss, blurred vision, double vision, yellow sclerae.  ENT: No headaches, hearing loss, vertigo, congestion or sore throat.   Cardiovascular: Per HPI  Respiratory: No cough or wheezing, no sputum production, no hematemesis   Gastrointestinal: No abdominal pain, no nausea, vomiting, constipation, diarrhea, melena.   Genitourinary: No dysuria, hematuria or increased frequency.  Musculoskeletal:  No gait disturbance, weakness or joint pain or stiffness  Integumentary: No rashes, urticaria, ulcers or sores.   Neurological: No headache, dizziness, syncope, paralysis, ataxia, no prior CVA/TIA  Psychiatric: No anxiety, or depression  Endocrine: No diaphoresis, cold or heat intolerance. No polyuria or polydipsia.   Hematologic/Lymphatic: No anemia, abnormal bruising or  bleeding. No history of DVT/PE.    Lab Results   Component Value Date    WBC 6.29 06/29/2020    HGB 15.0 06/29/2020    HCT 47.9 06/29/2020    MCV 99.6 (H) 06/29/2020     (L) 06/29/2020     Lab Results   Component Value Date    GLUCOSE 119 (H) 06/29/2020    CALCIUM 8.9 06/29/2020     06/29/2020    K 4.3 06/29/2020    CO2 34.0 (H) 06/29/2020     06/29/2020    BUN 11 06/29/2020    CREATININE 1.15 06/29/2020    EGFRIFNONA 63 06/29/2020    BCR 9.6 06/29/2020    ANIONGAP 7.0 06/29/2020     /89 (BP Location: Left arm, Patient Position: Lying)   Pulse 69   Temp 97.4 °F (36.3 °C) (Temporal)   Resp 18      GEN: Well nourished, well-developed, no acute distress  HEENT: Normocephalic, atraumatic, moist mucous membranes  NECK: Supple, no JVD, no thyromegaly, no lymphadenopathy  CARD: Regular rate and rhythm, normal S1 & S2 are present.  No murmur, gallop or rubs are appreciated.  LUNGS: Clear to auscultation bilateraly, normal respiratory effort  ABDOMEN: Soft, nontender, normal bowel sounds  EXTREMITIES: No gross deformities, no clubbing, cyanosis.  Trace BLE Edema   SKIN: Warm, dry  NEURO: No focal deficits, alert and oriented x 3  PSYCHIATRIC: Normal affect and mood, appropriate use of semantics and logic.    Electronically signed by VANDANA Dubois, 07/01/20, 7:40 AM.

## 2020-07-01 NOTE — OP NOTE
Cardiac Electrophysiology Procedure Note      [facility] at [facility]     CATHETER ABLATION OF VENTRICULAR TACHYCARDIA / PREMATURE VENTRICULAR COMPLEXES    PROCEDURES PERFORMED:    · Catheter ablation of ventricular tachycardia   · Full diagnostic EP study  · 3D electroanatomic mapping  · drug infusion / programmed pacing  · Intracadiac Echocardiography  · Left ventricular pacing and recording  · Left atrial pacing and recording  · Transeptal puncture    PREPROCEDURAL DIAGNOSES:    1.  Recurrent sustained monomorphic ventricular tachycardia  2.  Recurrent ICD therapy for #1  3.  Ischemic cardiomyopathy  4.  Heart failure with reduced ejection fraction  5.  Chronic systolic heart failure  6.  Obstructive coronary disease including remote coronary bypass graft surgery  7.  Myocardial infarction  8.  Saint Ricky dual-chamber ICD initially implanted in 2002 because of sustained monomorphic ventricular tachycardia at EP testing.  9.  Severe peripheral arterial disease.    POST PROCEDURE DIANGOSES:    1.  As above.    INDICATION FOR PROCEDURE:  Briefly, Derek Morris Jr. is a 69 y.o. year old male with a history of recurrent sustained monomorphic VT.  Have seen him in my arrhythmia clinic several times.  Initially he had been on mexiletine and Tikosyn for suppression of ventricular tachycardia and however in spite of this he had more than 40 sustained episodes of VT requiring ICD therapy.  All of his ICD events were approximately 320 and 420 ms cycle length and there appeared to be 1 significant consistent morphology.  I had offered him several months ago the option of a catheter ablation procedure to address his sustained VT episodes however he ultimately chose instead to go with amiodarone therapy as replacement for his Tikosyn.  While he did not have any significant recurrent episodes of VT while on amiodarone he did have worsening shortness of breath and was quite concerned that he may be  developing amiodarone toxicity given his advanced lung disease.  He continues to smoke and is struggling to quit.    After extensive conversation we decided to proceed with a cath ablation procedure and I stop his amiodarone as an outpatient.  Presents today for this elective procedure in outpatient setting.    DESCRIPTION OF TARGETED CLINICAL VENTRICULAR TACHYCARDIA OR PVCS:    1.  Sustained monomorphic VT not documented on twelve-lead EKG or telemetry but rather only through a Saint Ricky device with a cycle length between 380 ms and 420 ms which seems to have 1 single consistent morphology on the far field electrogram.    ANTICOAGULATION STRATEGY PRIOR TO AND POST PROCEDURE: Apixaban 5 mg twice daily.  The last dose of anticoagulant was confirmed to have been taken 2 days ago.      PT/INR:  No results found for: LABPROT, INR  PTT:  No results found for: APTT    CBC:   WBC   Date Value Ref Range Status   06/29/2020 6.29 3.40 - 10.80 10*3/mm3 Final     RBC   Date Value Ref Range Status   06/29/2020 4.81 4.14 - 5.80 10*6/mm3 Final     Hemoglobin   Date Value Ref Range Status   06/29/2020 15.0 13.0 - 17.7 g/dL Final     Hematocrit   Date Value Ref Range Status   06/29/2020 47.9 37.5 - 51.0 % Final     MCV   Date Value Ref Range Status   06/29/2020 99.6 (H) 79.0 - 97.0 fL Final     RDW   Date Value Ref Range Status   06/29/2020 13.6 12.3 - 15.4 % Final     Platelets   Date Value Ref Range Status   06/29/2020 124 (L) 140 - 450 10*3/mm3 Final     BMP:     Sodium   Date Value Ref Range Status   06/29/2020 142 136 - 145 mmol/L Final     Potassium   Date Value Ref Range Status   06/29/2020 4.3 3.5 - 5.2 mmol/L Final     Chloride   Date Value Ref Range Status   06/29/2020 101 98 - 107 mmol/L Final     CO2   Date Value Ref Range Status   06/29/2020 34.0 (H) 22.0 - 29.0 mmol/L Final     BUN   Date Value Ref Range Status   06/29/2020 11 8 - 23 mg/dL Final     Creatinine   Date Value Ref Range Status   06/29/2020 1.15 0.76 -  1.27 mg/dL Final       Vital Signs: /80 (BP Location: Left arm, Patient Position: Lying)   Pulse 80   Temp 97 °F (36.1 °C) (Temporal)   Resp 14   SpO2 95%      Admit Weight:     BMI: There is no height or weight on file to calculate BMI.    PROCEDURE NARRATIVE:    The patient was able to give written informed consent after revisiting the key portions of the risk versus benefit profile of the procedure.  This discussion was framed by our lengthy conversations  (please see our detailed notes).  Patient verbalized strong understanding of this discussion and a strong desire to proceed with the procedure.  Please note that this detailed informed consent process utilized mutual and shared decision making process between all parties involved, principally the physician and patient, but also potentially with input from the patient's selected family and friends.    The patient was brought to the EP laboratory in the post absorptive state.    His ICD was programmed to have tachycardia detection and therapy off during the procedure.  I did this personally.  Also I personally reprogrammed the ICD and the procedure to have tachycardia detection and therapies on.    The patient was electively intubated for the procedure and given a general anesthetic by colleagues from anesthesia. Please see the detailed anesthesia records.    The patient was then prepared and draped in a routing sterile fashion.  Seldinger access was obtained and the following sheaths were advanced in an over the wire fashion to the heart:    1) right common femoral vein: Terumo large curved deflectable sheath  2) right common femoral vein: Short 11 Bermudian sheath  3) left common femoral vein short 8 Bermudian sheath  4) left common femoral vein short 6 Bermudian sheath    The patient was anticoagulated with intravenous heparin (initial bolus and then continuous infusion) with a goal ACT of between 350 and 400 seconds.    A 10 [place]udian phased array ICE catheter  was placed into the right atrium and right ventricle.  This was used for transeptal puncture, monitoring of the pericardial space, monitoring of the ablation catheter position within the heart and monitoring of other cardiac structures.    Transeptal puncture was performed after heparin administration with a combination of echocardiographic and fluoroscopic guidance.  This was performed with a large curve Agilis sheath, a BRK needle, and a SafeSept transeptal wire.  The catheter and sheath were advanced safely into the left atrium.     A [employer] 64 electrode [place] diagnostic electrophysiology catheter and a ThermoCool RMT non-navigation irrigated tip ablation catheter were used for pacing and recording in the left ventricle    We used the [employer] 3D electroanatomic mapping system in conjunction with these catheters to construct an electroanatomic shell of the ventricle and it is relevant structures.  We used the [employer] remote magnetic navigation system to manipulate the ablation catheter during the procedure.    A decapolar electrophysiology catheter was placed to the ear sinus for left atrial pacing and recording.    A 5 electrode electrophysiology catheter was placed to the right ventricular apex for pacing and recording and also to use as a unipolar reference for the mapping system.    We performed the procedure during AV sequential pacing through the patient's ICD at a rate of 80 bpm.  RV pacing was desired during the procedure to play potential late potentials recorded from the left ventricle.    An EP study was performed.  Data obtained from this is listed in the below table:    Initial Study    Isuprel Washout Study           drive train / burst extrastim        Rhythm          Atrial CL      R-R 960    Ventricular CL          AVBCL      HV 58    AVNERP       drive train / burst extrastim   Slow Pway ERP      Rhythm     Fast Pway ERP      Atrial CL     VABCL conc? /   Dec?      Ventricular CL     VAERP      AVBCL 700    AERP      AVNERP 650 310   VERP      Slow Pway ERP     AP antegrade ERP      Fast Pway ERP     AP retrograde ERP      VABCL conc? /  Dec? vad          VAERP    Final Study      AERP      drive train / burst extrastim    VERP     Rhythm      AP antegrade ERP     Atrial CL      AP retrograde ERP     Ventricular CL           AVBCL     Isuprel Dose =      AVNERP       drive train / burst extrastim   Slow Pway ERP      Rhythm     Fast Pway ERP      Atrial CL     VABCL conc? /  Dec?      Ventricular CL     VAERP      AVBCL     AERP      AVNERP     VERP      Slow Pway ERP     AP antegrade ERP      Fast Pway ERP     AP retrograde ERP      VABCL conc? /  Dec?           VAERP           AERP           VERP           AP antegrade ERP           AP retrograde ERP                         VT Induction EP study ([facility] Protocol)        No Isuprel     Site One (RV [place] /  RV Septum)      Drive Train V ERP    350     400     600 / 550         Site Two (RV Outflow Tract /  RV Septum)     Drive Train V ERP    350     400     600 / 550         Isuprel Dose =      Site One (RV [place] /  RV Septum)      Drive Train V ERP    350     400     600 / 550         Site Two (RV Outflow Tract /  RV Septum)     Drive Train V ERP    350     400     600 / 550        DESCRIPTION OF ARRHYTHMIAS INDUCED:    1.  No arrhythmias were induced today.  Ventricular burst pacing was performed after ablation and was negative for any inducible tachycardia both with and without isoproterenol at a dose of 5 mcg/min.  2.  Substrate mapping ablation were performed.    Extensive voltage and activation mapping during ventricular pacing was performed of left ventricle in its entirety.  More than 10,000 activation points were obtained.  These were carefully evaluated.  Late potentials ( LAVAs ) were noted at the terminal 30 ms of the QRS and also separately after the QRS.  Scar was defined as less than 0.5  mV.  Healthy tissue was defined as greater than 1.5 mV.  Intermediate tissue was defined is between these 2 values.    We delineated an extensive area of scar on the inferior septum, inferior wall, posterior wall extending from the midportion of the left ventricle to the base of the left ventricle.  Additionally there was patchy scar on the basal aspect of the anterior septum and anterior wall as well as the lateral wall of the left ventricle.  The scar area was quite dramatic in contrast to the relative lack of wall motion abnormality seen on prior imaging studies.  There were multiple areas where we demonstrated LAVAs.  These areas were mostly at the border zone between scar and intermediate tissue but also were numerous within dense scar itself.      DESCRIPTION OF THE ABLATION STRATEGY:    Catheter ablation was performed with power settings limited to 50 Huang.  Lesions were such that no points were taken on the cardiac 3D electroanatomic map unless there was at least a 15 ohm impedance drop and the tissue after ablation was electrically and excitable at 7 mA pacing output.  All LAVAs on the map were targeted and ablated.  This constituted more than 34 minutes of radiofrequency application.  Applications of radiofrequency energy were between 5 and 15 seconds duration.  An extensive amount of ablation was performed.    Following this a ventricular burst stimulation was performed to induce tachycardia.  No tachycardia was inducible down to burst pacing cycle length of 350 msec.    Isuprel was administered intravenously following ablation to test for other atrial and or ventricular arrhythmias.  Dose of isoproterenol was 5 mcg/min.   Programmed stimulation was performed in an attempt to induce other and additional arrhythmias.  No arrhythmias were induced during isuprel administration and washout.    Catheters and sheaths were removed from the left atrium and heparin was discontinued.  Protamine was given to  "reverse the heparin.    The ICE catheter revealed that there was no pericardial effusion.    Catheters and sheaths were then removed from the body.  Hemostasis was achieved with manual pressure after reversal of anticoagulation with protamine.  The patient was extubated in routine fashion and transferred to PACU in stable condition.    A hemostatic \"figure of eight\" suture was applied to the cutaneous and subcutaneous tissue overlying the vascular puncture site at the groin.  This suture is to be removed prior to the patient being discharged home.                    COMPLICATIONS: None    EBL: minimal    RADIATION EXPOSURE: 94 mGy over 24.7 minutes    I/O: 3.1 L of crystalloid in/no Carrera catheter placed    LA PRESSURE: 24 over 13 mmHg at the beginning of the procedure.    RADIOFREQUENCY APPLICATION TIME (RF TIME): 34 minutes and 10 seconds.    KEY PROCEDURAL FINDINGS:    · Extensive left ventricular scar and extensive delayed potentials through the scar and the scar border zones consistent with extensive prior myocardial infarction and substrate consistent with recurrent sustained monomorphic VT.  · Extensive catheter ablation of the entirety of the left ventricular scar, delayed potentials and border zone.  · Normal ICD function documented before and after the procedure.  ICD confirmed to be programmed on after the procedure was completed prior to the patient leaving the room.    POST PROCEDURAL PLAN:    1.  Report was called to the PACU nurse responsible for the patients care.  · Uninterrupted anticoagulation for not less than 90 days unless specially instructed otherwise by myself or another member of our EP physician team.  Please note that the patient and the patient’s family have been extensively counseled about this critical requirement and have agreed to comply.  · Medications were reconciled, and key changes in medications include: Amiodarone has been recently stopped.  My desire is to restart Tikosyn " however we will need to wait a minimum of 6 weeks before doing so.  We will address this in outpatient follow-up during visit in 6 weeks time.  In the meantime he will remain on carvedilol as well as mexiletine.  · The patient will be seen at our office in 6 weeks time.  · Follow-up elective outpatient echocardiogram will be obtained.  If his ejection fraction is less than 35% consider resynchronization ICD upgrade.    Calin Dior DO, FACC, [facility]  Cardiac Electrophysiologist  [facility] / [facility]

## 2020-07-01 NOTE — ANESTHESIA POSTPROCEDURE EVALUATION
Patient: Derek Morris Jr.    Procedure Summary     Date:  07/01/20 Room / Location:  [facility] CATH/EP LAB E / [facility] EP INVASIVE LOCATION    Anesthesia Start:  0815 Anesthesia Stop:      Procedure:  EP/ABLATION VT, STX/Rhythmia (BSC), hold Eliquis 2 DOSES, no other meds to hold, GA (N/A ) Diagnosis:       Ventricular tachycardia (CMS/HCC)      (VT)    Provider:  Calin Dior DO Provider:  Juan Jose Winkler MD    Anesthesia Type:  general ASA Status:  4          Anesthesia Type: general    Vitals  Vitals Value Taken Time   /73 7/1/2020  3:00 PM   Temp 97 °F (36.1 °C) 7/1/2020  3:00 PM   Pulse 80 7/1/2020  3:02 PM   Resp     SpO2 99 % 7/1/2020  3:02 PM   Vitals shown include unvalidated device data.        Post Anesthesia Care and Evaluation    Patient location during evaluation: PACU  Patient participation: complete - patient participated  Level of consciousness: awake and alert  Pain score: 0  Pain management: adequate  Airway patency: patent  Anesthetic complications: No anesthetic complications  PONV Status: none  Cardiovascular status: hemodynamically stable and acceptable  Respiratory status: nonlabored ventilation, acceptable and face mask  Hydration status: acceptable

## 2020-07-02 PROBLEM — N17.9 AKI (ACUTE KIDNEY INJURY) (HCC): Status: ACTIVE | Noted: 2020-07-02

## 2020-07-02 LAB
ANION GAP SERPL CALCULATED.3IONS-SCNC: 6 MMOL/L (ref 5–15)
BUN SERPL-MCNC: 13 MG/DL (ref 8–23)
BUN/CREAT SERPL: 9.7 (ref 7–25)
CALCIUM SPEC-SCNC: 7.9 MG/DL (ref 8.6–10.5)
CHLORIDE SERPL-SCNC: 105 MMOL/L (ref 98–107)
CO2 SERPL-SCNC: 32 MMOL/L (ref 22–29)
CREAT SERPL-MCNC: 1.34 MG/DL (ref 0.76–1.27)
DEPRECATED RDW RBC AUTO: 52.4 FL (ref 37–54)
ERYTHROCYTE [DISTWIDTH] IN BLOOD BY AUTOMATED COUNT: 14.1 % (ref 12.3–15.4)
GFR SERPL CREATININE-BSD FRML MDRD: 53 ML/MIN/1.73
GLUCOSE BLDC GLUCOMTR-MCNC: 110 MG/DL (ref 70–130)
GLUCOSE BLDC GLUCOMTR-MCNC: 118 MG/DL (ref 70–130)
GLUCOSE BLDC GLUCOMTR-MCNC: 120 MG/DL (ref 70–130)
GLUCOSE BLDC GLUCOMTR-MCNC: 165 MG/DL (ref 70–130)
GLUCOSE SERPL-MCNC: 135 MG/DL (ref 65–99)
HCT VFR BLD AUTO: 43.3 % (ref 37.5–51)
HGB BLD-MCNC: 13.4 G/DL (ref 13–17.7)
MAGNESIUM SERPL-MCNC: 2.2 MG/DL (ref 1.6–2.4)
MCH RBC QN AUTO: 30.9 PG (ref 26.6–33)
MCHC RBC AUTO-ENTMCNC: 30.9 G/DL (ref 31.5–35.7)
MCV RBC AUTO: 100 FL (ref 79–97)
PHOSPHATE SERPL-MCNC: 4.2 MG/DL (ref 2.5–4.5)
PLATELET # BLD AUTO: 98 10*3/MM3 (ref 140–450)
PMV BLD AUTO: 11.2 FL (ref 6–12)
POTASSIUM SERPL-SCNC: 3.8 MMOL/L (ref 3.5–5.2)
RBC # BLD AUTO: 4.33 10*6/MM3 (ref 4.14–5.8)
SODIUM SERPL-SCNC: 143 MMOL/L (ref 136–145)
WBC # BLD AUTO: 6.09 10*3/MM3 (ref 3.4–10.8)

## 2020-07-02 PROCEDURE — 63710000001 SACUBITRIL-VALSARTAN 49-51 MG TABLET: Performed by: INTERNAL MEDICINE

## 2020-07-02 PROCEDURE — 80048 BASIC METABOLIC PNL TOTAL CA: CPT | Performed by: INTERNAL MEDICINE

## 2020-07-02 PROCEDURE — 99213 OFFICE O/P EST LOW 20 MIN: CPT | Performed by: NURSE PRACTITIONER

## 2020-07-02 PROCEDURE — 63710000001 SPIRONOLACTONE 25 MG TABLET: Performed by: INTERNAL MEDICINE

## 2020-07-02 PROCEDURE — 93005 ELECTROCARDIOGRAM TRACING: CPT | Performed by: INTERNAL MEDICINE

## 2020-07-02 PROCEDURE — 93010 ELECTROCARDIOGRAM REPORT: CPT | Performed by: INTERNAL MEDICINE

## 2020-07-02 PROCEDURE — 63710000001 GLIPIZIDE 5 MG TABLET: Performed by: INTERNAL MEDICINE

## 2020-07-02 PROCEDURE — A9270 NON-COVERED ITEM OR SERVICE: HCPCS | Performed by: INTERNAL MEDICINE

## 2020-07-02 PROCEDURE — 99232 SBSQ HOSP IP/OBS MODERATE 35: CPT | Performed by: INTERNAL MEDICINE

## 2020-07-02 PROCEDURE — 85027 COMPLETE CBC AUTOMATED: CPT | Performed by: INTERNAL MEDICINE

## 2020-07-02 PROCEDURE — 25010000002 KETOROLAC TROMETHAMINE PER 15 MG: Performed by: INTERNAL MEDICINE

## 2020-07-02 PROCEDURE — 84100 ASSAY OF PHOSPHORUS: CPT | Performed by: INTERNAL MEDICINE

## 2020-07-02 PROCEDURE — 82962 GLUCOSE BLOOD TEST: CPT

## 2020-07-02 PROCEDURE — 83735 ASSAY OF MAGNESIUM: CPT | Performed by: INTERNAL MEDICINE

## 2020-07-02 PROCEDURE — 63710000001 APIXABAN 5 MG TABLET: Performed by: INTERNAL MEDICINE

## 2020-07-02 PROCEDURE — 94799 UNLISTED PULMONARY SVC/PX: CPT

## 2020-07-02 PROCEDURE — 63710000001 MEXILETINE 150 MG CAPSULE: Performed by: INTERNAL MEDICINE

## 2020-07-02 PROCEDURE — 25010000002 FUROSEMIDE PER 20 MG: Performed by: NURSE PRACTITIONER

## 2020-07-02 PROCEDURE — 63710000001 ACETAMINOPHEN 325 MG TABLET: Performed by: INTERNAL MEDICINE

## 2020-07-02 RX ORDER — FUROSEMIDE 10 MG/ML
40 INJECTION INTRAMUSCULAR; INTRAVENOUS ONCE
Status: CANCELLED | OUTPATIENT
Start: 2020-07-02

## 2020-07-02 RX ORDER — ACETAMINOPHEN 325 MG/1
650 TABLET ORAL EVERY 6 HOURS PRN
Status: DISCONTINUED | OUTPATIENT
Start: 2020-07-02 | End: 2020-07-03 | Stop reason: HOSPADM

## 2020-07-02 RX ORDER — FUROSEMIDE 10 MG/ML
40 INJECTION INTRAMUSCULAR; INTRAVENOUS ONCE
Status: COMPLETED | OUTPATIENT
Start: 2020-07-02 | End: 2020-07-02

## 2020-07-02 RX ADMIN — MEXILETINE HYDROCHLORIDE 150 MG: 150 CAPSULE ORAL at 08:19

## 2020-07-02 RX ADMIN — IPRATROPIUM BROMIDE AND ALBUTEROL SULFATE 3 ML: 2.5; .5 SOLUTION RESPIRATORY (INHALATION) at 20:55

## 2020-07-02 RX ADMIN — APIXABAN 5 MG: 5 TABLET, FILM COATED ORAL at 08:17

## 2020-07-02 RX ADMIN — MEXILETINE HYDROCHLORIDE 150 MG: 150 CAPSULE ORAL at 16:42

## 2020-07-02 RX ADMIN — IPRATROPIUM BROMIDE AND ALBUTEROL SULFATE 3 ML: 2.5; .5 SOLUTION RESPIRATORY (INHALATION) at 11:50

## 2020-07-02 RX ADMIN — SPIRONOLACTONE 25 MG: 25 TABLET ORAL at 08:20

## 2020-07-02 RX ADMIN — SACUBITRIL AND VALSARTAN 0.5 TABLET: 49; 51 TABLET, FILM COATED ORAL at 08:17

## 2020-07-02 RX ADMIN — ACETAMINOPHEN 650 MG: 325 TABLET, FILM COATED ORAL at 19:05

## 2020-07-02 RX ADMIN — KETOROLAC TROMETHAMINE 15 MG: 15 INJECTION, SOLUTION INTRAMUSCULAR; INTRAVENOUS at 04:05

## 2020-07-02 RX ADMIN — IPRATROPIUM BROMIDE AND ALBUTEROL SULFATE 3 ML: 2.5; .5 SOLUTION RESPIRATORY (INHALATION) at 07:31

## 2020-07-02 RX ADMIN — GLIPIZIDE 1.25 MG: 5 TABLET ORAL at 08:16

## 2020-07-02 RX ADMIN — SACUBITRIL AND VALSARTAN 0.5 TABLET: 49; 51 TABLET, FILM COATED ORAL at 20:27

## 2020-07-02 RX ADMIN — GLIPIZIDE 1.25 MG: 5 TABLET ORAL at 16:43

## 2020-07-02 RX ADMIN — IPRATROPIUM BROMIDE AND ALBUTEROL SULFATE 3 ML: 2.5; .5 SOLUTION RESPIRATORY (INHALATION) at 15:32

## 2020-07-02 RX ADMIN — FUROSEMIDE 40 MG: 10 INJECTION, SOLUTION INTRAMUSCULAR; INTRAVENOUS at 17:33

## 2020-07-02 RX ADMIN — ACETAMINOPHEN 650 MG: 325 TABLET, FILM COATED ORAL at 13:21

## 2020-07-02 RX ADMIN — APIXABAN 5 MG: 5 TABLET, FILM COATED ORAL at 20:27

## 2020-07-02 NOTE — PROGRESS NOTES
"      Cardiac Electrophysiology Inpatient Follow Up Note         [facility] at [facility]     Progress Note      CC: Recurrent sustained monomorphic VT    Subjective     Mr. Morris is seen in EP follow-up today status post VT ablation yesterday.  Patient has been stable overnight from a cardiac standpoint without documented arrhythmias.  Patient denies chest pain, palpitations, dizziness, or presyncope.  It is noted that patient could not void after his procedure in the postanesthesia care unit yesterday necessitating a urology consult and difficult Carrera catheter placement.  Patient is noted to have 2 L of dark red urine output immediately following catheter placement and 500 cc of dark red urine output overnight.  Patient's right groin puncture site is assessed without hematoma or drainage noted.  Pursestring suture is removed without difficulty.    REVIEW OF SYSTEMS  ROS no change from admission H&P except for: above    Objective   VITAL SIGNS  /64   Pulse 87   Temp 97.9 °F (36.6 °C) (Oral)   Resp 16   Ht 172.7 cm (67.99\")   Wt 76.7 kg (169 lb)   SpO2 95%   BMI 25.70 kg/m²     Pulse Ox: SpO2  Av.2 %  Min: 87 %  Max: 100 %  Supplemental O2:       Admit Weight  Weight: 76.7 kg (169 lb)  Last 3 Weights    Body mass index is 25.7 kg/m².    INTAKE/OUTPUT  I/O last 3 completed shifts:  In: 3000 [I.V.:3000]  Out: 3700 [Urine:3700]    Intake/Output Summary (Last 24 hours) at 2020 0826  Last data filed at 2020 0600  Gross per 24 hour   Intake 3000 ml   Output 3700 ml   Net -700 ml       PHYSICAL EXAM  General appearance: awake, alert, oriented, moves all extremities  Lungs: no rhonchi, no wheezes, no rales  Heart: S1S2 RRR  Abdomen: positive bowel sounds, no bruits, no masses  Extremities: warm and dry, no cyanosis, no clubbing    LABS  Results from last 7 days   Lab Units 20  0353 20  1752 20  1006   WBC 10*3/mm3 6.09  --  6.29   HEMOGLOBIN g/dL 13.4 14.9 15.0   "   HEMATOCRIT % 43.3 48.7 47.9   MCV fL 100.0*  --  99.6*   PLATELETS 10*3/mm3 98*  --  124*         Results from last 7 days   Lab Units 07/02/20  0353 06/29/20  1006   POTASSIUM mmol/L 3.8 4.3   CHLORIDE mmol/L 105 101   CO2 mmol/L 32.0* 34.0*   BUN mg/dL 13 11   CREATININE mg/dL 1.34* 1.15   GLUCOSE mg/dL 135* 119*   CALCIUM mg/dL 7.9* 8.9   MAGNESIUM mg/dL 2.2  --              Results from last 7 days   Lab Units 07/02/20  0353   MAGNESIUM mg/dL 2.2       CURRENT MEDICATIONS    apixaban 5 mg Oral Q12H   famotidine 20 mg Intravenous Once   glipizide 1.25 mg Oral BID AC   ipratropium-albuterol 3 mL Nebulization 4x Daily - RT   ketorolac 15 mg Intravenous Q8H   mexiletine 150 mg Oral TID   rOPINIRole 3 mg Oral Nightly   sacubitril-valsartan 0.5 tablet Oral Q12H   spironolactone 25 mg Oral Daily     TELEMETRY:  Paced 80 bpm    Assessment & Plan     Mr. Lazo is seen in EP follow-up today status post VT ablation yesterday.  Patient has been stable from a EP standpoint overnight and is ready for discharge however patient noted to develop urinary retention status post procedure with difficult to place Carrera catheter.  Patient now with urology consult and continued dark red urine output.  We will place order for patient be transferred to the floor in the event that urology would not be able to discharge patient home today.  If patient is able to have catheter discontinued and does not have difficulty voiding would anticipate discharge possibly later today.  This is all pending obviously on urology's evaluation of the patient.  No new medication orders at this time will continue all current meds.  Will check back in on patient after lunch to assess his progress and possible discharge home.      Electronically signed by VANDANA Dubois, 07/02/20, 8:30 AM.

## 2020-07-02 NOTE — PROGRESS NOTES
2020  1717    Derek Morris Jr.   : 1950    HPI Update: Mr. Morris is seen in reevaluation this evening following his VT ablation yesterday with complications of urinary retention status post procedure requiring Carrera catheter placement that was traumatic with subsequent urology consultation.  Patient noted to have continued decreased urine output with 250 cc of dark red urine out over the past 10 hours.  Bladder scan obtained revealing no urinary retention.  Order placed for Carrera catheter discontinuation with administration of IV Lasix at this time.  We will continue with patient transfer to the floor with anticipated discharge in a.m. per Dr. Dior.    Electronically signed by VANDANA Dubois, 20, 5:21 PM.

## 2020-07-02 NOTE — PROGRESS NOTES
Discharge Planning Assessment  [facility]     Patient Name: Derek Morris Jr.  MRN: [id number]  Today's Date: 7/2/2020    Admit Date: 7/1/2020    Discharge Needs Assessment     Row Name 07/02/20 1126       Living Environment    Lives With  spouse    Name(s) of Who Lives With Patient  Amanda Stafford    Current Living Arrangements  home/apartment/condo    Primary Care Provided by  spouse/significant other    Provides Primary Care For  no one, unable/limited ability to care for self    Family Caregiver if Needed  spouse    Family Caregiver Names  Spouse, Amanda Morris    Quality of Family Relationships  supportive;involved;helpful    Able to Return to Prior Arrangements  yes    Living Arrangement Comments  Lives in [place] with spouse in mobile home with ramp.       Resource/Environmental Concerns    Resource/Environmental Concerns  none    Transportation Concerns  car, none       Transition Planning    Patient/Family Anticipates Transition to  home with family    Patient/Family Anticipated Services at Transition  none    Transportation Anticipated  family or friend will provide       Discharge Needs Assessment    Readmission Within the Last 30 Days  no previous admission in last 30 days    Concerns to be Addressed  no discharge needs identified    Equipment Currently Used at Home  glucometer;oxygen;nebulizer    Anticipated Changes Related to Illness  none    Equipment Needed After Discharge  none    Provided Post Acute Provider List?  N/A    N/A Provider List Comment  No post discharge services required        Discharge Plan     Row Name 07/02/20 1130       Plan    Plan  Home    Patient/Family in Agreement with Plan  yes    Plan Comments  Met with patient in the room for initial discharge planning.  Lives with spouse in mobile home in [place] [facility] with a ramp.  Independent.  PCP is [facility] [place].  He has home O2 he uses at 2L/NC at night.  He has a BP cuff, nebulizer.  Plan is home.  [place]y  discharge needs.      Final Discharge Disposition Code  01 - home or self-care        Destination      Coordination has not been started for this encounter.      Durable Medical Equipment      Coordination has not been started for this encounter.      Dialysis/Infusion      Coordination has not been started for this encounter.      Home Medical Care      Coordination has not been started for this encounter.      Therapy      Coordination has not been started for this encounter.      Community Resources      Coordination has not been started for this encounter.          Demographic Summary     Row Name 07/02/20 1124       General Information    Admission Type  inpatient    Arrived From  operating room    Referral Source  admission list    Reason for Consult  discharge planning    Preferred Language  English        Functional Status     Row Name 07/02/20 1124       Functional Status    Usual Activity Tolerance  good    Current Activity Tolerance  good       Functional Status, IADL    Medications  independent    Meal Preparation  assistive person daughter assists    Housekeeping  completely dependent Daughter provides    Laundry  completely dependent Daughter provides    Shopping  assistive person daughter assists        Psychosocial    No documentation.       Abuse/Neglect    No documentation.       Legal    No documentation.       Substance Abuse    No documentation.       Patient Forms    No documentation.           Christa Hollingsworth RN

## 2020-07-02 NOTE — PROGRESS NOTES
INTENSIVIST   PROGRESS NOTE     Hospital:  LOS: 0 days     Mr. Derek Morris Jr., 69 y.o. male is followed for a Chief Complaint of: Ventricular Tachycardia      Subjective   S     Mr. Morris is a 70yo M with a history of recurrent sustained Vtach and ICD shocks, ICM, piror MI and chronic systolic heart failure who presented today to undergo catheter ablation by Dr. Dior. His eliquis has been on hold for 2 days prior to the procedure.     Interval History:  He underwent a catheter ablation with prolonged general anesthesia today by Dr. Dior. He received IV fluids and then diuretics. He was extubated and transferred to the PACU. Nursing was unable to place a walter catheter and Urology was consulted and placed a 16 coude catheter with moderate difficulty. He continues to have hematuria. He has been transferred to the ICU for monitoring.        The patient's relevant past medical, surgical and social history were reviewed and updated in Epic as appropriate.      ROS:   Constitutional: Negative for fever.   Respiratory: Negative for dyspnea.   Cardiovascular: Negative for chest pain.   Gastrointestinal: Negative for  nausea, vomiting and diarrhea.     Objective   O     Vitals:  Temp  Min: 97 °F (36.1 °C)  Max: 97.5 °F (36.4 °C)  BP  Min: 104/74  Max: 174/89  Pulse  Min: 64  Max: 95  Resp  Min: 14  Max: 18  SpO2  Min: 87 %  Max: 100 % Flow (L/min)  Min: 2  Max: 10    Intake/Ouptut 24 hrs (7:00AM - 6:59 AM)  Intake & Output (last 3 days)       06/29 0701 - 06/30 0700 06/30 0701 - 07/01 0700 07/01 0701 - 07/02 0700    I.V.   3000    Total Intake   3000    Urine   3200    Total Output   3200    Net   -200                   Physical Examination  Telemetry:  Normal sinus rhythm.    Constitutional:  No acute distress.  Sitting up in bed.    Eyes: No scleral icterus.   PERRL, EOM intact.    Neck:  Supple, FROM   Cardiovascular: Normal rate, regular and rhythm. Normal heart sounds.  No murmurs, gallop or rub.   Respiratory:  No respiratory distress. Normal respiratory effort.  Normal breath sounds  Clear to ascultation   Abdominal:  Soft. No masses. Non-tender. No distension. No HSM.   Extremities: No digital cyanosis. No clubbing.  No peripheral edema.   Skin: No rashes, lesions or ulcers   Neurological:   Alert and Oriented to person, place, and time.              Results from last 7 days   Lab Units 07/01/20  1752 06/29/20  1006   WBC 10*3/mm3  --  6.29   HEMOGLOBIN g/dL 14.9 15.0   MCV fL  --  99.6*   PLATELETS 10*3/mm3  --  124*     Results from last 7 days   Lab Units 06/29/20  1006   SODIUM mmol/L 142   POTASSIUM mmol/L 4.3   CO2 mmol/L 34.0*   CREATININE mg/dL 1.15   GLUCOSE mg/dL 119*     Estimated Creatinine Clearance: 63.9 mL/min (by C-G formula based on SCr of 1.15 mg/dL).              Images:  Imaging Results (Last 24 Hours)     ** No results found for the last 24 hours. **            Results: Reviewed.  I reviewed the patient's new laboratory and imaging results.  I independently reviewed the patient's new images.    Medications: Reviewed.    Assessment/Plan   A / P     Mr. Morris is a 70yo M with a history of recurrent sustained Vtach and ICD shocks, ICM, piror MI and chronic systolic heart failure who presented today to undergo catheter ablation by Dr. Dior. His eliquis has been on hold for 2 days prior to the procedure.     He underwent a catheter ablation with prolonged general anesthesia today by Dr. Dior. He received IV fluids and then diuretics. He was extubated and transferred to the PACU. Nursing was unable to place a walter catheter and Urology was consulted and placed a 16 coude catheter with moderate difficulty. He continues to have hematuria. He has been transferred to the ICU for monitoring.       Nutrition:   Diet Regular  Advance Directives:   There are no questions and answers to display.       Active Hospital Problems    Diagnosis   • **Ventricular tachycardia (CMS/HCC)   • Ischemic cardiomyopathy      Added automatically from request for surgery [id number]     • Type 2 diabetes mellitus without complication (CMS/HCC)     Added automatically from request for surgery [id number]     • Other emphysema (CMS/HCC)     Added automatically from request for surgery [id number]     • Tobacco dependence     Home 02     • Essential hypertension   • Chronic systolic heart failure (CMS/HCC)     Device Interoggation: Demonstrates normal DD ICD function. No significant  Atrial or ventricular arrhytmias. He is at [facility] as of today          Assessment / Plan:    1. Monitor in the ICU  2. Hematuria/walter management per Urology  3. Home medications have been reviewed.   4. Monitor glucose.   5. Duonebs to replace Trelegy  6. Monitor volume status.   7. AM labs      I discussed the patient's findings and my recommendations with patient and nursing staff      Karina Head, DO    Intensive Care Medicine and Pulmonary Medicine

## 2020-07-02 NOTE — PROGRESS NOTES
Intensive Care Follow-up     Hospital:  LOS: 1 day   Mr. Derek Morris Jr., 69 y.o. male is followed for:   Ventricular tachycardia (CMS/HCC)            History of present illness:     69-year-old male with history of recurrent V. tach and ICD shock, ischemic cardiomyopathy status post ICD implant in the past.  He has prior MI, chronic systolic congestive heart failure as well.  He was brought in to perform catheter ablation of ventricular tachycardia and underwent successfully yesterday.  Postop patient was having issues with urinary retention so urology had to come in and place a Carrera catheter.  Patient continues with Carrera catheter at this time and some hematuria persisting.    Subjective   Interval History:  Patient without any complaints currently.  No more arrhythmias noted.  Currently 100% paced rhythm.  Carrera catheter in place and urine appears pretty dark.  Hemoglobin is stable however.                 The patient's past medical, surgical and social history were reviewed and updated in Epic as appropriate.       Objective     Infusions:     Medications:    apixaban 5 mg Oral Q12H   glipizide 1.25 mg Oral BID AC   ipratropium-albuterol 3 mL Nebulization 4x Daily - RT   mexiletine 150 mg Oral TID   rOPINIRole 3 mg Oral Nightly   sacubitril-valsartan 0.5 tablet Oral Q12H   spironolactone 25 mg Oral Daily       Vital Sign Min/Max for last 24 hours  Temp  Min: 97 °F (36.1 °C)  Max: 98.5 °F (36.9 °C)   BP  Min: 103/59  Max: 169/98   Pulse  Min: 64  Max: 95   Resp  Min: 14  Max: 18   SpO2  Min: 87 %  Max: 100 %   Flow (L/min)  Min: 2  Max: 10       Input/Output for last 24 hour shift  07/01 0701 - 07/02 0700  In: 3000 [I.V.:3000]  Out: 3700 [Urine:3700]      Objective     General Appearance: Awake, alert, in no acute distress  Lungs:   B/L Breath sounds present with decreased breath sounds on bases, no wheezing heard, no crackles.   Heart: S1 and S2 present, no murmur, paced rhythm  Abdomen: Soft, non-tender, no  guarding or rigidity, bowel sounds positive  Extremities: Atraumatic, no edema, warm to touch.  Pulses: Positive and symmetric.  Neurologic:  Moving all four extremities. Good strength bilaterally. No focal deficit.       Results from last 7 days   Lab Units 07/02/20  0353 07/01/20  1752 06/29/20  1006   WBC 10*3/mm3 6.09  --  6.29   HEMOGLOBIN g/dL 13.4 14.9 15.0   PLATELETS 10*3/mm3 98*  --  124*     Results from last 7 days   Lab Units 07/02/20  0353 06/29/20  1006   SODIUM mmol/L 143 142   POTASSIUM mmol/L 3.8 4.3   CO2 mmol/L 32.0* 34.0*   BUN mg/dL 13 11   CREATININE mg/dL 1.34* 1.15   MAGNESIUM mg/dL 2.2  --    PHOSPHORUS mg/dL 4.2  --    GLUCOSE mg/dL 135* 119*     Estimated Creatinine Clearance: 54.9 mL/min (A) (by C-G formula based on SCr of 1.34 mg/dL (H)).          Images:   None new    I reviewed the patient's results and images.     Assessment/Plan   Impression        Ventricular tachycardia (CMS/HCC)    Essential hypertension    Chronic systolic heart failure (CMS/HCC)    Tobacco dependence    Other emphysema (CMS/HCC)    Type 2 diabetes mellitus without complication (CMS/HCC)    Ischemic cardiomyopathy    SIMÓN (acute kidney injury) (CMS/HCC)       Plan        1.  Patient doing well post ablation of ventricular tachycardia.  Cardiology team is following.  Currently paced rhythm.  Started on Eliquis.  2.  Patient on 2 L nasal cannula oxygen.  We will attempt to wean that off.  Patient apparently is at home supplemental oxygen at nighttime.  Does have chronic congestive heart failure.  Will monitor.  Encourage incentive spirometry.Continue current nebulizers.  3.  Patient does have urinary retention and hematuria now.  Further management per urology.  4.  Renal function got slightly worse yesterday.  Did have urinary retention.  Currently urine output is okay.  Will recommend monitoring.  I will stop Toradol.  Avoid NSAIDs.    Plan is to transfer patient out of ICU which I agree with.    Plan of care and  goals reviewed with mulitdisciplinary/antibiotic stewardship team during rounds.   I discussed the patient's findings and my recommendations with patient and nursing staff       Bhupendra Christopher MD, FCCP  Pulmonary, Critical care and Sleep Medicine

## 2020-07-02 NOTE — NURSING NOTE
Spoke with Dr. Buckley regarding coude catheter drainage. We will leave the catheter in place, increase oral fluid intake, and continue to monitor urine. Dr. Christopher notified.

## 2020-07-02 NOTE — PROGRESS NOTES
"Multidisciplinary Rounds    Patient Name: Derek Morris Jr.  Date of Encounter: 07/02/20 09:23  MRN: [id number]  Admission date: 7/1/2020    Reason for visit: MDR. RD to continue to follow per protocol.     Additional information obtained during MDR:  S/P VT ablation yesterday.  Doing well overall.  Urology consult for difficult walter catheter placement.      Current diet: Diet Regular  Supplement: Orders Placed This Encounter    Evaluation of Received Nutrient/Fluid Intake:  Insufficient data     EMR reviewed     Admission MST \"0\"    Intervention:  Follow treatment plan  Care plan reviewed    Follow up:   Per protocol      Aida Roblero RD  9:23 AM  Time: 10min     "

## 2020-07-02 NOTE — PLAN OF CARE
Problem: Patient Care Overview  Goal: Plan of Care Review  7/2/2020 1825 by Puja Rae RN  Outcome: Ongoing (interventions implemented as appropriate)  7/2/2020 1823 by Puja Rae RN  Outcome: Ongoing (interventions implemented as appropriate)  Goal: Individualization and Mutuality  7/2/2020 1825 by Puja Rae RN  Outcome: Ongoing (interventions implemented as appropriate)  7/2/2020 1823 by Puja Rae RN  Outcome: Ongoing (interventions implemented as appropriate)  Goal: Discharge Needs Assessment  7/2/2020 1825 by Puja Rae RN  Outcome: Ongoing (interventions implemented as appropriate)  7/2/2020 1823 by Puja Rae RN  Outcome: Ongoing (interventions implemented as appropriate)  Goal: Interprofessional Rounds/Family Conf  Outcome: Ongoing (interventions implemented as appropriate)     Pt stable all day, urine output remains dark and bloody, urology called per request of cardiology and intensivist.  No new orders, at this time, instructed to push fluids and continue monitoring.  Pt bladder scanned and zero ml's noted, coude cath pulled per instruction of cardiology and lasix admin.  Plan for pt to be d/c'd in am

## 2020-07-02 NOTE — PLAN OF CARE
Patient doing well, Carrera total output: 400 dark red, groin sites soft/nontender, VSS will continue to monitor

## 2020-07-02 NOTE — PLAN OF CARE
Problem: Patient Care Overview  Goal: Plan of Care Review  Outcome: Ongoing (interventions implemented as appropriate)  Flowsheets  Taken 7/2/2020 0454 by Fabián Esqueda RN  Progress: improving  Taken 7/2/2020 1845 by Cathie Ernandez RNA  Plan of Care Reviewed With: patient  Taken 7/2/2020 1951 by Cathie Ernandez RNA  Outcome Summary: VSS, complaint of headache, given Tylenol. Bilateral groin sites soft, aquaseal dry and intact (some drainage noted on R side). Pt pleasant and cooperative. Possible discharge in AM.

## 2020-07-03 VITALS
TEMPERATURE: 98.7 F | WEIGHT: 169 LBS | RESPIRATION RATE: 16 BRPM | HEART RATE: 84 BPM | HEIGHT: 68 IN | OXYGEN SATURATION: 90 % | BODY MASS INDEX: 25.61 KG/M2 | SYSTOLIC BLOOD PRESSURE: 125 MMHG | DIASTOLIC BLOOD PRESSURE: 67 MMHG

## 2020-07-03 LAB
ANION GAP SERPL CALCULATED.3IONS-SCNC: 6 MMOL/L (ref 5–15)
BUN SERPL-MCNC: 16 MG/DL (ref 8–23)
BUN/CREAT SERPL: 15.4 (ref 7–25)
CALCIUM SPEC-SCNC: 8.3 MG/DL (ref 8.6–10.5)
CHLORIDE SERPL-SCNC: 103 MMOL/L (ref 98–107)
CO2 SERPL-SCNC: 35 MMOL/L (ref 22–29)
CREAT SERPL-MCNC: 1.04 MG/DL (ref 0.76–1.27)
GFR SERPL CREATININE-BSD FRML MDRD: 70 ML/MIN/1.73
GLUCOSE BLDC GLUCOMTR-MCNC: 109 MG/DL (ref 70–130)
GLUCOSE BLDC GLUCOMTR-MCNC: 173 MG/DL (ref 70–130)
GLUCOSE SERPL-MCNC: 110 MG/DL (ref 65–99)
POTASSIUM SERPL-SCNC: 3.7 MMOL/L (ref 3.5–5.2)
SODIUM SERPL-SCNC: 144 MMOL/L (ref 136–145)

## 2020-07-03 PROCEDURE — 82962 GLUCOSE BLOOD TEST: CPT

## 2020-07-03 PROCEDURE — 63710000001 GLIPIZIDE 5 MG TABLET: Performed by: INTERNAL MEDICINE

## 2020-07-03 PROCEDURE — 63710000001 ACETAMINOPHEN 325 MG TABLET: Performed by: INTERNAL MEDICINE

## 2020-07-03 PROCEDURE — A9270 NON-COVERED ITEM OR SERVICE: HCPCS | Performed by: INTERNAL MEDICINE

## 2020-07-03 PROCEDURE — 94799 UNLISTED PULMONARY SVC/PX: CPT

## 2020-07-03 PROCEDURE — 80048 BASIC METABOLIC PNL TOTAL CA: CPT | Performed by: INTERNAL MEDICINE

## 2020-07-03 PROCEDURE — 63710000001 APIXABAN 5 MG TABLET: Performed by: INTERNAL MEDICINE

## 2020-07-03 PROCEDURE — 63710000001 SPIRONOLACTONE 25 MG TABLET: Performed by: INTERNAL MEDICINE

## 2020-07-03 PROCEDURE — 63710000001 SACUBITRIL-VALSARTAN 49-51 MG TABLET: Performed by: INTERNAL MEDICINE

## 2020-07-03 RX ADMIN — SACUBITRIL AND VALSARTAN 0.5 TABLET: 49; 51 TABLET, FILM COATED ORAL at 10:35

## 2020-07-03 RX ADMIN — GLIPIZIDE 1.25 MG: 5 TABLET ORAL at 10:37

## 2020-07-03 RX ADMIN — ACETAMINOPHEN 650 MG: 325 TABLET, FILM COATED ORAL at 10:44

## 2020-07-03 RX ADMIN — SPIRONOLACTONE 25 MG: 25 TABLET ORAL at 10:36

## 2020-07-03 RX ADMIN — IPRATROPIUM BROMIDE AND ALBUTEROL SULFATE 3 ML: 2.5; .5 SOLUTION RESPIRATORY (INHALATION) at 12:29

## 2020-07-03 RX ADMIN — APIXABAN 5 MG: 5 TABLET, FILM COATED ORAL at 10:36

## 2020-07-03 RX ADMIN — IPRATROPIUM BROMIDE AND ALBUTEROL SULFATE 3 ML: 2.5; .5 SOLUTION RESPIRATORY (INHALATION) at 08:09

## 2020-07-03 NOTE — DISCHARGE SUMMARY
[facility] Discharge Summary    Date of Discharge:  7/3/2020    Discharge Diagnosis: Ventricular tachycardia    Presenting Problem/History of Present Illness  Ventricular tachycardia (CMS/HCC) [I47.2]  Ventricular tachycardia (CMS/HCC) [I47.2]        Hospital Course  Patient is a 69 y.o. male presented with symptomatic ventricular tachycardia and underwent ablation and is now feeling good and ready for discharge home.      Procedures Performed  Procedure(s):  EP/ABLATION VT, STX/Rhythmia (BSC), hold Eliquis 2 DOSES, no other meds to hold, GA       Consults:   Consults     Date and Time Order Name Status Description    7/1/2020 1629 Inpatient Urology Consult            Pertinent Test Results  Ejection Fraction  No results found for: EF    Echo EF Estimated  Lab Results   Component Value Date    ECHOEFEST 45 07/22/2019       Nuclear Stress Ejection Fraction  No components found for: NUCEF    Cath Ejection Fraction Quantitative  No results found for: CATHEF    Condition on Discharge: Good    Physical Exam at Discharge    Vital Signs  Temp:  [97.1 °F (36.2 °C)-98.8 °F (37.1 °C)] 98.7 °F (37.1 °C)  Heart Rate:  [] 82  Resp:  [14-18] 18  BP: (112-136)/(52-75) 125/67    Physical Exam:     General Appearance:    Alert, cooperative, in no acute distress   Head:    Normocephalic, without obvious abnormality, atraumatic   Eyes:            Lids and lashes normal, conjunctivae and sclerae normal, no   icterus, no pallor, corneas clear, PERRLA   Ears:    Ears appear intact with no abnormalities noted   Throat:   No oral lesions, no thrush, oral mucosa moist   Neck:   No adenopathy, supple, trachea midline, no thyromegaly, no   carotid bruit, no JVD   Back:     No kyphosis present, no scoliosis present, no skin lesions,      erythema or scars, no tenderness to percussion or                   palpation,   range of motion normal   Lungs:     Clear to auscultation,respirations regular, even and                   unlabored    Heart:    Regular rhythm and normal rate, normal S1 and S2, no            murmur, no gallop, no rub, no click   Chest Wall:    No abnormalities observed   Abdomen:     Normal bowel sounds, no masses, no organomegaly, soft        non-tender, non-distended, no guarding, no rebound                tenderness   Rectal:     Deferred   Extremities:   Moves all extremities well, no edema, no cyanosis, no             redness   Pulses:   Pulses palpable and equal bilaterally   Skin:   No bleeding, bruising or rash   Lymph nodes:   No palpable adenopathy   Neurologic:   Cranial nerves 2 - 12 grossly intact, sensation intact, DTR       present and equal bilaterally       Discharge Disposition  Home or Self Care    Discharge Medications     Discharge Medications      Changes to Medications      Instructions Start Date   Eliquis 5 MG tablet tablet  Generic drug:  apixaban  What changed:    · how much to take  · how to take this  · when to take this   TAKE 1 TABLET EVERY 12 HOURS      ezetimibe 10 MG tablet  Commonly known as:  ZETIA  What changed:  how to take this   TAKE 1 TABLET EVERY DAY         Continue These Medications      Instructions Start Date   albuterol sulfate  (90 Base) MCG/ACT inhaler  Commonly known as:  PROVENTIL HFA;VENTOLIN HFA;PROAIR HFA   1-2 puffs, Inhalation, Every 6 Hours PRN      aspirin 81 MG EC tablet   81 mg, Oral, Daily      bumetanide 0.5 MG tablet  Commonly known as:  BUMEX   1 mg, Oral, As Needed      carvedilol 12.5 MG tablet  Commonly known as:  COREG   12.5 mg, Oral, 2 Times Daily      Entresto 49-51 MG tablet  Generic drug:  sacubitril-valsartan   0.5 tablets, Oral, 2 Times Daily      Evolocumab with Infusor 420 MG/3.5ML solution cartridge  Commonly known as:  Repatha Pushtronex System   420 mg, Subcutaneous, Every 30 Days      glipiZIDE XL 2.5 MG 24 hr tablet  Generic drug:  glipizide   2.5 mg, Oral, Daily      mexiletine 150 MG capsule  Commonly known as:  MEXITIL   150  mg, Oral, 3 Times Daily      nitroglycerin 0.4 MG SL tablet  Commonly known as:  NITROSTAT   0.4 mg, Sublingual, Every 5 Minutes PRN, Take no more than 3 doses in 15 minutes.       rOPINIRole 1 MG tablet  Commonly known as:  REQUIP   3 mg, Oral, Nightly      spironolactone 25 MG tablet  Commonly known as:  ALDACTONE   25 mg, Oral, Daily PRN      tiZANidine 4 MG tablet  Commonly known as:  ZANAFLEX   8 mg, Oral, Nightly PRN      TRELEGY ELLIPTA IN   1 puff, Inhalation, Daily             Discharge Diet: Cardiac  Activity at Discharge: As tolerated    Follow-up Appointments  Future Appointments   Date Time Provider Department Center   10/5/2020 11:15 AM Calin Dior, DO [facility] None         Test Results Pending at Discharge       Yury Coates MD  07/03/20  11:48

## 2020-07-03 NOTE — PROGRESS NOTES
Continued Stay Note   [place]     Patient Name: Derek Morris Jr.  MRN: [id number]  Today's Date: 7/3/2020    Admit Date: 7/1/2020    Discharge Plan     Row Name 07/03/20 0915       Plan    Plan  Home at Discharge     Patient/Family in Agreement with Plan  yes    Plan Comments  Patient states his goal remains to return home upon discharge. Will follow to be sure he weans to room air during the day. Patient uses home 02 at night and also has a nebulizer, glucometer and BP cuff. No needs identified at this time -  following - tyrell [phone]     Final Discharge Disposition Code  01 - home or self-care        Discharge Codes    No documentation.             Tyrell Calles RN

## 2020-07-03 NOTE — PLAN OF CARE
Problem: Patient Care Overview  Goal: Plan of Care Review  Outcome: Ongoing (interventions implemented as appropriate)  Flowsheets  Taken 7/2/2020 0454 by Fabián Esqueda, RN  Progress: improving  Taken 7/3/2020 0303 by Nidhi Costa, RN  Plan of Care Reviewed With: patient  Note:   Vss, on 4lnc, pt cooperative, bilat groin sites look good, pt complains of a sore throat, medication given.

## 2020-07-14 LAB
ACT BLD: 312 SECONDS (ref 82–152)
ACT BLD: 378 SECONDS (ref 82–152)
ACT BLD: 389 SECONDS (ref 82–152)
ACT BLD: 389 SECONDS (ref 82–152)
ACT BLD: 406 SECONDS (ref 82–152)
ACT BLD: 406 SECONDS (ref 82–152)
ACT BLD: 417 SECONDS (ref 82–152)
ACT BLD: 428 SECONDS (ref 82–152)

## 2020-08-07 ENCOUNTER — TELEPHONE (OUTPATIENT)
Dept: CARDIOLOGY | Facility: CLINIC | Age: 70
End: 2020-08-07

## 2020-08-07 NOTE — TELEPHONE ENCOUNTER
Mr Morris called and spoke to EP nurse with c/o palpitations.  Mr Morris was transferred to device clinic and he left a voicemail for a return call.  Reading was received and no episodes noted.  Spoke to wife and they will call if there are any further issues.

## 2020-09-01 ENCOUNTER — TELEPHONE (OUTPATIENT)
Dept: CARDIOLOGY | Facility: CLINIC | Age: 70
End: 2020-09-01

## 2020-09-01 NOTE — TELEPHONE ENCOUNTER
"Pt called c/o of feeling \"weird\". States he feels short of air and fatigue. States when he walks around the house his heart rates goes up to 112.I had him send a transmission and it was normal with no events. I spoke with Mary Ellen and we are going to work him in to see Dr Dior in [place] on Friday 9/4/2020.Mr Morris verbalized satisfaction with plan.  "

## 2020-09-28 RX ORDER — EVOLOCUMAB 420 MG/3.5
KIT SUBCUTANEOUS
Qty: 1 ML | Refills: 11 | Status: ON HOLD | OUTPATIENT
Start: 2020-09-28 | End: 2021-01-01

## 2020-10-05 ENCOUNTER — OFFICE VISIT (OUTPATIENT)
Dept: CARDIOLOGY | Facility: CLINIC | Age: 70
End: 2020-10-05

## 2020-10-05 VITALS
OXYGEN SATURATION: 97 % | BODY MASS INDEX: 24.58 KG/M2 | WEIGHT: 162.2 LBS | HEIGHT: 68 IN | HEART RATE: 93 BPM | SYSTOLIC BLOOD PRESSURE: 109 MMHG | DIASTOLIC BLOOD PRESSURE: 66 MMHG

## 2020-10-05 VITALS
DIASTOLIC BLOOD PRESSURE: 66 MMHG | WEIGHT: 162 LBS | HEART RATE: 87 BPM | SYSTOLIC BLOOD PRESSURE: 120 MMHG | OXYGEN SATURATION: 95 % | BODY MASS INDEX: 24.55 KG/M2 | HEIGHT: 68 IN

## 2020-10-05 DIAGNOSIS — I25.5 ISCHEMIC CARDIOMYOPATHY: Primary | ICD-10-CM

## 2020-10-05 DIAGNOSIS — I49.01 VENTRICULAR FIBRILLATION (HCC): ICD-10-CM

## 2020-10-05 DIAGNOSIS — I47.20 VENTRICULAR TACHYCARDIA (HCC): ICD-10-CM

## 2020-10-05 DIAGNOSIS — I25.118 CORONARY ARTERY DISEASE OF NATIVE ARTERY OF NATIVE HEART WITH STABLE ANGINA PECTORIS (HCC): Primary | ICD-10-CM

## 2020-10-05 DIAGNOSIS — E78.2 MIXED HYPERLIPIDEMIA: ICD-10-CM

## 2020-10-05 DIAGNOSIS — I10 ESSENTIAL HYPERTENSION: ICD-10-CM

## 2020-10-05 DIAGNOSIS — I47.20 VT (VENTRICULAR TACHYCARDIA) (HCC): ICD-10-CM

## 2020-10-05 DIAGNOSIS — I25.9 ISCHEMIC HEART DISEASE: ICD-10-CM

## 2020-10-05 DIAGNOSIS — I73.9 PAD (PERIPHERAL ARTERY DISEASE) (HCC): ICD-10-CM

## 2020-10-05 DIAGNOSIS — I50.22 CHRONIC SYSTOLIC HEART FAILURE (HCC): ICD-10-CM

## 2020-10-05 PROCEDURE — 93283 PRGRMG EVAL IMPLANTABLE DFB: CPT | Performed by: INTERNAL MEDICINE

## 2020-10-05 PROCEDURE — 99213 OFFICE O/P EST LOW 20 MIN: CPT | Performed by: INTERNAL MEDICINE

## 2020-10-05 PROCEDURE — 99215 OFFICE O/P EST HI 40 MIN: CPT | Performed by: INTERNAL MEDICINE

## 2020-10-05 RX ORDER — SACUBITRIL AND VALSARTAN 24; 26 MG/1; MG/1
1 TABLET, FILM COATED ORAL 2 TIMES DAILY
COMMUNITY
End: 2020-10-05 | Stop reason: SDUPTHER

## 2020-10-05 RX ORDER — SACUBITRIL AND VALSARTAN 24; 26 MG/1; MG/1
1 TABLET, FILM COATED ORAL 2 TIMES DAILY
Qty: 180 TABLET | Refills: 3 | Status: SHIPPED | OUTPATIENT
Start: 2020-10-05 | End: 2021-01-01

## 2020-10-05 RX ORDER — SOTALOL HYDROCHLORIDE 120 MG/1
120 TABLET ORAL DAILY
Qty: 90 TABLET | Refills: 3 | Status: SHIPPED | OUTPATIENT
Start: 2020-10-05 | End: 2020-10-19 | Stop reason: HOSPADM

## 2020-10-05 NOTE — PROGRESS NOTES
[facility] AT [address], [address]  [place], [place], [address] [phone]  [email or web address]           OUTPATIENT CLINIC FOLLOW UP NOTE    Patient Care Team:  Patient Care Team:  Jn Beach MD as PCP - General (Family Medicine)    Subjective:   Reason for consultation:   Chief Complaint   Patient presents with   • Coronary Artery Disease   • Ischemic heart disease   • Shortness of Breath       HPI:    Derek Morris Jr. is a 69 y.o. male.  Cardiac problem list:  1. Ischemic heart disease:  a. MI x3, 1990, 1993, and 1995.  b. CABG x3 by Dr. Noble Cuello, 1995:  SVG to OM1 and OM2, SVG to PDA.  c. Normal LV.  d. Questionable LAD stent, incomplete  database.  e. STEMI, April 2009.  f. LHC by Dr. Glen Baker, April 2009:  EF approximately 40%, 1 of 3 patent grafts; data deficit.  g. LHC by Dr. Bird, 07/30/2010:  EF 30%, occluded vein graft to occluded RCA, minor plaque in LAD, 70% SVG -  circumflex treated with 4 x 18 mm Cypher KAILASH.  h. Echocardiogram, 02/08/2012:  EF 40% to 45%, diastolic dysfunction, mild TR.  i. Echocardiogram, 01/29/2015:  EF 30% to 35%, mild TR.  j. Jan 2017 EF 35-40%  k. LHC in Jan 2017 as noted in the results below. Resolute Integrity KAILASH to D2  2. Hypertension.  3. Chronic systolic CHF.  4. Nonsustained VT:  a. Inducible VT by EPS, January 2002.  b. Pacesetter ICD implant by Dr. Dickinson, January 2002.  c. Chronic amiodarone, discontinued fall of 2010:  Cannot  exclude amiodarone pulmonary toxicity.  d. ICD generator change-out with enrollment in the ANALYZE ST SEGMENT protocol, 08/16/2012.  e. Status post ablation with Dr. Doir 6/2020  5. PAD s/p PTA 7/2018 to left SFA  6. Hyperlipidemia; intolerance to multiple statins.  7. Type 2 diabetes mellitus.  8. Chronic tobacco; home O2.  9. Surgical history:  a.  CABG    He presents today for follow-up.  Since the patient was last seen he reports that he has been doing well from a cardiac  standpoint.  He underwent ablation with Dr. Dior on 6/25/2020.  Since that time he reports a significant increase in his overall energy level and has had a significant decrease in palpitations.  He denies any chest pain.  He reports stable chronic mild dyspnea with exertion.  He denies lower extremity edema.  He also reports intermittent lightheadedness when standing too quickly.    Review of Systems:  Positive for lightheadedness, palpitations, dyspnea  Negative for exertional chest pain, palpitations, syncope.       PFSH:      Patient Active Problem List   Diagnosis   • Ischemic heart disease   • Essential hypertension   • Chronic systolic heart failure (CMS/HCC)   • Mixed hyperlipidemia   • Type 2 diabetes mellitus (CMS/[facility])   • Tobacco dependence   • Coronary artery disease of native artery of native heart with stable angina pectoris (CMS/[facility])   • Cardiomyopathy (CMS/[facility])   • Shortness of breath   • COPD (chronic obstructive pulmonary disease) (CMS/[facility])   • Lung nodule   • PAD (peripheral artery disease) (CMS/[facility])   • Claudication (CMS/[facility])   • Other emphysema (CMS/[facility])   • Type 2 diabetes mellitus without complication (CMS/[facility])   • Rectal pain   • Ventricular tachycardia (CMS/[facility])   • Ischemic cardiomyopathy   • Ventricular fibrillation (CMS/[facility])   • SIMÓN (acute kidney injury) (CMS/[facility])         Current Outpatient Medications:   •  albuterol (PROVENTIL HFA;VENTOLIN HFA) 108 (90 Base) MCG/ACT inhaler, Inhale 1-2 puffs Every 6 (Six) Hours As Needed for Wheezing., Disp: , Rfl:   •  apixaban (Eliquis) 5 MG tablet tablet, Take 1 tablet by mouth Every 12 (Twelve) Hours., Disp: 180 tablet, Rfl: 0  •  aspirin 81 MG EC tablet, Take 1 tablet by mouth Daily., Disp: 30 tablet, Rfl: 10  •  bumetanide (BUMEX) 0.5 MG tablet, Take 1 mg by mouth As Needed., Disp: , Rfl:   •  carvedilol (COREG) 12.5 MG tablet, Take 1 tablet by mouth 2 (Two) Times a Day., Disp: 180 tablet, Rfl: 3  •  ezetimibe (ZETIA) 10 MG tablet, TAKE 1 TABLET  "EVERY DAY (Patient taking differently: 10 mg Daily.), Disp: 90 tablet, Rfl: 3  •  Fluticasone-Umeclidin-Vilant (TRELEGY ELLIPTA IN), Inhale 1 puff Daily., Disp: , Rfl:   •  GLIPIZIDE XL 2.5 MG 24 hr tablet, Take 2.5 mg by mouth Daily., Disp: , Rfl: 11  •  mexiletine (MEXITIL) 150 MG capsule, Take 1 capsule by mouth 3 (Three) Times a Day., Disp: 90 capsule, Rfl: 0  •  nitroglycerin (NITROSTAT) 0.4 MG SL tablet, Place 1 tablet under the tongue Every 5 (Five) Minutes As Needed for Chest Pain. Take no more than 3 doses in 15 minutes., Disp: 25 tablet, Rfl: 3  •  Repatha Pushtronex System 420 MG/3.5ML solution cartridge, INFUSE 420MG SUBCUTANEOUSLY VIA ON-BODY INFUSOR INTO THE APPROPRIATE AREA AS DIRECTED EVERY MONTH, Disp: 1 mL, Rfl: 11  •  rOPINIRole (REQUIP) 1 MG tablet, Take 3 mg by mouth Every Night., Disp: , Rfl:   •  sacubitril-valsartan (Entresto) 24-26 MG tablet, Take 1 tablet by mouth 2 (Two) Times a Day., Disp: , Rfl:   •  spironolactone (ALDACTONE) 25 MG tablet, Take 25 mg by mouth Daily As Needed., Disp: , Rfl:   •  tiZANidine (ZANAFLEX) 4 MG tablet, Take 8 mg by mouth At Night As Needed., Disp: , Rfl:     Allergies   Allergen Reactions   • Crestor [Rosuvastatin Calcium] Myalgia   • Lipitor [Atorvastatin] Myalgia     Joint pain myalgias   • Lopressor [Metoprolol Tartrate] Unknown (See Comments)     Side of face went numb     • Plavix [Clopidogrel Bisulfate] Unknown (See Comments)     Previously thought to be resistant; placed back on clopidogrel per Dr. Bravo earlier this year.  Confirmed with patient   • Adhesive Tape Itching and Rash        reports that he has been smoking cigarettes. He has a 45.00 pack-year smoking history. He has never used smokeless tobacco.    Family History   Problem Relation Age of Onset   • Hypertension Mother    • Sick sinus syndrome Father    • Coronary artery disease Father          Objective:   Blood pressure 110/72, pulse 83, height 172.7 cm (68\"), weight 73.6 kg (162 lb 3.2 " oz), SpO2 97 %.  CONSTITUTIONAL: No acute distress, normal affect  RESPIRATORY: Normal effort. Clear to auscultation bilaterally without wheezing or rales.  CARDIOVASCULAR: Regular rate and rhythm with normal S1 and S2. Without murmur, gallop or rub.  PERIPHERAL VASCULAR: Normal radial pulses bilaterally.     Labs:  Lab Results   Component Value Date    ALT 22 05/29/2020    AST 17 05/29/2020     Lab Results   Component Value Date    CHOL 211 (H) 11/26/2018    TRIG 151 (H) 11/26/2018    HDL 54 (L) 11/26/2018    CREATININE 1.04 07/03/2020       Diagnostic Data:    Procedures    Carotid Duplex 9/25/2019  · Right internal carotid artery stenosis of 0-49%.  · Left internal carotid artery stenosis of 0-49%.    Peripheral intervention 7/2018  · The 100% mid left SFA chronic total occlusion is now status post dissection and reentry revascularization with deployment of a Zilver PTX 6 x 80 mm drug-eluting stent with 0% residual stenosis.    Peripheral Angiogram 6/2018  · There was diffuse atherosclerosis including a 70% discrete stenosis of the proximal left common iliac artery as well as a 100% chronic total occlusion of the left superficial femoral artery.    [facility] 1/2017  · Severe multivessel disease including a previously known occluded proximal left circumflex and mid right coronary artery.  There was a de hao 60% discrete stenosis in a medium sized second diagonal that supplies much of the anterolateral wall that was found to be functionally significant with an iFR of 0.60.  The LAD was otherwise patent, the SVG to a distal OM was widely patent, the circumflex artery is supplied by septal collaterals, the RCA was supplied by moderate graft to OM left to right collaterals.  · The diagonal stenosis is now status post resolute integrity 2.25 x 14 mm drug-eluting stent placement with 0% residual stenosis.  · Left ventricular ejection fraction of 49% with an akinetic inferior wall and hypokinesis of the anterolateral  wall    TTE 5/2018  · Left ventricular systolic function is mildly decreased. Estimated EF appears to be in the range of 41 - 45%.  · The following left ventricular wall segments are hypokinetic: mid anterolateral. The following left ventricular wall segments are akinetic: mid inferolateral, basal inferior and mid inferior.  · Left ventricular diastolic dysfunction (grade I a) consistent with impaired relaxation.  · There is mild calcification of the aortic valve.  · Trace-to-mild aortic valve regurgitation is present.  · Estimated right ventricular systolic pressure from tricuspid regurgitation is mildly elevated (35-45 mmHg).    TTE 1/2017  · Left ventricular function is moderately decreased. Estimated EF appears to be in the range of 36 - 40%. Calculated EF = 37%.  · Left ventricular wall thickness is consistent with mild-to-moderate concentric hypertrophy.  · Left ventricular diastolic dysfunction (grade I a) consistent with impaired relaxation.  · Left ventricular wall segments contract abnormally. Akinesis of the inferior wall    DEVICE INTERROGATION:  [employer], Interrogation date 11/19/2018-   RA pacing 88 %, RV pacing less than 1 %. P wave is 1.2 mV with a threshold of 1 V at 0.5 msec and an impedance of 490 ohms. R wave is greater than 12 mV with a threshold of 0.75 V at 0.5 msec and an impedance of 640 ohms. Battery voltage is 4.9-5.2 years.  <1% AMS    DEVICE INTERROGATION:  [employer], Interrogation date 3/2019-   RA pacing 60%, RV pacing 2.2%, underlying rhythm sinus mary in the 50s.  P wave is 1.0 mV with a threshold of 1 V at 0.5 msec and an impedance of 490 ohms. R wave is >12 mV with a threshold of 1 V at 0.5 msec and an impedance of 590 ohms. Battery voltage is 5 yrs. AFib noted in mid Feb (time of hospitalization). No VT/VF. Mode switch 6.5% but due to competitive atrial pacing (not AFib)       Assessment and Plan:   Derek was seen today for ischemic heart disease, hypertension and chronic systolic  chf.    Diagnoses and all orders for this visit:    Coronary artery disease of native artery of native heart with stable angina pectoris  Hyperlipidemia  -CCS I  -Continue aspirin 81 mg daily, carvedilol  -Intolerance to atorvastatin and rosuvastatin   -Continue Zetia, Repatha  -Will consider discontinuing Zetia if LDL improved.  -We will attempt to obtain recent lipid panel from from PCPs office.  The patient has not completed 1 we will send him an external form to have this completed.    Chronic systolic heart failure  Status post dual-chamber ICD  -NYHA class 2  -Continue carvedilol, Entresto, spironolactone  -Device interrogation planned for later today with Dr. Dior.    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   -S/P ablation with Dr. Dior 6/2020.  -Beta blocker, Eliquis  -Follows with Dr. Dior    PAD  -s/p KAILASH to Left SFA  -Continue current related medications    Advanced COPD and pulmonary nodule  -Management per pulmonary and CT surgery teams    - Return in about 6 months (around 4/5/2021).    Scribed for Vlad Bravo MD by Slime Bowling, VANDANA   10/5/2020  17:15 EDT     I, Vlad Bravo MD, personally performed the services as scribed by the above named individual. I have made any necessary edits and it is both accurate and complete.     Vlad Bravo MD, MSc, [facility]  Interventional Cardiology  [facility] at [facility]

## 2020-10-05 NOTE — PROGRESS NOTES
Cardiac Electrophysiology Outpatient Follow Up Note            [facility] at [facility]    Follow Up Office Visit      Derek Morris Jr.  [id number]  10/05/2020  [unfilled]  [unfilled]    Primary Care Physician: Jn Beach MD    Referred By: No ref. provider found    Subjective     Chief Complaint:   Chief Complaint   Patient presents with   • ISCHEMIC HEART DISEASE   • Coronary Artery Disease   • Cardiomyopathy       History of Present Illness:   Mr. Derek Morrsi Jr. is a 69 y.o. male who presents to my electrophysiology clinic for follow up of sustained VT requiring multiple ICD shocks in the setting of ischemic heart disease and a [employer] ICD system.  He underwent catheter ablation of the left ventricle an extensive procedure lasting more than 6 hours with good result.    He reports that since the procedure he feels a lot better.  He has no further shocks or palpitations or awareness of any ICD therapy.  He feels dramatically improved.  He is working in the garage on his car.  He has not had any worsening heart failure symptoms no chest pain nausea vomiting fevers or chills no COVID-19 exposure.      Review of Systems:   Constitutional: No fevers or chills, no recent weight gain or weight loss or fatigue  Eyes: No visual loss, blurred vision, double vision, yellow sclerae.  ENT: No headaches, hearing loss, vertigo, congestion or sore throat.   Cardiovascular: Per HPI  Respiratory: No cough or wheezing, no sputum production, no hematemesis   Gastrointestinal: No abdominal pain, no nausea, vomiting, constipation, diarrhea, melena.   Genitourinary: No dysuria, hematuria or increased frequency.  Musculoskeletal:  No gait disturbance, weakness or joint pain or stiffness  Integumentary: No rashes, urticaria, ulcers or sores.   Neurological: No headache, dizziness, syncope, paralysis, ataxia, no prior CVA/TIA  Psychiatric: No anxiety, or depression  Endocrine:  No diaphoresis, cold or heat intolerance. No polyuria or polydipsia.   Hematologic/Lymphatic: No anemia, abnormal bruising or bleeding. No history of DVT/PE.      Past Medical History:   Past Medical History:   Diagnosis Date   • Arthritis    • Cardiomyopathy (CMS/HCC)    • Carotid stenosis    • CHF (congestive heart failure) (CMS/HCC)    • COPD (chronic obstructive pulmonary disease) (CMS/HCC)    • Coronary artery disease    • Diabetes mellitus (CMS/HCC)    • Enlarged prostate    • GERD (gastroesophageal reflux disease)    • Heart attack (CMS/HCC)     X3 1990, 1993, 1995   • Hyperlipidemia    • Hypertension    • Lung nodule    • On home O2     prn   • Peptic ulcer disease    • Urinary hesitancy    • Ventricular tachycardia (CMS/HCC)    • Wears dentures    • Wears glasses        Past Surgical History:   Past Surgical History:   Procedure Laterality Date   • ABLATION OF DYSRHYTHMIC FOCUS     • CARDIAC CATHETERIZATION Left 04/2009    by Dr. Glen Baker- MAUREEN. EF approx 40% II one of three patent grafts iii, Data deficit    • CARDIAC CATHETERIZATION N/A 1/26/2017    Procedure: Left Heart Cath;  Surgeon: Vlad Bravo MD;  Location:  [facility] CATH INVASIVE LOCATION;  Service:    • CARDIAC CATHETERIZATION Left 07/30/2010    i. EF 30% ii occluded vein graft to occluded RCS iii minor plaque in the LAD iv. 70% SVG- circumfelx, treated with 4x18 mm. Cypher KAILASH.   • CARDIAC CATHETERIZATION N/A 6/5/2018    Procedure: Peripheral angiography;  Surgeon: Vlad Bravo MD;  Location:  [facility] CATH INVASIVE LOCATION;  Service: Cardiovascular   • CARDIAC CATHETERIZATION N/A 7/23/2019    Procedure: Left Heart Cath;  Surgeon: Vlad Bravo MD;  Location:  [facility]ATH INVASIVE LOCATION;  Service: Cardiology   • CARDIAC DEFIBRILLATOR PLACEMENT     • CARDIAC ELECTROPHYSIOLOGY PROCEDURE N/A 7/1/2020    Procedure: EP/ABLATION VT, STX/Rhythmia (BSC), hold Eliquis 2 DOSES, no other meds to hold, GA;  Surgeon: Calin Dior DO;  Location:  [facility] EP  INVASIVE LOCATION;  Service: Cardiology;  Laterality: N/A;   • COLONOSCOPY     • COLONOSCOPY N/A 9/26/2018    Procedure: COLONOSCOPY;  Surgeon: Alfred Shah MD;  Location:  [facility] OR;  Service: General   • CORONARY ANGIOPLASTY     • CORONARY ARTERY BYPASS GRAFT  1995 1995, w/ subsequent stents 2009/2010 X3; svg TO om1 AND om2, svg TO pda    • CYST REMOVAL      Tailbone   • EXAM UNDER ANESTHESIA N/A 9/26/2018    Procedure: EXAM UNDER ANESTHESIA;  Surgeon: Alfred Shah MD;  Location:  [facility] OR;  Service: General   • HEMORRHOIDECTOMY N/A 9/28/2018    Procedure: HEMORRHOIDECTOMY;  Surgeon: Alrfed Shah MD;  Location:  [facility] OR;  Service: General   • INSERT / REPLACE / REMOVE PACEMAKER     • INTERVENTIONAL RADIOLOGY PROCEDURE N/A 6/5/2018    Procedure: Abdominal Aortagram with Runoff;  Surgeon: Vlad Bravo MD;  Location:  [facility]ATH INVASIVE LOC[facility];  Service: Cardiovascular   • INTERVENTIONAL RADIOLOGY PROCEDURE N/A 7/6/2018    Procedure: Abdominal Aortogram;  Surgeon: Vlad Bravo MD;  Location:  [facility]ATH INVASIVE LOCATION;  Service: Cardiology   • PACEMAKER IMPLANTATION  01/2002    Implant by Dr. Dickinson        Family History:   Family History   Problem Relation Age of Onset   • Hypertension Mother    • Sick sinus syndrome Father    • Coronary artery disease Father        Social History:   Social History     Socioeconomic History   • Marital status:      Spouse name: Not on file   • Number of children: 2   • Years of education: Not on file   • Highest education level: Not on file   Occupational History   • Occupation:      Comment: Retired   Tobacco Use   • Smoking status: Current Every Day Smoker     Packs/day: 1.00     Years: 45.00     Pack years: 45.00     Types: Cigarettes   • Smokeless tobacco: Never Used   • Tobacco comment: 1 PACK - 1.5 PACK QD   Substance and Sexual Activity   • Alcohol use: No   • Drug use: Yes     Types: Marijuana     Comment: occasional use -  maybe once a month    • Sexual activity: Defer   Social History Narrative    Caffeine use: 5-6 serving daily.            Medications:     Current Outpatient Medications:   •  albuterol (PROVENTIL HFA;VENTOLIN HFA) 108 (90 Base) MCG/ACT inhaler, Inhale 1-2 puffs Every 6 (Six) Hours As Needed for Wheezing., Disp: , Rfl:   •  apixaban (Eliquis) 5 MG tablet tablet, Take 1 tablet by mouth Every 12 (Twelve) Hours., Disp: 180 tablet, Rfl: 3  •  aspirin 81 MG EC tablet, Take 1 tablet by mouth Daily., Disp: 30 tablet, Rfl: 10  •  bumetanide (BUMEX) 0.5 MG tablet, Take 1 mg by mouth As Needed., Disp: , Rfl:   •  carvedilol (COREG) 12.5 MG tablet, Take 1 tablet by mouth 2 (Two) Times a Day., Disp: 180 tablet, Rfl: 3  •  ezetimibe (ZETIA) 10 MG tablet, TAKE 1 TABLET EVERY DAY (Patient taking differently: 10 mg Daily.), Disp: 90 tablet, Rfl: 3  •  Fluticasone-Umeclidin-Vilant (TRELEGY ELLIPTA IN), Inhale 1 puff Daily., Disp: , Rfl:   •  GLIPIZIDE XL 2.5 MG 24 hr tablet, Take 2.5 mg by mouth Daily., Disp: , Rfl: 11  •  mexiletine (MEXITIL) 150 MG capsule, Take 1 capsule by mouth 3 (Three) Times a Day., Disp: 90 capsule, Rfl: 0  •  nitroglycerin (NITROSTAT) 0.4 MG SL tablet, Place 1 tablet under the tongue Every 5 (Five) Minutes As Needed for Chest Pain. Take no more than 3 doses in 15 minutes., Disp: 25 tablet, Rfl: 3  •  Repatha Pushtronex System 420 MG/3.5ML solution cartridge, INFUSE 420MG SUBCUTANEOUSLY VIA ON-BODY INFUSOR INTO THE APPROPRIATE AREA AS DIRECTED EVERY MONTH, Disp: 1 mL, Rfl: 11  •  rOPINIRole (REQUIP) 1 MG tablet, Take 3 mg by mouth Every Night., Disp: , Rfl:   •  sacubitril-valsartan (Entresto) 24-26 MG tablet, Take 1 tablet by mouth 2 (Two) Times a Day. (Patient taking differently: Take 1 tablet by mouth 2 (Two) Times a Day. 49-51 MG BID), Disp: 180 tablet, Rfl: 3  •  spironolactone (ALDACTONE) 25 MG tablet, Take 25 mg by mouth Daily., Disp: , Rfl:   •  tiZANidine (ZANAFLEX) 4 MG tablet, Take 8 mg by mouth At  "Night As Needed., Disp: , Rfl:   •  sotalol (BETAPACE) 120 MG tablet, Take 1 tablet by mouth Daily., Disp: 90 tablet, Rfl: 3    Allergies:   Allergies   Allergen Reactions   • Crestor [Rosuvastatin Calcium] Myalgia   • Lipitor [Atorvastatin] Myalgia     Joint pain myalgias   • Lopressor [Metoprolol Tartrate] Unknown (See Comments)     Side of face went numb     • Plavix [Clopidogrel Bisulfate] Unknown (See Comments)     Previously thought to be resistant; placed back on clopidogrel per Dr. Bravo earlier this year.  Confirmed with patient   • Adhesive Tape Itching and Rash       Objective     Physical Exam:  Vital Signs:   Vitals:    10/05/20 1130   BP: 120/66   BP Location: Left arm   Patient Position: Sitting   Pulse: 87   SpO2: 95%   Weight: 73.5 kg (162 lb)   Height: 172.7 cm (68\")     GEN: Well nourished, well-developed, no acute distress  HEENT: Normocephalic, atraumatic, moist mucous membranes  NECK: Supple, no JVD, no thyromegaly, no lymphadenopathy  CARD: Regular rate and rhythm, normal S1 & S2 are present.  No murmur, gallop or rubs are appreciated.  LUNGS: Clear to auscultation bilateraly, normal respiratory effort  ABDOMEN: Soft, nontender, normal bowel sounds  EXTREMITIES: No gross deformities, no clubbing, cyanosis.  Edema none  SKIN: Warm, dry  NEURO: No focal deficits, alert and oriented x 3  PSYCHIATRIC: Normal affect and mood, appropriate use of semantics and logic.        Lab Results   Component Value Date    GLUCOSE 110 (H) 07/03/2020    CALCIUM 8.3 (L) 07/03/2020     07/03/2020    K 3.7 07/03/2020    CO2 35.0 (H) 07/03/2020     07/03/2020    BUN 16 07/03/2020    CREATININE 1.04 07/03/2020    EGFRIFNONA 70 07/03/2020    BCR 15.4 07/03/2020    ANIONGAP 6.0 07/03/2020     Lab Results   Component Value Date    WBC 6.09 07/02/2020    HGB 13.4 07/02/2020    HCT 43.3 07/02/2020    .0 (H) 07/02/2020    PLT 98 (L) 07/02/2020     Lab Results   Component Value Date    INR 1.18 (H) " 07/22/2019    INR 1.01 08/07/2016    INR 0.97 01/17/2016    PROTIME 14.4 (H) 07/22/2019    PROTIME 11.0 08/07/2016    PROTIME 10.6 01/17/2016     Lab Results   Component Value Date    TSH 5.150 (H) 05/29/2020       Cardiac Testing:     I personally viewed and interpreted the patient's EKG/Telemetry/lab data    Procedures    Tobacco Cessation: N/A  Obstructive Sleep Apnea Screening: N/A    Assessment & Plan      69-year-old gentle ischemic heart disease severe systolic dysfunction ICD system in situ recurrent sustained monomorphic VT requiring multiple ICD shocks recurrent VF as well.  He underwent catheter ablation of the left ventricle and early July.  He has had only one episode of VT which occurred 2 days ago since then.  This was a 420 ms cycle length and was terminated with antitachycardia pacing without any symptoms.    He says since the ablation he feels great.  His energy levels are much better.  He is able to get around he is no longer worried about getting shocked.  He is working on his 1970 [employer]et car 4 to 6 hours a day.    I discussed with him the single event of VT.  It is only one event.  He is on mexiletine monotherapy in terms of antiarrhythmic options.  I think I would feel better if he were on a potassium channel blocker such as sotalol.  He tolerated sotalol previously rather well.  His GFR is 53.  I will put him on 120 mg once a day of sotalol.    He will come to our [place] office on Friday for an EKG and I will review the QT interval at that point.    I will see him back in 12 months time.  He has an appointment to see Dr. banuelos in 6 months.    Derek was seen today for ischemic heart disease, coronary artery disease and cardiomyopathy.    Diagnoses and all orders for this visit:    Ischemic cardiomyopathy    Ventricular fibrillation (CMS/HCC)    Ventricular tachycardia (CMS/HCC)    Other orders  -     sotalol (BETAPACE) 120 MG tablet; Take 1 tablet by mouth Daily.          Follow Up:       Thank  you for allowing me to participate in the care of your patient. Please to not hesitate to contact me with additional questions or concerns.        Calin Dior, DO, FACC, RS  Cardiac Electrophysiologist

## 2020-10-09 ENCOUNTER — TELEPHONE (OUTPATIENT)
Dept: CARDIOLOGY | Facility: CLINIC | Age: 70
End: 2020-10-09

## 2020-10-09 DIAGNOSIS — I47.20 VT (VENTRICULAR TACHYCARDIA) (HCC): Primary | ICD-10-CM

## 2020-10-09 PROCEDURE — 93010 ELECTROCARDIOGRAM REPORT: CPT | Performed by: INTERNAL MEDICINE

## 2020-10-09 RX ORDER — APIXABAN 5 MG/1
TABLET, FILM COATED ORAL
Qty: 180 TABLET | Refills: 0 | Status: SHIPPED | OUTPATIENT
Start: 2020-10-09 | End: 2021-01-01 | Stop reason: SDUPTHER

## 2020-10-09 NOTE — TELEPHONE ENCOUNTER
Pt called stating he was having fast heart rates every time he gets up and walks. When he lays down it goes back down. Mr Morris was given Sotalol on Monday by Dr Dior for VT after an ablation. He had an EKG today at [facility] in [place]. However, after speaking with Mrs Morris his Sotalol was sent to his mail order pharmacy and he had not even started his Sotalol. I called in a prescription for 15 tablets to [facility] in [place]. He will start today and get an EKG on Monday.

## 2020-10-12 ENCOUNTER — HOSPITAL ENCOUNTER (OUTPATIENT)
Dept: CV DIAGNOSTICS | Facility: HOSPITAL | Age: 70
Discharge: HOME | End: 2020-10-12
Admitting: INTERNAL MEDICINE

## 2020-10-12 DIAGNOSIS — I50.22 CHRONIC SYSTOLIC HEART FAILURE (HCC): ICD-10-CM

## 2020-10-12 DIAGNOSIS — I47.20 VENTRICULAR TACHYCARDIA (HCC): ICD-10-CM

## 2020-10-12 DIAGNOSIS — I25.5 ISCHEMIC CARDIOMYOPATHY: ICD-10-CM

## 2020-10-12 PROCEDURE — 93005 ELECTROCARDIOGRAM TRACING: CPT | Performed by: INTERNAL MEDICINE

## 2020-10-13 ENCOUNTER — TELEPHONE (OUTPATIENT)
Dept: CARDIOLOGY | Facility: CLINIC | Age: 70
End: 2020-10-13

## 2020-10-13 NOTE — TELEPHONE ENCOUNTER
Called to check in with Mr Morris regarding his VT.  He said he feels his heart racing at times.  He was unable to start Sotalol last week due to script was sent into mail order. On Friday Sotalol was called into his local pharmacy and was started and he got an EKG yesterday at [facility].  He is taking Sotalol 120mg daily.

## 2020-10-15 ENCOUNTER — APPOINTMENT (OUTPATIENT)
Dept: GENERAL RADIOLOGY | Facility: HOSPITAL | Age: 70
End: 2020-10-15

## 2020-10-15 ENCOUNTER — HOSPITAL ENCOUNTER (INPATIENT)
Facility: HOSPITAL | Age: 70
LOS: 3 days | Discharge: HOME | End: 2020-10-19
Attending: EMERGENCY MEDICINE | Admitting: FAMILY MEDICINE

## 2020-10-15 DIAGNOSIS — Z86.79 HISTORY OF CORONARY ARTERY DISEASE: ICD-10-CM

## 2020-10-15 DIAGNOSIS — I49.9 CARDIAC ARRHYTHMIA, UNSPECIFIED CARDIAC ARRHYTHMIA TYPE: Primary | ICD-10-CM

## 2020-10-15 DIAGNOSIS — Z86.79 HISTORY OF CARDIOMYOPATHY: ICD-10-CM

## 2020-10-15 LAB
ALBUMIN SERPL-MCNC: 4.6 G/DL (ref 3.5–5.2)
ALBUMIN/GLOB SERPL: 1.9 G/DL
ALP SERPL-CCNC: 94 U/L (ref 39–117)
ALT SERPL W P-5'-P-CCNC: 16 U/L (ref 1–41)
ANION GAP SERPL CALCULATED.3IONS-SCNC: 8 MMOL/L (ref 5–15)
AST SERPL-CCNC: 18 U/L (ref 1–40)
BASOPHILS # BLD AUTO: 0.09 10*3/MM3 (ref 0–0.2)
BASOPHILS NFR BLD AUTO: 1 % (ref 0–1.5)
BILIRUB SERPL-MCNC: 0.4 MG/DL (ref 0–1.2)
BUN SERPL-MCNC: 9 MG/DL (ref 8–23)
BUN/CREAT SERPL: 7.6 (ref 7–25)
CALCIUM SPEC-SCNC: 9.2 MG/DL (ref 8.6–10.5)
CHLORIDE SERPL-SCNC: 105 MMOL/L (ref 98–107)
CO2 SERPL-SCNC: 29 MMOL/L (ref 22–29)
CREAT SERPL-MCNC: 1.18 MG/DL (ref 0.76–1.27)
DEPRECATED RDW RBC AUTO: 45.7 FL (ref 37–54)
EOSINOPHIL # BLD AUTO: 0.15 10*3/MM3 (ref 0–0.4)
EOSINOPHIL NFR BLD AUTO: 1.7 % (ref 0.3–6.2)
ERYTHROCYTE [DISTWIDTH] IN BLOOD BY AUTOMATED COUNT: 12.8 % (ref 12.3–15.4)
GFR SERPL CREATININE-BSD FRML MDRD: 61 ML/MIN/1.73
GLOBULIN UR ELPH-MCNC: 2.4 GM/DL
GLUCOSE SERPL-MCNC: 163 MG/DL (ref 65–99)
HCT VFR BLD AUTO: 53.7 % (ref 37.5–51)
HGB BLD-MCNC: 16.8 G/DL (ref 13–17.7)
HOLD SPECIMEN: NORMAL
HOLD SPECIMEN: NORMAL
IMM GRANULOCYTES # BLD AUTO: 0.02 10*3/MM3 (ref 0–0.05)
IMM GRANULOCYTES NFR BLD AUTO: 0.2 % (ref 0–0.5)
LYMPHOCYTES # BLD AUTO: 2.8 10*3/MM3 (ref 0.7–3.1)
LYMPHOCYTES NFR BLD AUTO: 32.5 % (ref 19.6–45.3)
MAGNESIUM SERPL-MCNC: 2.3 MG/DL (ref 1.6–2.4)
MCH RBC QN AUTO: 30.5 PG (ref 26.6–33)
MCHC RBC AUTO-ENTMCNC: 31.3 G/DL (ref 31.5–35.7)
MCV RBC AUTO: 97.6 FL (ref 79–97)
MONOCYTES # BLD AUTO: 0.75 10*3/MM3 (ref 0.1–0.9)
MONOCYTES NFR BLD AUTO: 8.7 % (ref 5–12)
NEUTROPHILS NFR BLD AUTO: 4.81 10*3/MM3 (ref 1.7–7)
NEUTROPHILS NFR BLD AUTO: 55.9 % (ref 42.7–76)
NRBC BLD AUTO-RTO: 0 /100 WBC (ref 0–0.2)
NT-PROBNP SERPL-MCNC: 741.3 PG/ML (ref 0–900)
PLATELET # BLD AUTO: 169 10*3/MM3 (ref 140–450)
PMV BLD AUTO: 10.7 FL (ref 6–12)
POTASSIUM SERPL-SCNC: 4.1 MMOL/L (ref 3.5–5.2)
PROT SERPL-MCNC: 7 G/DL (ref 6–8.5)
RBC # BLD AUTO: 5.5 10*6/MM3 (ref 4.14–5.8)
SODIUM SERPL-SCNC: 142 MMOL/L (ref 136–145)
TROPONIN T SERPL-MCNC: <0.01 NG/ML (ref 0–0.03)
TSH SERPL DL<=0.05 MIU/L-ACNC: 6.33 UIU/ML (ref 0.27–4.2)
WBC # BLD AUTO: 8.62 10*3/MM3 (ref 3.4–10.8)
WHOLE BLOOD HOLD SPECIMEN: NORMAL
WHOLE BLOOD HOLD SPECIMEN: NORMAL

## 2020-10-15 PROCEDURE — 71045 X-RAY EXAM CHEST 1 VIEW: CPT

## 2020-10-15 PROCEDURE — 84443 ASSAY THYROID STIM HORMONE: CPT

## 2020-10-15 PROCEDURE — 83880 ASSAY OF NATRIURETIC PEPTIDE: CPT

## 2020-10-15 PROCEDURE — 99284 EMERGENCY DEPT VISIT MOD MDM: CPT

## 2020-10-15 PROCEDURE — 85025 COMPLETE CBC W/AUTO DIFF WBC: CPT

## 2020-10-15 PROCEDURE — 80053 COMPREHEN METABOLIC PANEL: CPT

## 2020-10-15 PROCEDURE — 84439 ASSAY OF FREE THYROXINE: CPT | Performed by: NURSE PRACTITIONER

## 2020-10-15 PROCEDURE — 84484 ASSAY OF TROPONIN QUANT: CPT

## 2020-10-15 PROCEDURE — 83735 ASSAY OF MAGNESIUM: CPT

## 2020-10-15 PROCEDURE — 93005 ELECTROCARDIOGRAM TRACING: CPT | Performed by: EMERGENCY MEDICINE

## 2020-10-15 PROCEDURE — 93005 ELECTROCARDIOGRAM TRACING: CPT

## 2020-10-15 RX ORDER — SODIUM CHLORIDE 0.9 % (FLUSH) 0.9 %
10 SYRINGE (ML) INJECTION AS NEEDED
Status: DISCONTINUED | OUTPATIENT
Start: 2020-10-15 | End: 2020-10-19 | Stop reason: HOSPADM

## 2020-10-16 PROBLEM — R06.02 SHORTNESS OF BREATH: Status: RESOLVED | Noted: 2017-02-02 | Resolved: 2020-10-16

## 2020-10-16 PROBLEM — E11.9 TYPE 2 DIABETES MELLITUS WITHOUT COMPLICATION (HCC): Status: RESOLVED | Noted: 2018-09-24 | Resolved: 2020-10-16

## 2020-10-16 PROBLEM — I73.9 CLAUDICATION (HCC): Status: RESOLVED | Noted: 2018-06-26 | Resolved: 2020-10-16

## 2020-10-16 PROBLEM — N17.9 AKI (ACUTE KIDNEY INJURY) (HCC): Status: RESOLVED | Noted: 2020-07-02 | Resolved: 2020-10-16

## 2020-10-16 PROBLEM — K62.89 RECTAL PAIN: Status: RESOLVED | Noted: 2018-09-27 | Resolved: 2020-10-16

## 2020-10-16 PROBLEM — I49.01 VENTRICULAR FIBRILLATION (HCC): Status: RESOLVED | Noted: 2020-04-27 | Resolved: 2020-10-16

## 2020-10-16 PROBLEM — J43.8 OTHER EMPHYSEMA (HCC): Status: RESOLVED | Noted: 2018-09-24 | Resolved: 2020-10-16

## 2020-10-16 PROBLEM — R91.1 LUNG NODULE: Status: RESOLVED | Noted: 2017-09-11 | Resolved: 2020-10-16

## 2020-10-16 LAB
ANION GAP SERPL CALCULATED.3IONS-SCNC: 8 MMOL/L (ref 5–15)
BASOPHILS # BLD AUTO: 0.05 10*3/MM3 (ref 0–0.2)
BASOPHILS NFR BLD AUTO: 0.9 % (ref 0–1.5)
BUN SERPL-MCNC: 10 MG/DL (ref 8–23)
BUN/CREAT SERPL: 10.5 (ref 7–25)
CALCIUM SPEC-SCNC: 8.7 MG/DL (ref 8.6–10.5)
CHLORIDE SERPL-SCNC: 105 MMOL/L (ref 98–107)
CO2 SERPL-SCNC: 28 MMOL/L (ref 22–29)
CREAT SERPL-MCNC: 0.95 MG/DL (ref 0.76–1.27)
DEPRECATED RDW RBC AUTO: 46.2 FL (ref 37–54)
EOSINOPHIL # BLD AUTO: 0.13 10*3/MM3 (ref 0–0.4)
EOSINOPHIL NFR BLD AUTO: 2.3 % (ref 0.3–6.2)
ERYTHROCYTE [DISTWIDTH] IN BLOOD BY AUTOMATED COUNT: 12.8 % (ref 12.3–15.4)
GFR SERPL CREATININE-BSD FRML MDRD: 78 ML/MIN/1.73
GLUCOSE BLDC GLUCOMTR-MCNC: 115 MG/DL (ref 70–130)
GLUCOSE BLDC GLUCOMTR-MCNC: 130 MG/DL (ref 70–130)
GLUCOSE BLDC GLUCOMTR-MCNC: 165 MG/DL (ref 70–130)
GLUCOSE BLDC GLUCOMTR-MCNC: 167 MG/DL (ref 70–130)
GLUCOSE BLDC GLUCOMTR-MCNC: 99 MG/DL (ref 70–130)
GLUCOSE SERPL-MCNC: 168 MG/DL (ref 65–99)
HCT VFR BLD AUTO: 46.8 % (ref 37.5–51)
HGB BLD-MCNC: 14.7 G/DL (ref 13–17.7)
IMM GRANULOCYTES # BLD AUTO: 0.01 10*3/MM3 (ref 0–0.05)
IMM GRANULOCYTES NFR BLD AUTO: 0.2 % (ref 0–0.5)
LYMPHOCYTES # BLD AUTO: 1.92 10*3/MM3 (ref 0.7–3.1)
LYMPHOCYTES NFR BLD AUTO: 33.9 % (ref 19.6–45.3)
MCH RBC QN AUTO: 30.5 PG (ref 26.6–33)
MCHC RBC AUTO-ENTMCNC: 31.4 G/DL (ref 31.5–35.7)
MCV RBC AUTO: 97.1 FL (ref 79–97)
MONOCYTES # BLD AUTO: 0.57 10*3/MM3 (ref 0.1–0.9)
MONOCYTES NFR BLD AUTO: 10.1 % (ref 5–12)
NEUTROPHILS NFR BLD AUTO: 2.99 10*3/MM3 (ref 1.7–7)
NEUTROPHILS NFR BLD AUTO: 52.6 % (ref 42.7–76)
NRBC BLD AUTO-RTO: 0 /100 WBC (ref 0–0.2)
PLATELET # BLD AUTO: 118 10*3/MM3 (ref 140–450)
PMV BLD AUTO: 11.5 FL (ref 6–12)
POTASSIUM SERPL-SCNC: 4.2 MMOL/L (ref 3.5–5.2)
RBC # BLD AUTO: 4.82 10*6/MM3 (ref 4.14–5.8)
SARS-COV-2 RNA RESP QL NAA+PROBE: NOT DETECTED
SODIUM SERPL-SCNC: 141 MMOL/L (ref 136–145)
T4 FREE SERPL-MCNC: 1.22 NG/DL (ref 0.93–1.7)
TROPONIN T SERPL-MCNC: <0.01 NG/ML (ref 0–0.03)
WBC # BLD AUTO: 5.67 10*3/MM3 (ref 3.4–10.8)

## 2020-10-16 PROCEDURE — 87635 SARS-COV-2 COVID-19 AMP PRB: CPT | Performed by: INTERNAL MEDICINE

## 2020-10-16 PROCEDURE — 25010000002 AMIODARONE IN DEXTROSE 5% 360-4.14 MG/200ML-% SOLUTION: Performed by: NURSE PRACTITIONER

## 2020-10-16 PROCEDURE — 99223 1ST HOSP IP/OBS HIGH 75: CPT | Performed by: INTERNAL MEDICINE

## 2020-10-16 PROCEDURE — 85025 COMPLETE CBC W/AUTO DIFF WBC: CPT | Performed by: NURSE PRACTITIONER

## 2020-10-16 PROCEDURE — 94640 AIRWAY INHALATION TREATMENT: CPT

## 2020-10-16 PROCEDURE — 25010000002 AMIODARONE IN DEXTROSE 5% 150-4.21 MG/100ML-% SOLUTION: Performed by: NURSE PRACTITIONER

## 2020-10-16 PROCEDURE — 80048 BASIC METABOLIC PNL TOTAL CA: CPT | Performed by: NURSE PRACTITIONER

## 2020-10-16 PROCEDURE — 82962 GLUCOSE BLOOD TEST: CPT

## 2020-10-16 PROCEDURE — 84484 ASSAY OF TROPONIN QUANT: CPT | Performed by: NURSE PRACTITIONER

## 2020-10-16 PROCEDURE — 93010 ELECTROCARDIOGRAM REPORT: CPT | Performed by: INTERNAL MEDICINE

## 2020-10-16 PROCEDURE — 93005 ELECTROCARDIOGRAM TRACING: CPT | Performed by: INTERNAL MEDICINE

## 2020-10-16 RX ORDER — CARVEDILOL 12.5 MG/1
12.5 TABLET ORAL 2 TIMES DAILY
Status: DISCONTINUED | OUTPATIENT
Start: 2020-10-16 | End: 2020-10-16

## 2020-10-16 RX ORDER — ARFORMOTEROL TARTRATE 15 UG/2ML
15 SOLUTION RESPIRATORY (INHALATION)
Status: DISCONTINUED | OUTPATIENT
Start: 2020-10-16 | End: 2020-10-19 | Stop reason: HOSPADM

## 2020-10-16 RX ORDER — SOTALOL HYDROCHLORIDE 80 MG/1
120 TABLET ORAL DAILY
Status: DISCONTINUED | OUTPATIENT
Start: 2020-10-16 | End: 2020-10-16

## 2020-10-16 RX ORDER — CARVEDILOL 12.5 MG/1
25 TABLET ORAL 2 TIMES DAILY
Status: DISCONTINUED | OUTPATIENT
Start: 2020-10-16 | End: 2020-10-19 | Stop reason: HOSPADM

## 2020-10-16 RX ORDER — ASPIRIN 81 MG/1
81 TABLET ORAL DAILY
Status: DISCONTINUED | OUTPATIENT
Start: 2020-10-16 | End: 2020-10-19 | Stop reason: HOSPADM

## 2020-10-16 RX ORDER — BUDESONIDE 0.5 MG/2ML
0.5 INHALANT ORAL
Status: DISCONTINUED | OUTPATIENT
Start: 2020-10-16 | End: 2020-10-19 | Stop reason: HOSPADM

## 2020-10-16 RX ORDER — SODIUM CHLORIDE 0.9 % (FLUSH) 0.9 %
10 SYRINGE (ML) INJECTION EVERY 12 HOURS SCHEDULED
Status: DISCONTINUED | OUTPATIENT
Start: 2020-10-16 | End: 2020-10-19 | Stop reason: HOSPADM

## 2020-10-16 RX ORDER — SOTALOL HYDROCHLORIDE 80 MG/1
120 TABLET ORAL EVERY 12 HOURS SCHEDULED
Status: DISCONTINUED | OUTPATIENT
Start: 2020-10-16 | End: 2020-10-16

## 2020-10-16 RX ORDER — NICOTINE 21 MG/24HR
1 PATCH, TRANSDERMAL 24 HOURS TRANSDERMAL
Status: DISCONTINUED | OUTPATIENT
Start: 2020-10-16 | End: 2020-10-19 | Stop reason: HOSPADM

## 2020-10-16 RX ORDER — MEXILETINE HYDROCHLORIDE 150 MG/1
150 CAPSULE ORAL 3 TIMES DAILY
Status: DISCONTINUED | OUTPATIENT
Start: 2020-10-16 | End: 2020-10-19 | Stop reason: HOSPADM

## 2020-10-16 RX ORDER — SPIRONOLACTONE 25 MG/1
25 TABLET ORAL DAILY
Status: DISCONTINUED | OUTPATIENT
Start: 2020-10-16 | End: 2020-10-19 | Stop reason: HOSPADM

## 2020-10-16 RX ORDER — DEXTROSE MONOHYDRATE 25 G/50ML
25 INJECTION, SOLUTION INTRAVENOUS
Status: DISCONTINUED | OUTPATIENT
Start: 2020-10-16 | End: 2020-10-19 | Stop reason: HOSPADM

## 2020-10-16 RX ORDER — SODIUM CHLORIDE 0.9 % (FLUSH) 0.9 %
10 SYRINGE (ML) INJECTION AS NEEDED
Status: DISCONTINUED | OUTPATIENT
Start: 2020-10-16 | End: 2020-10-19 | Stop reason: HOSPADM

## 2020-10-16 RX ORDER — AMIODARONE HYDROCHLORIDE 200 MG/1
400 TABLET ORAL
Status: DISCONTINUED | OUTPATIENT
Start: 2020-10-16 | End: 2020-10-19

## 2020-10-16 RX ORDER — NICOTINE POLACRILEX 4 MG
15 LOZENGE BUCCAL
Status: DISCONTINUED | OUTPATIENT
Start: 2020-10-16 | End: 2020-10-19 | Stop reason: HOSPADM

## 2020-10-16 RX ADMIN — ROPINIROLE HYDROCHLORIDE 3 MG: 2 TABLET, FILM COATED ORAL at 21:08

## 2020-10-16 RX ADMIN — MEXILETINE HYDROCHLORIDE 150 MG: 150 CAPSULE ORAL at 21:08

## 2020-10-16 RX ADMIN — APIXABAN 5 MG: 5 TABLET, FILM COATED ORAL at 08:17

## 2020-10-16 RX ADMIN — ASPIRIN 81 MG: 81 TABLET, COATED ORAL at 08:17

## 2020-10-16 RX ADMIN — AMIODARONE HYDROCHLORIDE 1 MG/MIN: 1.8 INJECTION, SOLUTION INTRAVENOUS at 11:36

## 2020-10-16 RX ADMIN — ARFORMOTEROL TARTRATE 15 MCG: 15 SOLUTION RESPIRATORY (INHALATION) at 19:16

## 2020-10-16 RX ADMIN — SACUBITRIL AND VALSARTAN 1 TABLET: 24; 26 TABLET, FILM COATED ORAL at 21:08

## 2020-10-16 RX ADMIN — CARVEDILOL 25 MG: 12.5 TABLET, FILM COATED ORAL at 21:08

## 2020-10-16 RX ADMIN — MEXILETINE HYDROCHLORIDE 150 MG: 150 CAPSULE ORAL at 08:21

## 2020-10-16 RX ADMIN — IPRATROPIUM BROMIDE 0.5 MG: 0.5 SOLUTION RESPIRATORY (INHALATION) at 19:07

## 2020-10-16 RX ADMIN — SODIUM CHLORIDE, PRESERVATIVE FREE 10 ML: 5 INJECTION INTRAVENOUS at 21:08

## 2020-10-16 RX ADMIN — SACUBITRIL AND VALSARTAN 1 TABLET: 24; 26 TABLET, FILM COATED ORAL at 08:18

## 2020-10-16 RX ADMIN — SOTALOL HYDROCHLORIDE 120 MG: 80 TABLET ORAL at 08:18

## 2020-10-16 RX ADMIN — AMIODARONE HYDROCHLORIDE 400 MG: 200 TABLET ORAL at 17:08

## 2020-10-16 RX ADMIN — AMIODARONE HYDROCHLORIDE 150 MG: 1.5 INJECTION, SOLUTION INTRAVENOUS at 11:22

## 2020-10-16 RX ADMIN — SPIRONOLACTONE 25 MG: 25 TABLET ORAL at 08:17

## 2020-10-16 RX ADMIN — MEXILETINE HYDROCHLORIDE 150 MG: 150 CAPSULE ORAL at 15:09

## 2020-10-16 RX ADMIN — CARVEDILOL 12.5 MG: 12.5 TABLET, FILM COATED ORAL at 08:18

## 2020-10-16 RX ADMIN — SODIUM CHLORIDE, PRESERVATIVE FREE 10 ML: 5 INJECTION INTRAVENOUS at 08:18

## 2020-10-16 RX ADMIN — APIXABAN 5 MG: 5 TABLET, FILM COATED ORAL at 21:08

## 2020-10-16 RX ADMIN — AMIODARONE HYDROCHLORIDE 1 MG/MIN: 1.8 INJECTION, SOLUTION INTRAVENOUS at 17:08

## 2020-10-16 RX ADMIN — AMIODARONE HYDROCHLORIDE 400 MG: 200 TABLET ORAL at 11:22

## 2020-10-16 RX ADMIN — AMIODARONE HYDROCHLORIDE 1 MG/MIN: 1.8 INJECTION, SOLUTION INTRAVENOUS at 23:10

## 2020-10-16 NOTE — PLAN OF CARE
Goal Outcome Evaluation:  Plan of Care Reviewed With: patient, spouse  Progress: no change  Outcome Summary: Pt with numerous runs of V-Tach.  Seen by Cardiology and aware of the pts runs as well as Dr. Turk.  Patient at first was going to transfer to ICU but then Cardiology stated he did not need ICU.  Amnio loading dose given and followed with Amnio gtt and PO Amnio given per orders.  Pts wife updated on the pts condition.  Pt has been A-paced and pt has not reported any shocks from his defibulater.

## 2020-10-16 NOTE — ED PROVIDER NOTES
EMERGENCY DEPARTMENT ENCOUNTER      Pt Name: Derek Morris Jr.  MRN: [id number]  YOB: 1950  Date of evaluation: 10/15/2020  Provider: Eliseo Bettencourt MD    CHIEF COMPLAINT       Chief Complaint   Patient presents with   • Palpitations         HISTORY OF PRESENT ILLNESS  (Location/Symptom, Timing/Onset, Context/Setting, Quality, Duration, Modifying Factors, Severity.)   Derek Morris Jr. is a 69 y.o. male who presents to the emergency department with intermittent palpitations for the past 2 days.  Patient has history of CHF and cardiomyopathy and has pacemaker and ICD in place.  Patient was admitted to this hospital in July of this year for similar symptoms and was found to have multiple episodes of recurrent ventricular tachycardia.  He had subsequent ablation and had been doing well up until a couple of days ago.  Patient states that about a week ago he had some settings adjusted on his pacemaker and feels that this may be responsible for his new onset palpitations.  He denies any other associated symptoms including any chest pain, shortness of breath, presyncope, syncope.  He is asymptomatic at this time.      Nursing notes were reviewed.    REVIEW OF SYSTEMS    (2-9 systems for level 4, 10 or more for level 5)   ROS:  General:  No fevers, no chills, no weakness  Cardiovascular: Palpitations  Respiratory:  No shortness of breath, no cough, no wheezing  Gastrointestinal:  No pain, no nausea, no vomiting, no diarrhea  Musculoskeletal:  No muscle pain, no joint pain  Skin:  No rash, no easy bruising  Neurologic:  No speech problems, no headache, no extremity numbness, no extremity tingling, no extremity weakness  Psychiatric:  No anxiety  Genitourinary:  No dysuria, no hematuria    Except as noted above the remainder of the review of systems was reviewed and negative.       PAST MEDICAL HISTORY     Past Medical History:   Diagnosis Date   • SIMÓN (acute kidney injury) (CMS/[facility]) 7/2/2020   • Arthritis     • Cardiomyopathy (CMS/HCC)    • Carotid stenosis    • CHF (congestive heart failure) (CMS/HCC)    • Claudication (CMS/HCC) 6/26/2018    Added automatically from request for surgery [id number]   • COPD (chronic obstructive pulmonary disease) (CMS/HCC)    • Coronary artery disease    • Diabetes mellitus (CMS/HCC)    • Enlarged prostate    • GERD (gastroesophageal reflux disease)    • Heart attack (CMS/HCC)     X3 1990, 1993, 1995   • Hyperlipidemia    • Hypertension    • Lung nodule    • On home O2     prn   • Other emphysema (CMS/HCC) 9/24/2018    Added automatically from request for surgery [id number]   • Peptic ulcer disease    • Rectal pain 9/27/2018   • Shortness of breath 2/2/2017   • Type 2 diabetes mellitus without complication (CMS/HCC) 9/24/2018    Added automatically from request for surgery [id number]   • Urinary hesitancy    • Ventricular fibrillation (CMS/HCC) 4/27/2020   • Ventricular tachycardia (CMS/HCC)    • Wears dentures    • Wears glasses          SURGICAL HISTORY       Past Surgical History:   Procedure Laterality Date   • ABLATION OF DYSRHYTHMIC FOCUS     • CARDIAC CATHETERIZATION Left 04/2009    by Dr. Glen Baker- I. EF approx 40% II one of three patent grafts iii, Data deficit    • CARDIAC CATHETERIZATION N/A 1/26/2017    Procedure: Left Heart Cath;  Surgeon: Vlad Bravo MD;  Location:  [facility] CATH INVASIVE LOCATION;  Service:    • CARDIAC CATHETERIZATION Left 07/30/2010    i. EF 30% ii occluded vein graft to occluded RCS iii minor plaque in the LAD iv. 70% SVG- circumfelx, treated with 4x18 mm. Cypher KAILASH.   • CARDIAC CATHETERIZATION N/A 6/5/2018    Procedure: Peripheral angiography;  Surgeon: Vlad Bravo MD;  Location:  [facility] CATH INVASIVE LOCATION;  Service: Cardiovascular   • CARDIAC CATHETERIZATION N/A 7/23/2019    Procedure: Left Heart Cath;  Surgeon: Vlad Bravo MD;  Location:  [facility] CATH INVASIVE LOCATION;  Service: Cardiology   • CARDIAC DEFIBRILLATOR PLACEMENT     • CARDIAC  ELECTROPHYSIOLOGY PROCEDURE N/A 7/1/2020    Procedure: EP/ABLATION VT, STX/Rhythmia ([facility]), hold Eliquis 2 DOSES, no other meds to hold, GA;  Surgeon: Calin Dior DO;  Location:  [facility] EP INVASIVE LOCATION;  Service: Cardiology;  Laterality: N/A;   • COLONOSCOPY     • COLONOSCOPY N/A 9/26/2018    Procedure: COLONOSCOPY;  Surgeon: Alfred Shah MD;  Location:  [facility] OR;  Service: General   • CORONARY ANGIOPLASTY     • CORONARY ARTERY BYPASS GRAFT  1995 1995, w/ subsequent stents 2009/2010 X3; svg TO om1 AND om2, svg TO pda    • CYST REMOVAL      Tailbone   • EXAM UNDER ANESTHESIA N/A 9/26/2018    Procedure: EXAM UNDER ANESTHESIA;  Surgeon: Alfrde Shah MD;  Location:  [facility] OR;  Service: General   • HEMORRHOIDECTOMY N/A 9/28/2018    Procedure: HEMORRHOIDECTOMY;  Surgeon: Alfred Shah MD;  Location:  [facility] OR;  Service: General   • INSERT / REPLACE / REMOVE PACEMAKER     • INTERVENTIONAL RADIOLOGY PROCEDURE N/A 6/5/2018    Procedure: Abdominal Aortagram with Runoff;  Surgeon: Vlad Bravo MD;  Location:  [facility] CATH INVASIVE LOCATION;  Service: Cardiovascular   • INTERVENTIONAL RADIOLOGY PROCEDURE N/A 7/6/2018    Procedure: Abdominal Aortogram;  Surgeon: Vlad Bravo MD;  Location:  [facility] CATH INVASIVE LOCATION;  Service: Cardiology   • PACEMAKER IMPLANTATION  01/2002    Implant by Dr. Dickinson          CURRENT MEDICATIONS       Current Facility-Administered Medications:   •  sodium chloride 0.9 % flush 10 mL, 10 mL, Intravenous, PRN, Eliseo Bettencourt MD    Current Outpatient Medications:   •  albuterol (PROVENTIL HFA;VENTOLIN HFA) 108 (90 Base) MCG/ACT inhaler, Inhale 1-2 puffs Every 6 (Six) Hours As Needed for Wheezing., Disp: , Rfl:   •  aspirin 81 MG EC tablet, Take 1 tablet by mouth Daily., Disp: 30 tablet, Rfl: 10  •  bumetanide (BUMEX) 0.5 MG tablet, Take 1 mg by mouth As Needed., Disp: , Rfl:   •  carvedilol (COREG) 12.5 MG tablet, Take 1 tablet by mouth 2 (Two) Times a Day., Disp: 180  tablet, Rfl: 3  •  Eliquis 5 MG tablet tablet, TAKE 1 TABLET BY MOUTH EVERY 12 HOURS TO THIN BLOOD, Disp: 180 tablet, Rfl: 0  •  ezetimibe (ZETIA) 10 MG tablet, TAKE 1 TABLET EVERY DAY (Patient taking differently: 10 mg Daily.), Disp: 90 tablet, Rfl: 3  •  Fluticasone-Umeclidin-Vilant (TRELEGY ELLIPTA IN), Inhale 1 puff Daily., Disp: , Rfl:   •  GLIPIZIDE XL 2.5 MG 24 hr tablet, Take 2.5 mg by mouth Daily., Disp: , Rfl: 11  •  mexiletine (MEXITIL) 150 MG capsule, Take 1 capsule by mouth 3 (Three) Times a Day., Disp: 90 capsule, Rfl: 0  •  nitroglycerin (NITROSTAT) 0.4 MG SL tablet, Place 1 tablet under the tongue Every 5 (Five) Minutes As Needed for Chest Pain. Take no more than 3 doses in 15 minutes., Disp: 25 tablet, Rfl: 3  •  Repatha Pushtronex System 420 MG/3.5ML solution cartridge, INFUSE 420MG SUBCUTANEOUSLY VIA ON-BODY INFUSOR INTO THE APPROPRIATE AREA AS DIRECTED EVERY MONTH, Disp: 1 mL, Rfl: 11  •  rOPINIRole (REQUIP) 1 MG tablet, Take 3 mg by mouth Every Night., Disp: , Rfl:   •  sacubitril-valsartan (Entresto) 24-26 MG tablet, Take 1 tablet by mouth 2 (Two) Times a Day. (Patient taking differently: Take 1 tablet by mouth 2 (Two) Times a Day. 49-51 MG BID), Disp: 180 tablet, Rfl: 3  •  sotalol (BETAPACE) 120 MG tablet, Take 1 tablet by mouth Daily., Disp: 90 tablet, Rfl: 3  •  spironolactone (ALDACTONE) 25 MG tablet, Take 25 mg by mouth Daily., Disp: , Rfl:   •  tiZANidine (ZANAFLEX) 4 MG tablet, Take 8 mg by mouth At Night As Needed., Disp: , Rfl:     ALLERGIES     Crestor [rosuvastatin calcium], Lipitor [atorvastatin], Lopressor [metoprolol tartrate], Plavix [clopidogrel bisulfate], and Adhesive tape    FAMILY HISTORY       Family History   Problem Relation Age of Onset   • Hypertension Mother    • Sick sinus syndrome Father    • Coronary artery disease Father           SOCIAL HISTORY       Social History     Socioeconomic History   • Marital status:      Spouse name: Not on file   • Number of  children: 2   • Years of education: Not on file   • Highest education level: Not on file   Occupational History   • Occupation:      Comment: Retired   Tobacco Use   • Smoking status: Current Every Day Smoker     Packs/day: 1.00     Years: 45.00     Pack years: 45.00     Types: Cigarettes   • Smokeless tobacco: Never Used   • Tobacco comment: 1 PACK - 1.5 PACK QD   Substance and Sexual Activity   • Alcohol use: No   • Drug use: Yes     Types: Marijuana     Comment: occasional use - maybe once a month    • Sexual activity: Defer   Social History Narrative    Caffeine use: 5-6 serving daily.              PHYSICAL EXAM    (up to 7 for level 4, 8 or more for level 5)     Vitals:    10/16/20 0230 10/16/20 0300 10/16/20 0330 10/16/20 0430   BP: 137/87 137/68 128/51 136/84   Pulse: 83 86 80 80   Resp:       Temp:       SpO2: 94% 95% 91% 91%   Weight:       Height:           Physical Exam  General: Awake, alert, no acute distress.  HEENT: Conjunctiva normal.  Neck: Trachea midline.  Cardiac: Heart regular rate, rhythm, no murmurs, rubs, or gallops  Lungs: Lungs are clear to auscultation, there is no wheezing, rhonchi, or rales. There is no use of accessory muscles.  Chest wall: There is no tenderness to palpation over the chest wall or over ribs  Abdomen: Abdomen is soft, nontender, nondistended. There are no firm or pulsatile masses, no rebound rigidity or guarding.   Musculoskeletal: No deformity.  Neuro: Alert and oriented x 4.  Dermatology: Skin is warm and dry  Psych: Mentation is grossly normal, cognition is grossly normal. Affect is appropriate.        DIAGNOSTIC RESULTS     EKG: All EKG's are interpreted by the Emergency Department Physician who either signs or Co-signs this chart in the absence of a cardiologist.    Atrial paced rhythm rate of 84, no acute ischemic changes, QTC is 458    RADIOLOGY:   Non-plain film images such as CT, Ultrasound and MRI are read by the radiologist. Plain radiographic  images are visualized and preliminarily interpreted by the emergency physician with the below findings:      [x] Radiologist's Report Reviewed:  XR Chest 1 View   Final Result   1.  No definite active disease.   2.  No change since 8/28/2019.   3.  CABG. Pacer/AICD.      Signer Name: Jovi Grimes MD    Signed: 10/15/2020 10:37 PM    Workstation Name: [email or web address]     [facility]          LABS:    I have reviewed and interpreted all of the currently available lab results from this visit (if applicable):  Results for orders placed or performed during the hospital encounter of 10/15/20   COVID-19,CEPHEID,[facility] IN-HOUSE(OR EMERGENT/ADD-ON),NP SWAB IN TRANSPORT MEDIA 3-4 HR TAT - Swab, Nasopharynx    Specimen: Nasopharynx; Swab   Result Value Ref Range    COVID19 Not Detected Not Detected - Ref. Range   Comprehensive Metabolic Panel    Specimen: Blood   Result Value Ref Range    Glucose 163 (H) 65 - 99 mg/dL    BUN 9 8 - 23 mg/dL    Creatinine 1.18 0.76 - 1.27 mg/dL    Sodium 142 136 - 145 mmol/L    Potassium 4.1 3.5 - 5.2 mmol/L    Chloride 105 98 - 107 mmol/L    CO2 29.0 22.0 - 29.0 mmol/L    Calcium 9.2 8.6 - 10.5 mg/dL    Total Protein 7.0 6.0 - 8.5 g/dL    Albumin 4.60 3.50 - 5.20 g/dL    ALT (SGPT) 16 1 - 41 U/L    AST (SGOT) 18 1 - 40 U/L    Alkaline Phosphatase 94 39 - 117 U/L    Total Bilirubin 0.4 0.0 - 1.2 mg/dL    eGFR Non African Amer 61 >60 mL/min/1.73    Globulin 2.4 gm/dL    A/G Ratio 1.9 g/dL    BUN/Creatinine Ratio 7.6 7.0 - 25.0    Anion Gap 8.0 5.0 - 15.0 mmol/L   Magnesium    Specimen: Blood   Result Value Ref Range    Magnesium 2.3 1.6 - 2.4 mg/dL   Troponin    Specimen: Blood   Result Value Ref Range    Troponin T <0.010 0.000 - 0.030 ng/mL   TSH    Specimen: Blood   Result Value Ref Range    TSH 6.330 (H) 0.270 - 4.200 uIU/mL   BNP    Specimen: Blood   Result Value Ref Range    proBNP 741.3 0.0 - 900.0 pg/mL   CBC Auto Differential    Specimen: Blood   Result Value Ref  Range    WBC 8.62 3.40 - 10.80 10*3/mm3    RBC 5.50 4.14 - 5.80 10*6/mm3    Hemoglobin 16.8 13.0 - 17.7 g/dL    Hematocrit 53.7 (H) 37.5 - 51.0 %    MCV 97.6 (H) 79.0 - 97.0 fL    MCH 30.5 26.6 - 33.0 pg    MCHC 31.3 (L) 31.5 - 35.7 g/dL    RDW 12.8 12.3 - 15.4 %    RDW-SD 45.7 37.0 - 54.0 fl    MPV 10.7 6.0 - 12.0 fL    Platelets 169 140 - 450 10*3/mm3    Neutrophil % 55.9 42.7 - 76.0 %    Lymphocyte % 32.5 19.6 - 45.3 %    Monocyte % 8.7 5.0 - 12.0 %    Eosinophil % 1.7 0.3 - 6.2 %    Basophil % 1.0 0.0 - 1.5 %    Immature Grans % 0.2 0.0 - 0.5 %    Neutrophils, Absolute 4.81 1.70 - 7.00 10*3/mm3    Lymphocytes, Absolute 2.80 0.70 - 3.10 10*3/mm3    Monocytes, Absolute 0.75 0.10 - 0.90 10*3/mm3    Eosinophils, Absolute 0.15 0.00 - 0.40 10*3/mm3    Basophils, Absolute 0.09 0.00 - 0.20 10*3/mm3    Immature Grans, Absolute 0.02 0.00 - 0.05 10*3/mm3    nRBC 0.0 0.0 - 0.2 /100 WBC   Light Blue Top   Result Value Ref Range    Extra Tube hold for add-on    Green Top (Gel)   Result Value Ref Range    Extra Tube Hold for add-ons.    Lavender Top   Result Value Ref Range    Extra Tube hold for add-on    Gold Top - SST   Result Value Ref Range    Extra Tube Hold for add-ons.         All other labs were within normal range or not returned as of this dictation.      EMERGENCY DEPARTMENT COURSE and DIFFERENTIAL DIAGNOSIS/MDM:   Vitals:    Vitals:    10/16/20 0230 10/16/20 0300 10/16/20 0330 10/16/20 0430   BP: 137/87 137/68 128/51 136/84   Pulse: 83 86 80 80   Resp:       Temp:       SpO2: 94% 95% 91% 91%   Weight:       Height:           ED Course as of Oct 16 0458   Thu Oct 15, 2020   2314 CXR reviewed and no evidence of acute pathology    [NS]   Fri Oct 16, 2020   0100 Spoke w/ Dr. Lawrence who accepts the pt for admission    [NS]   0108 Dr. Matson does not rec additional anti-dysrhythmics tonight. Aasbo to see tomorrow.    [NS]      ED Course User Index  [NS] Eliseo Bettencourt MD       Patient well-appearing and  asymptomatic throughout the duration of his stay in the emergency department.  His pacemaker was interrogated and was revealed that he has had several episodes of ventricular tachycardia over the past 2 days without any ICD shocks.  His laboratory evaluation is unremarkable without any evidence of electrolyte derangement, significant anemia, myocardial injury.  Cardiology was consulted from the emergency department and recommended continuing his home sotalol but did not recommend adding any additional antidysrhythmic.  He will be admitted to the hospitalist service for further evaluation care.          MEDICATIONS ADMINISTERED IN ED:  Medications   sodium chloride 0.9 % flush 10 mL (has no administration in time range)         FINAL IMPRESSION      1. Cardiac arrhythmia, unspecified cardiac arrhythmia type    2. History of cardiomyopathy    3. History of coronary artery disease          DISPOSITION/PLAN     ED Disposition     ED Disposition Condition Comment    Decision to Admit  Level of Care: Telemetry [5]   Diagnosis: Ventricular tachycardia (CMS/[facility]) [092513]   Admitting Physician: NELL MOON [[id number]]   Attending Physician: NELL MOON [[id number]]   Bed Request Comments: tele            Eliseo Bettencourt MD  Attending Emergency Physician               Eliseo Bettencourt MD  10/16/20 0451

## 2020-10-16 NOTE — CONSULTS
Cardiac Electrophysiology In Patient Consultation        [facility] at [facility]     Consultation      PATIENT NAME:  Derek Morris Jr.    :  1950 AGE: 69 y.o.     Date of Admission:  10/15/2020  Date of Consultation:  10/16/2020    Primary Electrophysiologist: Dr. Dior     Primary Cardiologist:  Dr. Bravo    Subjective      REASON FOR CONSULT: Recurrent VT    CHIEF COMPLAINT:  Palpitations, Dizziness, Lightheadedness    Cardiac problem list:    1. Ischemic heart disease:  a. MI x3, , , and .  b. CABG x3 by Dr. Noble Cuello, :  SVG to OM1 and OM2, SVG to PDA.  c. Normal LV.  d. Questionable LAD stent, incomplete  database.  e. STEMI, 2009.  f. LHC by Dr. Glen Baker, 2009:  EF approximately 40%, 1 of 3 patent grafts; data deficit.  g. LHC by Dr. Bird, 2010:  EF 30%, occluded vein graft to occluded RCA, minor plaque in LAD, 70% SVG -  circumflex treated with 4 x 18 mm Cypher KAILASH.  h. Echocardiogram, 2012:  EF 40% to 45%, diastolic dysfunction, mild TR.  i. Echocardiogram, 2015:  EF 30% to 35%, mild TR.  j. 2017 EF 35-40%  k. LHC in 2017 as noted in the results below. Resolute Integrity KAILASH to D2  2. Hypertension.  3. Chronic systolic CHF.  4. Nonsustained VT:  a. Inducible VT by EPS, 2002.  b. Pacesetter ICD implant by Dr. Dickinson, 2002.  c. Chronic amiodarone, discontinued :  Cannot  exclude amiodarone pulmonary toxicity.  d. ICD generator change-out with enrollment in the ANALYZE ST SEGMENT protocol, 2012.  e. Status post ablation with Dr. Dior  2020  f. Recurrent VT 2020 with initiation of amiodarone therapy  5. PAD s/p PTA 2018 to left SFA  6. Hyperlipidemia; intolerance to multiple statins.  7. Type 2 diabetes mellitus.  8. Chronic tobacco; home O2.  9. Surgical history:  a.  CABG      HISTORY OF PRESENT ILLNESS: Mr. Derek Morris is a 69-year-old white male seen in  EP evaluation today for recurrent episodes of ventricular tachycardia.  Patient has a known past medical history of ischemic cardiomyopathy, prior MIs, chronic systolic congestive heart failure, and ventricular tachycardia status post dual-chamber ICD implant initially in 2002.  Patient was last seen in EP follow-up on 10/5/2020 status post VT ablation on 7/1/2020.  At that time device interrogation revealed one episode of recurrent VT with addition of sotalol to his current mexiletine therapy.  Upon evaluation patient reports on 10/6/2020 the day after his follow-up visit he began to have intermittent episodes of palpitations associated with lightheadedness and dizziness.  He reports the episodes lasted only a few seconds in duration.  Patient denies chest pain, syncope, or ICD shocks.  Patient's device was remotely interrogated revealing multiple paced terminated episodes of sustained ventricular tachycardia.  Patient was contacted and instructed to present to [facility] ED last evening.  Throughout the night and this morning patient has had recurrent episodes of paced terminated VT again associated with palpitations and dizziness.  Episodes continue last approximately 30 seconds or less and are terminated by antitachycardia pacing without ICD shock.  Patient's LV function on 5/10/2020 revealed EF of 51-55%.  Patient admits to compliance with current medication therapies without noted side effects.      PAST MEDICAL HISTORY  Past Medical History:   Diagnosis Date   • SIMÓN (acute kidney injury) (CMS/[facility]) 7/2/2020   • Arthritis    • Cardiomyopathy (CMS/[facility])    • Carotid stenosis    • CHF (congestive heart failure) (CMS/[facility])    • Claudication (CMS/[facility]) 6/26/2018    Added automatically from request for surgery [id number]   • COPD (chronic obstructive pulmonary disease) (CMS/[facility])    • Coronary artery disease    • Diabetes mellitus (CMS/[facility])    • Enlarged prostate    • GERD (gastroesophageal reflux disease)     • Heart attack (CMS/[facility])     X3 1990, 1993, 1995   • Hyperlipidemia    • Hypertension    • Lung nodule    • On home O2     prn   • Other emphysema (CMS/[facility]) 9/24/2018    Added automatically from request for surgery [id number]   • Peptic ulcer disease    • Rectal pain 9/27/2018   • Shortness of breath 2/2/2017   • Type 2 diabetes mellitus without complication (CMS/[facility]) 9/24/2018    Added automatically from request for surgery [id number]   • Urinary hesitancy    • Ventricular fibrillation (CMS/[facility]) 4/27/2020   • Ventricular tachycardia (CMS/[facility])    • Wears dentures    • Wears glasses        SURGICAL HISTORY   has a past surgical history that includes Coronary artery bypass graft (1995); Cardiac catheterization (Left, 04/2009); Coronary angioplasty; Insert / replace / remove pacemaker; Cardiac catheterization (N/A, 1/26/2017); Cardiac catheterization (Left, 07/30/2010); Pacemaker Implantation (01/2002); Cardiac defibrillator placement; Cyst Removal; Cardiac catheterization (N/A, 6/5/2018); Interventional radiology procedure (N/A, 6/5/2018); Interventional radiology procedure (N/A, 7/6/2018); Examination under anesthesia (N/A, 9/26/2018); Hemorrhoid surgery (N/A, 9/28/2018); Cardiac catheterization (N/A, 7/23/2019); Colonoscopy; Colonoscopy (N/A, 9/26/2018); Cardiac electrophysiology procedure (N/A, 7/1/2020); and Ablation of dysrhythmic focus.     SOCIAL HISTORY  Social History     Socioeconomic History   • Marital status:      Spouse name: Not on file   • Number of children: 2   • Years of education: Not on file   • Highest education level: Not on file   Occupational History   • Occupation:      Comment: Retired   Tobacco Use   • Smoking status: Current Every Day Smoker     Packs/day: 1.00     Years: 45.00     Pack years: 45.00     Types: Cigarettes   • Smokeless tobacco: Never Used   • Tobacco comment: 1 PACK - 1.5 PACK QD   Substance and Sexual Activity   • Alcohol use: No   • Drug use: Yes      Types: Marijuana     Comment: occasional use - maybe once a month    • Sexual activity: Defer   Social History Narrative    Caffeine use: 5-6 serving daily.            FAMILY HISTORY  family history includes Coronary artery disease in his father; Hypertension in his mother; Sick sinus syndrome in his father.     MEDICATIONS  Prior to Admission medications    Medication Sig Start Date End Date Taking? Authorizing Provider   albuterol (PROVENTIL HFA;VENTOLIN HFA) 108 (90 Base) MCG/ACT inhaler Inhale 1-2 puffs Every 6 (Six) Hours As Needed for Wheezing.    Sunil Marroquin MD   aspirin 81 MG EC tablet Take 1 tablet by mouth Daily. 7/26/19   Slime Bowling APRN   bumetanide (BUMEX) 0.5 MG tablet Take 1 mg by mouth As Needed.    Sunil Marroquin MD   carvedilol (COREG) 12.5 MG tablet Take 1 tablet by mouth 2 (Two) Times a Day. 12/17/19   Zaki Dickinson MD   Eliquis 5 MG tablet tablet TAKE 1 TABLET BY MOUTH EVERY 12 HOURS TO THIN BLOOD 10/9/20   Vlad Bravo MD   ezetimibe (ZETIA) 10 MG tablet TAKE 1 TABLET EVERY DAY  Patient taking differently: 10 mg Daily. 5/15/20   Vlad Bravo MD   Fluticasone-Umeclidin-Vilant (TRELEGY ELLIPTA IN) Inhale 1 puff Daily.    Sunil Marroquin MD   GLIPIZIDE XL 2.5 MG 24 hr tablet Take 2.5 mg by mouth Daily. 2/16/18   Sunil Marroquin MD   mexiletine (MEXITIL) 150 MG capsule Take 1 capsule by mouth 3 (Three) Times a Day. 5/14/20   Calin Dior DO   nitroglycerin (NITROSTAT) 0.4 MG SL tablet Place 1 tablet under the tongue Every 5 (Five) Minutes As Needed for Chest Pain. Take no more than 3 doses in 15 minutes. 9/18/19   Vlad Bravo MD   Repatha Pushtronex System 420 MG/3.5ML solution cartridge INFUSE 420MG SUBCUTANEOUSLY VIA ON-BODY INFUSOR INTO THE APPROPRIATE AREA AS DIRECTED EVERY MONTH 9/28/20   Vlad Bravo MD   rOPINIRole (REQUIP) 1 MG tablet Take 3 mg by mouth Every Night.    Sunil Marroquin MD   sacubitril-valsartan (Entresto) 24-26 MG  "tablet Take 1 tablet by mouth 2 (Two) Times a Day.  Patient taking differently: Take 1 tablet by mouth 2 (Two) Times a Day. 49-51 MG BID 10/5/20   Slime Bowling APRN   sotalol (BETAPACE) 120 MG tablet Take 1 tablet by mouth Daily. 10/5/20   Calin Dior DO   spironolactone (ALDACTONE) 25 MG tablet Take 25 mg by mouth Daily.    Provider, Historical, MD   tiZANidine (ZANAFLEX) 4 MG tablet Take 8 mg by mouth At Night As Needed.    Provider, Historical, MD       ALLERGIES  Allergies   Allergen Reactions   • Crestor [Rosuvastatin Calcium] Myalgia   • Lipitor [Atorvastatin] Myalgia     Joint pain myalgias   • Lopressor [Metoprolol Tartrate] Unknown (See Comments)     Side of face went numb     • Plavix [Clopidogrel Bisulfate] Unknown (See Comments)     Previously thought to be resistant; placed back on clopidogrel per Dr. Bravo earlier this year.  Confirmed with patient   • Adhesive Tape Itching and Rash       REVIEW OF SYSTEMS  Constitutional: No fevers or chills, no recent weight gain or weight loss, + fatigue  Eyes: No visual loss, blurred vision, double vision, yellow sclerae.  ENT: No headaches, hearing loss, vertigo, congestion or sore throat.   Cardiovascular: Per HPI  Respiratory: No cough or wheezing, no sputum production, no hematemesis   Gastrointestinal: No abdominal pain, no nausea, vomiting, constipation, diarrhea, melena.   Genitourinary: No dysuria, hematuria or increased frequency.  Musculoskeletal:  No gait disturbance, weakness or joint pain or stiffness  Integumentary: No rashes, urticaria, ulcers or sores.   Neurological: No headache, syncope, paralysis, ataxia  Psychiatric: No anxiety, or depression  Endocrine: No diaphoresis, cold or heat intolerance. No polyuria or polydipsia.   Hematologic/Lymphatic: No anemia, abnormal bruising or bleeding      Objective     VITAL SIGNS: /91   Pulse 80   Temp 97.3 °F (36.3 °C) (Oral)   Resp 15   Ht 172.7 cm (68\")   Wt 72.6 kg (160 lb 1.6 oz)   " "SpO2 91%   BMI 24.34 kg/m²      ADMIT WEIGHT:  74.8 kg (165 lb)  BMI: Body mass index is 24.34 kg/m².    Admission Weight: 74.8 kg (165 lb)     PHYSICAL EXAM  General appearance: Awake, alert, cooperative  Head: Normocephalic, without obvious abnormality, atraumatic  Eyes: Conjunctivae/corneas clear, EOMs intact  Neck: no adenopathy, no carotid bruit, no JVD and thyroid: not enlarged  Lungs: clear to auscultation bilaterally and no rhonchi or crackles\", ' symmetric  Heart: regular rate and rhythm, S1, S2 normal, no murmur, click, rub or gallop  Abdomen: Soft, non-tender, bowel sounds normal,  no organomegaly  Extremities: extremities normal, atraumatic, no cyanosis or edema  Skin: Skin color, turgor normal, no rashes or lesions  Neurologic: Grossly normal     CBC:   Results from last 7 days   Lab Units 10/15/20  2053   WBC 10*3/mm3 8.62   HEMOGLOBIN g/dL 16.8   HEMATOCRIT % 53.7*   MCV fL 97.6*   PLATELETS 10*3/mm3 169         BMP:  Results from last 7 days   Lab Units 10/15/20  2053   POTASSIUM mmol/L 4.1   CHLORIDE mmol/L 105   CO2 mmol/L 29.0   BUN mg/dL 9   CREATININE mg/dL 1.18   GLUCOSE mg/dL 163*   CALCIUM mg/dL 9.2   MAGNESIUM mg/dL 2.3     MAG:   Results from last 7 days   Lab Units 10/15/20  2053   MAGNESIUM mg/dL 2.3       CURRENT MEDICATIONS:    Current Facility-Administered Medications:   •  amiodarone (PACERONE) tablet 400 mg, 400 mg, Oral, TID With Meals, Paxton Turner APRN  •  amiodarone 360 mg in 200 mL D5W infusion, 1 mg/min, Intravenous, Continuous, Paxton Turner APRN  •  amiodarone in dextrose 5% (NEXTERONE) loading dose 150mg/100mL, 150 mg, Intravenous, Once, Paxton Turner APRN  •  apixaban (ELIQUIS) tablet 5 mg, 5 mg, Oral, Q12H, Juanis Manzano APRN, 5 mg at 10/16/20 0817  •  aspirin EC tablet 81 mg, 81 mg, Oral, Daily, Juanis Manzano, APRN, 81 mg at 10/16/20 0817  •  carvedilol (COREG) tablet 25 mg, 25 mg, Oral, BID, Calin Dior,   •  dextrose (D50W) 25 " g/ 50mL Intravenous Solution 25 g, 25 g, Intravenous, Q15 Min PRN, Juanis Manzano APRN  •  dextrose (GLUTOSE) oral gel 15 g, 15 g, Oral, Q15 Min PRN, Juanis Manzano APRN  •  glucagon (human recombinant) (GLUCAGEN DIAGNOSTIC) injection 1 mg, 1 mg, Subcutaneous, Q15 Min PRN, Juanis Manzano APRN  •  influenza vac split quad (FLUZONE,FLUARIX,AFLURIA,FLULAVAL) injection 0.5 mL, 0.5 mL, Intramuscular, During Hospitalization, Jewels Lawrence, DO  •  insulin lispro (humaLOG) injection 0-7 Units, 0-7 Units, Subcutaneous, TID AC, Juanis Manzano APRN  •  mexiletine (MEXITIL) capsule 150 mg, 150 mg, Oral, TID, Calin Dior, DO, 150 mg at 10/16/20 0821  •  nicotine (NICODERM CQ) 21 MG/24HR patch 1 patch, 1 patch, Transdermal, Q24H, Juanis Manzano APRN  •  rOPINIRole (REQUIP) tablet 3 mg, 3 mg, Oral, Nightly, Juanis Manzano APRN  •  sacubitril-valsartan (ENTRESTO) 24-26 MG tablet 1 tablet, 1 tablet, Oral, BID, Juanis Manzano APRN, 1 tablet at 10/16/20 0818  •  sodium chloride 0.9 % flush 10 mL, 10 mL, Intravenous, PRN, Eliseo Bettencourt MD  •  sodium chloride 0.9 % flush 10 mL, 10 mL, Intravenous, Q12H, Juanis Manzano APRN, 10 mL at 10/16/20 0818  •  sodium chloride 0.9 % flush 10 mL, 10 mL, Intravenous, PRN, Juanis Manzano APRN  •  spironolactone (ALDACTONE) tablet 25 mg, 25 mg, Oral, Daily, Juanis Manzano APRN, 25 mg at 10/16/20 0817  CONTINUOUS INFUSIONS:  amiodarone, 1 mg/min        TELEMETRY:  Paced 75 bpm     Assessment & Plan     Mr. Derek Morris is a 69-year-old white male seen in EP evaluation today for recurrent episodes of ventricular tachycardia.  Patient has a known past medical history of ischemic cardiomyopathy, prior MIs, chronic systolic congestive heart failure, and ventricular tachycardia status post dual-chamber ICD implant initially in 2002.  Initial reports were unclear if patient was actually taking his sotalol therapy as prescribed.  Patient now reports he has been taking  his sotalol daily as prescribed over the past 6 days now with continued recurrent episodes of VT.  Case reviewed with Dr. Dior with plan to discontinue sotalol therapy at this time with initiation of amiodarone and transfer to ICU.  Will initiate amiodarone IV at 1 mg/min with addition of p.o. amiodarone at 400 mg 3 times daily for 10 days with plan to decrease to 400 mg daily maintenance dose.  Will continue to follow with further recommendations to follow per Dr. Dior.      Electronically signed by VANDANA Dubois, 10/16/20, 11:04 AM EDT.      This note was completed using a voice transcription system. Every effort wa    s made to ensure accuracy. However, inadvertent computerized transcription errors may be present.      __________________            Cardiac Electrophysiology Procedure Note      [facility] at [facility]          CARDIAC ELECTROPHYSIOLOGIST ADDENDUM    I have personally performed a face to face diagnostic evaluation on this patient.  I have reviewed the note authored by the advanced practice provider and agree with the history of present illness, past medical history, surgical history, social history, family history and review of systems.. I have reviewed current medications, and lab values.  My findings are below:  .    History of Present Illness:  69 y.o. male who I know well.  He was readmitted to the hospital at my request after having more than 25 episodes of sustained VT requiring antitachycardia pacing to terminate them.  He received no shocks.  He says that he felt great until he saw me in the office and then the next day he started to have more episodes of VT.  He is totally unclear why this happened.  He is mystified by it indeed.    I recommended that he go back on sotalol.  He started this on last Friday and has been taking sotalol regularly and a once a day fashion since then.  His GFR at the time that I prescribed it was 46.    He is dismayed by this  "but he is not having any chest pain nausea vomiting fevers or chills he is not passed out is not felt like he is going to pass out.  He actually drove himself to the hospital yesterday.    Review of Systems:   · Constitutional: No Fevers/Chills, No recent weight gain weight loss, No fatigue.  · Cardiovascular: Per HPI  · Respiratory: No cough or wheezing, no sputum production. No hematemesis.    · Gastrointestinal: No Nausea, Vomiting, Diarrhea, or Constipation. No bloody or dark stools.  · All others negative except what is noted above and in my AYLEEN's note.     Physical Exam   Vital Signs: /82   Pulse 85   Temp 97.1 °F (36.2 °C) (Oral)   Resp 16   Ht 172.7 cm (68\")   Wt 72.6 kg (160 lb 1.6 oz)   SpO2 94%   BMI 24.34 kg/m²        Admission Weight: 74.8 kg (165 lb)     General appearance: Alert oriented and cooperative.  In no acute distress  Skin:  Warm and Dry to touch  Head: Normocephalic, without obvious abnormality, atraumatic.   Eyes: Conjunctivae unremarkable, EOM's intact.Sclera non icteric.  Neck: No JVD, No carotid bruit. Neck supple, trachea midline  Lungs: Clear to auscultation bilaterally, no use of accessory muscles.    Heart:: RRR with Normal S1 and S2 . No murmurs and no gallops.  Abdomen: Soft, non-tender. Bowel sounds normal.  Extremities: No edema.  Neurologic: Oriented to time, person and place, affect appropriate.  No focal/major motor or sensory defects noted.    Psychiatric:  Appropriate mood, memory and judgment.  Good use of semantics and logic.    Mode device interrogations personally reviewed.    EKG personally reviewed.    Telemetry personally reviewed.    Laboratory studies personally reviewed.    PLAN:    Very pleasant gentleman in no well he has a history of ischemic heart disease and underwent extensive catheter ablation of the left ventricle for recurrent sustained repetitive monomorphic VT.  We transitioned him off of amiodarone.  He has been on mexiletine monotherapy " and did well for about 3 months and then has had now recurrence of VT.  The VT is indeed monomorphic and has not required shocks thus far.  Has been treated with antitachycardia pacing alone and has had more than 30 episodes in the last 3 to 4 weeks.  Several of these episodes of been nonsustained.    I discussed with him all of the options available.  I recommended that we increase his carvedilol to 25 mg twice daily continue his mexiletine at the present dose stop his sotalol and transitioned him to amiodarone.  I do think that the risk-benefit profile favors discontinuing the amiodarone and not waiting 3 days for washout but rather to starting the amiodarone right away.  His QT interval is reasonable.  His GFR is actually improved and has been taking sotalol once a day which means that he has been actually underdosed with sotalol a little bit.    I discussed these options with him he wished to proceed in this fashion.  We did discuss briefly the option of repeat catheter ablation for VT however at this time I think we should try and keep him medically stable at least for the short-term.    I spent about 45 minutes with him at the bedside.  All of his questions answered to his satisfaction.  He has no questions or misconceptions about his care at this time.    I would like to keep him on high-dose amiodarone intravenous and oral over the weekend at a dose of 400 mg orally 3 times daily and 1 mg/min without stopping it.  Also really want to keep him on high dose of carvedilol if at all possible.  We can be relatively lenient with tolerating transient hypotension.    His ICD is a [employer] device.       Calin Dior DO

## 2020-10-16 NOTE — PROGRESS NOTES
Discharge Planning Assessment  [facility]     Patient Name: Derek Morris Jr.  MRN: [id number]  Today's Date: 10/16/2020    Admit Date: 10/15/2020    Discharge Needs Assessment     Row Name 10/16/20 0836       Living Environment    Lives With  spouse    Name(s) of Who Lives With Patient  Amanda Morris (spouse) [phone]    Current Living Arrangements  home/apartment/condo    Primary Care Provided by  self    Provides Primary Care For  no one    Family Caregiver if Needed  spouse    Living Arrangement Comments  Amanda Morris (spouse) [phone]       Resource/Environmental Concerns    Resource/Environmental Concerns  none    Transportation Concerns  car, none       Transition Planning    Patient/Family Anticipates Transition to  home with family    Patient/Family Anticipated Services at Transition  none    Transportation Anticipated  family or friend will provide       Discharge Needs Assessment    Readmission Within the Last 30 Days  no previous admission in last 30 days    Equipment Currently Used at Home  walker, rolling;oxygen    Concerns to be Addressed  denies needs/concerns at this time    Anticipated Changes Related to Illness  none    Equipment Needed After Discharge  none        Discharge Plan     Row Name 10/16/20 6354       Plan    Plan  Home with family    Patient/Family in Agreement with Plan  yes    Plan Comments  Spoke with patient at bedside. Lives with Amanda Morris (spouse) [phone] in [facility]. Is independent with ADL's. No problems with [employer] Medicare or medications. Has a rolling walker and oxygen at home. Plan is home with family. CM will continue to follow.    Final Discharge Disposition Code  01 - home or self-care        Continued Care and Services - Admitted Since 10/15/2020    Coordination has not been started for this encounter.         Demographic Summary     Row Name 10/16/20 0834       General Information    Admission Type  inpatient    Arrived From  emergency  department    Referral Source  admission list    Reason for Consult  discharge planning    Preferred Language  English     Used During This Interaction  no       Contact Information    Permission Granted to Share Info With      Contact Information Obtained for      Contact Information Comments  PCP is Dennies Ulrich, MD        Functional Status     Row Name 10/16/20 0835       Functional Status    Usual Activity Tolerance  moderate    Current Activity Tolerance  moderate       Functional Status, IADL    Medications  independent    Meal Preparation  independent    Housekeeping  independent    Laundry  independent    Shopping  independent       Mental Status    General Appearance WDL  WDL       Mental Status Summary    Recent Changes in Mental Status/Cognitive Functioning  no changes       Employment/    Employment Status  retired        Psychosocial    No documentation.       Abuse/Neglect    No documentation.       Legal    No documentation.       Substance Abuse    No documentation.       Patient Forms    No documentation.           Jovi Pierre, RN

## 2020-10-16 NOTE — H&P
"    [facility] Medicine Services  HISTORY AND PHYSICAL    Patient Name: Derek Morris Jr.  : 1950  MRN: [id number]  Primary Care Physician: Jn Beach MD  Date of admission: 10/15/2020      Subjective   Subjective     Chief Complaint:  Palpitations     HPI:  Derek Morris Jr. is a 69 y.o. male w/ a hx of CAD, ischemic cardiomyopathy, CHF, pace/ICD in place, HTN, HLD, COPD on 2.5 L home O2 as needed, ongoing tobacco abuse, T2DM and s/p ablation 2020 2/2 VTach who presented to the ED w/ c/o palpitations.   Pt c/o intermittent palpitations since Monday. Pt w/ associated fatigue and intermittent dizziness. Palpitations usually occur at rest. Pt denies associated dyspnea or chest pain.   Pt reports that he has not had any issues w/ palpitations since his ablation in July until Monday. He reports that he has been \"doing great\" and even has increased energy levels post-ablation.   Pt reports that he was seen in f/u by Cardiology last week and started on Sotalol. He didn't start that Sotalol until 11am Thursday 10/15.   Pt was called Thursday afternoon and instructed to come to the ED for further evaluation as his ICD/pacer was showing \"repeated abnormal heart patterns\".   Additionally, pt c/o intermittent pedal edema for which he takes \"as needed\" diuretics. He last took a diuretic two days ago w/ improvement.   Pt w/ chronic cough; no change in baseline. Pt denies fever/chills, chest pain, N/V/D, abdominal pain, dysuria, syncope, confusion.  Pt evaluated in the ED. Noted to be having intermittent runs of VTach. Pt admitted to the hospital medicine service for further evaluation.       Current COVID Risks are:  [] Fever []  Cough [] Shortness of breath [] Fatigue [] Change in taste or smell    [] Exposure to COVID positive patient  [] High risk facility   [x]  NONE    Review of Systems   Constitutional: Positive for fatigue. Negative for chills and fever.   HENT: Negative.  " Negative for congestion, sinus pressure, sinus pain, sneezing, sore throat and trouble swallowing.    Eyes: Negative.  Negative for visual disturbance.   Respiratory: Negative.  Negative for cough, shortness of breath, wheezing and stridor.    Cardiovascular: Positive for palpitations and leg swelling. Negative for chest pain.   Gastrointestinal: Negative.  Negative for abdominal pain, blood in stool, constipation, diarrhea, nausea and vomiting.   Endocrine: Negative.    Genitourinary: Negative.  Negative for decreased urine volume, difficulty urinating, dysuria, flank pain, frequency, hematuria and urgency.   Musculoskeletal: Negative.  Negative for arthralgias, myalgias, neck pain and neck stiffness.   Skin: Negative.  Negative for wound.   Allergic/Immunologic: Negative.  Negative for immunocompromised state.   Neurological: Positive for dizziness. Negative for tremors, seizures, syncope, facial asymmetry, speech difficulty, weakness, light-headedness, numbness and headaches.        Intermittent dizziness x 1 month; often associated w/ episodes of palpitations.    Hematological: Negative.  Does not bruise/bleed easily.   Psychiatric/Behavioral: Negative.  Negative for confusion.   All other systems reviewed and are negative.     All other systems reviewed and are negative.     Personal History     Past Medical History:   Diagnosis Date   • SIMÓN (acute kidney injury) (CMS/[facility]) 7/2/2020   • Arthritis    • Cardiomyopathy (CMS/[facility])    • Carotid stenosis    • CHF (congestive heart failure) (CMS/[facility])    • Claudication (CMS/[facility]) 6/26/2018    Added automatically from request for surgery [id number]   • COPD (chronic obstructive pulmonary disease) (CMS/[facility])    • Coronary artery disease    • Diabetes mellitus (CMS/[facility])    • Enlarged prostate    • GERD (gastroesophageal reflux disease)    • Heart attack (CMS/[facility])     X3 1990, 1993, 1995   • Hyperlipidemia    • Hypertension    • Lung nodule    • On home O2     prn   • Other  emphysema (CMS/[facility]) 9/24/2018    Added automatically from request for surgery [id number]   • Peptic ulcer disease    • Rectal pain 9/27/2018   • Shortness of breath 2/2/2017   • Type 2 diabetes mellitus without complication (CMS/[facility]) 9/24/2018    Added automatically from request for surgery [id number]   • Urinary hesitancy    • Ventricular fibrillation (CMS/[facility]) 4/27/2020   • Ventricular tachycardia (CMS/[facility])    • Wears dentures    • Wears glasses        Past Surgical History:   Procedure Laterality Date   • ABLATION OF DYSRHYTHMIC FOCUS     • CARDIAC CATHETERIZATION Left 04/2009    by Dr. Glen Baker- I. EF approx 40% II one of three patent grafts iii, Data deficit    • CARDIAC CATHETERIZATION N/A 1/26/2017    Procedure: Left Heart Cath;  Surgeon: Vlad Bravo MD;  Location:  [facility];  Service:    • CARDIAC CATHETERIZATION Left 07/30/2010    i. EF 30% ii occluded vein graft to occluded RCS iii minor plaque in the LAD iv. 70% SVG- circumfelx, treated with 4x18 mm. Cypher KAILASH.   • CARDIAC CATHETERIZATION N/A 6/5/2018    Procedure: Peripheral angiography;  Surgeon: Vlad Bravo MD;  Location:  [facility];  Service: Cardiovascular   • CARDIAC CATHETERIZATION N/A 7/23/2019    Procedure: Left Heart Cath;  Surgeon: Vlad Bravo MD;  Location:  [facility];  Service: Cardiology   • CARDIAC DEFIBRILLATOR PLACEMENT     • CARDIAC ELECTROPHYSIOLOGY PROCEDURE N/A 7/1/2020    Procedure: EP/ABLATION VT, STX/Rhythmia (BSC), hold Eliquis 2 DOSES, no other meds to hold, GA;  Surgeon: Calin Dior DO;  Location:  [facility] EP INVASIVE LOCATION;  Service: Cardiology;  Laterality: N/A;   • COLONOSCOPY     • COLONOSCOPY N/A 9/26/2018    Procedure: COLONOSCOPY;  Surgeon: Alfred Shah MD;  Location:  [facility] OR;  Service: General   • CORONARY ANGIOPLASTY     • CORONARY ARTERY BYPASS GRAFT  1995 1995, w/ subsequent stents 2009/2010 X3; svg TO om1 AND om2, svg TO pda    • CYST  REMOVAL      Tailbone   • EXAM UNDER ANESTHESIA N/A 9/26/2018    Procedure: EXAM UNDER ANESTHESIA;  Surgeon: Alfred Shah MD;  Location:  [facility] OR;  Service: General   • HEMORRHOIDECTOMY N/A 9/28/2018    Procedure: HEMORRHOIDECTOMY;  Surgeon: Alfred Shah MD;  Location:  [facility];  Service: General   • INSERT / REPLACE / REMOVE PACEMAKER     • INTERVENTIONAL RADIOLOGY PROCEDURE N/A 6/5/2018    Procedure: Abdominal Aortagram with Runoff;  Surgeon: Vlad Bravo MD;  Location:  [facility];  Service: Cardiovascular   • INTERVENTIONAL RADIOLOGY PROCEDURE N/A 7/6/2018    Procedure: Abdominal Aortogram;  Surgeon: Vlad Bravo MD;  Location:  [facility];  Service: Cardiology   • PACEMAKER IMPLANTATION  01/2002    Implant by Dr. Dickinson        Family History: family history includes Coronary artery disease in his father; Hypertension in his mother; Sick sinus syndrome in his father. Otherwise pertinent FHx was reviewed and unremarkable.     Social History:  reports that he has been smoking cigarettes. He has a 45.00 pack-year smoking history. He has never used smokeless tobacco. He reports current drug use. Drug: Marijuana. He reports that he does not drink alcohol.  Social History     Social History Narrative    Caffeine use: 5-6 serving daily.            Medications:  Available home medication information reviewed.  (Not in a hospital admission)      Allergies   Allergen Reactions   • Crestor [Rosuvastatin Calcium] Myalgia   • Lipitor [Atorvastatin] Myalgia     Joint pain myalgias   • Lopressor [Metoprolol Tartrate] Unknown (See Comments)     Side of face went numb     • Plavix [Clopidogrel Bisulfate] Unknown (See Comments)     Previously thought to be resistant; placed back on clopidogrel per Dr. Bravo earlier this year.  Confirmed with patient   • Adhesive Tape Itching and Rash       Objective   Objective     Vital Signs:   Temp:  [98.2 °F (36.8 °C)] 98.2 °F (36.8 °C)  Heart  Rate:  [81] 81  Resp:  [20] 20  BP: (195)/(91) 195/91        Physical Exam     Constitutional: Awake, alert; non-toxic appearing   Eyes: PERRLA, sclerae anicteric, no conjunctival injection  HENT: NCAT, mucous membranes moist  Neck: Supple, no thyromegaly, no lymphadenopathy, trachea midline  Respiratory: faint expiratory wheeze, course breath sounds bilateral lower lobes; normal respiratory effort   Cardiovascular: RRR, no murmurs, rubs, or gallops, no peripheral edema noted   Gastrointestinal: Positive bowel sounds, soft, nontender, nondistended  Musculoskeletal: normal ROM bilaterally   Psychiatric: Appropriate affect, cooperative  Neurologic: Oriented x 3, strength symmetric in all extremities, Cranial Nerves grossly intact to confrontation, speech clear  Skin: No rashes, lesions or wounds       Results Reviewed:  I have personally reviewed most recent indicated data and agree with findings including:  [x]  Laboratory  [x]  Radiology  [x]  EKG/Telemetry  []  Pathology  []  Cardiac/Vascular Studies  []  Old records  []  Other:  Most pertinent findings include:      Results from last 7 days   Lab Units 10/15/20  2053   WBC 10*3/mm3 8.62   HEMOGLOBIN g/dL 16.8   HEMATOCRIT % 53.7*   PLATELETS 10*3/mm3 169     Results from last 7 days   Lab Units 10/15/20  2053   SODIUM mmol/L 142   POTASSIUM mmol/L 4.1   CHLORIDE mmol/L 105   CO2 mmol/L 29.0   BUN mg/dL 9   CREATININE mg/dL 1.18   GLUCOSE mg/dL 163*   CALCIUM mg/dL 9.2   ALT (SGPT) U/L 16   AST (SGOT) U/L 18   TROPONIN T ng/mL <0.010   PROBNP pg/mL 741.3     Estimated Creatinine Clearance: 60.7 mL/min (by C-G formula based on SCr of 1.18 mg/dL).  Brief Urine Lab Results  (Last result in the past 365 days)      Color   Clarity   Blood   Leuk Est   Nitrite   Protein   CREAT   Urine HCG        05/09/20 1114 Yellow Clear Negative Negative Negative Negative             Imaging Results (Last 24 Hours)     Procedure Component Value Units Date/Time    XR Chest 1 View  [[id number]] Collected: 10/15/20 2237     Updated: 10/15/20 2239    Narrative:      CHEST X-RAY, 10/15/2020      HISTORY:    69-year-old male in the ED undergoing cardiac dysrhythmia triage protocol. History of cardiac ablation.      TECHNIQUE:  AP portable upright chest x-ray.    COMPARISON:  *  Chest x-ray, [facility], 8/28/2019.    FINDINGS:  Dual-chamber cardiac pacer/AICD appears well-positioned.    Postop changes CABG surgery. Heart size is normal. Pulmonary vascularity is within normal limits. No visible pulmonary edema, focal infiltrate or pleural effusion. No significant change since the prior exam.      Impression:      1.  No definite active disease.  2.  No change since 8/28/2019.  3.  CABG. Pacer/AICD.    Signer Name: Jovi Grimes MD   Signed: 10/15/2020 10:37 PM   Workstation Name: [email or web address]    [facility]        Results for orders placed during the hospital encounter of 05/09/20   Adult Transthoracic Echo Complete W/ Cont if Necessary Per Protocol    Narrative · Left ventricular systolic function is normal. Estimated EF appears to be   in the range of 51 - 55%.  · The following left ventricular wall segments are hypokinetic: basal   anterolateral, mid anterolateral, basal inferolateral, basal inferoseptal,   basal inferior and basal inferoseptal.  · Left ventricular wall thickness is consistent with moderate concentric   hypertrophy.  · Left ventricular diastolic dysfunction (grade II) consistent with   pseudonormalization.  · Left atrial volume is mildly increased.  · There is moderate calcification of the aortic valve.  · Trace-to-mild aortic valve regurgitation is present.          Assessment/Plan   Assessment & Plan     Active Hospital Problems    Diagnosis POA   • Ventricular tachycardia (CMS/HCC) [I47.2] Yes   • Ischemic cardiomyopathy [I25.5] Yes     Added automatically from request for surgery [id number]     • PAD (peripheral artery disease) (CMS/HCC)  [I73.9] Yes     Added automatically from request for surgery [id number]     • COPD (chronic obstructive pulmonary disease) (CMS/HCC) [J44.9] Yes   • Cardiomyopathy (CMS/HCC) [I42.9] Yes     EF 12%     • Coronary artery disease of native artery of native heart with stable angina pectoris (CMS/HCC) [I25.118] Yes   • Chronic systolic heart failure (CMS/HCC) [I50.22] Yes     Device Interoggation: Demonstrates normal DD ICD function. No significant  Atrial or ventricular arrhytmias. He is at [facility] as of today      • Essential hypertension [I10] Yes   • Mixed hyperlipidemia [E78.2] Yes     Intolerance to multiple statins     • Tobacco dependence [F17.200] Yes     Home 02     • Type 2 diabetes mellitus (CMS/[facility]) [E11.9] Yes   • Ischemic heart disease [I25.9] Yes     a. MI x3, 1990, 1993, and 1995.  b. CABG x3 by Dr. Noble Cuello, 1995:  SVG to OM1 and OM2, SVG to PDA.  c. Normal LV.  d. Questionable LAD stent, incomplete database.  e. STEMI, April 2009.  f. LHC by Dr. Glen Baker, April 2009:  EF approximately 40%, 1 of 3 patent grafts; data deficit.  g. LHC by Dr. Bird, 07/30/2010:  EF 30%, occluded vein graft to occluded RCA, minor plaque in LAD, 70% SVG - circumflex treated with 4 x 18 mm Cypher KAILASH.  h. Echocardiogram, 02/08/2012:  EF 40% to 45%, diastolic dysfunction, mild TR.  i. Echocardiogram, 01/29/2015:  EF 30% to 35%, mild TR.         Derek Morris . is a 69 y.o. male w/ a hx of CAD, ischemic cardiomyopathy, CHF, pace/ICD in place, HTN, HLD, COPD, ongoing tobacco abuse, T2DM and s/p ablation 7/2020 2/2 VTach who presented to the ED w/ c/o palpitations. Pt evaluated in the ED. Noted to be having intermittent runs of VTach. Pt admitted to the hospital medicine service for further evaluation.     Assessment & Plan    **Ventricular tachycardia   **S/p ablation for VTach in 7/2020  -started on Sotalol last week; did not take first dose until yesterday (Thur) around 11am   -continue Sotalol for now   -also on routine  Mexitil- continue   -continue routine Eliquis, ASA   -continuous telemetry  -serial EKGs  -consult Cardiology in am     **CHF  **HTN/HLD  **CAD s/p CABG   -ECHO 5/2020; EF 51-5% w/ normal LVSF and grade II diastolic dysfunction   -continue routine ASA, Eliquis  -continue routine Coreg w/ hold parameters   -continue Aldactone and Entresto   -daily weights, strict I/Os    **COPD on 2.5 L home O2 as needed  **Ongoing tobacco abuse  -CXR reviewed   -stable respiratory status   -cessation education    **T2DM  -hold routine Glipizide   -FSBG q ac/hs w/ sliding scale       DVT prophylaxis:  Eliquis       CODE STATUS:  Full code  Code Status and Medical Interventions:   Ordered at: 10/16/20 0139     Code Status:    CPR     Medical Interventions (Level of Support Prior to Arrest):    Full       Electronically signed by VANDANA Markham, 10/16/20, 1:15 AM EDT.      Attending   Admission Attestation       I have seen and examined the patient, performing an independent face-to-face diagnostic evaluation with plan of care reviewed and developed with the advanced practice clinician (APC).      Brief Summary Statement:   Derek Morris Jr. is a 69 y.o. male With a PMH significant for CAD, ischemic cardiomyopathy, pacemaker ICD placement, HTN, HLD, COPD on 2.5 L home O2 as needed, ongoing tobacco abuse, history of recurrent ventricular tachyarrhythmias, diabetes mellitus type 2 who presents to the ED due to palpitations.  Patient states that a week and a half ago on Monday he began to experience palpitations.  His symptoms have persisted.  Patient's pacemaker was interrogated and he was found to have recurrent episodes of nonsustained tachycardia.  He was instructed to come to the ED.  Remainder of detailed HPI is as noted by APC and has been reviewed and/or edited by me for completeness.    Attending Physical Exam:  Constitutional: Awake, alert  Eyes: PERRLA, sclerae anicteric, no conjunctival injection  HENT: NCAT, mucous  membranes moist  Neck: Supple, no thyromegaly, no lymphadenopathy, trachea midline  Respiratory: Poor aeration, wheezes throughout all lung fields  Cardiovascular: RRR, no murmurs, rubs, or gallops, palpable pedal pulses bilaterally  Gastrointestinal: Positive bowel sounds, soft, nontender, nondistended  Musculoskeletal: No bilateral ankle edema, no clubbing or cyanosis to extremities  Psychiatric: Appropriate affect, cooperative  Neurologic: Oriented x 3, strength symmetric in all extremities, Cranial Nerves grossly intact to confrontation, speech clear  Skin: No rashes      Brief Assessment/Plan :  See detailed assessment and plan developed with APC which I have reviewed and/or edited for completeness.        Admission Status: I believe that this patient meets INPATIENT status due to current episodes of nonsustained ventricular tachycardia.  I feel patient’s risk for adverse outcomes and need for care warrant INPATIENT evaluation and I predict the patient’s care encounter to likely last beyond 2 midnights.        Jewels Lawrence, DO  10/16/20

## 2020-10-16 NOTE — PLAN OF CARE
Goal Outcome Evaluation:  Plan of Care Reviewed With: patient  Progress: no change  Outcome Summary: New admit this morning, pt has been having several runs of V-tach this shift. Bearing down/coughing seem to help with runs of vtach. Pt has no c/o CP/SOA but can feel palpitations. Will cont to monitor closely.

## 2020-10-17 LAB
ANION GAP SERPL CALCULATED.3IONS-SCNC: 7 MMOL/L (ref 5–15)
BASOPHILS # BLD AUTO: 0.05 10*3/MM3 (ref 0–0.2)
BASOPHILS NFR BLD AUTO: 0.6 % (ref 0–1.5)
BUN SERPL-MCNC: 10 MG/DL (ref 8–23)
BUN/CREAT SERPL: 10.4 (ref 7–25)
CALCIUM SPEC-SCNC: 8.6 MG/DL (ref 8.6–10.5)
CHLORIDE SERPL-SCNC: 105 MMOL/L (ref 98–107)
CO2 SERPL-SCNC: 28 MMOL/L (ref 22–29)
CREAT SERPL-MCNC: 0.96 MG/DL (ref 0.76–1.27)
DEPRECATED RDW RBC AUTO: 46.4 FL (ref 37–54)
EOSINOPHIL # BLD AUTO: 0.14 10*3/MM3 (ref 0–0.4)
EOSINOPHIL NFR BLD AUTO: 1.6 % (ref 0.3–6.2)
ERYTHROCYTE [DISTWIDTH] IN BLOOD BY AUTOMATED COUNT: 12.6 % (ref 12.3–15.4)
GFR SERPL CREATININE-BSD FRML MDRD: 77 ML/MIN/1.73
GLUCOSE BLDC GLUCOMTR-MCNC: 126 MG/DL (ref 70–130)
GLUCOSE BLDC GLUCOMTR-MCNC: 150 MG/DL (ref 70–130)
GLUCOSE BLDC GLUCOMTR-MCNC: 175 MG/DL (ref 70–130)
GLUCOSE SERPL-MCNC: 136 MG/DL (ref 65–99)
HCT VFR BLD AUTO: 50.1 % (ref 37.5–51)
HGB BLD-MCNC: 15.6 G/DL (ref 13–17.7)
IMM GRANULOCYTES # BLD AUTO: 0.03 10*3/MM3 (ref 0–0.05)
IMM GRANULOCYTES NFR BLD AUTO: 0.3 % (ref 0–0.5)
LYMPHOCYTES # BLD AUTO: 2.88 10*3/MM3 (ref 0.7–3.1)
LYMPHOCYTES NFR BLD AUTO: 33.3 % (ref 19.6–45.3)
MCH RBC QN AUTO: 30.9 PG (ref 26.6–33)
MCHC RBC AUTO-ENTMCNC: 31.1 G/DL (ref 31.5–35.7)
MCV RBC AUTO: 99.2 FL (ref 79–97)
MONOCYTES # BLD AUTO: 0.82 10*3/MM3 (ref 0.1–0.9)
MONOCYTES NFR BLD AUTO: 9.5 % (ref 5–12)
NEUTROPHILS NFR BLD AUTO: 4.73 10*3/MM3 (ref 1.7–7)
NEUTROPHILS NFR BLD AUTO: 54.7 % (ref 42.7–76)
NRBC BLD AUTO-RTO: 0 /100 WBC (ref 0–0.2)
PLATELET # BLD AUTO: 123 10*3/MM3 (ref 140–450)
PMV BLD AUTO: 10.9 FL (ref 6–12)
POTASSIUM SERPL-SCNC: 4.2 MMOL/L (ref 3.5–5.2)
RBC # BLD AUTO: 5.05 10*6/MM3 (ref 4.14–5.8)
SODIUM SERPL-SCNC: 140 MMOL/L (ref 136–145)
WBC # BLD AUTO: 8.65 10*3/MM3 (ref 3.4–10.8)

## 2020-10-17 PROCEDURE — 94799 UNLISTED PULMONARY SVC/PX: CPT

## 2020-10-17 PROCEDURE — 25010000002 AMIODARONE IN DEXTROSE 5% 360-4.14 MG/200ML-% SOLUTION: Performed by: NURSE PRACTITIONER

## 2020-10-17 PROCEDURE — 93010 ELECTROCARDIOGRAM REPORT: CPT | Performed by: INTERNAL MEDICINE

## 2020-10-17 PROCEDURE — 82962 GLUCOSE BLOOD TEST: CPT

## 2020-10-17 PROCEDURE — 99232 SBSQ HOSP IP/OBS MODERATE 35: CPT | Performed by: INTERNAL MEDICINE

## 2020-10-17 PROCEDURE — 93005 ELECTROCARDIOGRAM TRACING: CPT | Performed by: NURSE PRACTITIONER

## 2020-10-17 PROCEDURE — 85025 COMPLETE CBC W/AUTO DIFF WBC: CPT | Performed by: FAMILY MEDICINE

## 2020-10-17 PROCEDURE — 99232 SBSQ HOSP IP/OBS MODERATE 35: CPT | Performed by: FAMILY MEDICINE

## 2020-10-17 PROCEDURE — 80048 BASIC METABOLIC PNL TOTAL CA: CPT | Performed by: FAMILY MEDICINE

## 2020-10-17 RX ORDER — ACETAMINOPHEN 160 MG/5ML
650 SOLUTION ORAL EVERY 4 HOURS PRN
Status: DISCONTINUED | OUTPATIENT
Start: 2020-10-17 | End: 2020-10-19 | Stop reason: HOSPADM

## 2020-10-17 RX ORDER — ACETAMINOPHEN 325 MG/1
650 TABLET ORAL EVERY 4 HOURS PRN
Status: DISCONTINUED | OUTPATIENT
Start: 2020-10-17 | End: 2020-10-19 | Stop reason: HOSPADM

## 2020-10-17 RX ORDER — ACETAMINOPHEN 650 MG/1
650 SUPPOSITORY RECTAL EVERY 4 HOURS PRN
Status: DISCONTINUED | OUTPATIENT
Start: 2020-10-17 | End: 2020-10-19 | Stop reason: HOSPADM

## 2020-10-17 RX ADMIN — APIXABAN 5 MG: 5 TABLET, FILM COATED ORAL at 23:05

## 2020-10-17 RX ADMIN — AMIODARONE HYDROCHLORIDE 1 MG/MIN: 1.8 INJECTION, SOLUTION INTRAVENOUS at 05:03

## 2020-10-17 RX ADMIN — SACUBITRIL AND VALSARTAN 1 TABLET: 24; 26 TABLET, FILM COATED ORAL at 10:43

## 2020-10-17 RX ADMIN — AMIODARONE HYDROCHLORIDE 1 MG/MIN: 1.8 INJECTION, SOLUTION INTRAVENOUS at 23:04

## 2020-10-17 RX ADMIN — ASPIRIN 81 MG: 81 TABLET, COATED ORAL at 08:07

## 2020-10-17 RX ADMIN — ARFORMOTEROL TARTRATE 15 MCG: 15 SOLUTION RESPIRATORY (INHALATION) at 19:15

## 2020-10-17 RX ADMIN — AMIODARONE HYDROCHLORIDE 400 MG: 200 TABLET ORAL at 12:09

## 2020-10-17 RX ADMIN — CARVEDILOL 25 MG: 12.5 TABLET, FILM COATED ORAL at 10:42

## 2020-10-17 RX ADMIN — MEXILETINE HYDROCHLORIDE 150 MG: 150 CAPSULE ORAL at 08:07

## 2020-10-17 RX ADMIN — MEXILETINE HYDROCHLORIDE 150 MG: 150 CAPSULE ORAL at 15:32

## 2020-10-17 RX ADMIN — AMIODARONE HYDROCHLORIDE 1 MG/MIN: 1.8 INJECTION, SOLUTION INTRAVENOUS at 10:43

## 2020-10-17 RX ADMIN — IPRATROPIUM BROMIDE 0.5 MG: 0.5 SOLUTION RESPIRATORY (INHALATION) at 13:58

## 2020-10-17 RX ADMIN — SACUBITRIL AND VALSARTAN 1 TABLET: 24; 26 TABLET, FILM COATED ORAL at 23:07

## 2020-10-17 RX ADMIN — BUDESONIDE 0.5 MG: 0.5 INHALANT RESPIRATORY (INHALATION) at 07:15

## 2020-10-17 RX ADMIN — CARVEDILOL 25 MG: 12.5 TABLET, FILM COATED ORAL at 23:05

## 2020-10-17 RX ADMIN — APIXABAN 5 MG: 5 TABLET, FILM COATED ORAL at 10:42

## 2020-10-17 RX ADMIN — IPRATROPIUM BROMIDE 0.5 MG: 0.5 SOLUTION RESPIRATORY (INHALATION) at 19:15

## 2020-10-17 RX ADMIN — IPRATROPIUM BROMIDE 0.5 MG: 0.5 SOLUTION RESPIRATORY (INHALATION) at 07:13

## 2020-10-17 RX ADMIN — AMIODARONE HYDROCHLORIDE 1 MG/MIN: 1.8 INJECTION, SOLUTION INTRAVENOUS at 17:00

## 2020-10-17 RX ADMIN — ARFORMOTEROL TARTRATE 15 MCG: 15 SOLUTION RESPIRATORY (INHALATION) at 07:16

## 2020-10-17 RX ADMIN — SPIRONOLACTONE 25 MG: 25 TABLET ORAL at 08:06

## 2020-10-17 RX ADMIN — AMIODARONE HYDROCHLORIDE 400 MG: 200 TABLET ORAL at 17:00

## 2020-10-17 RX ADMIN — ACETAMINOPHEN 650 MG: 325 TABLET, FILM COATED ORAL at 15:48

## 2020-10-17 RX ADMIN — SODIUM CHLORIDE, PRESERVATIVE FREE 10 ML: 5 INJECTION INTRAVENOUS at 23:07

## 2020-10-17 RX ADMIN — AMIODARONE HYDROCHLORIDE 400 MG: 200 TABLET ORAL at 08:06

## 2020-10-17 RX ADMIN — MEXILETINE HYDROCHLORIDE 150 MG: 150 CAPSULE ORAL at 23:10

## 2020-10-17 RX ADMIN — NICOTINE 1 PATCH: 21 PATCH, EXTENDED RELEASE TRANSDERMAL at 08:06

## 2020-10-17 RX ADMIN — ROPINIROLE HYDROCHLORIDE 3 MG: 2 TABLET, FILM COATED ORAL at 23:04

## 2020-10-17 RX ADMIN — BUDESONIDE 0.5 MG: 0.5 INHALANT RESPIRATORY (INHALATION) at 19:15

## 2020-10-17 NOTE — PROGRESS NOTES
"[facility] Progress Note     LOS: 1 day   Patient Care Team:  Jn Beach MD as PCP - General (Family Medicine)  PCP:  Jn Beach MD    Chief Complaint: Ventricular tachycardia    SUBJECTIVE: Resting comfortably in bed eating breakfast this morning.  Denies chest pain, palpitations or dyspnea.  No ventricular arrhythmias.  Telemetry currently reveals paced rhythm.      Review of Systems:   All systems have been reviewed and are negative with the exception of those mentioned above.      OBJECTIVE:    Vital Sign Min/Max for last 24 hours  Temp  Min: 96.5 °F (35.8 °C)  Max: 98.1 °F (36.7 °C)   BP  Min: 92/71  Max: 160/94   Pulse  Min: 80  Max: 108   Resp  Min: 16  Max: 18   SpO2  Min: 90 %  Max: 98 %   No data recorded   Weight  Min: 72 kg (158 lb 12.8 oz)  Max: 72 kg (158 lb 12.8 oz)     Flowsheet Rows      First Filed Value   Admission Height  172.7 cm (68\") Documented at 10/15/2020 2048   Admission Weight  74.8 kg (165 lb) Documented at 10/15/2020 2048              Intake/Output Summary (Last 24 hours) at 10/17/2020 0935  Last data filed at 10/17/2020 0600  Gross per 24 hour   Intake 1396 ml   Output 800 ml   Net 596 ml     Intake & Output (last 3 days)       10/14 0701 - 10/15 0700 10/15 0701 - 10/16 0700 10/16 0701 - 10/17 0700 10/17 0701 - 10/18 0700    P.O.   1396     Total Intake(mL/kg)   1396 (19.4)     Urine (mL/kg/hr)   800 (0.5)     Total Output   800     Net   +596             Urine Unmeasured Occurrence   2 x            Physical Exam:    General Appearance:    Alert, cooperative, no distress, appears stated age   Neck:   Supple, symmetrical, trachea midline.   Lungs:    Mildly diminished at the bases, respirations unlabored   Chest Wall:    No tenderness or deformity    Heart:    Regular rate and rhythm, S1 and S2 normal, no murmur, rub   or gallop, normal carotid impulse bilaterally without bruit.   Extremities:   Extremities normal, atraumatic, " no cyanosis or edema   Pulses:   2+ and symmetric all extremities   Skin:   Skin color, texture, turgor normal, no rashes or lesions      LABS/DIAGNOSTIC DATA:  Results from last 7 days   Lab Units 10/17/20  0759 10/16/20  1138 10/15/20  2053   WBC 10*3/mm3 8.65 5.67 8.62   HEMOGLOBIN g/dL 15.6 14.7 16.8   HEMATOCRIT % 50.1 46.8 53.7*   PLATELETS 10*3/mm3 123* 118* 169     Lab Results   Lab Value Date/Time    TROPONINT <0.010 10/16/2020 1138    TROPONINT <0.010 10/15/2020 2053    TROPONINT <0.010 05/29/2020 1655    TROPONINT <0.010 05/29/2020 1455    TROPONINT <0.010 05/09/2020 0946    TROPONINT 0.076 (C) 07/22/2019 1206         Results from last 7 days   Lab Units 10/17/20  0759 10/16/20  1138 10/15/20  2053   SODIUM mmol/L 140 141 142   POTASSIUM mmol/L 4.2 4.2 4.1   CHLORIDE mmol/L 105 105 105   CO2 mmol/L 28.0 28.0 29.0   BUN mg/dL 10 10 9   CREATININE mg/dL 0.96 0.95 1.18   CALCIUM mg/dL 8.6 8.7 9.2   BILIRUBIN mg/dL  --   --  0.4   ALK PHOS U/L  --   --  94   ALT (SGPT) U/L  --   --  16   AST (SGOT) U/L  --   --  18   GLUCOSE mg/dL 136* 168* 163*             Results from last 7 days   Lab Units 10/15/20  2053   TSH uIU/mL 6.330*   FREE T4 ng/dL 1.22           Medication Review:   amiodarone, 400 mg, Oral, TID With Meals  apixaban, 5 mg, Oral, Q12H  arformoterol, 15 mcg, Nebulization, BID - RT    And  budesonide, 0.5 mg, Nebulization, Q12H - RT    And  ipratropium, 0.5 mg, Nebulization, Q6H - RT  aspirin, 81 mg, Oral, Daily  carvedilol, 25 mg, Oral, BID  insulin lispro, 0-7 Units, Subcutaneous, TID AC  mexiletine, 150 mg, Oral, TID  nicotine, 1 patch, Transdermal, Q24H  rOPINIRole, 3 mg, Oral, Nightly  sacubitril-valsartan, 1 tablet, Oral, BID  sodium chloride, 10 mL, Intravenous, Q12H  spironolactone, 25 mg, Oral, Daily       amiodarone, 1 mg/min, Last Rate: 1 mg/min (10/17/20 4742)         ASSESSMENT/PLAN:    Ischemic heart disease    Essential hypertension    Chronic systolic heart failure (CMS/HCC)    Mixed  hyperlipidemia    Type 2 diabetes mellitus (CMS/HCC)    Tobacco dependence    Coronary artery disease of native artery of native heart with stable angina pectoris (CMS/HCC)    Cardiomyopathy (CMS/HCC)    COPD (chronic obstructive pulmonary disease) (CMS/HCC)    PAD (peripheral artery disease) (CMS/HCC)    Ventricular tachycardia (CMS/HCC)    Ischemic cardiomyopathy        Continue combination of oral and IV amiodarone load.  Continue Mexitil 150 mg p.o. 3 times daily  Continue carvedilol 25 mg p.o. twice daily.    Patient has a strong desire to be discharged tomorrow however I discussed with him that his electrophysiologist, Dr. Dior, had planned on him getting loaded with medical therapy over the weekend before reassessment on Monday.      Chris Hernandez III, MD   10/17/20  09:35 EDT

## 2020-10-17 NOTE — PROGRESS NOTES
[facility] Medicine Services  ADMISSION FOLLOW-UP NOTE          Patient admitted after midnight, H&P by my partner performed earlier on today's date reviewed.  Interim findings, labs, and charting also reviewed.        The [facility] Hospital Problem List has been managed and updated to include any new diagnoses:  Active Hospital Problems    Diagnosis  POA   • Ventricular tachycardia (CMS/HCC) [I47.2]  Yes   • Ischemic cardiomyopathy [I25.5]  Yes   • PAD (peripheral artery disease) (CMS/HCC) [I73.9]  Yes   • COPD (chronic obstructive pulmonary disease) (CMS/HCC) [J44.9]  Yes   • Cardiomyopathy (CMS/HCC) [I42.9]  Yes   • Coronary artery disease of native artery of native heart with stable angina pectoris (CMS/HCC) [I25.118]  Yes   • Chronic systolic heart failure (CMS/HCC) [I50.22]  Yes   • Essential hypertension [I10]  Yes   • Mixed hyperlipidemia [E78.2]  Yes   • Tobacco dependence [F17.200]  Yes   • Type 2 diabetes mellitus (CMS/HCC) [E11.9]  Yes   • Ischemic heart disease [I25.9]  Yes      Resolved Hospital Problems   No resolved problems to display.         ADDITIONAL PLAN:  - detailed assessment and plan from admission reviewed   70 yo with PMH of CAD, ischemic cariomyopathy, CHF, hx ICD/pace and ablation for VTACH, COPD, tobacco abuse, DMII, was admitted with palpatations that was found to be runs of VTACH.     VTACH  S/p ablation 7/2020  -has had several runs since admission  -discussed with intensivist, ICU is full but he can be transferred at any point.  -Dr Dior following  -stopping sotalol and transitioning to amiodarone, will be on high dose and tolerate hypotension during transition   CHF  CAD s/p CABG  -ASA, eliquis  -ECHO EF 51% diastolic dysfunction grade II  -aldactone, entresto  HTN  COPD-2.5L at home  DM II  -SSI    Mary Lion,   10/16/20

## 2020-10-17 NOTE — PLAN OF CARE
Goal Outcome Evaluation:  Plan of Care Reviewed With: patient  Progress: improving  Outcome Summary: Pts VSS, has had one run of V-Tach (20-30 seconds), pt reported he did not feel palpatations.  Has denied pain, continues on both IV and PO Amnio.  Up independently and ambulated in the halls with a face mask on.

## 2020-10-17 NOTE — PLAN OF CARE
Goal Outcome Evaluation:  Plan of Care Reviewed With: patient  Progress: improving  Outcome Summary: VSS, on room air satting 93% tonight. Pt had no c/o of SOA/chest pain or palpatations. A-paced, no defib fire or runs of V-tach seen tonight, pt on cont amnio gtt. BP and HR monitored closely. Will cont plan of care.

## 2020-10-17 NOTE — PROGRESS NOTES
[facility] Medicine Services  PROGRESS NOTE    Patient Name: Derek Morris Jr.  : 1950  MRN: [id number]    Date of Admission: 10/15/2020  Primary Care Physician: Jn Beach MD    Subjective   Subjective     CC:  F/U palpitations     HPI:  Patient is resting in bed. He says he has not felt any palpitations. He denies any pain. He really wants to go home. I advised him to stay.     Review of Systems  Gen- No fevers, chills  CV- No chest pain, palpitations  Resp- No cough, dyspnea  GI- No N/V/D, abd pain        Objective   Objective     Vital Signs:   Temp:  [96.5 °F (35.8 °C)-98.7 °F (37.1 °C)] 98.7 °F (37.1 °C)  Heart Rate:  [] 88  Resp:  [16-18] 16  BP: ()/(64-99) 143/87        Physical Exam:  Constitutional: No acute distress, awake, alert  HENT: NCAT, mucous membranes moist  Respiratory: mild expiratory wheeze , respiratory effort normal   Cardiovascular: RRR paced , no murmurs, rubs, or gallops  Gastrointestinal: Positive bowel sounds, soft, nontender, nondistended  Musculoskeletal: No bilateral ankle edema  Psychiatric: Appropriate affect, cooperative  Neurologic: Oriented x 3, strength symmetric in all extremities, Cranial Nerves grossly intact to confrontation, speech clear  Skin: No rashes      Results Reviewed:  Results from last 7 days   Lab Units 10/17/20  0759 10/16/20  1138 10/15/20  2053   WBC 10*3/mm3 8.65 5.67 8.62   HEMOGLOBIN g/dL 15.6 14.7 16.8   HEMATOCRIT % 50.1 46.8 53.7*   PLATELETS 10*3/mm3 123* 118* 169     Results from last 7 days   Lab Units 10/17/20  0759 10/16/20  1138 10/15/20  2053   SODIUM mmol/L 140 141 142   POTASSIUM mmol/L 4.2 4.2 4.1   CHLORIDE mmol/L 105 105 105   CO2 mmol/L 28.0 28.0 29.0   BUN mg/dL 10 10 9   CREATININE mg/dL 0.96 0.95 1.18   GLUCOSE mg/dL 136* 168* 163*   CALCIUM mg/dL 8.6 8.7 9.2   ALT (SGPT) U/L  --   --  16   AST (SGOT) U/L  --   --  18   TROPONIN T ng/mL  --  <0.010 <0.010   PROBNP pg/mL  --   --   741.3     Estimated Creatinine Clearance: 71.9 mL/min (by C-G formula based on SCr of 0.96 mg/dL).    Microbiology Results Abnormal     Procedure Component Value - Date/Time    COVID PRE-OP / PRE-PROCEDURE SCREENING ORDER (NO ISOLATION) - Swab, Nasopharynx [[id number]]  (Normal) Collected: 10/16/20 0144    Lab Status: Final result Specimen: Swab from Nasopharynx Updated: 10/16/20 0244    Narrative:      The following orders were created for panel order COVID PRE-OP / PRE-PROCEDURE SCREENING ORDER (NO ISOLATION) - Swab, Nasopharynx.  Procedure                               Abnormality         Status                     ---------                               -----------         ------                     COVID-19,CEPHEID,GIA IN-...[[id number]]  Normal              Final result                 Please view results for these tests on the individual orders.    COVID-19,CEPHEID,GIA IN-HOUSE(OR EMERGENT/ADD-ON),NP SWAB IN TRANSPORT MEDIA 3-4 HR TAT - Swab, Nasopharynx [[id number]]  (Normal) Collected: 10/16/20 0144    Lab Status: Final result Specimen: Swab from Nasopharynx Updated: 10/16/20 0244     COVID19 Not Detected    Narrative:      Fact sheet for providers: https://www.fda.gov/media/662546/download     Fact sheet for patients: https://www.fda.gov/media/627438/download          Imaging Results (Last 24 Hours)     ** No results found for the last 24 hours. **          Results for orders placed during the hospital encounter of 05/09/20   Adult Transthoracic Echo Complete W/ Cont if Necessary Per Protocol    Narrative · Left ventricular systolic function is normal. Estimated EF appears to be   in the range of 51 - 55%.  · The following left ventricular wall segments are hypokinetic: basal   anterolateral, mid anterolateral, basal inferolateral, basal inferoseptal,   basal inferior and basal inferoseptal.  · Left ventricular wall thickness is consistent with moderate concentric   hypertrophy.  · Left ventricular diastolic  dysfunction (grade II) consistent with   pseudonormalization.  · Left atrial volume is mildly increased.  · There is moderate calcification of the aortic valve.  · Trace-to-mild aortic valve regurgitation is present.          I have reviewed the medications:  Scheduled Meds:amiodarone, 400 mg, Oral, TID With Meals  apixaban, 5 mg, Oral, Q12H  arformoterol, 15 mcg, Nebulization, BID - RT    And  budesonide, 0.5 mg, Nebulization, Q12H - RT    And  ipratropium, 0.5 mg, Nebulization, Q6H - RT  aspirin, 81 mg, Oral, Daily  carvedilol, 25 mg, Oral, BID  insulin lispro, 0-7 Units, Subcutaneous, TID AC  mexiletine, 150 mg, Oral, TID  nicotine, 1 patch, Transdermal, Q24H  rOPINIRole, 3 mg, Oral, Nightly  sacubitril-valsartan, 1 tablet, Oral, BID  sodium chloride, 10 mL, Intravenous, Q12H  spironolactone, 25 mg, Oral, Daily      Continuous Infusions:amiodarone, 1 mg/min, Last Rate: 1 mg/min (10/17/20 1043)      PRN Meds:.dextrose  •  dextrose  •  glucagon (human recombinant)  •  influenza vaccine  •  sodium chloride  •  sodium chloride    Assessment/Plan   Assessment & Plan     Active Hospital Problems    Diagnosis  POA   • Ventricular tachycardia (CMS/HCC) [I47.2]  Yes   • Ischemic cardiomyopathy [I25.5]  Yes   • PAD (peripheral artery disease) (CMS/HCC) [I73.9]  Yes   • COPD (chronic obstructive pulmonary disease) (CMS/HCC) [J44.9]  Yes   • Cardiomyopathy (CMS/HCC) [I42.9]  Yes   • Coronary artery disease of native artery of native heart with stable angina pectoris (CMS/HCC) [I25.118]  Yes   • Chronic systolic heart failure (CMS/HCC) [I50.22]  Yes   • Essential hypertension [I10]  Yes   • Mixed hyperlipidemia [E78.2]  Yes   • Tobacco dependence [F17.200]  Yes   • Type 2 diabetes mellitus (CMS/HCC) [E11.9]  Yes   • Ischemic heart disease [I25.9]  Yes      Resolved Hospital Problems   No resolved problems to display.        Brief Hospital Course to date:  Derek Morris Jr. is a 69 y.o. male with PMH of CAD, ischemic  cariomyopathy, CHF, hx ICD/pace and ablation for VTACH, COPD, tobacco abuse, DMII, was admitted with palpatations that was found to be runs of VTACH.     VTACH  S/p ablation 7/2020  -has had several runs since admission, one run in the past 12 hours  -cards following  -stopping sotalol and transitioning to amiodarone, will be on high dose and tolerate hypotension during transition   CHF  CAD s/p CABG  -ASA, eliquis  -ECHO EF 51% diastolic dysfunction grade II  -aldactone, entresto  HTN  COPD-2.5L at home  -soham   DM II  -SSI    DVT Prophylaxis:  eliquis      Disposition: I expect the patient to be discharged TBD, needs amiodarone load over the weekend.    CODE STATUS:   Code Status and Medical Interventions:   Ordered at: 10/16/20 0139     Code Status:    CPR     Medical Interventions (Level of Support Prior to Arrest):    Full       Mary Lion, DO  10/17/20

## 2020-10-18 LAB
ANION GAP SERPL CALCULATED.3IONS-SCNC: 8 MMOL/L (ref 5–15)
BUN SERPL-MCNC: 10 MG/DL (ref 8–23)
BUN/CREAT SERPL: 10.6 (ref 7–25)
CALCIUM SPEC-SCNC: 9.1 MG/DL (ref 8.6–10.5)
CHLORIDE SERPL-SCNC: 102 MMOL/L (ref 98–107)
CO2 SERPL-SCNC: 29 MMOL/L (ref 22–29)
CREAT SERPL-MCNC: 0.94 MG/DL (ref 0.76–1.27)
DEPRECATED RDW RBC AUTO: 44.4 FL (ref 37–54)
ERYTHROCYTE [DISTWIDTH] IN BLOOD BY AUTOMATED COUNT: 12.8 % (ref 12.3–15.4)
GFR SERPL CREATININE-BSD FRML MDRD: 79 ML/MIN/1.73
GLUCOSE BLDC GLUCOMTR-MCNC: 127 MG/DL (ref 70–130)
GLUCOSE BLDC GLUCOMTR-MCNC: 130 MG/DL (ref 70–130)
GLUCOSE BLDC GLUCOMTR-MCNC: 169 MG/DL (ref 70–130)
GLUCOSE BLDC GLUCOMTR-MCNC: 200 MG/DL (ref 70–130)
GLUCOSE BLDC GLUCOMTR-MCNC: 249 MG/DL (ref 70–130)
GLUCOSE SERPL-MCNC: 161 MG/DL (ref 65–99)
HCT VFR BLD AUTO: 46.3 % (ref 37.5–51)
HGB BLD-MCNC: 14.5 G/DL (ref 13–17.7)
MCH RBC QN AUTO: 29.9 PG (ref 26.6–33)
MCHC RBC AUTO-ENTMCNC: 31.3 G/DL (ref 31.5–35.7)
MCV RBC AUTO: 95.5 FL (ref 79–97)
PLATELET # BLD AUTO: 107 10*3/MM3 (ref 140–450)
PMV BLD AUTO: 11.5 FL (ref 6–12)
POTASSIUM SERPL-SCNC: 4.2 MMOL/L (ref 3.5–5.2)
RBC # BLD AUTO: 4.85 10*6/MM3 (ref 4.14–5.8)
SODIUM SERPL-SCNC: 139 MMOL/L (ref 136–145)
WBC # BLD AUTO: 9.55 10*3/MM3 (ref 3.4–10.8)

## 2020-10-18 PROCEDURE — 93005 ELECTROCARDIOGRAM TRACING: CPT | Performed by: NURSE PRACTITIONER

## 2020-10-18 PROCEDURE — 93010 ELECTROCARDIOGRAM REPORT: CPT | Performed by: INTERNAL MEDICINE

## 2020-10-18 PROCEDURE — 80048 BASIC METABOLIC PNL TOTAL CA: CPT | Performed by: FAMILY MEDICINE

## 2020-10-18 PROCEDURE — 25010000002 AMIODARONE IN DEXTROSE 5% 360-4.14 MG/200ML-% SOLUTION: Performed by: NURSE PRACTITIONER

## 2020-10-18 PROCEDURE — 94799 UNLISTED PULMONARY SVC/PX: CPT

## 2020-10-18 PROCEDURE — 99232 SBSQ HOSP IP/OBS MODERATE 35: CPT | Performed by: FAMILY MEDICINE

## 2020-10-18 PROCEDURE — 99232 SBSQ HOSP IP/OBS MODERATE 35: CPT | Performed by: PHYSICIAN ASSISTANT

## 2020-10-18 PROCEDURE — 82962 GLUCOSE BLOOD TEST: CPT

## 2020-10-18 PROCEDURE — 85027 COMPLETE CBC AUTOMATED: CPT | Performed by: FAMILY MEDICINE

## 2020-10-18 PROCEDURE — 93005 ELECTROCARDIOGRAM TRACING: CPT | Performed by: FAMILY MEDICINE

## 2020-10-18 RX ORDER — CALCIUM CARBONATE 750 MG/1
750 TABLET, CHEWABLE ORAL 3 TIMES DAILY PRN
Status: DISCONTINUED | OUTPATIENT
Start: 2020-10-18 | End: 2020-10-19 | Stop reason: HOSPADM

## 2020-10-18 RX ORDER — HYDROCODONE BITARTRATE AND ACETAMINOPHEN 5; 325 MG/1; MG/1
1 TABLET ORAL EVERY 6 HOURS PRN
Status: DISCONTINUED | OUTPATIENT
Start: 2020-10-18 | End: 2020-10-19 | Stop reason: HOSPADM

## 2020-10-18 RX ADMIN — ROPINIROLE HYDROCHLORIDE 3 MG: 2 TABLET, FILM COATED ORAL at 22:03

## 2020-10-18 RX ADMIN — AMIODARONE HYDROCHLORIDE 400 MG: 200 TABLET ORAL at 11:15

## 2020-10-18 RX ADMIN — AMIODARONE HYDROCHLORIDE 1 MG/MIN: 1.8 INJECTION, SOLUTION INTRAVENOUS at 16:48

## 2020-10-18 RX ADMIN — SODIUM CHLORIDE, PRESERVATIVE FREE 10 ML: 5 INJECTION INTRAVENOUS at 22:04

## 2020-10-18 RX ADMIN — MEXILETINE HYDROCHLORIDE 150 MG: 150 CAPSULE ORAL at 16:48

## 2020-10-18 RX ADMIN — AMIODARONE HYDROCHLORIDE 400 MG: 200 TABLET ORAL at 08:25

## 2020-10-18 RX ADMIN — IPRATROPIUM BROMIDE 0.5 MG: 0.5 SOLUTION RESPIRATORY (INHALATION) at 07:40

## 2020-10-18 RX ADMIN — MEXILETINE HYDROCHLORIDE 150 MG: 150 CAPSULE ORAL at 08:26

## 2020-10-18 RX ADMIN — ACETAMINOPHEN 650 MG: 325 TABLET, FILM COATED ORAL at 08:25

## 2020-10-18 RX ADMIN — IPRATROPIUM BROMIDE 0.5 MG: 0.5 SOLUTION RESPIRATORY (INHALATION) at 14:29

## 2020-10-18 RX ADMIN — CARVEDILOL 25 MG: 12.5 TABLET, FILM COATED ORAL at 22:04

## 2020-10-18 RX ADMIN — AMIODARONE HYDROCHLORIDE 1 MG/MIN: 1.8 INJECTION, SOLUTION INTRAVENOUS at 11:15

## 2020-10-18 RX ADMIN — CALCIUM CARBONATE 750 MG: 750 TABLET ORAL at 00:12

## 2020-10-18 RX ADMIN — HYDROCODONE BITARTRATE AND ACETAMINOPHEN 1 TABLET: 5; 325 TABLET ORAL at 13:23

## 2020-10-18 RX ADMIN — SPIRONOLACTONE 25 MG: 25 TABLET ORAL at 08:25

## 2020-10-18 RX ADMIN — APIXABAN 5 MG: 5 TABLET, FILM COATED ORAL at 11:15

## 2020-10-18 RX ADMIN — NICOTINE 1 PATCH: 21 PATCH, EXTENDED RELEASE TRANSDERMAL at 08:29

## 2020-10-18 RX ADMIN — AMIODARONE HYDROCHLORIDE 400 MG: 200 TABLET ORAL at 17:09

## 2020-10-18 RX ADMIN — MEXILETINE HYDROCHLORIDE 150 MG: 150 CAPSULE ORAL at 22:01

## 2020-10-18 RX ADMIN — SACUBITRIL AND VALSARTAN 1 TABLET: 24; 26 TABLET, FILM COATED ORAL at 22:04

## 2020-10-18 RX ADMIN — BUDESONIDE 0.5 MG: 0.5 INHALANT RESPIRATORY (INHALATION) at 07:40

## 2020-10-18 RX ADMIN — ARFORMOTEROL TARTRATE 15 MCG: 15 SOLUTION RESPIRATORY (INHALATION) at 19:36

## 2020-10-18 RX ADMIN — BUDESONIDE 0.5 MG: 0.5 INHALANT RESPIRATORY (INHALATION) at 19:36

## 2020-10-18 RX ADMIN — AMIODARONE HYDROCHLORIDE 1 MG/MIN: 1.8 INJECTION, SOLUTION INTRAVENOUS at 23:35

## 2020-10-18 RX ADMIN — CARVEDILOL 25 MG: 12.5 TABLET, FILM COATED ORAL at 11:15

## 2020-10-18 RX ADMIN — SACUBITRIL AND VALSARTAN 1 TABLET: 24; 26 TABLET, FILM COATED ORAL at 11:15

## 2020-10-18 RX ADMIN — IPRATROPIUM BROMIDE 0.5 MG: 0.5 SOLUTION RESPIRATORY (INHALATION) at 23:54

## 2020-10-18 RX ADMIN — ARFORMOTEROL TARTRATE 15 MCG: 15 SOLUTION RESPIRATORY (INHALATION) at 07:40

## 2020-10-18 RX ADMIN — APIXABAN 5 MG: 5 TABLET, FILM COATED ORAL at 22:04

## 2020-10-18 RX ADMIN — SODIUM CHLORIDE, PRESERVATIVE FREE 10 ML: 5 INJECTION INTRAVENOUS at 08:29

## 2020-10-18 RX ADMIN — AMIODARONE HYDROCHLORIDE 1 MG/MIN: 1.8 INJECTION, SOLUTION INTRAVENOUS at 05:02

## 2020-10-18 RX ADMIN — ASPIRIN 81 MG: 81 TABLET, COATED ORAL at 08:25

## 2020-10-18 RX ADMIN — IPRATROPIUM BROMIDE 0.5 MG: 0.5 SOLUTION RESPIRATORY (INHALATION) at 19:36

## 2020-10-18 NOTE — PLAN OF CARE
Goal Outcome Evaluation:  Plan of Care Reviewed With: patient  Progress: improving  Outcome Summary: Pt VSS and on room air. Pt alert and oriented, up independently. Amiodarone drip running @ 33.3 mL/hr. Pt NSR with no runs of VTACH this shift. Pt c/o headache, PRN NORCO given. Will continue to monitor.

## 2020-10-18 NOTE — NURSING NOTE
Patient c/o nausea, Bowel sounds normal active. Kimmy mai notified. PRN Tums ordered for nausea. On administration of Tums patient c/o SOA, hot flashes. Temp 98.2 oral. 's. Oxygen saturation 93% on RA. Lungs clear. Kimmy Parson notified of patient complaints EKG ordered and obtained. No change in EKG noted. Patient states he wears 2.5L NC at home. Patient placed on 2L NC. SpO2 increased to 98%. SOA resolved with oxygen administration. Patient continued to c/o nausea. Patient c/o palpitations feeling similar to when patient is in Ventricular Tachycardia. Patient remained A. Paced on monitor. Patient denies chest pain. Second EKG obtained. No change from previous EKG's. Palpitation resolved without intervention. Perla Welch notified of palpitations and resolution of palpitation without intervention, Paced rhythm on monitor, EKG results, c/o nausea BP and C/o SOA. No new orders. Nurse asked if troponin needed. No new orders per Perla Welch. Continues to be Atrial paced, denies SOA at this time, remains on 2L NC, -130. Patient resting currently. Will continue to monitor.

## 2020-10-18 NOTE — PROGRESS NOTES
"[facility] at [facility]  IP Progress Note   LOS: 2 days   Patient Care Team:  Jn Beach MD as PCP - General (Family Medicine)    Chief Complaint: Follow up for Cardiomyopathy    Subjective Denies Chest Pain Denies Dyspnea Eating OK Ambulating. C/o headache        Tele: Paced    Vitals:  /64 (BP Location: Left arm, Patient Position: Lying)   Pulse 82   Temp 98.2 °F (36.8 °C) (Oral)   Resp 18   Ht 172.7 cm (68\")   Wt 70.9 kg (156 lb 4.8 oz)   SpO2 94%   BMI 23.77 kg/m²    Body mass index is 23.77 kg/m².    Intake/Output Summary (Last 24 hours) at 10/18/2020 1302  Last data filed at 10/18/2020 0900  Gross per 24 hour   Intake 450 ml   Output 1200 ml   Net -750 ml       Physical Exam:      General: alert, no acute distress, acyanotic, well developed, well nourished   Chest: Clear Auscultation and No Wheezes   CV: Heart sounds are normal.  Regular rate and rhythm without murmur, gallop or rub.   Extremities: negative    Results Review:     I reviewed the patient's new clinical results.    Results from last 7 days   Lab Units 10/18/20  0859   WBC 10*3/mm3 9.55   HEMOGLOBIN g/dL 14.5   HEMATOCRIT % 46.3   PLATELETS 10*3/mm3 107*     Results from last 7 days   Lab Units 10/18/20  0859  10/15/20  2053   SODIUM mmol/L 139   < > 142   POTASSIUM mmol/L 4.2   < > 4.1   CHLORIDE mmol/L 102   < > 105   CO2 mmol/L 29.0   < > 29.0   BUN mg/dL 10   < > 9   CREATININE mg/dL 0.94   < > 1.18   GLUCOSE mg/dL 161*   < > 163*   CALCIUM mg/dL 9.1   < > 9.2   ALT (SGPT) U/L  --   --  16   AST (SGOT) U/L  --   --  18    < > = values in this interval not displayed.     Estimated Creatinine Clearance: 72.3 mL/min (by C-G formula based on SCr of 0.94 mg/dL).  Lab Results   Component Value Date    HGBA1C 6.90 (H) 05/09/2020     Results from last 7 days   Lab Units 10/16/20  1138 10/15/20  2053   TROPONIN T ng/mL <0.010 <0.010     Results from last 7 days   Lab Units 10/15/20  2053   PROBNP pg/mL " 741.3         Scheduled Meds:  amiodarone, 400 mg, Oral, TID With Meals  apixaban, 5 mg, Oral, Q12H  arformoterol, 15 mcg, Nebulization, BID - RT    And  budesonide, 0.5 mg, Nebulization, Q12H - RT    And  ipratropium, 0.5 mg, Nebulization, Q6H - RT  aspirin, 81 mg, Oral, Daily  carvedilol, 25 mg, Oral, BID  insulin lispro, 0-7 Units, Subcutaneous, TID AC  mexiletine, 150 mg, Oral, TID  nicotine, 1 patch, Transdermal, Q24H  rOPINIRole, 3 mg, Oral, Nightly  sacubitril-valsartan, 1 tablet, Oral, BID  sodium chloride, 10 mL, Intravenous, Q12H  spironolactone, 25 mg, Oral, Daily        Continuous Infusions:amiodarone, 1 mg/min, Last Rate: 1 mg/min (10/18/20 1115)               Assessment: Plan:    Ischemic heart disease    Essential hypertension    Chronic systolic heart failure (CMS/HCC)    Mixed hyperlipidemia    Type 2 diabetes mellitus (CMS/HCC)    Tobacco dependence    Cardiomyopathy (CMS/HCC)    PAD (peripheral artery disease) (CMS/HCC)    Ventricular tachycardia (CMS/HCC)    Ischemic cardiomyopathy    headache        1. Continue combination of oral and IV amiodarone load overnight.  2.  Continue Mexitil 150 mg p.o. 3 times daily  3.  Continue carvedilol 25 mg p.o. twice daily.  4. Probable DC to home tomorrow per Dr. Dior  5. Pain pill for headache          Electronically signed by VERO Tavarez, 10/18/20, 1:13 PM EDT.

## 2020-10-18 NOTE — PROGRESS NOTES
[facility] Medicine Services  PROGRESS NOTE    Patient Name: Derek Morris Jr.  : 1950  MRN: [id number]    Date of Admission: 10/15/2020  Primary Care Physician: Jn Beach MD    Subjective   Subjective     CC:  F/U palpitations     HPI:  Patient is resting in bed. He is eager to go home. He denies any palpitations. He says he has a headache.     Review of Systems  Gen- No fevers, chills  CV- No chest pain, palpitations  Resp- No cough, dyspnea  GI- No N/V/D, abd pain        Objective   Objective     Vital Signs:   Temp:  [97.1 °F (36.2 °C)-98.2 °F (36.8 °C)] 97.1 °F (36.2 °C)  Heart Rate:  [80-92] 88  Resp:  [16-18] 18  BP: (104-154)/(64-96) 106/71        Physical Exam:  Constitutional: No acute distress, awake, alert  HENT: NCAT, mucous membranes moist  Respiratory: mild expiratory wheeze , respiratory effort normal   Cardiovascular: RRR paced , no murmurs, rubs, or gallops  Gastrointestinal: Positive bowel sounds, soft, nontender, nondistended  Musculoskeletal: No bilateral ankle edema  Psychiatric: Appropriate affect, cooperative  Neurologic: Oriented x 3, strength symmetric in all extremities, Cranial Nerves grossly intact to confrontation, speech clear  Skin: No rashes      Results Reviewed:  Results from last 7 days   Lab Units 10/18/20  0859 10/17/20  0759 10/16/20  113   WBC 10*3/mm3 9.55 8.65 5.67   HEMOGLOBIN g/dL 14.5 15.6 14.7   HEMATOCRIT % 46.3 50.1 46.8   PLATELETS 10*3/mm3 107* 123* 118*     Results from last 7 days   Lab Units 10/18/20  0859 10/17/20  0759 10/16/20  1138 10/15/20  2053   SODIUM mmol/L 139 140 141 142   POTASSIUM mmol/L 4.2 4.2 4.2 4.1   CHLORIDE mmol/L 102 105 105 105   CO2 mmol/L 29.0 28.0 28.0 29.0   BUN mg/dL 10 10 10 9   CREATININE mg/dL 0.94 0.96 0.95 1.18   GLUCOSE mg/dL 161* 136* 168* 163*   CALCIUM mg/dL 9.1 8.6 8.7 9.2   ALT (SGPT) U/L  --   --   --  16   AST (SGOT) U/L  --   --   --  18   TROPONIN T ng/mL  --   --  <0.010  <0.010   PROBNP pg/mL  --   --   --  741.3     Estimated Creatinine Clearance: 72.3 mL/min (by C-G formula based on SCr of 0.94 mg/dL).    Microbiology Results Abnormal     Procedure Component Value - Date/Time    COVID PRE-OP / PRE-PROCEDURE SCREENING ORDER (NO ISOLATION) - Swab, Nasopharynx [[id number]]  (Normal) Collected: 10/16/20 0144    Lab Status: Final result Specimen: Swab from Nasopharynx Updated: 10/16/20 0244    Narrative:      The following orders were created for panel order COVID PRE-OP / PRE-PROCEDURE SCREENING ORDER (NO ISOLATION) - Swab, Nasopharynx.  Procedure                               Abnormality         Status                     ---------                               -----------         ------                     COVID-19,CEPHEID,GIA IN-...[[id number]]  Normal              Final result                 Please view results for these tests on the individual orders.    COVID-19,CEPHEID,GIA IN-HOUSE(OR EMERGENT/ADD-ON),NP SWAB IN TRANSPORT MEDIA 3-4 HR TAT - Swab, Nasopharynx [[id number]]  (Normal) Collected: 10/16/20 0144    Lab Status: Final result Specimen: Swab from Nasopharynx Updated: 10/16/20 0244     COVID19 Not Detected    Narrative:      Fact sheet for providers: https://www.fda.gov/media/858726/download     Fact sheet for patients: https://www.fda.gov/media/391179/download          Imaging Results (Last 24 Hours)     ** No results found for the last 24 hours. **          Results for orders placed during the hospital encounter of 05/09/20   Adult Transthoracic Echo Complete W/ Cont if Necessary Per Protocol    Narrative · Left ventricular systolic function is normal. Estimated EF appears to be   in the range of 51 - 55%.  · The following left ventricular wall segments are hypokinetic: basal   anterolateral, mid anterolateral, basal inferolateral, basal inferoseptal,   basal inferior and basal inferoseptal.  · Left ventricular wall thickness is consistent with moderate concentric    hypertrophy.  · Left ventricular diastolic dysfunction (grade II) consistent with   pseudonormalization.  · Left atrial volume is mildly increased.  · There is moderate calcification of the aortic valve.  · Trace-to-mild aortic valve regurgitation is present.          I have reviewed the medications:  Scheduled Meds:amiodarone, 400 mg, Oral, TID With Meals  apixaban, 5 mg, Oral, Q12H  arformoterol, 15 mcg, Nebulization, BID - RT    And  budesonide, 0.5 mg, Nebulization, Q12H - RT    And  ipratropium, 0.5 mg, Nebulization, Q6H - RT  aspirin, 81 mg, Oral, Daily  carvedilol, 25 mg, Oral, BID  insulin lispro, 0-7 Units, Subcutaneous, TID AC  mexiletine, 150 mg, Oral, TID  nicotine, 1 patch, Transdermal, Q24H  rOPINIRole, 3 mg, Oral, Nightly  sacubitril-valsartan, 1 tablet, Oral, BID  sodium chloride, 10 mL, Intravenous, Q12H  spironolactone, 25 mg, Oral, Daily      Continuous Infusions:amiodarone, 1 mg/min, Last Rate: 1 mg/min (10/18/20 1115)      PRN Meds:.•  acetaminophen **OR** acetaminophen **OR** acetaminophen  •  calcium carbonate EX  •  dextrose  •  dextrose  •  glucagon (human recombinant)  •  HYDROcodone-acetaminophen  •  influenza vaccine  •  sodium chloride  •  sodium chloride    Assessment/Plan   Assessment & Plan     Active Hospital Problems    Diagnosis  POA   • Ventricular tachycardia (CMS/HCC) [I47.2]  Yes   • Ischemic cardiomyopathy [I25.5]  Yes   • PAD (peripheral artery disease) (CMS/HCC) [I73.9]  Yes   • COPD (chronic obstructive pulmonary disease) (CMS/[facility]) [J44.9]  Yes   • Cardiomyopathy (CMS/HCC) [I42.9]  Yes   • Coronary artery disease of native artery of native heart with stable angina pectoris (CMS/[facility]) [I25.118]  Yes   • Chronic systolic heart failure (CMS/HCC) [I50.22]  Yes   • Essential hypertension [I10]  Yes   • Mixed hyperlipidemia [E78.2]  Yes   • Tobacco dependence [F17.200]  Yes   • Type 2 diabetes mellitus (CMS/[facility]) [E11.9]  Yes   • Ischemic heart disease [I25.9]  Yes      Resolved  Hospital Problems   No resolved problems to display.        Brief Hospital Course to date:  Derek Morris Jr. is a 69 y.o. male with PMH of CAD, ischemic cariomyopathy, CHF, hx ICD/pace and ablation for VTACH, COPD, tobacco abuse, DMII, was admitted with palpatations that was found to be runs of VTACH.     VTACH  S/p ablation 7/2020  -has had several runs since admission, no  runs in the past 12 hours  -cards following  -stopping sotalol and transitioning to amiodarone, will be on high dose and tolerate hypotension during transition   CHF  CAD s/p CABG  -ASA, eliquis  -ECHO EF 51% diastolic dysfunction grade II  -aldactone, entresto  HTN  COPD-2.5L at home  -soham   DM II  -SSI    DVT Prophylaxis:  eliquis      Disposition: I expect the patient to be discharged tomorrow     CODE STATUS:   Code Status and Medical Interventions:   Ordered at: 10/16/20 0139     Code Status:    CPR     Medical Interventions (Level of Support Prior to Arrest):    Full       Mary Lion,   10/18/20

## 2020-10-18 NOTE — PLAN OF CARE
Goal Outcome Evaluation:  Plan of Care Reviewed With: patient  Progress: no change  Outcome Summary: VSS. patient remained paced on the monitor. No episodes of V-Tach this shift. Patient denies SOA, palpitations, nausea this am. will continue to monitor.

## 2020-10-19 VITALS
HEIGHT: 68 IN | TEMPERATURE: 97.2 F | SYSTOLIC BLOOD PRESSURE: 127 MMHG | OXYGEN SATURATION: 92 % | RESPIRATION RATE: 16 BRPM | WEIGHT: 156.3 LBS | DIASTOLIC BLOOD PRESSURE: 80 MMHG | HEART RATE: 84 BPM | BODY MASS INDEX: 23.69 KG/M2

## 2020-10-19 PROBLEM — I47.20 VENTRICULAR TACHYCARDIA (HCC): Status: RESOLVED | Noted: 2019-07-22 | Resolved: 2020-10-19

## 2020-10-19 LAB
ANION GAP SERPL CALCULATED.3IONS-SCNC: 9 MMOL/L (ref 5–15)
BUN SERPL-MCNC: 15 MG/DL (ref 8–23)
BUN/CREAT SERPL: 16.1 (ref 7–25)
CALCIUM SPEC-SCNC: 8.7 MG/DL (ref 8.6–10.5)
CHLORIDE SERPL-SCNC: 105 MMOL/L (ref 98–107)
CO2 SERPL-SCNC: 27 MMOL/L (ref 22–29)
CREAT SERPL-MCNC: 0.93 MG/DL (ref 0.76–1.27)
DEPRECATED RDW RBC AUTO: 46.1 FL (ref 37–54)
ERYTHROCYTE [DISTWIDTH] IN BLOOD BY AUTOMATED COUNT: 12.8 % (ref 12.3–15.4)
GFR SERPL CREATININE-BSD FRML MDRD: 80 ML/MIN/1.73
GLUCOSE BLDC GLUCOMTR-MCNC: 122 MG/DL (ref 70–130)
GLUCOSE BLDC GLUCOMTR-MCNC: 127 MG/DL (ref 70–130)
GLUCOSE SERPL-MCNC: 152 MG/DL (ref 65–99)
HCT VFR BLD AUTO: 43.5 % (ref 37.5–51)
HGB BLD-MCNC: 13.8 G/DL (ref 13–17.7)
MCH RBC QN AUTO: 31.1 PG (ref 26.6–33)
MCHC RBC AUTO-ENTMCNC: 31.7 G/DL (ref 31.5–35.7)
MCV RBC AUTO: 98 FL (ref 79–97)
PLATELET # BLD AUTO: 100 10*3/MM3 (ref 140–450)
PMV BLD AUTO: 11.4 FL (ref 6–12)
POTASSIUM SERPL-SCNC: 3.7 MMOL/L (ref 3.5–5.2)
RBC # BLD AUTO: 4.44 10*6/MM3 (ref 4.14–5.8)
SODIUM SERPL-SCNC: 141 MMOL/L (ref 136–145)
WBC # BLD AUTO: 7.67 10*3/MM3 (ref 3.4–10.8)

## 2020-10-19 PROCEDURE — 99239 HOSP IP/OBS DSCHRG MGMT >30: CPT | Performed by: FAMILY MEDICINE

## 2020-10-19 PROCEDURE — 99232 SBSQ HOSP IP/OBS MODERATE 35: CPT | Performed by: NURSE PRACTITIONER

## 2020-10-19 PROCEDURE — 80048 BASIC METABOLIC PNL TOTAL CA: CPT | Performed by: FAMILY MEDICINE

## 2020-10-19 PROCEDURE — 85027 COMPLETE CBC AUTOMATED: CPT | Performed by: FAMILY MEDICINE

## 2020-10-19 PROCEDURE — 94799 UNLISTED PULMONARY SVC/PX: CPT

## 2020-10-19 PROCEDURE — 25010000002 AMIODARONE IN DEXTROSE 5% 360-4.14 MG/200ML-% SOLUTION: Performed by: NURSE PRACTITIONER

## 2020-10-19 PROCEDURE — 93010 ELECTROCARDIOGRAM REPORT: CPT | Performed by: INTERNAL MEDICINE

## 2020-10-19 PROCEDURE — 82962 GLUCOSE BLOOD TEST: CPT

## 2020-10-19 PROCEDURE — 93005 ELECTROCARDIOGRAM TRACING: CPT | Performed by: NURSE PRACTITIONER

## 2020-10-19 RX ORDER — AMIODARONE HYDROCHLORIDE 400 MG/1
400 TABLET ORAL DAILY
Qty: 30 TABLET | Refills: 0 | Status: SHIPPED | OUTPATIENT
Start: 2020-10-20 | End: 2021-01-01 | Stop reason: SDUPTHER

## 2020-10-19 RX ORDER — AMIODARONE HYDROCHLORIDE 200 MG/1
400 TABLET ORAL DAILY
Status: DISCONTINUED | OUTPATIENT
Start: 2020-10-20 | End: 2020-10-19 | Stop reason: HOSPADM

## 2020-10-19 RX ORDER — CARVEDILOL 25 MG/1
25 TABLET ORAL 2 TIMES DAILY
Qty: 60 TABLET | Refills: 0 | Status: SHIPPED | OUTPATIENT
Start: 2020-10-19 | End: 2021-01-01 | Stop reason: SDUPTHER

## 2020-10-19 RX ADMIN — APIXABAN 5 MG: 5 TABLET, FILM COATED ORAL at 12:34

## 2020-10-19 RX ADMIN — SACUBITRIL AND VALSARTAN 1 TABLET: 24; 26 TABLET, FILM COATED ORAL at 12:34

## 2020-10-19 RX ADMIN — HYDROCODONE BITARTRATE AND ACETAMINOPHEN 1 TABLET: 5; 325 TABLET ORAL at 04:03

## 2020-10-19 RX ADMIN — MEXILETINE HYDROCHLORIDE 150 MG: 150 CAPSULE ORAL at 08:51

## 2020-10-19 RX ADMIN — ASPIRIN 81 MG: 81 TABLET, COATED ORAL at 08:53

## 2020-10-19 RX ADMIN — CARVEDILOL 25 MG: 12.5 TABLET, FILM COATED ORAL at 12:34

## 2020-10-19 RX ADMIN — BUDESONIDE 0.5 MG: 0.5 INHALANT RESPIRATORY (INHALATION) at 07:04

## 2020-10-19 RX ADMIN — AMIODARONE HYDROCHLORIDE 1 MG/MIN: 1.8 INJECTION, SOLUTION INTRAVENOUS at 05:36

## 2020-10-19 RX ADMIN — ARFORMOTEROL TARTRATE 15 MCG: 15 SOLUTION RESPIRATORY (INHALATION) at 07:04

## 2020-10-19 RX ADMIN — AMIODARONE HYDROCHLORIDE 400 MG: 200 TABLET ORAL at 08:51

## 2020-10-19 RX ADMIN — IPRATROPIUM BROMIDE 0.5 MG: 0.5 SOLUTION RESPIRATORY (INHALATION) at 07:04

## 2020-10-19 RX ADMIN — SPIRONOLACTONE 25 MG: 25 TABLET ORAL at 08:52

## 2020-10-19 NOTE — PLAN OF CARE
Goal Outcome Evaluation:  Plan of Care Reviewed With: patient  Progress: improving  Outcome Summary: Pt's VSS on room air, runs of V tach throughout the night. Amiodarone drip running @ 33.3 ml/hr. Continue POC.

## 2020-10-19 NOTE — PROGRESS NOTES
"      Cardiac Electrophysiology Inpatient Follow Up Note         [facility] at [facility]    Progress Note      CC: Ventricular Tachycardia    Subjective      Mr Derek Morris is a 69-year-old white male seen in EP follow-up today for his recurrent ventricular tachycardia.  Patient reports that he has felt great over the past 2 days while inpatient without recurrent episodes of palpitations or dizziness.  Patient admits to tolerating current medication therapy without noted side effects.  Patient states he is anxious to go home if possible to care for his wife.    REVIEW OF SYSTEMS  ROS no change from admission H&P except for: above    Objective   VITAL SIGNS  /89 (BP Location: Left arm, Patient Position: Lying)   Pulse 91   Temp 97.6 °F (36.4 °C) (Oral)   Resp 16   Ht 172.7 cm (68\")   Wt 70.9 kg (156 lb 4.8 oz)   SpO2 92%   BMI 23.77 kg/m²     Pulse Ox: SpO2  Av.8 %  Min: 92 %  Max: 97 %  Supplemental O2:       Admit Weight  Weight: 74.8 kg (165 lb)  Last 3 Weights    Body mass index is 23.77 kg/m².    INTAKE/OUTPUT  I/O last 3 completed shifts:  In: 1525 [P.O.:1525]  Out: 900 [Urine:900]    Intake/Output Summary (Last 24 hours) at 10/19/2020 09  Last data filed at 10/18/2020 2200  Gross per 24 hour   Intake 1075 ml   Output 300 ml   Net 775 ml       PHYSICAL EXAM  General appearance: awake, alert, oriented, moves all extremities  Lungs: no rhonchi, no wheezes, no rales  Heart: S1S2 RRR  Abdomen: positive bowel sounds, no bruits, no masses  Extremities: warm and dry, no cyanosis, no clubbing    LABS  Results from last 7 days   Lab Units 10/19/20  0330 10/18/20  0859 10/17/20  0759   WBC 10*3/mm3 7.67 9.55 8.65   HEMOGLOBIN g/dL 13.8 14.5 15.6   HEMATOCRIT % 43.5 46.3 50.1   MCV fL 98.0* 95.5 99.2*   PLATELETS 10*3/mm3 100* 107* 123*         Results from last 7 days   Lab Units 10/19/20  0330 10/18/20  0859 10/17/20  0759  10/15/20  2053   POTASSIUM mmol/L 3.7 4.2 4.2   < " > 4.1   CHLORIDE mmol/L 105 102 105   < > 105   CO2 mmol/L 27.0 29.0 28.0   < > 29.0   BUN mg/dL 15 10 10   < > 9   CREATININE mg/dL 0.93 0.94 0.96   < > 1.18   GLUCOSE mg/dL 152* 161* 136*   < > 163*   CALCIUM mg/dL 8.7 9.1 8.6   < > 9.2   MAGNESIUM mg/dL  --   --   --   --  2.3    < > = values in this interval not displayed.             Results from last 7 days   Lab Units 10/15/20  2053   MAGNESIUM mg/dL 2.3       CURRENT MEDICATIONS  amiodarone, 400 mg, Oral, TID With Meals  apixaban, 5 mg, Oral, Q12H  arformoterol, 15 mcg, Nebulization, BID - RT    And  budesonide, 0.5 mg, Nebulization, Q12H - RT    And  ipratropium, 0.5 mg, Nebulization, Q6H - RT  aspirin, 81 mg, Oral, Daily  carvedilol, 25 mg, Oral, BID  insulin lispro, 0-7 Units, Subcutaneous, TID AC  mexiletine, 150 mg, Oral, TID  nicotine, 1 patch, Transdermal, Q24H  rOPINIRole, 3 mg, Oral, Nightly  sacubitril-valsartan, 1 tablet, Oral, BID  sodium chloride, 10 mL, Intravenous, Q12H  spironolactone, 25 mg, Oral, Daily      CONTINUOUS INFUSIONS  amiodarone, 1 mg/min, Last Rate: 1 mg/min (10/19/20 0536)         TELEMETRY:  Paced 70 bpm    Assessment & Plan     Mr. Derek Morris is a 69-year-old white male seen in EP follow-up today for his documented recurrent ICD paced terminated ventricular tachycardia.  Patient was transitioned from sotalol to amiodarone therapy with significant reduction in his VT.  Per nursing report patient has had 3 episodes in the past 48 hours compared to approximately 25 episodes the 24 hours prior to admission.  Patient has now had approximately 8 g total loading dose of amiodarone therapy in addition to his previously prescribed mexiletine.  Case reviewed with Dr. Dior with plan to discharge home today on maintenance dose of amiodarone 400 mg daily in addition to his previously prescribed regimen including mexiletine 150 mg 3 times daily and current dose of Coreg increased to 25 mg twice daily.  We will continue Eliquis therapy  for his paroxysmal atrial fibrillation.  Will place order to follow-up in 2 months in office with Dr. Dior for reevaluation.  At this time patient is stable and ready for discharge home today from an EP standpoint.  Patient educated on above plan.  Patient verbalized complete understanding and is ready for discharge home today.      Electronically signed by VANDANA Dubois, 10/19/20, 9:06 AM EDT.

## 2020-10-19 NOTE — PROGRESS NOTES
Continued Stay Note  [facility]     Patient Name: Derek Morris Jr.  MRN: [id number]  Today's Date: 10/19/2020    Admit Date: 10/15/2020    Discharge Plan     Row Name 10/19/20 1115       Plan    Plan  Home with wife    Patient/Family in Agreement with Plan  yes    Plan Comments  Pt plans to d/c home. Currently on RA. He plans to drive himself home. Has no d/c needs. Brenda Herrera RN,  ext. [phone]    Final Discharge Disposition Code  01 - home or self-care        Discharge Codes    No documentation.       Expected Discharge Date and Time     Expected Discharge Date Expected Discharge Time    Oct 19, 2020             Brenda Herrera RN

## 2020-10-20 ENCOUNTER — READMISSION MANAGEMENT (OUTPATIENT)
Dept: CALL CENTER | Facility: HOSPITAL | Age: 70
End: 2020-10-20

## 2020-10-20 NOTE — OUTREACH NOTE
Prep Survey      Responses   [facility] facility patient discharged from?  [place]   Is LACE score < 7 ?  No   Eligibility  Readm Mgmt   Discharge diagnosis  Ventricular tachycardia    Does the patient have one of the following disease processes/diagnoses(primary or secondary)?  CHF   Does the patient have Home health ordered?  No   Is there a DME ordered?  No   Medication alerts for this patient  Amiodarone    Prep survey completed?  Yes          Amanda Adams RN

## 2020-10-23 NOTE — DISCHARGE SUMMARY
[facility] Medicine Services  DISCHARGE SUMMARY    Patient Name: Derek Morris Jr.  : 1950  MRN: [id number]    Date of Admission: 10/15/2020 10:56 PM  Date of Discharge:  10/19/2020  Primary Care Physician: Jn Beach MD    Consults     Date and Time Order Name Status Description    10/16/2020 0537 Inpatient Cardiology Consult Completed           Hospital Course     Presenting Problem:   Ventricular tachycardia (CMS/HCC) [I47.2]  Ventricular tachycardia (CMS/HCC) [I47.2]  Ventricular tachycardia (CMS/HCC) [I47.2]    Active Hospital Problems    Diagnosis  POA   • Ischemic cardiomyopathy [I25.5]  Yes   • PAD (peripheral artery disease) (CMS/HCC) [I73.9]  Yes   • COPD (chronic obstructive pulmonary disease) (CMS/HCC) [J44.9]  Yes   • Cardiomyopathy (CMS/HCC) [I42.9]  Yes   • Coronary artery disease of native artery of native heart with stable angina pectoris (CMS/HCC) [I25.118]  Yes   • Chronic systolic heart failure (CMS/HCC) [I50.22]  Yes   • Essential hypertension [I10]  Yes   • Mixed hyperlipidemia [E78.2]  Yes   • Tobacco dependence [F17.200]  Yes   • Type 2 diabetes mellitus (CMS/HCC) [E11.9]  Yes   • Ischemic heart disease [I25.9]  Yes      Resolved Hospital Problems    Diagnosis Date Resolved POA   • Ventricular tachycardia (CMS/HCC) [I47.2] 10/19/2020 Yes          Hospital Course:  Derek Morris Jr. is a 69 y.o. male with PMH of CAD, ischemic cariomyopathy, CHF, hx ICD/pace and ablation for VTACH, COPD, tobacco abuse, DMII, was admitted with palpatations that was found to be runs of VTACH.      The following issues/diagosis were addressed during this admission  VTACH  S/p ablation 2020  -had several runs after admission   -cardiology was following   -stopped his previous  sotalol transitioning to amiodarone, was loaded over couple of days and will continue po at discharge   CHF  CAD s/p CABG  -ASA, eliquis  -ECHO EF 51% diastolic dysfunction grade II  -aldactone,  entresto  HTN  COPD-2.5L at home  -[facility]   DM II      Discharge Follow Up Recommendations for outpatient labs/diagnostics:   pcp in 1-2 weeks   Dr Dior 2 months     Day of Discharge     HPI:   Patient  Is resting in bed. He has not felt any palpatations for over a day.     Review of Systems   Gen- No fevers, chills  CV- No chest pain, palpitations  Resp- No cough, dyspnea  GI- No N/V/D, abd pain        Vital Signs:         Physical Exam:  Constitutional: No acute distress, awake, alert  HENT: NCAT, mucous membranes moist  Respiratory: Clear to auscultation bilaterally, respiratory effort normal   Cardiovascular: RRR, no murmurs, rubs, or gallops,   Gastrointestinal: Positive bowel sounds, soft, nontender, nondistended  Musculoskeletal: No bilateral ankle edema  Psychiatric: Appropriate affect, cooperative  Neurologic: Oriented x 3, strength symmetric in all extremities, Cranial Nerves grossly intact to confrontation, speech clear  Skin: No rashes      Pertinent  and/or Most Recent Results     Results from last 7 days   Lab Units 10/19/20  0330 10/18/20  0859 10/17/20  0759 10/16/20  1138   WBC 10*3/mm3 7.67 9.55 8.65 5.67   HEMOGLOBIN g/dL 13.8 14.5 15.6 14.7   HEMATOCRIT % 43.5 46.3 50.1 46.8   PLATELETS 10*3/mm3 100* 107* 123* 118*   SODIUM mmol/L 141 139 140 141   POTASSIUM mmol/L 3.7 4.2 4.2 4.2   CHLORIDE mmol/L 105 102 105 105   CO2 mmol/L 27.0 29.0 28.0 28.0   BUN mg/dL 15 10 10 10   CREATININE mg/dL 0.93 0.94 0.96 0.95   GLUCOSE mg/dL 152* 161* 136* 168*   CALCIUM mg/dL 8.7 9.1 8.6 8.7           Invalid input(s): PROT, LABALBU        Invalid input(s): TG, LDLCALC, LDLREALC  Results from last 7 days   Lab Units 10/16/20  1138   TROPONIN T ng/mL <0.010       Brief Urine Lab Results  (Last result in the past 365 days)      Color   Clarity   Blood   Leuk Est   Nitrite   Protein   CREAT   Urine HCG        05/09/20 1114 Yellow Clear Negative Negative Negative Negative               Microbiology Results  Abnormal     Procedure Component Value - Date/Time    COVID PRE-OP / PRE-PROCEDURE SCREENING ORDER (NO ISOLATION) - Swab, Nasopharynx [[id number]]  (Normal) Collected: 10/16/20 0144    Lab Status: Final result Specimen: Swab from Nasopharynx Updated: 10/16/20 0244    Narrative:      The following orders were created for panel order COVID PRE-OP / PRE-PROCEDURE SCREENING ORDER (NO ISOLATION) - Swab, Nasopharynx.  Procedure                               Abnormality         Status                     ---------                               -----------         ------                     COVID-19,CEPHEID,GIA IN-...[[id number]]  Normal              Final result                 Please view results for these tests on the individual orders.    COVID-19,CEPHEID,GIA IN-HOUSE(OR EMERGENT/ADD-ON),NP SWAB IN TRANSPORT MEDIA 3-4 HR TAT - Swab, Nasopharynx [[id number]]  (Normal) Collected: 10/16/20 0144    Lab Status: Final result Specimen: Swab from Nasopharynx Updated: 10/16/20 0244     COVID19 Not Detected    Narrative:      Fact sheet for providers: https://www.fda.gov/media/284382/download     Fact sheet for patients: https://www.fda.gov/media/626866/download          Imaging Results (All)     Procedure Component Value Units Date/Time    XR Chest 1 View [[id number]] Collected: 10/15/20 2237     Updated: 10/15/20 2239    Narrative:      CHEST X-RAY, 10/15/2020      HISTORY:    69-year-old male in the ED undergoing cardiac dysrhythmia triage protocol. History of cardiac ablation.      TECHNIQUE:  AP portable upright chest x-ray.    COMPARISON:  *  Chest x-ray, [facility], 8/28/2019.    FINDINGS:  Dual-chamber cardiac pacer/AICD appears well-positioned.    Postop changes CABG surgery. Heart size is normal. Pulmonary vascularity is within normal limits. No visible pulmonary edema, focal infiltrate or pleural effusion. No significant change since the prior exam.      Impression:      1.  No definite active disease.  2.  No  change since 8/28/2019.  3.  CABG. Pacer/AICD.    Signer Name: Jovi Grimes MD   Signed: 10/15/2020 10:37 PM   Workstation Name: [email or web address]    [facility]          Results for orders placed during the hospital encounter of 09/25/19   Duplex Carotid Ultrasound CAR    Narrative · Right internal carotid artery stenosis of 0-49%.  · Left internal carotid artery stenosis of 0-49%.          Results for orders placed during the hospital encounter of 09/25/19   Duplex Carotid Ultrasound CAR    Narrative · Right internal carotid artery stenosis of 0-49%.  · Left internal carotid artery stenosis of 0-49%.          Results for orders placed during the hospital encounter of 05/09/20   Adult Transthoracic Echo Complete W/ Cont if Necessary Per Protocol    Narrative · Left ventricular systolic function is normal. Estimated EF appears to be   in the range of 51 - 55%.  · The following left ventricular wall segments are hypokinetic: basal   anterolateral, mid anterolateral, basal inferolateral, basal inferoseptal,   basal inferior and basal inferoseptal.  · Left ventricular wall thickness is consistent with moderate concentric   hypertrophy.  · Left ventricular diastolic dysfunction (grade II) consistent with   pseudonormalization.  · Left atrial volume is mildly increased.  · There is moderate calcification of the aortic valve.  · Trace-to-mild aortic valve regurgitation is present.          Plan for Follow-up of Pending Labs/Results:     Discharge Details        Discharge Medications      New Medications      Instructions Start Date   amiodarone 400 MG tablet  Commonly known as: PACERONE   400 mg, Oral, Daily         Changes to Medications      Instructions Start Date   carvedilol 25 MG tablet  Commonly known as: COREG  What changed:   · medication strength  · how much to take   25 mg, Oral, 2 Times Daily      Entresto 24-26 MG tablet  Generic drug: sacubitril-valsartan  What changed: additional  instructions   1 tablet, Oral, 2 Times Daily      ezetimibe 10 MG tablet  Commonly known as: ZETIA  What changed: how to take this   TAKE 1 TABLET EVERY DAY         Continue These Medications      Instructions Start Date   albuterol sulfate  (90 Base) MCG/ACT inhaler  Commonly known as: PROVENTIL HFA;VENTOLIN HFA;PROAIR HFA   1-2 puffs, Inhalation, Every 6 Hours PRN      aspirin 81 MG EC tablet   81 mg, Oral, Daily      bumetanide 0.5 MG tablet  Commonly known as: BUMEX   1 mg, Oral, As Needed      Eliquis 5 MG tablet tablet  Generic drug: apixaban   TAKE 1 TABLET BY MOUTH EVERY 12 HOURS TO THIN BLOOD      glipiZIDE XL 2.5 MG 24 hr tablet  Generic drug: glipizide   2.5 mg, Oral, Daily      mexiletine 150 MG capsule  Commonly known as: MEXITIL   150 mg, Oral, 3 Times Daily      nitroglycerin 0.4 MG SL tablet  Commonly known as: NITROSTAT   0.4 mg, Sublingual, Every 5 Minutes PRN, Take no more than 3 doses in 15 minutes.       Repatha Pushtronex System 420 MG/3.5ML solution cartridge  Generic drug: Evolocumab with Infusor   INFUSE 420MG SUBCUTANEOUSLY VIA ON-BODY INFUSOR INTO THE APPROPRIATE AREA AS DIRECTED EVERY MONTH      rOPINIRole 1 MG tablet  Commonly known as: REQUIP   3 mg, Oral, Nightly      spironolactone 25 MG tablet  Commonly known as: ALDACTONE   25 mg, Oral, Daily      tiZANidine 4 MG tablet  Commonly known as: ZANAFLEX   8 mg, Oral, Nightly PRN      TRELEGY ELLIPTA IN   1 puff, Inhalation, Daily         Stop These Medications    sotalol 120 MG tablet  Commonly known as: BETAPACE            Allergies   Allergen Reactions   • Crestor [Rosuvastatin Calcium] Myalgia   • Lipitor [Atorvastatin] Myalgia     Joint pain myalgias   • Lopressor [Metoprolol Tartrate] Unknown (See Comments)     Side of face went numb     • Plavix [Clopidogrel Bisulfate] Unknown (See Comments)     Previously thought to be resistant; placed back on clopidogrel per Dr. Bravo earlier this year.  Confirmed with patient   • Adhesive  Tape Itching and Rash         Discharge Disposition:  Home or Self Care    Diet:  Hospital:No active diet order      Activity:  Activity Instructions     Gradually Increase Activity Until at Pre-Hospitalization Level            Restrictions or Other Recommendations:         CODE STATUS:    Code Status and Medical Interventions:   Ordered at: 10/16/20 0139     Code Status:    CPR     Medical Interventions (Level of Support Prior to Arrest):    Full       Future Appointments   Date Time Provider Department Center   12/21/2020  2:30 PM Calin Flaherty DO [facility] None   4/12/2021  3:45 PM Vlad Bravo MD [facility] None       Additional Instructions for the Follow-ups that You Need to Schedule     Discharge Follow-up with PCP   As directed       Currently Documented PCP:    Jn Beach MD    PCP Phone Number:    [phone]     Follow Up Details: 1-2 weeks         Discharge Follow-up with Specialty: Follow up in 2 months with Dr Flaherty and [facility] device check.; 2 Months   As directed      Specialty: Follow up in 2 months with Dr Flaherty and [facility] device check.    Follow Up: 2 Months         Discharge Follow-up with Specified Provider: dr flaherty; 2 Months   As directed      To: dr flaherty    Follow Up: 2 Months                     Mary Lion DO  10/23/20      Time Spent on Discharge:  I spent  35 minutes on this discharge activity which included: face-to-face encounter with the patient, reviewing the data in the system, coordination of the care with the nursing staff as well as consultants, documentation, and entering orders.

## 2020-10-26 ENCOUNTER — READMISSION MANAGEMENT (OUTPATIENT)
Dept: CALL CENTER | Facility: HOSPITAL | Age: 70
End: 2020-10-26

## 2020-10-26 NOTE — OUTREACH NOTE
CHF Week 1 Survey      Responses   [facility] patient discharged from?  [place]   Does the patient have one of the following disease processes/diagnoses(primary or secondary)?  CHF   CHF Week 1 attempt successful?  Yes   Call start time  1524   Call end time  1547   Discharge diagnosis  Ventricular tachycardia    Medication alerts for this patient  Amiodarone    Meds reviewed with patient/caregiver?  Yes   Is the patient having any side effects they believe may be caused by any medication additions or changes?  Yes   Side effects comments   nausea- but that resolved today.    Does the patient have all medications ordered at discharge?  Yes   Is the patient taking all medications as directed (includes completed medication regime)?  Yes   Does the patient have a primary care provider?   Yes   Does the patient have an appointment with their PCP within 7 days of discharge?  Yes   Has the patient kept scheduled appointments due by today?  N/A   Pulse Ox monitoring  Intermittent   Pulse Ox device source  Patient   O2 Sat comments  95-97% RA with exertion. On occas it goes below 90%.    O2 Sat: education provided  Sat levels, Monitoring frequency   Psychosocial issues?  No   Comments  Pt is unsure if he is feeling palpitations or high heart rate. His HR goes up with minimal activity 120's. Advised him to call Cardiology since his appt is not until Dec.    Did the patient receive a copy of their discharge instructions?  Yes   Nursing interventions  Reviewed instructions with patient   What is the patient's perception of their health status since discharge?  Improving   Nursing interventions  Nurse provided patient education   Is the patient weighing daily?  No   Does the patient have scales?  Yes   Daily weight interventions  Education provided on importance of daily weight   Is the patient able to teach back Heart Failure diet management?  Yes   Is the patient able to teach back signs and symptoms of worsening  condition? (i.e. weight gain, shortness of air, etc.)  Yes   If the patient is a current smoker, are they able to teach back resources for cessation?  -- [still smoking]   Is the patient/caregiver able to teach back the hierarchy of who to call/visit for symptoms/problems? PCP, Specialist, Home health nurse, Urgent Care, ED, 911  Yes    CHF Week 1 call completed?  Yes          Princess Holloway RN

## 2020-10-26 NOTE — OUTREACH NOTE
CHF Week 1 Survey      Responses   [facility] patient discharged from?  [place]   Does the patient have one of the following disease processes/diagnoses(primary or secondary)?  CHF   CHF Week 1 attempt successful?  Yes   Call start time  1401   Rescheduled  Rescheduled-pt requested   Call end time  1404   Discharge diagnosis  Ventricular tachycardia    Is patient permission given to speak with other caregiver?  Yes   List who call center can speak with  wife   Person spoke with today (if not patient) and relationship  wife   Medication alerts for this patient  Amiodarone           Princess Holloway, RN

## 2020-11-03 NOTE — OUTREACH NOTE
CHF Week 2 Survey      Responses   [facility] patient discharged from?  [place]   Does the patient have one of the following disease processes/diagnoses(primary or secondary)?  CHF   Week 2 attempt successful?  Yes   Call start time  1236   Call end time  1240   Meds reviewed with patient/caregiver?  Yes   Is the patient having any side effects they believe may be caused by any medication additions or changes?  Yes   Side effects comments   nausea   Is the patient taking all medications as directed (includes completed medication regime)?  Yes   Comments regarding appointments  Pt has seen pcp with lab work.   Has the patient kept scheduled appointments due by today?  Yes   Comments  Pt does not report any continued palpitations.   CHF Week 2 call completed?  Yes          Yessica Harrison RN

## 2020-11-12 NOTE — OUTREACH NOTE
CHF Week 3 Survey      Responses   [facility] patient discharged from?  [place]   Does the patient have one of the following disease processes/diagnoses(primary or secondary)?  CHF   Week 3 attempt successful?  Yes   Call start time  1127   Discharge diagnosis  Ventricular tachycardia    Is the patient taking all medications as directed (includes completed medication regime)?  Yes   Does the patient have a primary care provider?   Yes   Does the patient have an appointment with their PCP within 7 days of discharge?  Yes   Has the patient kept scheduled appointments due by today?  Yes   Psychosocial issues?  No   What is the patient's perception of their health status since discharge?  Improving   Nursing interventions  Nurse provided patient education   Is the patient weighing daily?  Yes   Does the patient have scales?  Yes   Daily weight interventions  Education provided on importance of daily weight   Is the patient able to teach back Heart Failure diet management?  Yes   Is the patient able to teach back Heart Failure Zones?  Yes   Is the patient able to teach back signs and symptoms of worsening condition? (i.e. weight gain, shortness of air, etc.)  Yes   Is the patient/caregiver able to teach back the hierarchy of who to call/visit for symptoms/problems? PCP, Specialist, Home health nurse, Urgent Care, ED, 911  Yes   Additional teach back comments  States he is doing pretty good.  States a few days ago he had symptoms of Covid19 and was tested and it was negative.  Symptoms have improved.  He had another follow up for lab results with PCP but cancelled due to illness and has it rescheduled for Mon.  Blood sugars are not checked regularly but checked yesterday and they were 150.  Does weigh daily.   CHF Week 3 call completed?  Yes   Wrap up additional comments  Denies questions or needs at this time          Hyun Jean LPN

## 2020-11-20 NOTE — OUTREACH NOTE
CHF Week 4 Survey      Responses   [facility] patient discharged from?  [place]   Does the patient have one of the following disease processes/diagnoses(primary or secondary)?  CHF   Week 4 attempt successful?  Yes   Call start time  1711   Call end time  1714   Discharge diagnosis  Ventricular tachycardia    Meds reviewed with patient/caregiver?  Yes   Is the patient having any side effects they believe may be caused by any medication additions or changes?  No   Is the patient taking all medications as directed (includes completed medication regime)?  Yes   Has the patient kept scheduled appointments due by today?  Yes   Is the patient still receiving Home Health Services?  No   Pulse Ox monitoring  Intermittent   Pulse Ox device source  Patient   Psychosocial issues?  No   What is the patient's perception of their health status since discharge?  Improving   Nursing interventions  Nurse provided patient education   Is the patient weighing daily?  Yes   Does the patient have scales?  Yes   Is the patient able to teach back Heart Failure diet management?  Yes   Is the patient able to teach back Heart Failure Zones?  Yes   Is the patient able to teach back signs and symptoms of worsening condition? (i.e. weight gain, shortness of air, etc.)  Yes   Is the patient/caregiver able to teach back the hierarchy of who to call/visit for symptoms/problems? PCP, Specialist, Home health nurse, Urgent Care, ED, 911  Yes   Additional teach back comments  states has been doing well, just sometimes weak   Week 4 Call Completed?  Yes   Would the patient like one additional call?  No   Graduated  Yes   Did the patient feel the follow up calls were helpful during their recovery period?  Yes   Was the number of calls appropriate?  Yes          Simran Salomon RN

## 2021-01-01 ENCOUNTER — PREP FOR SURGERY (OUTPATIENT)
Dept: OTHER | Facility: HOSPITAL | Age: 71
End: 2021-01-01

## 2021-01-01 ENCOUNTER — HOSPITAL ENCOUNTER (OUTPATIENT)
Dept: ULTRASOUND IMAGING | Facility: HOSPITAL | Age: 71
Discharge: HOME | End: 2021-10-04
Admitting: NURSE PRACTITIONER

## 2021-01-01 ENCOUNTER — HOSPITAL ENCOUNTER (OUTPATIENT)
Dept: ULTRASOUND IMAGING | Facility: HOSPITAL | Age: 71
Discharge: HOME | End: 2021-05-14
Admitting: FAMILY MEDICINE

## 2021-01-01 ENCOUNTER — APPOINTMENT (OUTPATIENT)
Dept: ULTRASOUND IMAGING | Facility: HOSPITAL | Age: 71
End: 2021-01-01

## 2021-01-01 ENCOUNTER — TRANSCRIBE ORDERS (OUTPATIENT)
Dept: LAB | Facility: HOSPITAL | Age: 71
End: 2021-01-01

## 2021-01-01 ENCOUNTER — TELEPHONE (OUTPATIENT)
Dept: CARDIOLOGY | Facility: CLINIC | Age: 71
End: 2021-01-01

## 2021-01-01 ENCOUNTER — APPOINTMENT (OUTPATIENT)
Dept: GENERAL RADIOLOGY | Facility: HOSPITAL | Age: 71
End: 2021-01-01

## 2021-01-01 ENCOUNTER — LAB (OUTPATIENT)
Dept: LAB | Facility: HOSPITAL | Age: 71
End: 2021-01-01

## 2021-01-01 ENCOUNTER — APPOINTMENT (OUTPATIENT)
Dept: CARDIOLOGY | Facility: HOSPITAL | Age: 71
End: 2021-01-01

## 2021-01-01 ENCOUNTER — HOSPITAL ENCOUNTER (OUTPATIENT)
Facility: HOSPITAL | Age: 71
Discharge: HOME | End: 2021-03-03
Attending: INTERNAL MEDICINE | Admitting: INTERNAL MEDICINE

## 2021-01-01 ENCOUNTER — OFFICE VISIT (OUTPATIENT)
Dept: CARDIOLOGY | Facility: CLINIC | Age: 71
End: 2021-01-01

## 2021-01-01 ENCOUNTER — TRANSCRIBE ORDERS (OUTPATIENT)
Dept: ULTRASOUND IMAGING | Facility: HOSPITAL | Age: 71
End: 2021-01-01

## 2021-01-01 ENCOUNTER — TRANSCRIBE ORDERS (OUTPATIENT)
Dept: ADMINISTRATIVE | Facility: HOSPITAL | Age: 71
End: 2021-01-01

## 2021-01-01 VITALS
HEIGHT: 68 IN | RESPIRATION RATE: 16 BRPM | DIASTOLIC BLOOD PRESSURE: 59 MMHG | WEIGHT: 163.1 LBS | BODY MASS INDEX: 24.72 KG/M2 | TEMPERATURE: 98.1 F | OXYGEN SATURATION: 97 % | SYSTOLIC BLOOD PRESSURE: 107 MMHG | HEART RATE: 52 BPM

## 2021-01-01 VITALS
DIASTOLIC BLOOD PRESSURE: 84 MMHG | OXYGEN SATURATION: 97 % | WEIGHT: 161 LBS | BODY MASS INDEX: 24.4 KG/M2 | HEART RATE: 84 BPM | HEIGHT: 68 IN | SYSTOLIC BLOOD PRESSURE: 155 MMHG

## 2021-01-01 DIAGNOSIS — I50.22 CHRONIC SYSTOLIC HEART FAILURE (HCC): ICD-10-CM

## 2021-01-01 DIAGNOSIS — I47.29 VENTRICULAR TACHYCARDIA (PAROXYSMAL) (HCC): ICD-10-CM

## 2021-01-01 DIAGNOSIS — R09.89 SUSPECTED DEEP VEIN THROMBOSIS (DVT): ICD-10-CM

## 2021-01-01 DIAGNOSIS — I25.5 ISCHEMIC CARDIOMYOPATHY: ICD-10-CM

## 2021-01-01 DIAGNOSIS — Z11.52 ENCOUNTER FOR SCREENING FOR COVID-19: Primary | ICD-10-CM

## 2021-01-01 DIAGNOSIS — R74.8 ABNORMAL LEVELS OF OTHER SERUM ENZYMES: Primary | ICD-10-CM

## 2021-01-01 DIAGNOSIS — I44.7 LBBB (LEFT BUNDLE BRANCH BLOCK): Primary | ICD-10-CM

## 2021-01-01 DIAGNOSIS — I44.7 LBBB (LEFT BUNDLE BRANCH BLOCK): ICD-10-CM

## 2021-01-01 DIAGNOSIS — R60.0 LOCALIZED EDEMA: Primary | ICD-10-CM

## 2021-01-01 DIAGNOSIS — R60.0 LOCALIZED EDEMA: ICD-10-CM

## 2021-01-01 DIAGNOSIS — M79.661 PAIN IN RIGHT LOWER LEG: ICD-10-CM

## 2021-01-01 DIAGNOSIS — I25.118 CORONARY ARTERY DISEASE OF NATIVE ARTERY OF NATIVE HEART WITH STABLE ANGINA PECTORIS (HCC): ICD-10-CM

## 2021-01-01 DIAGNOSIS — R74.8 ABNORMAL LEVELS OF OTHER SERUM ENZYMES: ICD-10-CM

## 2021-01-01 DIAGNOSIS — Z11.52 ENCOUNTER FOR SCREENING FOR COVID-19: ICD-10-CM

## 2021-01-01 DIAGNOSIS — I25.9 ISCHEMIC HEART DISEASE: Primary | ICD-10-CM

## 2021-01-01 DIAGNOSIS — I73.9 PAD (PERIPHERAL ARTERY DISEASE) (HCC): ICD-10-CM

## 2021-01-01 LAB
ALBUMIN SERPL-MCNC: 3.6 G/DL (ref 3.5–5.2)
ALBUMIN/GLOB SERPL: 1.2 G/DL
ALP SERPL-CCNC: 108 U/L (ref 39–117)
ALT SERPL-CCNC: 56 U/L (ref 1–41)
ANION GAP SERPL CALCULATED.3IONS-SCNC: 7 MMOL/L (ref 5–15)
AST SERPL-CCNC: 35 U/L (ref 1–40)
BH CV ECHO MEAS - AO ROOT AREA (BSA CORRECTED): 1.6
BH CV ECHO MEAS - AO ROOT AREA: 6.8 CM^2
BH CV ECHO MEAS - AO ROOT DIAM: 2.9 CM
BH CV ECHO MEAS - BSA(HAYCOCK): 1.9 M^2
BH CV ECHO MEAS - BSA: 1.9 M^2
BH CV ECHO MEAS - BZI_BMI: 24.6 KILOGRAMS/M^2
BH CV ECHO MEAS - BZI_METRIC_HEIGHT: 172.7 CM
BH CV ECHO MEAS - BZI_METRIC_WEIGHT: 73.5 KG
BH CV ECHO MEAS - EDV(CUBED): 87.3 ML
BH CV ECHO MEAS - EDV(MOD-SP2): 156 ML
BH CV ECHO MEAS - EDV(MOD-SP4): 137 ML
BH CV ECHO MEAS - EDV(TEICH): 89.4 ML
BH CV ECHO MEAS - EF(CUBED): 60.8 %
BH CV ECHO MEAS - EF(MOD-BP): 35 %
BH CV ECHO MEAS - EF(MOD-SP2): 46.8 %
BH CV ECHO MEAS - EF(MOD-SP4): 50.4 %
BH CV ECHO MEAS - EF(TEICH): 52.6 %
BH CV ECHO MEAS - ESV(CUBED): 34.2 ML
BH CV ECHO MEAS - ESV(MOD-SP2): 83 ML
BH CV ECHO MEAS - ESV(MOD-SP4): 68 ML
BH CV ECHO MEAS - ESV(TEICH): 42.4 ML
BH CV ECHO MEAS - FS: 26.8 %
BH CV ECHO MEAS - IVS/LVPW: 1.1
BH CV ECHO MEAS - IVSD: 1 CM
BH CV ECHO MEAS - LA DIMENSION: 4.1 CM
BH CV ECHO MEAS - LA/AO: 1.4
BH CV ECHO MEAS - LV DIASTOLIC VOL/BSA (35-75): 73.3 ML/M^2
BH CV ECHO MEAS - LV MASS(C)D: 151.4 GRAMS
BH CV ECHO MEAS - LV MASS(C)DI: 81 GRAMS/M^2
BH CV ECHO MEAS - LV SYSTOLIC VOL/BSA (12-30): 36.4 ML/M^2
BH CV ECHO MEAS - LVIDD: 4.4 CM
BH CV ECHO MEAS - LVIDS: 3.2 CM
BH CV ECHO MEAS - LVLD AP2: 8.9 CM
BH CV ECHO MEAS - LVLD AP4: 9.2 CM
BH CV ECHO MEAS - LVLS AP2: 8.2 CM
BH CV ECHO MEAS - LVLS AP4: 8.8 CM
BH CV ECHO MEAS - LVOT AREA (M): 3.1 CM^2
BH CV ECHO MEAS - LVOT AREA: 3.3 CM^2
BH CV ECHO MEAS - LVOT DIAM: 2 CM
BH CV ECHO MEAS - LVPWD: 0.97 CM
BH CV ECHO MEAS - MV DEC TIME: 0.22 SEC
BH CV ECHO MEAS - MV E MAX VEL: 127.8 CM/SEC
BH CV ECHO MEAS - RAP SYSTOLE: 8 MMHG
BH CV ECHO MEAS - RVSP: 40 MMHG
BH CV ECHO MEAS - SI(CUBED): 28.4 ML/M^2
BH CV ECHO MEAS - SI(MOD-SP2): 39.1 ML/M^2
BH CV ECHO MEAS - SI(MOD-SP4): 36.9 ML/M^2
BH CV ECHO MEAS - SI(TEICH): 25.2 ML/M^2
BH CV ECHO MEAS - SV(CUBED): 53.1 ML
BH CV ECHO MEAS - SV(MOD-SP2): 73 ML
BH CV ECHO MEAS - SV(MOD-SP4): 69 ML
BH CV ECHO MEAS - SV(TEICH): 47 ML
BH CV ECHO MEAS - TR MAX PG: 32 MMHG
BH CV ECHO MEAS - TR MAX VEL: 282 CM/SEC
BH CV VAS BP LEFT ARM: NORMAL MMHG
BH CV XLRA - RV BASE: 3.9 CM
BH CV XLRA - RV LENGTH: 7.4 CM
BH CV XLRA - RV MID: 2.8 CM
BILIRUB SERPL-MCNC: 0.4 MG/DL (ref 0.2–1.2)
BUN SERPL-MCNC: 12 MG/DL (ref 8–23)
BUN SERPL-MCNC: 16 MG/DL (ref 8–23)
BUN/CREAT SERPL: 11.3 (ref 7–25)
BUN/CREAT SERPL: 15.7 (ref 7–25)
CALCIUM SERPL-MCNC: 9.5 MG/DL (ref 8.6–10.5)
CALCIUM SPEC-SCNC: 8.6 MG/DL (ref 8.6–10.5)
CHLORIDE SERPL-SCNC: 103 MMOL/L (ref 98–107)
CHLORIDE SERPL-SCNC: 109 MMOL/L (ref 98–107)
CO2 SERPL-SCNC: 29 MMOL/L (ref 22–29)
CO2 SERPL-SCNC: 29 MMOL/L (ref 22–29)
CREAT SERPL-MCNC: 1.02 MG/DL (ref 0.76–1.27)
CREAT SERPL-MCNC: 1.06 MG/DL (ref 0.76–1.27)
DEPRECATED RDW RBC AUTO: 49.3 FL (ref 37–54)
ERYTHROCYTE [DISTWIDTH] IN BLOOD BY AUTOMATED COUNT: 13.6 % (ref 12.3–15.4)
GFR SERPL CREATININE-BSD FRML MDRD: 69 ML/MIN/1.73
GLOBULIN SER CALC-MCNC: 3 GM/DL
GLUCOSE SERPL-MCNC: 111 MG/DL (ref 65–99)
GLUCOSE SERPL-MCNC: 172 MG/DL (ref 65–99)
HCT VFR BLD AUTO: 43.8 % (ref 37.5–51)
HGB BLD-MCNC: 13.7 G/DL (ref 13–17.7)
LV EF 2D ECHO EST: 35 %
MCH RBC QN AUTO: 30.5 PG (ref 26.6–33)
MCHC RBC AUTO-ENTMCNC: 31.3 G/DL (ref 31.5–35.7)
MCV RBC AUTO: 97.6 FL (ref 79–97)
PLATELET # BLD AUTO: 156 10*3/MM3 (ref 140–450)
PMV BLD AUTO: 10.6 FL (ref 6–12)
POTASSIUM SERPL-SCNC: 4.6 MMOL/L (ref 3.5–5.2)
POTASSIUM SERPL-SCNC: 4.9 MMOL/L (ref 3.5–5.2)
PROT SERPL-MCNC: 6.6 G/DL (ref 6–8.5)
QT INTERVAL: 498 MS
QTC INTERVAL: 472 MS
RBC # BLD AUTO: 4.49 10*6/MM3 (ref 4.14–5.8)
SARS-COV-2 RNA PNL SPEC NAA+PROBE: NOT DETECTED
SODIUM SERPL-SCNC: 139 MMOL/L (ref 136–145)
SODIUM SERPL-SCNC: 149 MMOL/L (ref 136–145)
T4 FREE SERPL-MCNC: 1.41 NG/DL (ref 0.89–1.76)
TSH SERPL DL<=0.005 MIU/L-ACNC: 5.18 MIU/ML (ref 0.27–4.2)
WBC # BLD AUTO: 7 10*3/MM3 (ref 3.4–10.8)

## 2021-01-01 PROCEDURE — A9270 NON-COVERED ITEM OR SERVICE: HCPCS | Performed by: NURSE PRACTITIONER

## 2021-01-01 PROCEDURE — 71046 X-RAY EXAM CHEST 2 VIEWS: CPT

## 2021-01-01 PROCEDURE — 93005 ELECTROCARDIOGRAM TRACING: CPT | Performed by: INTERNAL MEDICINE

## 2021-01-01 PROCEDURE — 99215 OFFICE O/P EST HI 40 MIN: CPT | Performed by: INTERNAL MEDICINE

## 2021-01-01 PROCEDURE — 93325 DOPPLER ECHO COLOR FLOW MAPG: CPT | Performed by: INTERNAL MEDICINE

## 2021-01-01 PROCEDURE — A9270 NON-COVERED ITEM OR SERVICE: HCPCS | Performed by: INTERNAL MEDICINE

## 2021-01-01 PROCEDURE — S0260 H&P FOR SURGERY: HCPCS | Performed by: NURSE PRACTITIONER

## 2021-01-01 PROCEDURE — 93321 DOPPLER ECHO F-UP/LMTD STD: CPT | Performed by: INTERNAL MEDICINE

## 2021-01-01 PROCEDURE — 93321 DOPPLER ECHO F-UP/LMTD STD: CPT

## 2021-01-01 PROCEDURE — 93641 EP EVL 1/2CHMB PAC CVDFB TST: CPT | Performed by: INTERNAL MEDICINE

## 2021-01-01 PROCEDURE — 63710000001 MEXILETINE 150 MG CAPSULE: Performed by: INTERNAL MEDICINE

## 2021-01-01 PROCEDURE — 99153 MOD SED SAME PHYS/QHP EA: CPT | Performed by: INTERNAL MEDICINE

## 2021-01-01 PROCEDURE — 63710000001 AMIODARONE 200 MG TABLET: Performed by: INTERNAL MEDICINE

## 2021-01-01 PROCEDURE — 93296 REM INTERROG EVL PM/IDS: CPT | Performed by: INTERNAL MEDICINE

## 2021-01-01 PROCEDURE — 99152 MOD SED SAME PHYS/QHP 5/>YRS: CPT | Performed by: INTERNAL MEDICINE

## 2021-01-01 PROCEDURE — C1894 INTRO/SHEATH, NON-LASER: HCPCS | Performed by: INTERNAL MEDICINE

## 2021-01-01 PROCEDURE — C1882 AICD, OTHER THAN SING/DUAL: HCPCS | Performed by: INTERNAL MEDICINE

## 2021-01-01 PROCEDURE — 63710000001 CARVEDILOL 12.5 MG TABLET: Performed by: INTERNAL MEDICINE

## 2021-01-01 PROCEDURE — C1900 LEAD, CORONARY VENOUS: HCPCS | Performed by: INTERNAL MEDICINE

## 2021-01-01 PROCEDURE — 93295 DEV INTERROG REMOTE 1/2/MLT: CPT | Performed by: INTERNAL MEDICINE

## 2021-01-01 PROCEDURE — 63710000001 SPIRONOLACTONE 25 MG TABLET: Performed by: INTERNAL MEDICINE

## 2021-01-01 PROCEDURE — C1892 INTRO/SHEATH,FIXED,PEEL-AWAY: HCPCS | Performed by: INTERNAL MEDICINE

## 2021-01-01 PROCEDURE — 25010000003 CEFAZOLIN IN DEXTROSE 2-4 GM/100ML-% SOLUTION: Performed by: NURSE PRACTITIONER

## 2021-01-01 PROCEDURE — 25010000002 FENTANYL CITRATE (PF) 100 MCG/2ML SOLUTION: Performed by: INTERNAL MEDICINE

## 2021-01-01 PROCEDURE — C1769 GUIDE WIRE: HCPCS | Performed by: INTERNAL MEDICINE

## 2021-01-01 PROCEDURE — 33264 RMVL & RPLCMT DFB GEN MLT LD: CPT | Performed by: INTERNAL MEDICINE

## 2021-01-01 PROCEDURE — 76705 ECHO EXAM OF ABDOMEN: CPT

## 2021-01-01 PROCEDURE — 63710000001 ACETAMINOPHEN 325 MG TABLET: Performed by: INTERNAL MEDICINE

## 2021-01-01 PROCEDURE — 93971 EXTREMITY STUDY: CPT

## 2021-01-01 PROCEDURE — C9803 HOPD COVID-19 SPEC COLLECT: HCPCS

## 2021-01-01 PROCEDURE — 93971 EXTREMITY STUDY: CPT | Performed by: RADIOLOGY

## 2021-01-01 PROCEDURE — 93325 DOPPLER ECHO COLOR FLOW MAPG: CPT

## 2021-01-01 PROCEDURE — 80048 BASIC METABOLIC PNL TOTAL CA: CPT | Performed by: INTERNAL MEDICINE

## 2021-01-01 PROCEDURE — 33225 L VENTRIC PACING LEAD ADD-ON: CPT | Performed by: INTERNAL MEDICINE

## 2021-01-01 PROCEDURE — 85027 COMPLETE CBC AUTOMATED: CPT | Performed by: INTERNAL MEDICINE

## 2021-01-01 PROCEDURE — 63710000001 IBUPROFEN 600 MG TABLET: Performed by: INTERNAL MEDICINE

## 2021-01-01 PROCEDURE — 63710000001 ROPINIROLE 1 MG TABLET: Performed by: NURSE PRACTITIONER

## 2021-01-01 PROCEDURE — 25010000002 MIDAZOLAM PER 1 MG: Performed by: INTERNAL MEDICINE

## 2021-01-01 PROCEDURE — 93308 TTE F-UP OR LMTD: CPT | Performed by: INTERNAL MEDICINE

## 2021-01-01 PROCEDURE — 99024 POSTOP FOLLOW-UP VISIT: CPT | Performed by: NURSE PRACTITIONER

## 2021-01-01 PROCEDURE — 63710000001 SACUBITRIL-VALSARTAN 49-51 MG TABLET: Performed by: INTERNAL MEDICINE

## 2021-01-01 PROCEDURE — 93010 ELECTROCARDIOGRAM REPORT: CPT | Performed by: INTERNAL MEDICINE

## 2021-01-01 PROCEDURE — 93283 PRGRMG EVAL IMPLANTABLE DFB: CPT | Performed by: INTERNAL MEDICINE

## 2021-01-01 PROCEDURE — 0 IOPAMIDOL PER 1 ML: Performed by: INTERNAL MEDICINE

## 2021-01-01 PROCEDURE — 63710000001 ASPIRIN 81 MG TABLET DELAYED-RELEASE: Performed by: INTERNAL MEDICINE

## 2021-01-01 PROCEDURE — U0004 COV-19 TEST NON-CDC HGH THRU: HCPCS | Performed by: SURGERY

## 2021-01-01 PROCEDURE — 93000 ELECTROCARDIOGRAM COMPLETE: CPT | Performed by: INTERNAL MEDICINE

## 2021-01-01 PROCEDURE — 76705 ECHO EXAM OF ABDOMEN: CPT | Performed by: RADIOLOGY

## 2021-01-01 PROCEDURE — 93308 TTE F-UP OR LMTD: CPT

## 2021-01-01 DEVICE — IMPLANTABLE DEVICE: Type: IMPLANTABLE DEVICE | Status: FUNCTIONAL

## 2021-01-01 DEVICE — ENV PM AIGISRX ANTIBAC RESORB 2.9X3.3IN LG: Type: IMPLANTABLE DEVICE | Status: FUNCTIONAL

## 2021-01-01 DEVICE — LD ATTAIN PERFORMA S SHAPE LV 4598 88CM: Type: IMPLANTABLE DEVICE | Status: FUNCTIONAL

## 2021-01-01 RX ORDER — MEXILETINE HYDROCHLORIDE 150 MG/1
150 CAPSULE ORAL 3 TIMES DAILY
Qty: 270 CAPSULE | Refills: 3 | Status: SHIPPED | OUTPATIENT
Start: 2021-01-01

## 2021-01-01 RX ORDER — FENTANYL CITRATE 50 UG/ML
INJECTION, SOLUTION INTRAMUSCULAR; INTRAVENOUS AS NEEDED
Status: DISCONTINUED | OUTPATIENT
Start: 2021-01-01 | End: 2021-01-01 | Stop reason: HOSPADM

## 2021-01-01 RX ORDER — ACETAMINOPHEN 325 MG/1
650 TABLET ORAL EVERY 4 HOURS PRN
Status: CANCELLED | OUTPATIENT
Start: 2021-01-01

## 2021-01-01 RX ORDER — ACETAMINOPHEN 325 MG/1
650 TABLET ORAL EVERY 4 HOURS PRN
Status: DISCONTINUED | OUTPATIENT
Start: 2021-01-01 | End: 2021-01-01 | Stop reason: HOSPADM

## 2021-01-01 RX ORDER — CEFAZOLIN SODIUM 2 G/100ML
2 INJECTION, SOLUTION INTRAVENOUS ONCE
Status: CANCELLED | OUTPATIENT
Start: 2021-01-01 | End: 2021-01-01

## 2021-01-01 RX ORDER — AMIODARONE HYDROCHLORIDE 200 MG/1
400 TABLET ORAL DAILY
Status: DISCONTINUED | OUTPATIENT
Start: 2021-01-01 | End: 2021-01-01 | Stop reason: HOSPADM

## 2021-01-01 RX ORDER — BUMETANIDE 1 MG/1
1 TABLET ORAL DAILY PRN
Status: DISCONTINUED | OUTPATIENT
Start: 2021-01-01 | End: 2021-01-01 | Stop reason: HOSPADM

## 2021-01-01 RX ORDER — SODIUM CHLORIDE 0.9 % (FLUSH) 0.9 %
1-10 SYRINGE (ML) INJECTION AS NEEDED
Status: CANCELLED | OUTPATIENT
Start: 2021-01-01

## 2021-01-01 RX ORDER — SPIRONOLACTONE 25 MG/1
25 TABLET ORAL DAILY
Qty: 14 TABLET | Refills: 3 | Status: SHIPPED | OUTPATIENT
Start: 2021-01-01

## 2021-01-01 RX ORDER — IBUPROFEN 600 MG/1
600 TABLET ORAL EVERY 6 HOURS SCHEDULED
Qty: 17 TABLET | Refills: 0 | Status: SHIPPED | OUTPATIENT
Start: 2021-01-01 | End: 2021-01-01

## 2021-01-01 RX ORDER — AMIODARONE HYDROCHLORIDE 200 MG/1
400 TABLET ORAL DAILY
Qty: 180 TABLET | Refills: 3 | Status: SHIPPED | OUTPATIENT
Start: 2021-01-01

## 2021-01-01 RX ORDER — CARVEDILOL 25 MG/1
12.5 TABLET ORAL 2 TIMES DAILY
Qty: 14 TABLET | Refills: 3 | Status: SHIPPED | OUTPATIENT
Start: 2021-01-01

## 2021-01-01 RX ORDER — SODIUM CHLORIDE 9 MG/ML
1 INJECTION, SOLUTION INTRAVENOUS CONTINUOUS
Status: CANCELLED | OUTPATIENT
Start: 2021-01-01 | End: 2021-01-01

## 2021-01-01 RX ORDER — CHLORHEXIDINE GLUCONATE 4 G/100ML
SOLUTION TOPICAL
Qty: 100 ML | Refills: 0 | Status: CANCELLED | OUTPATIENT
Start: 2021-01-01

## 2021-01-01 RX ORDER — NITROGLYCERIN 0.4 MG/1
0.4 TABLET SUBLINGUAL
Status: DISCONTINUED | OUTPATIENT
Start: 2021-01-01 | End: 2021-01-01 | Stop reason: HOSPADM

## 2021-01-01 RX ORDER — BUMETANIDE 0.5 MG/1
1 TABLET ORAL DAILY PRN
Qty: 180 TABLET | Refills: 3 | Status: SHIPPED | OUTPATIENT
Start: 2021-01-01

## 2021-01-01 RX ORDER — ROPINIROLE 3 MG/1
3 TABLET, FILM COATED ORAL NIGHTLY
COMMUNITY

## 2021-01-01 RX ORDER — CEFAZOLIN SODIUM 2 G/100ML
2 INJECTION, SOLUTION INTRAVENOUS ONCE
Status: COMPLETED | OUTPATIENT
Start: 2021-01-01 | End: 2021-01-01

## 2021-01-01 RX ORDER — SODIUM CHLORIDE 0.9 % (FLUSH) 0.9 %
10 SYRINGE (ML) INJECTION AS NEEDED
Status: DISCONTINUED | OUTPATIENT
Start: 2021-01-01 | End: 2021-01-01 | Stop reason: HOSPADM

## 2021-01-01 RX ORDER — SODIUM CHLORIDE 0.9 % (FLUSH) 0.9 %
1-10 SYRINGE (ML) INJECTION AS NEEDED
Status: DISCONTINUED | OUTPATIENT
Start: 2021-01-01 | End: 2021-01-01 | Stop reason: HOSPADM

## 2021-01-01 RX ORDER — BUPIVACAINE HYDROCHLORIDE 5 MG/ML
INJECTION, SOLUTION PERINEURAL AS NEEDED
Status: DISCONTINUED | OUTPATIENT
Start: 2021-01-01 | End: 2021-01-01 | Stop reason: HOSPADM

## 2021-01-01 RX ORDER — SODIUM CHLORIDE 0.9 % (FLUSH) 0.9 %
3 SYRINGE (ML) INJECTION EVERY 12 HOURS SCHEDULED
Status: CANCELLED | OUTPATIENT
Start: 2021-01-01

## 2021-01-01 RX ORDER — AMIODARONE HYDROCHLORIDE 400 MG/1
400 TABLET ORAL DAILY
Qty: 90 TABLET | Refills: 3 | Status: CANCELLED | OUTPATIENT
Start: 2021-01-01

## 2021-01-01 RX ORDER — NITROGLYCERIN 0.4 MG/1
0.4 TABLET SUBLINGUAL
Status: CANCELLED | OUTPATIENT
Start: 2021-01-01

## 2021-01-01 RX ORDER — MEXILETINE HYDROCHLORIDE 150 MG/1
150 CAPSULE ORAL 3 TIMES DAILY
Status: DISCONTINUED | OUTPATIENT
Start: 2021-01-01 | End: 2021-01-01 | Stop reason: HOSPADM

## 2021-01-01 RX ORDER — ACETAMINOPHEN 325 MG/1
650 TABLET ORAL EVERY 6 HOURS
Status: DISCONTINUED | OUTPATIENT
Start: 2021-01-01 | End: 2021-01-01 | Stop reason: HOSPADM

## 2021-01-01 RX ORDER — CARVEDILOL 12.5 MG/1
25 TABLET ORAL 2 TIMES DAILY
Status: DISCONTINUED | OUTPATIENT
Start: 2021-01-01 | End: 2021-01-01 | Stop reason: HOSPADM

## 2021-01-01 RX ORDER — ASPIRIN 81 MG/1
81 TABLET ORAL DAILY
Status: DISCONTINUED | OUTPATIENT
Start: 2021-01-01 | End: 2021-01-01 | Stop reason: HOSPADM

## 2021-01-01 RX ORDER — BUMETANIDE 0.5 MG/1
1 TABLET ORAL AS NEEDED
Qty: 90 TABLET | Refills: 3 | Status: SHIPPED | OUTPATIENT
Start: 2021-01-01 | End: 2021-01-01 | Stop reason: SDUPTHER

## 2021-01-01 RX ORDER — SACUBITRIL AND VALSARTAN 49; 51 MG/1; MG/1
0.5 TABLET, FILM COATED ORAL 2 TIMES DAILY
Qty: 180 TABLET | Refills: 3 | Status: SHIPPED | OUTPATIENT
Start: 2021-01-01

## 2021-01-01 RX ORDER — SODIUM CHLORIDE 0.9 % (FLUSH) 0.9 %
3 SYRINGE (ML) INJECTION EVERY 12 HOURS SCHEDULED
Status: DISCONTINUED | OUTPATIENT
Start: 2021-01-01 | End: 2021-01-01 | Stop reason: HOSPADM

## 2021-01-01 RX ORDER — EZETIMIBE 10 MG/1
10 TABLET ORAL DAILY
Qty: 90 TABLET | Refills: 3 | Status: SHIPPED | OUTPATIENT
Start: 2021-01-01

## 2021-01-01 RX ORDER — MIDAZOLAM HYDROCHLORIDE 1 MG/ML
INJECTION INTRAMUSCULAR; INTRAVENOUS AS NEEDED
Status: DISCONTINUED | OUTPATIENT
Start: 2021-01-01 | End: 2021-01-01 | Stop reason: HOSPADM

## 2021-01-01 RX ORDER — LIDOCAINE HYDROCHLORIDE 10 MG/ML
INJECTION, SOLUTION EPIDURAL; INFILTRATION; INTRACAUDAL; PERINEURAL AS NEEDED
Status: DISCONTINUED | OUTPATIENT
Start: 2021-01-01 | End: 2021-01-01 | Stop reason: HOSPADM

## 2021-01-01 RX ORDER — ACETAMINOPHEN 325 MG/1
650 TABLET ORAL EVERY 6 HOURS
Qty: 34 TABLET | Refills: 0 | Status: SHIPPED | OUTPATIENT
Start: 2021-01-01 | End: 2021-01-01

## 2021-01-01 RX ORDER — SPIRONOLACTONE 25 MG/1
25 TABLET ORAL DAILY
Status: DISCONTINUED | OUTPATIENT
Start: 2021-01-01 | End: 2021-01-01 | Stop reason: HOSPADM

## 2021-01-01 RX ORDER — SODIUM CHLORIDE 9 MG/ML
1 INJECTION, SOLUTION INTRAVENOUS CONTINUOUS
Status: ACTIVE | OUTPATIENT
Start: 2021-01-01 | End: 2021-01-01

## 2021-01-01 RX ORDER — SACUBITRIL AND VALSARTAN 49; 51 MG/1; MG/1
0.5 TABLET, FILM COATED ORAL 2 TIMES DAILY
COMMUNITY
End: 2021-01-01 | Stop reason: SDUPTHER

## 2021-01-01 RX ORDER — ROPINIROLE 1 MG/1
1 TABLET, FILM COATED ORAL NIGHTLY
Status: DISCONTINUED | OUTPATIENT
Start: 2021-01-01 | End: 2021-01-01 | Stop reason: HOSPADM

## 2021-01-01 RX ORDER — IBUPROFEN 600 MG/1
600 TABLET ORAL EVERY 6 HOURS SCHEDULED
Status: DISCONTINUED | OUTPATIENT
Start: 2021-01-01 | End: 2021-01-01 | Stop reason: HOSPADM

## 2021-01-01 RX ADMIN — SODIUM CHLORIDE 1 ML/KG/HR: 9 INJECTION, SOLUTION INTRAVENOUS at 12:24

## 2021-01-01 RX ADMIN — ROPINIROLE HYDROCHLORIDE 1 MG: 1 TABLET, FILM COATED ORAL at 23:24

## 2021-01-01 RX ADMIN — IBUPROFEN 600 MG: 600 TABLET, FILM COATED ORAL at 18:46

## 2021-01-01 RX ADMIN — IBUPROFEN 600 MG: 600 TABLET, FILM COATED ORAL at 23:24

## 2021-01-01 RX ADMIN — CEFAZOLIN SODIUM 2 G: 2 INJECTION, SOLUTION INTRAVENOUS at 15:10

## 2021-01-01 RX ADMIN — ASPIRIN 81 MG: 81 TABLET, COATED ORAL at 20:53

## 2021-01-01 RX ADMIN — SPIRONOLACTONE 25 MG: 25 TABLET ORAL at 09:10

## 2021-01-01 RX ADMIN — AMIODARONE HYDROCHLORIDE 400 MG: 200 TABLET ORAL at 09:07

## 2021-01-01 RX ADMIN — SODIUM CHLORIDE, PRESERVATIVE FREE 3 ML: 5 INJECTION INTRAVENOUS at 11:03

## 2021-01-01 RX ADMIN — ASPIRIN 81 MG: 81 TABLET, COATED ORAL at 09:06

## 2021-01-01 RX ADMIN — MEXILETINE HYDROCHLORIDE 150 MG: 150 CAPSULE ORAL at 09:09

## 2021-01-01 RX ADMIN — IBUPROFEN 600 MG: 600 TABLET, FILM COATED ORAL at 05:50

## 2021-01-01 RX ADMIN — MEXILETINE HYDROCHLORIDE 150 MG: 150 CAPSULE ORAL at 20:57

## 2021-01-01 RX ADMIN — ACETAMINOPHEN 650 MG: 325 TABLET ORAL at 09:05

## 2021-01-01 RX ADMIN — SODIUM CHLORIDE, PRESERVATIVE FREE 3 ML: 5 INJECTION INTRAVENOUS at 20:53

## 2021-01-01 RX ADMIN — CARVEDILOL 25 MG: 12.5 TABLET, FILM COATED ORAL at 21:58

## 2021-01-01 RX ADMIN — SACUBITRIL AND VALSARTAN 0.5 TABLET: 49; 51 TABLET, FILM COATED ORAL at 20:55

## 2021-01-01 RX ADMIN — SACUBITRIL AND VALSARTAN 0.5 TABLET: 49; 51 TABLET, FILM COATED ORAL at 10:43

## 2021-01-01 RX ADMIN — ACETAMINOPHEN 650 MG: 325 TABLET ORAL at 20:53

## 2021-01-01 RX ADMIN — ACETAMINOPHEN 650 MG: 325 TABLET ORAL at 04:38

## 2021-01-01 RX ADMIN — SODIUM CHLORIDE, PRESERVATIVE FREE 10 ML: 5 INJECTION INTRAVENOUS at 09:11

## 2021-01-25 PROBLEM — I47.29 VENTRICULAR TACHYCARDIA (PAROXYSMAL) (HCC): Status: ACTIVE | Noted: 2021-01-01

## 2021-01-25 PROBLEM — Z95.810 ICD (IMPLANTABLE CARDIOVERTER-DEFIBRILLATOR), DUAL, IN SITU: Status: ACTIVE | Noted: 2021-01-01

## 2021-01-25 NOTE — PROGRESS NOTES
Cardiac Electrophysiology Outpatient Follow Up Note            [facility]    Follow Up Office Visit      Derek Morris Jr.  [id number]  01/25/2021  [unfilled]  [unfilled]    Primary Care Physician: Jn Beach MD    Referred By: No ref. provider found    Subjective     Chief Complaint:   Diagnoses and all orders for this visit:    1. Ischemic heart disease (Primary)  -     TSH; Future  -     T4, Free; Future  -     Comprehensive Metabolic Panel; Future  -     Ambulatory Referral to Neurology    2. Chronic systolic heart failure (CMS/HCC)  -     TSH; Future  -     T4, Free; Future  -     Comprehensive Metabolic Panel; Future  -     Case Request EP Lab: Biventricular Device Upgrade ICD, after seeing a neurologist, Pt will need to be mailed Hibiclense, hold Eliquis 2 days before  -     Ambulatory Referral to Neurology    3. Coronary artery disease of native artery of native heart with stable angina pectoris (CMS/HCC)  -     TSH; Future  -     T4, Free; Future  -     Comprehensive Metabolic Panel; Future  -     Ambulatory Referral to Neurology    4. Ischemic cardiomyopathy  -     TSH; Future  -     T4, Free; Future  -     Comprehensive Metabolic Panel; Future  -     Case Request EP Lab: Biventricular Device Upgrade ICD, after seeing a neurologist, Pt will need to be mailed Hibiclense, hold Eliquis 2 days before  -     Ambulatory Referral to Neurology    5. PAD (peripheral artery disease) (CMS/HCC)  -     TSH; Future  -     T4, Free; Future  -     Comprehensive Metabolic Panel; Future  -     Ambulatory Referral to Neurology    6. Ventricular tachycardia (paroxysmal) (CMS/HCC)  -     TSH; Future  -     T4, Free; Future  -     Comprehensive Metabolic Panel; Future  -     Case Request EP Lab: Biventricular Device Upgrade ICD, after seeing a neurologist, Pt will need to be mailed Hibiclense, hold Eliquis 2 days before  -     Ambulatory Referral to  Neurology    7. LBBB (left bundle branch block)      Chief Complaint   Patient presents with   • Cardiomyopathy       History of Present Illness:   Derek Morris Jr. is a 70 y.o. male who presents to my electrophysiology clinic for follow up of the above complaints.  He is very pleased that he is not had any further ICD shocks.  This gentleman underwent extensive catheter ablation of the left ventricle very involved ablation procedure for many hours in the early portion of 2020 and did well for a while.  He had recurrent arrhythmia requiring increasing doses of amiodarone in the fall.  On 8 200 mg of amiodarone today he had recurrent VT on 400 mg of amiodarone today he has had no VT.    Overall he says his breathing is better.  He still gets a little bit winded when he ambulates takes maybe a half a block of walking before he gets short of breath.  The most compelling issue for him really is the fact that his legs are weak.  They can started to get weak after we increased the amiodarone dose to the point where is hard for him to walk.  He now says is actually getting better once again however it still is an issue for him he thinks he is losing some muscle mass in his legs and the weakness is not back to baseline.    He has not seen an eye doctor in very long time now.    He has not had pulmonary function tests in a very long time now either.          Review of Systems:   Constitutional: No fevers or chills, no recent weight gain or weight loss or fatigue  Eyes: No visual loss, blurred vision, double vision, yellow sclerae.  ENT: No headaches, hearing loss, vertigo, congestion or sore throat.   Cardiovascular: Per HPI  Respiratory: No cough or wheezing, no sputum production, no hematemesis   Gastrointestinal: No abdominal pain, no nausea, vomiting, constipation, diarrhea, melena.   Genitourinary: No dysuria, hematuria or increased frequency.  Musculoskeletal:  No gait disturbance, weakness or joint pain or  stiffness  Integumentary: No rashes, urticaria, ulcers or sores.   Neurological: No headache, dizziness, syncope, paralysis, ataxia, no prior CVA/TIA  Psychiatric: No anxiety, or depression  Endocrine: No diaphoresis, cold or heat intolerance. No polyuria or polydipsia.   Hematologic/Lymphatic: No anemia, abnormal bruising or bleeding. No history of DVT/PE.      Past Medical History:   Past Medical History:   Diagnosis Date   • SIMÓN (acute kidney injury) (CMS/HCC) 7/2/2020   • Arthritis    • Cardiomyopathy (CMS/HCC)    • Carotid stenosis    • CHF (congestive heart failure) (CMS/HCC)    • Claudication (CMS/HCC) 6/26/2018    Added automatically from request for surgery [id number]   • COPD (chronic obstructive pulmonary disease) (CMS/HCC)    • Coronary artery disease    • Diabetes mellitus (CMS/HCC)    • Enlarged prostate    • GERD (gastroesophageal reflux disease)    • Heart attack (CMS/HCC)     X3 1990, 1993, 1995   • Hyperlipidemia    • Hypertension    • Lung nodule    • On home O2     prn   • Other emphysema (CMS/HCC) 9/24/2018    Added automatically from request for surgery [id number]   • Peptic ulcer disease    • Rectal pain 9/27/2018   • Shortness of breath 2/2/2017   • Type 2 diabetes mellitus without complication (CMS/HCC) 9/24/2018    Added automatically from request for surgery [id number]   • Urinary hesitancy    • Ventricular fibrillation (CMS/HCC) 4/27/2020   • Ventricular tachycardia (CMS/HCC)    • Wears dentures    • Wears glasses        Past Surgical History:   Past Surgical History:   Procedure Laterality Date   • ABLATION OF DYSRHYTHMIC FOCUS     • CARDIAC CATHETERIZATION Left 04/2009    by Dr. Glen Baker- I. EF approx 40% II one of three patent grafts iii, Data deficit    • CARDIAC CATHETERIZATION N/A 1/26/2017    Procedure: Left Heart Cath;  Surgeon: Vlad Bravo MD;  Location: [facility] CATH INVASIVE LOCATION;  Service:    • CARDIAC CATHETERIZATION Left 07/30/2010    i. EF 30% ii occluded vein graft to  occluded RCS iii minor plaque in the LAD iv. 70% SVG- circumfelx, treated with 4x18 mm. Cypher KAILASH.   • CARDIAC CATHETERIZATION N/A 6/5/2018    Procedure: Peripheral angiography;  Surgeon: Vlad Bravo MD;  Location:  [facility] CATH INVASIVE LOCATION;  Service: Cardiovascular   • CARDIAC CATHETERIZATION N/A 7/23/2019    Procedure: Left Heart Cath;  Surgeon: Vlad Bravo MD;  Location:  [facility] CATH INVASIVE LOCATION;  Service: Cardiology   • CARDIAC DEFIBRILLATOR PLACEMENT     • CARDIAC ELECTROPHYSIOLOGY PROCEDURE N/A 7/1/2020    Procedure: EP/ABLATION VT, STX/Rhythmia ([facility]), hold Eliquis 2 DOSES, no other meds to hold, GA;  Surgeon: Calin Dior DO;  Location:  [facility] EP INVASIVE LOCATION;  Service: Cardiology;  Laterality: N/A;   • COLONOSCOPY     • COLONOSCOPY N/A 9/26/2018    Procedure: COLONOSCOPY;  Surgeon: Alfred Shah MD;  Location:  [facility] OR;  Service: General   • CORONARY ANGIOPLASTY     • CORONARY ARTERY BYPASS GRAFT  1995 1995, w/ subsequent stents 2009/2010 X3; svg TO om1 AND om2, svg TO pda    • CYST REMOVAL      Tailbone   • EXAM UNDER ANESTHESIA N/A 9/26/2018    Procedure: EXAM UNDER ANESTHESIA;  Surgeon: Alfred Shah MD;  Location:  [facility] OR;  Service: General   • HEMORRHOIDECTOMY N/A 9/28/2018    Procedure: HEMORRHOIDECTOMY;  Surgeon: Alfred Shah MD;  Location:  [facility] OR;  Service: General   • INSERT / REPLACE / REMOVE PACEMAKER     • INTERVENTIONAL RADIOLOGY PROCEDURE N/A 6/5/2018    Procedure: Abdominal Aortagram with Runoff;  Surgeon: Vlad Bravo MD;  Location:  [facility] CATH INVASIVE LOCATION;  Service: Cardiovascular   • INTERVENTIONAL RADIOLOGY PROCEDURE N/A 7/6/2018    Procedure: Abdominal Aortogram;  Surgeon: Vlad Bravo MD;  Location:  [facility] CATH INVASIVE LOCATION;  Service: Cardiology   • PACEMAKER IMPLANTATION  01/2002    Implant by Dr. Dickinson        Family History:   Family History   Problem Relation Age of Onset   • Hypertension Mother    • Sick sinus syndrome Father     • Coronary artery disease Father        Social History:   Social History     Socioeconomic History   • Marital status:      Spouse name: Not on file   • Number of children: 2   • Years of education: Not on file   • Highest education level: Not on file   Occupational History   • Occupation:      Comment: Retired   Tobacco Use   • Smoking status: Current Every Day Smoker     Packs/day: 1.00     Years: 45.00     Pack years: 45.00     Types: Cigarettes   • Smokeless tobacco: Never Used   • Tobacco comment: 1 PACK - 1.5 PACK QD   Substance and Sexual Activity   • Alcohol use: No   • Drug use: Yes     Types: Marijuana     Comment: occasional use - maybe once a month    • Sexual activity: Defer   Social History Narrative    Caffeine use: 5-6 serving daily.            Medications:     Current Outpatient Medications:   •  albuterol (PROVENTIL HFA;VENTOLIN HFA) 108 (90 Base) MCG/ACT inhaler, Inhale 1-2 puffs Every 6 (Six) Hours As Needed for Wheezing., Disp: , Rfl:   •  amiodarone (PACERONE) 400 MG tablet, Take 1 tablet by mouth Daily., Disp: 30 tablet, Rfl: 0  •  aspirin 81 MG EC tablet, Take 1 tablet by mouth Daily., Disp: 30 tablet, Rfl: 10  •  bumetanide (BUMEX) 0.5 MG tablet, Take 1 mg by mouth As Needed., Disp: , Rfl:   •  carvedilol (COREG) 25 MG tablet, Take 1 tablet by mouth 2 (Two) Times a Day. (Patient taking differently: Take 12.5 mg by mouth 2 (Two) Times a Day.), Disp: 60 tablet, Rfl: 0  •  Eliquis 5 MG tablet tablet, TAKE 1 TABLET BY MOUTH EVERY 12 HOURS TO THIN BLOOD, Disp: 180 tablet, Rfl: 0  •  ezetimibe (ZETIA) 10 MG tablet, TAKE 1 TABLET EVERY DAY (Patient taking differently: 10 mg Daily.), Disp: 90 tablet, Rfl: 3  •  Fluticasone-Umeclidin-Vilant (TRELEGY ELLIPTA IN), Inhale 1 puff Daily., Disp: , Rfl:   •  GLIPIZIDE XL 2.5 MG 24 hr tablet, Take 2.5 mg by mouth Daily., Disp: , Rfl: 11  •  mexiletine (MEXITIL) 150 MG capsule, Take 1 capsule by mouth 3 (Three) Times a Day., Disp: 90  "capsule, Rfl: 0  •  nitroglycerin (NITROSTAT) 0.4 MG SL tablet, PLACE 1 TABLET UNDER THE TONGUE EVERY 5 MINUTES AS NEEDED FOR CHEST PAIN. TAKE NO MORE THAN 3 DOSES IN 15 MINUTES. IF NO RELIEF THEN CALL 91, Disp: 25 tablet, Rfl: 11  •  Repatha Pushtronex System 420 MG/3.5ML solution cartridge, INFUSE 420MG SUBCUTANEOUSLY VIA ON-BODY INFUSOR INTO THE APPROPRIATE AREA AS DIRECTED EVERY MONTH, Disp: 1 mL, Rfl: 11  •  rOPINIRole (REQUIP) 1 MG tablet, Take 3 mg by mouth Every Night., Disp: , Rfl:   •  sacubitril-valsartan (Entresto) 49-51 MG tablet, Take 0.5 tablets by mouth 2 (Two) Times a Day., Disp: , Rfl:   •  spironolactone (ALDACTONE) 25 MG tablet, Take 25 mg by mouth Daily., Disp: , Rfl:   •  tiZANidine (ZANAFLEX) 4 MG tablet, Take 8 mg by mouth At Night As Needed., Disp: , Rfl:     Allergies:   Allergies   Allergen Reactions   • Crestor [Rosuvastatin Calcium] Myalgia   • Lipitor [Atorvastatin] Myalgia     Joint pain myalgias   • Lopressor [Metoprolol Tartrate] Unknown (See Comments)     Side of face went numb     • Plavix [Clopidogrel Bisulfate] Unknown (See Comments)     Previously thought to be resistant; placed back on clopidogrel per Dr. Bravo earlier this year.  Confirmed with patient   • Adhesive Tape Itching and Rash       Objective   Vital Signs:   Vitals:    01/25/21 1325   BP: 155/84   BP Location: Left arm   Patient Position: Sitting   Pulse: 84   SpO2: 97%   Weight: 73 kg (161 lb)   Height: 172.7 cm (68\")       PHYSICAL EXAM  General appearance: Awake, alert, cooperative  Head: Normocephalic, without obvious abnormality, atraumatic  Eyes: Conjunctivae/corneas clear, EOMs intact  Neck: no adenopathy, no carotid bruit, no JVD and thyroid: not enlarged  Lungs: clear to auscultation bilaterally and no rhonchi or crackles\", ' symmetric  Heart: regular rate and rhythm, S1, S2 normal, no murmur, click, rub or gallop  Abdomen: Soft, non-tender, bowel sounds normal,  no organomegaly  Extremities: extremities " normal, atraumatic, no cyanosis or edema  Skin: Skin color, turgor normal, no rashes or lesions  Neurologic: Grossly normal     Lab Results   Component Value Date    GLUCOSE 152 (H) 10/19/2020    CALCIUM 8.7 10/19/2020     10/19/2020    K 3.7 10/19/2020    CO2 27.0 10/19/2020     10/19/2020    BUN 15 10/19/2020    CREATININE 0.93 10/19/2020    EGFRIFNONA 80 10/19/2020    BCR 16.1 10/19/2020    ANIONGAP 9.0 10/19/2020     Lab Results   Component Value Date    WBC 7.67 10/19/2020    HGB 13.8 10/19/2020    HCT 43.5 10/19/2020    MCV 98.0 (H) 10/19/2020     (L) 10/19/2020     Lab Results   Component Value Date    INR 1.18 (H) 07/22/2019    INR 1.01 08/07/2016    INR 0.97 01/17/2016    PROTIME 14.4 (H) 07/22/2019    PROTIME 11.0 08/07/2016    PROTIME 10.6 01/17/2016     Lab Results   Component Value Date    TSH 6.330 (H) 10/15/2020       Cardiac Testing:     I personally viewed and interpreted the patient's EKG/Telemetry/lab data      ECG 12 Lead    Date/Time: 1/25/2021 1:55 PM  Performed by: Calin Dior DO  Authorized by: Calin Dior DO   Comparison: compared with previous ECG   Similar to previous ECG  Rhythm: paced  Conduction: left bundle branch block            Tobacco Cessation: N/A  Obstructive Sleep Apnea Screening: Completed    Assessment & Plan    Diagnoses and all orders for this visit:    1. Ischemic heart disease (Primary)  -     TSH; Future  -     T4, Free; Future  -     Comprehensive Metabolic Panel; Future  -     Ambulatory Referral to Neurology    2. Chronic systolic heart failure (CMS/HCC)  -     TSH; Future  -     T4, Free; Future  -     Comprehensive Metabolic Panel; Future  -     Case Request EP Lab: Biventricular Device Upgrade ICD, after seeing a neurologist, Pt will need to be mailed Hibiclense, hold Eliquis 2 days before  -     Ambulatory Referral to Neurology    3. Coronary artery disease of native artery of native heart with stable angina pectoris (CMS/HCC)  -      TSH; Future  -     T4, Free; Future  -     Comprehensive Metabolic Panel; Future  -     Ambulatory Referral to Neurology    4. Ischemic cardiomyopathy  -     TSH; Future  -     T4, Free; Future  -     Comprehensive Metabolic Panel; Future  -     Case Request EP Lab: Biventricular Device Upgrade ICD, after seeing a neurologist, Pt will need to be mailed Hibiclense, hold Eliquis 2 days before  -     Ambulatory Referral to Neurology    5. PAD (peripheral artery disease) (CMS/[facility])  -     TSH; Future  -     T4, Free; Future  -     Comprehensive Metabolic Panel; Future  -     Ambulatory Referral to Neurology    6. Ventricular tachycardia (paroxysmal) (CMS/[facility])  -     TSH; Future  -     T4, Free; Future  -     Comprehensive Metabolic Panel; Future  -     Case Request EP Lab: Biventricular Device Upgrade ICD, after seeing a neurologist, Pt will need to be mailed Hibiclense, hold Eliquis 2 days before  -     Ambulatory Referral to Neurology    7. LBBB (left bundle branch block)         Diagnosis Plan   1. Ischemic heart disease   at this point stable.  No further anginal symptoms.  He is on excellent medical therapy.  Last myocardial infarction was July 2019.   2. Chronic systolic heart failure (CMS/[facility])   euvolemic.  Very limited lower extremity edema.  New York Heart Association functional class III heart failure.  Maximally tolerated dose of guideline directed medical therapy for quite a long time certainly more than 90 days.   3. Coronary artery disease of native artery of native heart with stable angina pectoris (CMS/[facility])   stable.  No worsening symptoms.  On single agent antiplatelet therapy in combination with full anticoagulation.  Doing well.   4. Ischemic cardiomyopathy   as above   5. PAD (peripheral artery disease) (CMS/[facility])   no claudication.  Some muscle loss.  This may be due to peripheral arterial disease.  It may also be a complication of amiodarone therapy but I doubt this.  May be some other neurologic  process.  We will refer him to a neurologist to assess for lower extremity weakness and muscle loss.   6. Ventricular tachycardia (paroxysmal) (CMS/HCC)   suppressed at this point.  Appropriately programmed ICD.  No further recurrent VT.  Status post extensive VT ablation of the left ventricle approximately 10 months ago now.  Amiodarone mexiletine at aggressive doses of appropriately suppress his arrhythmia.  Continue with him at this dose for the time being.   7.  New left bundle branch block.  QRS duration 170 ms.  I discussed with him the option of upgrading him to resynchronization ICD system.  This would seem to be appropriate given his systolic dysfunction ejection fraction of 30% chronic systolic heart failure New York Heart Association functional class III heart failure symptoms all despite maximally tolerated doses of guideline directed medical therapy.  Last myocardial infarction was in July 2019.  Ischemic heart disease.  He wished to proceed with upgrade to resynchronization defibrillator.  We will schedule this in the not-too-distant future.  He will keep [place] his device .  We will hold his Eliquis for 2 days beforehand.    In summary:  1.  Upgrade to resynchronization ICD  2.  Refer to neurology.  3.  Thyroid studies.  Complete metabolic panel  4.  We will need to have pulmonary function test down the road.  5.  Refer to an optometrist or an ophthalmologist preferably to assess her ophthalmic complications of amiodarone therapy          This patient who is a candidate for an ICD has a life expectancy greater than 12 months.    Please especially note that the patient has been on maximally tolerated doses of guideline directed medical therapy, including but not limited to: HF indicated beta-blocker (carvedilol, metoprolol succinate), ACE Inhibitor or Angiotensin receptor blocker.  Please note that for some of these medications the maximally tolerated dose may be zero.  The patient has  not had a myocardial infarction within 40 days and has not had coronary revascularization within 90 days.    I spent 41 minutes in consultation with this patient which included more than 65% of this time in direct face-to-face counseling, physical examination and discussion of my assessment and findings and shared decision making with the patient.    Follow Up: To see me after device upgrade.          Thank you for allowing me to participate in the care of your patient. Please to not hesitate to contact me with additional questions or concerns.      Calin Dior DO, FACC, RS  Cardiac Electrophysiologist  [facility] / [facility]

## 2021-02-01 NOTE — TELEPHONE ENCOUNTER
Urgent PA submitted verbally on the phone with [employer]. Ref # [id number]. Should receive notification via fax within the next 24 hours.

## 2021-02-01 NOTE — TELEPHONE ENCOUNTER
Patients wife called to tell us Mexiletine no longer covered on insurance plan. I called [employer] to attempt a PA.

## 2021-03-02 NOTE — H&P
Cardiac Electrophysiology History and Physical          [facility] at [facility]    History & Physical      PATIENT NAME  Derek Morris Jr.    :  1950 AGE: 70 y.o.    ADMISSION DATE: 3/2/2021     Subjective      CC:  ICM, Prior MI, Systolic Heart Failure, LBBB    PROBLEM LIST:    1. Ischemic heart disease / Chronic Systolic Heart Failure  a. MI x3, , , and .  b. CABG x3 by Dr. Noble Cuello, :  SVG to OM1 and OM2, SVG to PDA.  c. Normal LV.  d. Questionable LAD stent, incomplete  database.  e. STEMI, 2009.  f. LHC by Dr. Glen Baker, 2009:  EF approximately 40%, 1 of 3 patent grafts; data deficit.  g. LHC by Dr. Bird, 2010:  EF 30%, occluded vein graft to occluded RCA, minor plaque in LAD, 70% SVG -  circumflex treated with 4 x 18 mm Cypher KAILASH.  h. Echocardiogram, 2012:  EF 40% to 45%, diastolic dysfunction, mild TR.  i. Echocardiogram, 2015:  EF 30% to 35%, mild TR.  j. 2017 EF 35-40%  k. LHC in 2017 as noted in the results below. Resolute Integrity KAILASH to D2  l. Echo 2019 EF 45%, no significant VHD, LA 3.9 cm  m. LHC 2019 per Dr. Bravo without intervention  n. Echo 5/10/2020 EF 51-55%, no significant VHD, LA 3.4 cm  2. Ventricular tachycardia  a. Inducible VT by EPS, 2002.  b. Pacesetter ICD implant by Dr. Dickinson, 2002.  c. Chronic amiodarone, discontinued :  Cannot  exclude amiodarone pulmonary toxicity.  d. ICD generator change-out with enrollment in the ANALYZE ST SEGMENT protocol, 2012.  e. EPS 2020 with catheter ablation of ventricular tachycardia per Dr. Dior  5. Peripheral artery disease  / PAD   1. s/p PTA 2018 to left SFA  6. Essential hypertension  7. Dyslipidemia  8. Left bundle branch block  9. Type 2 diabetes mellitus.  10. COPD with home oxygen  11. Chronic tobacco      HISTORY OF PRESENT ILLNESS: Mr. Derek Morris Jr is a 70-year-old white male  presents today for dual-chamber ICD upgrade to biventricular CRT-D.  Patient noted with history of inducible sustained VT and subsequent dual-chamber ICD implant initially in 2002 with known history of ischemic cardiomyopathy and chronic systolic heart failure at that time.  Patient with continued NYHA class III symptomology with weakness, fatigue and dyspnea on minimal exertion such as walking half of 1 city block.  Patient has been maintained on ongoing guideline directed medical therapy for many years.  Patient noted with left bundle branch block with QRS duration greater than 150 ms with option for device upgrade to CRT-D with EP follow-up.  Upon evaluation patient denies chest pain, palpitations, dizziness, presyncope, syncope, or ICD shocks.  Patient denies recent infections or signs of fever, chills, sweats, or cough.  Patient denies recent sick contacts.  Patient reports having symptomatic hypotension since recent increase in amiodarone therapy with reported SBP 70-90 at times associated with dizziness with standing and bending over that resolves within less than a minute.  Patient denies eboni syncope.      PAST MEDICAL HISTORY  Past Medical History:   Diagnosis Date   • SIMÓN (acute kidney injury) (CMS/[facility]) 7/2/2020   • Arthritis    • Cardiomyopathy (CMS/[facility])    • Carotid stenosis    • CHF (congestive heart failure) (CMS/[facility])    • Claudication (CMS/[facility]) 6/26/2018    Added automatically from request for surgery [id number]   • COPD (chronic obstructive pulmonary disease) (CMS/[facility])    • Coronary artery disease    • Diabetes mellitus (CMS/[facility])    • Enlarged prostate    • GERD (gastroesophageal reflux disease)    • Heart attack (CMS/[facility])     X3 1990, 1993, 1995   • Hyperlipidemia    • Hypertension    • Lung nodule    • On home O2     prn   • Other emphysema (CMS/[facility]) 9/24/2018    Added automatically from request for surgery [id number]   • Peptic ulcer disease    • Rectal pain 9/27/2018   • Shortness of breath 2/2/2017    • Type 2 diabetes mellitus without complication (CMS/[facility]) 9/24/2018    Added automatically from request for surgery [id number]   • Urinary hesitancy    • Ventricular fibrillation (CMS/[facility]) 4/27/2020   • Ventricular tachycardia (CMS/[facility])    • Wears dentures    • Wears glasses        SURGICAL HISTORY   has a past surgical history that includes Coronary artery bypass graft (1995); Cardiac catheterization (Left, 04/2009); Coronary angioplasty; Insert / replace / remove pacemaker; Cardiac catheterization (N/A, 1/26/2017); Cardiac catheterization (Left, 07/30/2010); Pacemaker Implantation (01/2002); Cardiac defibrillator placement; Cyst Removal; Cardiac catheterization (N/A, 6/5/2018); Interventional radiology procedure (N/A, 6/5/2018); Interventional radiology procedure (N/A, 7/6/2018); Examination under anesthesia (N/A, 9/26/2018); Hemorrhoid surgery (N/A, 9/28/2018); Cardiac catheterization (N/A, 7/23/2019); Colonoscopy; Colonoscopy (N/A, 9/26/2018); Cardiac electrophysiology procedure (N/A, 7/1/2020); and Ablation of dysrhythmic focus.     SOCIAL HISTORY  Social History     Socioeconomic History   • Marital status:      Spouse name: Not on file   • Number of children: 2   • Years of education: Not on file   • Highest education level: Not on file   Occupational History   • Occupation:      Comment: Retired   Tobacco Use   • Smoking status: Current Every Day Smoker     Packs/day: 1.00     Years: 45.00     Pack years: 45.00     Types: Cigarettes   • Smokeless tobacco: Never Used   • Tobacco comment: 1 PACK - 1.5 PACK QD   Substance and Sexual Activity   • Alcohol use: No   • Drug use: Yes     Types: Marijuana     Comment: occasional use - maybe once a month    • Sexual activity: Defer   Social History Narrative    Caffeine use: 5-6 serving daily.            FAMILY HISTORY  family history includes Coronary artery disease in his father; Hypertension in his mother; Sick sinus syndrome in his father.      MEDICATIONS  Prior to Admission medications    Medication Sig Start Date End Date Taking? Authorizing Provider   albuterol (PROVENTIL HFA;VENTOLIN HFA) 108 (90 Base) MCG/ACT inhaler Inhale 1-2 puffs Every 6 (Six) Hours As Needed for Wheezing.   Yes Sunil Marroquin MD   amiodarone (Pacerone) 200 MG tablet Take 2 tablets by mouth Daily.  Patient taking differently: Take 200 mg by mouth 2 (Two) Times a Day. 1/25/21  Yes Calin Dior, DO   apixaban (Eliquis) 5 MG tablet tablet Take 1 tablet by mouth Every 12 (Twelve) Hours. 1/25/21  Yes Calin Dior, DO   aspirin 81 MG EC tablet Take 1 tablet by mouth Daily. 7/26/19  Yes Slime Bowling APRN   bumetanide (BUMEX) 0.5 MG tablet Take 2 tablets by mouth Daily As Needed (swelling). 1/28/21  Yes Calin Dior, DO   carvedilol (COREG) 25 MG tablet Take 0.5 tablets by mouth 2 (Two) Times a Day.  Patient taking differently: Take 25 mg by mouth 2 (Two) Times a Day. 1/25/21  Yes Calin Dior, DO   ezetimibe (ZETIA) 10 MG tablet Take 1 tablet by mouth Daily. 1/25/21  Yes Calin Dior, DO   Fluticasone-Umeclidin-Vilant (TRELEGY ELLIPTA IN) Inhale 1 puff Daily.   Yes Sunil Marroquin MD   GLIPIZIDE XL 2.5 MG 24 hr tablet Take 2.5 mg by mouth Daily. 2/16/18  Yes Sunil Marroquin MD   mexiletine (MEXITIL) 150 MG capsule Take 1 capsule by mouth 3 (Three) Times a Day.  Patient taking differently: Take 150 mg by mouth 2 (two) times a day. 1/25/21  Yes Calin Dior DO   nitroglycerin (NITROSTAT) 0.4 MG SL tablet PLACE 1 TABLET UNDER THE TONGUE EVERY 5 MINUTES AS NEEDED FOR CHEST PAIN. TAKE NO MORE THAN 3 DOSES IN 15 MINUTES. IF NO RELIEF THEN CALL 91  Patient taking differently: Place 0.4 mg under the tongue Every 5 (Five) Minutes As Needed for Chest Pain. 11/24/20  Yes Slime Bowling APRN   rOPINIRole (REQUIP) 3 MG tablet Take 3 mg by mouth Every Night.   Yes Provider, MD Sunil   sacubitril-valsartan (Entresto) 49-51 MG tablet Take 0.5 tablets by  mouth 2 (Two) Times a Day. 1/25/21  Yes Calin Dior DO   spironolactone (ALDACTONE) 25 MG tablet Take 1 tablet by mouth Daily. 1/25/21  Yes Calin Dior DO   tiZANidine (ZANAFLEX) 4 MG tablet Take 8 mg by mouth At Night As Needed.   Yes ProviderSunil MD   Repatha Pushtronex System 420 MG/3.5ML solution cartridge INFUSE 420MG SUBCUTANEOUSLY VIA ON-BODY INFUSOR INTO THE APPROPRIATE AREA AS DIRECTED EVERY MONTH 9/28/20 3/2/21  Vlad Bravo MD   rOPINIRole (REQUIP) 1 MG tablet Take 3 mg by mouth Every Night.  3/2/21  ProviderSunil MD       ALLERGIES  Allergies   Allergen Reactions   • Crestor [Rosuvastatin Calcium] Myalgia   • Lipitor [Atorvastatin] Myalgia     Joint pain myalgias   • Lopressor [Metoprolol Tartrate] Unknown (See Comments)     Side of face went numb     • Plavix [Clopidogrel Bisulfate] Unknown (See Comments)     Previously thought to be resistant; placed back on clopidogrel per Dr. Bravo earlier this year.  Confirmed with patient   • Adhesive Tape Itching and Rash       REVIEW OF SYSTEMS  Constitutional: No fevers or chills, no recent weight gain or weight loss, + weakness, fatigue  Eyes: No visual loss, blurred vision, double vision, yellow sclerae.  ENT: No headaches, hearing loss, vertigo, congestion or sore throat.   Cardiovascular: Per HPI  Respiratory: No cough or wheezing, no sputum production, no hematemesis, + soa, george   Gastrointestinal: No abdominal pain, no nausea, vomiting, constipation, diarrhea, melena.   Genitourinary: No dysuria, hematuria or increased frequency.  Musculoskeletal:  No gait disturbance, weakness or joint pain or stiffness  Integumentary: No rashes, urticaria, ulcers or sores.   Neurological: No headache, dizziness, syncope, paralysis, ataxia, no prior CVA/TIA  Psychiatric: No anxiety, or depression  Endocrine: No diaphoresis, cold or heat intolerance. No polyuria or polydipsia.   Hematologic/Lymphatic: No anemia, abnormal bruising or bleeding. No  "history of DVT/PE.      Objective      PHYSICAL EXAM    Vital Signs: /80 (BP Location: Right arm, Patient Position: Lying) Comment: 135/85 L arm  Pulse 82   Temp 97.7 °F (36.5 °C) (Temporal)   Resp 18   Ht 172.7 cm (68\")   Wt 73.6 kg (162 lb 4.1 oz)   SpO2 95%   BMI 24.67 kg/m²     General appearance: Awake, alert, cooperative  Head: Normocephalic, without obvious abnormality, atraumatic  Eyes: Conjunctivae/corneas clear, EOM's intact  Neck: no adenopathy, no carotid bruit, no JVD and thyroid: not enlarged  Lungs: clear to auscultation bilaterally and no rhonchi or crackles\", ' symmetric  Heart: regular rate and rhythm, S1, S2 normal, no murmur, click, rub or gallop  Abdomen: Soft, non-tender. Bowel sounds normal,  no organomegaly  Extremities: extremities normal, atraumatic, no cyanosis or edema  Skin: Skin color, turgor normal, no rashes or lesions  Neurologic: Grossly normal     Lab Results   Component Value Date    WBC 7.00 03/02/2021    HGB 13.7 03/02/2021    HCT 43.8 03/02/2021    MCV 97.6 (H) 03/02/2021     03/02/2021     Lab Results   Component Value Date    GLUCOSE 111 (H) 01/25/2021    CALCIUM 9.5 01/25/2021     (H) 01/25/2021    K 4.9 01/25/2021    CO2 29.0 01/25/2021     (H) 01/25/2021    BUN 16 01/25/2021    CREATININE 1.02 01/25/2021    EGFRIFNONA 72 01/25/2021    BCR 15.7 01/25/2021    ANIONGAP 11.0 01/25/2021     TELEMETRY: Paced 80 bpm    Assessment & Plan     Mr. Derek Morris Jr is a 70-year-old white male presents today for dual-chamber ICD upgrade to biventricular CRT-D.  Patient noted with history of inducible sustained VT and subsequent dual-chamber ICD implant initially in 2002 with known history of ischemic cardiomyopathy and chronic systolic heart failure at that time.  Patient with continued NYHA class III symptomology with weakness, fatigue and dyspnea on minimal exertion such as walking half of 1 city block.  Patient has been maintained on ongoing " maximally tolerated doses of guideline directed medical therapy for many years.  Patient noted with left bundle branch block with QRS duration greater than 150 ms with option for device upgrade to CRT-D with EP follow-up.  All risk, benefits, and alternatives to the procedure were outlined reviewed with patient in clinic and again here at bedside today.  A shared decision making encounter outlining device implant, generator changes, and end-of-life decision-making were outlined reviewed with patient in detail.  Patient verbalizes complete understanding of the procedure and is willing to proceed as scheduled.  Stat limited echo ordered today prior to procedure to assess LV function.  All preprocedure lab evaluation reviewed and acceptable.  Patient has a negative Covid screening documented last 72 hours.      Electronically signed by VANDANA Dubois, 03/02/21, 10:38 AM EST.      This note was completed using a voice transcription system. Every effort was made to ensure accuracy. However, inadvertent computerized transcription errors may be present.   WDL

## 2021-03-02 NOTE — PLAN OF CARE
Goal Outcome Evaluation:  Plan of Care Reviewed With: patient  Progress: improving  Outcome Summary: VSS. Transfer CVOU. Left arm in sling. Soreness at the incision site. Paced on the monitor.      Problem: Adult Inpatient Plan of Care  Goal: Absence of Hospital-Acquired Illness or Injury  Outcome: Ongoing, Progressing  Intervention: Identify and Manage Fall Risk  Recent Flowsheet Documentation  Taken 3/2/2021 1838 by Sierra Sofia, RN  Safety Promotion/Fall Prevention:   activity supervised   assistive device/personal items within reach   clutter free environment maintained   fall prevention program maintained   lighting adjusted   muscle strengthening facilitated   nonskid shoes/slippers when out of bed   room organization consistent   safety round/check completed  Intervention: Prevent Skin Injury  Recent Flowsheet Documentation  Taken 3/2/2021 1838 by Sierra Sofia, RN  Body Position: supine

## 2021-03-02 NOTE — OP NOTE
PROCEDURE:  ATRIOBIVENTRICULAR ICD (CRT-d) UPGRADE    OPERATION PERFORMED:     1. Explantation of [employer] Ricky model 2357-40 C dual-chamber ICD serial #[id number] implanted on January 18, 2016.    2. Testing of retained leads:        A. Atrial lead Saint Ricky model 1488 TC, 52 cm, and a [id number] implanted on January 15, 2002.        B. Right ventricular lead Saint Ricky model SPO2, SP [id number] implanted on genera 15 2002.      3. Implantation of Medtronic resynchronization ICD pulse generator with serial number [id number]    4. Implantation of new leads:          C. Left ventricular lead Medtronic 4598, 88 cm, Q UC [id number].    RADIATION EXPOSURE: 2.3 minutes and 64 milliGray.                ATTENDING SURGEON: Calin Dior,     MODERATE SEDATION FOR PROCEDURE:  Moderate sedation was given during this procedure.    I supervised and directed RN to administer this sedation.  This staff member also monitored the patient's hemodynamic and respiratory status and response to these medications.  Please see the full detailed procedure report generated by the electrophysiology laboratory staff.  The patient tolerated moderate sedation well.  There were no complications regarding sedation.  The total dose of fentanyl was 150 mcg and the total dose of midazolam was 4 mg.  The total dose of Brevital was 20 mg.  First sedation was administered at 1514 and continued through 1626.    ESTIMATED BLOOD LOSS: less than 20cc    COMPLICATIONS: None    TIME OUT: Time out was completed with verification of the correct patient identity, procedure to be performed, procedure site and implanted equipment.    INDICATION FOR PROCEDURE:  Briefly,Derek Morris Jr. is a 70 y.o. male with a history of ischemic cardiomyopathy, New York Heart Association functional class III heart failure, severe systolic dysfunction with ejection fraction of 30% classically, recurrent prior episodes of ventricular fibrillation and ventricular tachycardia as well as  appropriate ICD shocks for these rhythms who was initially implanted with a dual-chamber ICD on January 15 of 2002.  The patient was recently seen in our device clinic and was deemed appropriate for an up-grade to a CRT system due to worsening heart failure symptoms and development of left bundle branch block with a QRS duration of more than 150 ms.  He has since our visit in the office now developed complete heart block and is in complete heart block today.  He has a intermittent escape rhythm.  He is RV paced now greater than 40% of the time.    The patient was able to give written informed consent after revisiting the key portions of the risk versus benefit profile of the procedure.  This discussion was framed by our lengthy conversations  (please see our detailed notes).  Patient verbalized strong understanding of this discussion and a strong desire to proceed with the procedure.  Please note that this detailed informed consent process utilized mutual and shared decision making process between all parties involved, principally the physician and patient, but also potentially with input from the patient's selected family and friends.    Please especially note that the patient has been on maximally tolerated doses of guideline directed medical therapy, including but not limited to: HF indicated beta-blocker (carvedilol, metoprolol succinate), ACE Inhibitor or Angiotensin receptor blocker.  Please note that for some of these medications the maximally tolerated dose may be zero.  The patient has not had a myocardial infarction within 40 days and has not had coronary revascularization within 90 days.    This patient who is a candidate for an ICD has a life expectancy greater than 12 months.    PROCEDURE AND FINDINGS:  The patient was brought to the electrophysiology suite in a post absorptive state.  Informed consent was obtained prior to the procedure and confirmed.  Intravenous prophylactic antibiotics were  administered and confirmed to be completely infused prior to the start of the procedure.  After the site of implantation was prepped and draped in the usual sterile fashion and after adequate anesthesia was given, the skin was infiltrated with 1% lidocaine and 0.5% bupivicaine 50/50 mixture.  The skin was incised with a #10 scalpel.  Blunt and electrosurgical dissection was carried out to the pulse generator pocket.  The leads were carefully isolated with blunt and electrosurgical dissection.  Careful attention was paid not to damage the leads.  The pulse generator was explanted and the pocket was copiously irrigated with antibiotic containing normal saline and subsequently observed.  Once adequate hemostasis was confirmed within the pocket, the leads were detached from the pulse generator.  The leads were tested for adequate sensing, impedances and pacing thresholds.    The axillary vein was accessed via a contrast guided Seldinger technique.  J tip 0.035 inch guide wires were introduced and their course through the venous system was confirmed by their presence under fluoroscopy in the inferior vena (excluding inadvertent arterial puncture.)      NEW LV LEAD:  A Reggie third generation variable curve CS peel away sheath was brought to the field and placed into the venous system via over the wire technique.  Next the coronary sinus was cannulated with radiopaque contrast injection technique in an over the wire fashion.  Coronary venous angiography performed in multiple projections within the great cardiac vein.  This identified the possible target branch veins.  A lateral anatomic position vein was selected.  This target vein was cannulated with the Essex vein selector, the Essex LVI lead delivery sheath and 0.014 inch interventional wires.  The left ventricular lead was placed to the target vein over this 0.014 inch wire in an over the wire fashion.  Adequate sensing and threshold parameters were obtained.  There  was no evidence of diaphragmatic stimulation at 10 V output.  The [employer] system was removed from the body in toto and the lead collar was advanced to the pectoral muscle and sutured with non absorbable  suture.  Tug testing of this lead confirmed stability of the lead and the length of the lead's slack was assessed as optimal with fluoroscopy.  The lead tips for all leads were cleaned and dried thoroughly.  The leads were attached to the appropriate ports on the pulse generator.  Tug testing was performed on all connections.  The device and leads were placed within the pocket such that the coiled redundant leads were posterior to the pulse generator.      The lead tips for all leads were cleaned and dried thoroughly.  The leads were attached to the appropriate ports on the new pulse generator.  Tug testing was performed on all connections.  The device and leads were placed within the pocket such that the coiled redundant leads were posterior to the pulse generator.  Once again, the device was tested for adequate sensing, impedances and pacing thresholds.    The pulse generator was then sutured to the fascia in a medial location within the pocket using non absorbable suture.  The pocket was closed with two layers of suture using 2-0 then 3-0 Vicryl, followed by a layer of surgical adhesive and finally a sterile occlusive dressing.    DEFIBRILLATION THRESHOLD TESTING:    Once adequate level of anesthesia was obtained, defibrillation testing was performed using Shock on T induction of ventricular fibrillation.  The patient was successfully internal defibrillated with 25 Joules with the least sensitivity after 15 J failed to terminate ventricular fibrillation. The patient after DFT testing remained hemodynamically stable.  The defibrillation threshold was greater than 15 J but less than or equal to 25 joules.                     MEASURED DEVICE DATA:    Atrial lead                   sensin.2 mV                    impedance:           418 ohms                   Threshold:            1.1 volts at 0.4 ms    RV lead                   sensin mV                   pacing impedance:    513 ohms                   Threshold:                  0.8 volts at 0.4 ms       High voltage impedance 66 / 91 ohms    LV lead                   sensing:                      NA                   impedance:                703 ohms                   Threshold:                 0.8 volts at 0.4 ms                                                        PROGRAMMED PARAMETERS:    Mode:                                 DDDR  Lower Rate:                       60 pulses per minute  Upper Sensor Rate:          130 pulses per minute  Upper Tracking Rate:      130 pulses per minute  Mode Switch Rate:           171 bpm                    Tachy Therapy Programming.                  VT 1 zone 72 intervals to detect               Detection: 160 bpm                Therapy:    ATP x8, no shocks    VT 2 zone 30 / 40 intervals to detect               Detection: 185 bpm               Therapy:    ATP times for the max output shocks    VF zone 30 / 40 intervals to detect               Detection: 210 bpm               Therapy:    ATP during charge the maximum but defibrillation      CONCLUSION:  Successful upgrade to CRT-D.    RECOMMENDATION:    Patient is to follow up with our clinic in approximately one week and then myself in 3 months.    No lovenox or heparin at any dose is to be given.    FOR THE PATIENT    Please do not lift more than 10 pounds or abduct the shoulder joint / or raise the arm above the shoulder for 6 weeks after device was implanted (this does not apply to subcutaneous ICDs).    Avoid activities that involve heavy lifting or rough contact that could result in blows to your implant site and to allow your incision time to heal.    No shower or getting device incision wet for 2 days post-operatively.    Keep wound  exposed to air unless otherwise instructed, the surgical glue is the bandage.    No creams, lotions, or powders to incision.    Please avoid allowing bra strap or suspenders to lay over incision until completely healed.    Avoid hot tubs or pools until incision completely healed.    No driving for 24 hours post-operatively after device implant.    Call your doctor if you have any swelling, redness or discharge around your incision, notice anything unusual or unexpected, or you develop a fever that does not go away in two or three days.    Call your doctor if you hear any beeping sounds / vibratory alerts from your device as this indicates your device needs to be checked immediately.    Carry your Medical Device ID Card with you at all times.  Please call our office () with any questions about the device or the wounds.

## 2021-03-03 NOTE — PROGRESS NOTES
Cardiac Electrophysiology Inpatient Follow Up Note         [facility]    Progress Note      CC:  ICM, Prior MI, Systolic Heart Failure, LBBB     PROBLEM LIST:     1. Ischemic heart disease / Chronic Systolic Heart Failure  a. MI x3, 1990, 1993, and 1995.  b. CABG x3 by Dr. Noble Cuello, 1995:  SVG to OM1 and OM2, SVG to PDA.  c. Normal LV.  d. Questionable LAD stent, incomplete  database.  e. STEMI, April 2009.  f. LHC by Dr. Glen Baker, April 2009:  EF approximately 40%, 1 of 3 patent grafts; data deficit.  g. LHC by Dr. Bird, 07/30/2010:  EF 30%, occluded vein graft to occluded RCA, minor plaque in LAD, 70% SVG -  circumflex treated with 4 x 18 mm Cypher KAILASH.  h. Echocardiogram, 02/08/2012:  EF 40% to 45%, diastolic dysfunction, mild TR.  i. Echocardiogram, 01/29/2015:  EF 30% to 35%, mild TR.  j. Jan 2017 EF 35-40%  k. LHC in Jan 2017 as noted in the results below. Resolute Integrity KAILASH to D2  l. Echo 7/22/2019 EF 45%, no significant VHD, LA 3.9 cm  m. LHC 7/22/2019 per Dr. Bravo without intervention  n. Echo 5/10/2020 EF 51-55%, no significant VHD, LA 3.4 cm  o. Echo 3/2/2021 EF 35%, moderate TR W/RVSP 40 mmHg, LA 4.1 cm  p. Medtronic dual-chamber ICD upgrade to biventricular ICD 3/2/2021 per Dr. Dior  2. Ventricular tachycardia  a. Inducible VT by EPS, January 2002.  b. Pacesetter ICD implant by Dr. Dickinson, January 2002.  c. Chronic amiodarone, discontinued fall of 2010:  Cannot  exclude amiodarone pulmonary toxicity.  d. ICD generator change-out with enrollment in the ANALYZE ST SEGMENT protocol, 08/16/2012.  e. EPS 7/1/2020 with catheter ablation of ventricular tachycardia per Dr. Dior for ICM, Prior MI, Systolic Heart Failure, LBBB, EF 35%.  f. Recurrent VT October 2020 with [facility] admission with amiodarone load in addition to mexiletine therapy.  5. Paroxysmal atrial fibrillation  1. CHADSVASc = 5  2. Diagnosed February 2019 on presentation to [facility] room with initiation of Eliquis therapy.  6. Peripheral artery disease  / PAD   1. s/p PTA 2018 to left SFA  7. Essential hypertension  8. Dyslipidemia  9. Left bundle branch block  10. Type 2 diabetes mellitus.  11. COPD with home oxygen  12. Chronic tobacco      Subjective     Mr. Derek Morris Jr. is a 70-year-old white male seen in EP follow-up today status post dual-chamber ICD upgrade to biventricular ICD yesterday.  On evaluation patient is resting comfortably in bed without complaints.  Patient's bandages removed with ICD incision site clean, dry, and approximated with occlusive Dermabond covering and without hematoma, ecchymosis, or drainage noted.  Patient has been up at bedside and voiding without difficulty.  Patient reports he is ready for discharge home today as scheduled.    REVIEW OF SYSTEMS  ROS no change from admission H&P except for: above    Objective   VITAL SIGNS  /62 (BP Location: Left arm, Patient Position: Lying)   Pulse 60  Temp 98.1 °F (36.7 °C) (Oral)   Resp 20   Ht 172.7 cm (67.99\")   Wt 74 kg (163 lb 1.6 oz)   SpO2 97%   BMI 24.81 kg/m²     Pulse Ox: SpO2  Av.7 %  Min: 91 %  Max: 100 %  Supplemental O2:       Admit Weight  Weight: 73.6 kg (162 lb 4.1 oz)  Last 3 Weights    Body mass index is 24.81 kg/m².    INTAKE/OUTPUT  I/O last 3 completed shifts:  In: 400 [P.O.:400]  Out: 275 [Urine:275]    Intake/Output Summary (Last 24 hours) at 3/3/2021 0939  Last data filed at 3/3/2021 0600  Gross per 24 hour   Intake 400 ml   Output 275 ml   Net 125 ml       PHYSICAL EXAM  General appearance: awake, alert, oriented, moves all extremities  Lungs: no rhonchi, no wheezes, no rales  Heart: S1-S2, RRR  Abdomen: positive bowel sounds, no bruits, no masses  Extremities: warm and dry, no cyanosis, no clubbing    LABS  Results from last 7 days   Lab Units 21  1012   WBC 10*3/mm3 7.00   HEMOGLOBIN g/dL 13.7   HEMATOCRIT % 43.8   MCV fL 97.6*   PLATELETS 10*3/mm3 " 156         Results from last 7 days   Lab Units 03/02/21  1012   POTASSIUM mmol/L 4.6   CHLORIDE mmol/L 103   CO2 mmol/L 29.0   BUN mg/dL 12   CREATININE mg/dL 1.06   GLUCOSE mg/dL 172*   CALCIUM mg/dL 8.6       CURRENT MEDICATIONS  acetaminophen, 650 mg, Oral, Q6H  amiodarone, 400 mg, Oral, Daily  aspirin, 81 mg, Oral, Daily  carvedilol, 25 mg, Oral, BID  ibuprofen, 600 mg, Oral, Q6H  mexiletine, 150 mg, Oral, TID  rOPINIRole, 1 mg, Oral, Nightly  sacubitril-valsartan, 0.5 tablet, Oral, BID  sodium chloride, 3 mL, Intravenous, Q12H  spironolactone, 25 mg, Oral, Daily      CXR: Device and leads in acceptable position without pneumothorax noted    TELEMETRY: V paced    Assessment and plan:    Mr. Derek Morris Jr. is a 70-year-old white male seen in EP follow-up today status post dual-chamber ICD upgrade to biventricular ICD yesterday.  Patient's checks x-ray reviewed with device and leads in acceptable position without pneumothorax noted.  Device interrogation per representative today with normal function.  Patient is stable and ready for discharge home today with follow-up in 1 week for wound check and in 3 months with Dr. Dior in clinic with Medtronic device check.  Patient is instructed on all activity restrictions and wound care instructions.  Patient's medications are reviewed and outlined on discharge paperwork work.  Patient verbalizes complete understanding of above instruction education and is ready for discharge home today.      Electronically signed by VANDANA Dubois, 03/03/21, 9:39 AM EST.

## 2021-03-03 NOTE — PROGRESS NOTES
Continued Stay Note  [facility]     Patient Name: Derek Morris Jr.  MRN: [id number]  Today's Date: 3/3/2021    Admit Date: 3/2/2021    Discharge Plan     Row Name 03/03/21 0958       Plan    Plan  Home at DC    Patient/Family in Agreement with Plan  other (see comments)    Plan Comments  No DC needs identified.    Final Discharge Disposition Code  01 - home or self-care    Row Name 03/03/21 0831       Plan    Final Discharge Disposition Code  01 - home or self-care        Discharge Codes    No documentation.       Expected Discharge Date and Time     Expected Discharge Date Expected Discharge Time    Mar 3, 2021             Clair Watkins RN

## 2021-03-03 NOTE — PLAN OF CARE
Goal Outcome Evaluation:        Outcome Summary: Patient resting comfortably only c/o mild discomfort to incision site. Patient reeducated multiple times on putting his arm in the sling. Paced rhythm per telemetry. VSS on 3L NC. Will continue to monitor.

## 2021-03-04 NOTE — TELEPHONE ENCOUNTER
I spoke to Mr. Mueller wife who states he is alternating weekly between bumex and lasix to determine which medication will be more effective for him. I sent refill authorization for bumex to his pharmacy    -Joshua Carrera   Yes

## 2021-03-16 NOTE — TELEPHONE ENCOUNTER
Patient called and left a message. I tried to call the patient 2 times but the phone line was busy.

## 2021-10-09 ENCOUNTER — HOSPITAL ENCOUNTER (OUTPATIENT)
Dept: HOSPITAL 79 - ER1 | Age: 71
Setting detail: OBSERVATION
LOS: 2 days | Discharge: HOME | End: 2021-10-11
Attending: STUDENT IN AN ORGANIZED HEALTH CARE EDUCATION/TRAINING PROGRAM | Admitting: INTERNAL MEDICINE
Payer: MEDICARE

## 2021-10-09 VITALS — BODY MASS INDEX: 23.65 KG/M2 | HEIGHT: 68 IN | WEIGHT: 156.06 LBS

## 2021-10-09 DIAGNOSIS — E11.9: ICD-10-CM

## 2021-10-09 DIAGNOSIS — I50.43: ICD-10-CM

## 2021-10-09 DIAGNOSIS — Z79.84: ICD-10-CM

## 2021-10-09 DIAGNOSIS — Z23: ICD-10-CM

## 2021-10-09 DIAGNOSIS — I25.5: ICD-10-CM

## 2021-10-09 DIAGNOSIS — I48.0: ICD-10-CM

## 2021-10-09 DIAGNOSIS — E43: ICD-10-CM

## 2021-10-09 DIAGNOSIS — Z95.5: ICD-10-CM

## 2021-10-09 DIAGNOSIS — Z79.899: ICD-10-CM

## 2021-10-09 DIAGNOSIS — Z95.810: ICD-10-CM

## 2021-10-09 DIAGNOSIS — F17.210: ICD-10-CM

## 2021-10-09 DIAGNOSIS — J96.22: ICD-10-CM

## 2021-10-09 DIAGNOSIS — Z79.01: ICD-10-CM

## 2021-10-09 DIAGNOSIS — I25.10: ICD-10-CM

## 2021-10-09 DIAGNOSIS — I47.2: ICD-10-CM

## 2021-10-09 DIAGNOSIS — J44.1: ICD-10-CM

## 2021-10-09 DIAGNOSIS — Z20.822: ICD-10-CM

## 2021-10-09 DIAGNOSIS — I25.2: ICD-10-CM

## 2021-10-09 DIAGNOSIS — Z99.81: ICD-10-CM

## 2021-10-09 DIAGNOSIS — I11.0: Primary | ICD-10-CM

## 2021-10-09 DIAGNOSIS — J96.21: ICD-10-CM

## 2021-10-09 DIAGNOSIS — E87.6: ICD-10-CM

## 2021-10-09 DIAGNOSIS — Z95.1: ICD-10-CM

## 2021-10-09 LAB
BUN/CREATININE RATIO: 18 (ref 0–10)
HGB BLD-MCNC: 11.9 GM/DL (ref 14–17.5)
RED BLOOD COUNT: 4.21 M/UL (ref 4.2–5.5)
WHITE BLOOD COUNT: 8.5 K/UL (ref 4.5–11)

## 2021-10-09 PROCEDURE — G0378 HOSPITAL OBSERVATION PER HR: HCPCS

## 2021-10-09 PROCEDURE — U0002 COVID-19 LAB TEST NON-CDC: HCPCS

## 2021-10-11 LAB
BUN/CREATININE RATIO: 16 (ref 0–10)
HGB BLD-MCNC: 11.5 GM/DL (ref 14–17.5)
RED BLOOD COUNT: 4.14 M/UL (ref 4.2–5.5)
WHITE BLOOD COUNT: 6.5 K/UL (ref 4.5–11)

## 2021-10-12 ENCOUNTER — HOSPITAL ENCOUNTER (OUTPATIENT)
Dept: HOSPITAL 79 - NM | Age: 71
End: 2021-10-12
Attending: SURGERY
Payer: MEDICARE

## 2021-10-12 DIAGNOSIS — R93.3: ICD-10-CM

## 2021-10-12 DIAGNOSIS — K21.00: Primary | ICD-10-CM

## 2021-10-12 PROCEDURE — A9541 TC99M SULFUR COLLOID: HCPCS

## 2021-10-17 ENCOUNTER — HOSPITAL ENCOUNTER (INPATIENT)
Dept: HOSPITAL 79 - ER1 | Age: 71
LOS: 12 days | DRG: 871 | End: 2021-10-29
Attending: INTERNAL MEDICINE | Admitting: INTERNAL MEDICINE
Payer: MEDICARE

## 2021-10-17 VITALS — WEIGHT: 150.58 LBS | HEIGHT: 68 IN | BODY MASS INDEX: 22.82 KG/M2

## 2021-10-17 DIAGNOSIS — Z20.822: ICD-10-CM

## 2021-10-17 DIAGNOSIS — I50.813: ICD-10-CM

## 2021-10-17 DIAGNOSIS — I27.20: ICD-10-CM

## 2021-10-17 DIAGNOSIS — N10: ICD-10-CM

## 2021-10-17 DIAGNOSIS — K72.90: ICD-10-CM

## 2021-10-17 DIAGNOSIS — I25.5: ICD-10-CM

## 2021-10-17 DIAGNOSIS — Z95.810: ICD-10-CM

## 2021-10-17 DIAGNOSIS — I50.22: ICD-10-CM

## 2021-10-17 DIAGNOSIS — J18.9: ICD-10-CM

## 2021-10-17 DIAGNOSIS — E11.51: ICD-10-CM

## 2021-10-17 DIAGNOSIS — Z79.82: ICD-10-CM

## 2021-10-17 DIAGNOSIS — Z95.1: ICD-10-CM

## 2021-10-17 DIAGNOSIS — I44.7: ICD-10-CM

## 2021-10-17 DIAGNOSIS — G93.41: ICD-10-CM

## 2021-10-17 DIAGNOSIS — N17.9: ICD-10-CM

## 2021-10-17 DIAGNOSIS — F17.210: ICD-10-CM

## 2021-10-17 DIAGNOSIS — I25.10: ICD-10-CM

## 2021-10-17 DIAGNOSIS — Z79.4: ICD-10-CM

## 2021-10-17 DIAGNOSIS — J96.21: ICD-10-CM

## 2021-10-17 DIAGNOSIS — J69.0: ICD-10-CM

## 2021-10-17 DIAGNOSIS — A41.9: Primary | ICD-10-CM

## 2021-10-17 DIAGNOSIS — Z82.49: ICD-10-CM

## 2021-10-17 DIAGNOSIS — J44.0: ICD-10-CM

## 2021-10-17 DIAGNOSIS — E11.22: ICD-10-CM

## 2021-10-17 DIAGNOSIS — N18.2: ICD-10-CM

## 2021-10-17 DIAGNOSIS — I13.0: ICD-10-CM

## 2021-10-17 DIAGNOSIS — E87.6: ICD-10-CM

## 2021-10-17 DIAGNOSIS — J96.22: ICD-10-CM

## 2021-10-17 DIAGNOSIS — Z99.81: ICD-10-CM

## 2021-10-17 DIAGNOSIS — K31.84: ICD-10-CM

## 2021-10-17 DIAGNOSIS — R53.81: ICD-10-CM

## 2021-10-17 DIAGNOSIS — Z79.01: ICD-10-CM

## 2021-10-17 DIAGNOSIS — I48.0: ICD-10-CM

## 2021-10-17 DIAGNOSIS — G93.49: ICD-10-CM

## 2021-10-17 DIAGNOSIS — R57.1: ICD-10-CM

## 2021-10-17 DIAGNOSIS — E78.5: ICD-10-CM

## 2021-10-17 DIAGNOSIS — I95.9: ICD-10-CM

## 2021-10-17 LAB
BUN/CREATININE RATIO: 28 (ref 0–10)
HGB BLD-MCNC: 11.2 GM/DL (ref 14–17.5)
RED BLOOD COUNT: 4.11 M/UL (ref 4.2–5.5)
WHITE BLOOD COUNT: 7.9 K/UL (ref 4.5–11)

## 2021-10-17 PROCEDURE — 3E033XZ INTRODUCTION OF VASOPRESSOR INTO PERIPHERAL VEIN, PERCUTANEOUS APPROACH: ICD-10-PCS | Performed by: INTERNAL MEDICINE

## 2021-10-17 PROCEDURE — A6212 FOAM DRG <=16 SQ IN W/BORDER: HCPCS

## 2021-10-17 PROCEDURE — U0002 COVID-19 LAB TEST NON-CDC: HCPCS

## 2021-10-18 LAB
BUN/CREATININE RATIO: 35 (ref 0–10)
HGB BLD-MCNC: 11.7 GM/DL (ref 14–17.5)
RED BLOOD COUNT: 4.32 M/UL (ref 4.2–5.5)
WHITE BLOOD COUNT: 14.2 K/UL (ref 4.5–11)

## 2021-10-19 LAB
BUN/CREATININE RATIO: 33 (ref 0–10)
HGB BLD-MCNC: 10.6 GM/DL (ref 14–17.5)
RED BLOOD COUNT: 3.95 M/UL (ref 4.2–5.5)
WHITE BLOOD COUNT: 10 K/UL (ref 4.5–11)

## 2021-10-19 NOTE — NUR
PATIENT NOTED TO BE CONFUSED AND O2 SAT IS 72. HE AWAKENS TO STIMULUS BUT ISNT
ORIENTED AT ALL. NOTIFIED DR WATKINS WHO GAVE SOME ORDERS.

## 2021-10-20 LAB
BUN/CREATININE RATIO: 29 (ref 0–10)
BUN/CREATININE RATIO: 32 (ref 0–10)
HGB BLD-MCNC: 10.7 GM/DL (ref 14–17.5)
RED BLOOD COUNT: 3.98 M/UL (ref 4.2–5.5)
WHITE BLOOD COUNT: 11.9 K/UL (ref 4.5–11)

## 2021-10-20 NOTE — NUR
CALLED TO NOTIFY [facility] OF PTS AST/ALT/BILI INCREASING SIGNIFICANTLY. ALSO OF
PTS MENTAL STATUS CHANGE AND LOW OXYGEN. ORDERS OBTAINED.

## 2021-10-20 NOTE — NUR
10/20/21 1758 SPOKE WITH ROBERTO, PT DAUGHTER, UPDATED ON PT STATUS, OFF BIPAP
AND ON O2 VIA NC, EXPLAINED LABS DONE BY DR BOOTH AND CT HEAD ORDERED.
EXPLAINED PT REMAINS CALM AT THIS TIME, NO PULLING AT LINES ETC.

## 2021-10-20 NOTE — NUR
CALLED AMMONIA, MYOGLOBIN, AND REPEAT LIVER ENZYMES TO DR WATKINS. ORDERS FOR
REPEAT ABG STAT. UPDATED HIM THAT PT HAS LABORED BREATHING AND NOW ONLY
RESPONDS TO PAIN. VITALS ARE AS FOLLOWS. /57 HR 52 O2 95 4LNC.  NOTIFIED
RESPIRATORY OF NEW ORDER.

## 2021-10-20 NOTE — NUR
10/20/21 1610 YUAN HERE TO SEE PT, UPDATED ON PT STATUS, LABS, TESTING ETC
ORDERED PER MD. ALSO SPOKE TO DAUGHTER ROBERTO AND UPDATED ON PT STATUS.
EXPLAINED CONFUSION DURING THE LAST SHIFT, YUAN STATES THAT PATIENT "DOES
THIS WHEN HE IS IN THE HOSPITAL."

## 2021-10-21 LAB
BUN/CREATININE RATIO: 37 (ref 0–10)
HEP C VIRUS AB: <0.1 (ref 0–0.9)
HGB BLD-MCNC: 10.5 GM/DL (ref 14–17.5)
RED BLOOD COUNT: 3.89 M/UL (ref 4.2–5.5)
WHITE BLOOD COUNT: 12.8 K/UL (ref 4.5–11)

## 2021-10-21 PROCEDURE — 5A09357 ASSISTANCE WITH RESPIRATORY VENTILATION, LESS THAN 24 CONSECUTIVE HOURS, CONTINUOUS POSITIVE AIRWAY PRESSURE: ICD-10-PCS | Performed by: INTERNAL MEDICINE

## 2021-10-21 PROCEDURE — 5A0945A ASSISTANCE WITH RESPIRATORY VENTILATION, 24-96 CONSECUTIVE HOURS, HIGH NASAL FLOW/VELOCITY: ICD-10-PCS | Performed by: INTERNAL MEDICINE

## 2021-10-22 LAB
BUN/CREATININE RATIO: 34 (ref 0–10)
HGB BLD-MCNC: 9.9 GM/DL (ref 14–17.5)
RED BLOOD COUNT: 3.69 M/UL (ref 4.2–5.5)
WHITE BLOOD COUNT: 10.9 K/UL (ref 4.5–11)

## 2021-10-23 LAB — BUN/CREATININE RATIO: 41 (ref 0–10)

## 2021-10-24 LAB — BUN/CREATININE RATIO: 32 (ref 0–10)

## 2021-10-25 LAB — BUN/CREATININE RATIO: 27 (ref 0–10)

## 2021-10-26 LAB
BUN/CREATININE RATIO: 30 (ref 0–10)
HGB BLD-MCNC: 9.7 GM/DL (ref 14–17.5)
RED BLOOD COUNT: 3.66 M/UL (ref 4.2–5.5)
WHITE BLOOD COUNT: 8.4 K/UL (ref 4.5–11)

## 2021-10-27 LAB
BUN/CREATININE RATIO: 21 (ref 0–10)
HGB BLD-MCNC: 8.9 GM/DL (ref 14–17.5)
RED BLOOD COUNT: 3.38 M/UL (ref 4.2–5.5)
WHITE BLOOD COUNT: 6.7 K/UL (ref 4.5–11)

## 2021-10-28 LAB
BUN/CREATININE RATIO: 20 (ref 0–10)
HGB BLD-MCNC: 9.5 GM/DL (ref 14–17.5)
RED BLOOD COUNT: 3.64 M/UL (ref 4.2–5.5)
WHITE BLOOD COUNT: 6.6 K/UL (ref 4.5–11)

## 2021-10-29 PROCEDURE — 5A12012 PERFORMANCE OF CARDIAC OUTPUT, SINGLE, MANUAL: ICD-10-PCS | Performed by: INTERNAL MEDICINE

## 2021-10-29 NOTE — NUR
0530: DID EYE PREP ON PATIENT FOR JACINDA-3 DROPS OF NORMAL SALINE PLACED IN EACH
EYE. BOTH EYES COVERED WITH SALINE SOAKED GAUZE. ICE PLACED IN PT GROIN, UNDER
BOTH ARMS AND BEHIND NECK PER JACINDA. JACINDA NOTIFIED THAT PREP WAS COMPLETED.

## 2021-10-29 NOTE — NUR
0249: NOTIFIED BY TECH OF PT BEING UNRESPONSIVE. NO RESPIRATIONS, NO HEARTRATE
IDENTIFIED. CPR WAS INITIATED IMMEDIATELY AND CODE BLUE CALLED.
0250: CODE WAS INTIATED. SEE CODE SHEET.
0309: CPR STOPPED PER DR. CAIN WHO WAS ON PHONE WITH FAMILY WHO WISHES TO
STOP CPR.
0320: JACINDA AND FAMILY NOTIFIED.
FAMILY ARRIVIED SHORTLY AFTER, SEE DEATH CERTIFICATE FOR MORE INFO.
FAMILY CONSENTED TO JACINDA, AWAITING FURTHER INSTRUCTIONS FROM JACINDA.

## 2021-11-17 ENCOUNTER — TELEPHONE (OUTPATIENT)
Dept: CARDIOLOGY | Facility: CLINIC | Age: 71
End: 2021-11-17

## 2021-11-17 NOTE — TELEPHONE ENCOUNTER
I called patient to let him know that his home monitor didn't send in its scheduled reading. There was no answer and no way to leave a message.

## 2025-07-16 NOTE — ANESTHESIA POSTPROCEDURE EVALUATION
Case was discussed with internal medicine and the patient's admission status was agreed to be Admission Status: inpatient status to the service of Dr. Bueno   Patient: Derek Morris Jr.    Procedure Summary     Date:  09/26/18 Room / Location:   [facility] /  [facility]    Anesthesia Start:  1140 Anesthesia Stop:  1203    Procedures:       COLONOSCOPY (N/A )      EXAM UNDER ANESTHESIA (N/A ) Diagnosis:       Other emphysema (CMS/HCC)      Essential hypertension      Coronary artery disease of native artery of native heart with stable angina pectoris (CMS/HCC)      Type 2 diabetes mellitus without complication, unspecified long term insulin use status (CMS/HCC)      Cardiomyopathy, unspecified type (CMS/HCC)      (Other emphysema (CMS/HCC) [J43.8])      (Essential hypertension [I10])      (Coronary artery disease of native artery of native heart with stable angina pectoris (CMS/HCC) [I25.118])      (Type 2 diabetes mellitus without complication, unspecified long term insulin use status (CMS/HCC) [E11.9])      (Cardiomyopathy, unspecified type (CMS/HCC) [I42.9])    Surgeon:  Alfred Shah MD Provider:  Isauro Obrien MD    Anesthesia Type:  general ASA Status:  3          Anesthesia Type: general  Last vitals  BP   92/47 (09/26/18 1210)   Temp   97.1 °F (36.2 °C) (09/26/18 1207)   Pulse   73 (09/26/18 1210)   Resp   18 (09/26/18 1210)     SpO2   97 % (09/26/18 1210)     Post Anesthesia Care and Evaluation    Patient location during evaluation: bedside  Patient participation: complete - patient participated  Level of consciousness: awake and alert  Pain score: 1  Pain management: adequate  Airway patency: patent  Anesthetic complications: No anesthetic complications  PONV Status: none  Cardiovascular status: acceptable  Respiratory status: acceptable  Hydration status: acceptable

## (undated) DEVICE — MODEL AT P65, P/N 701554-001KIT CONTENTS: HAND CONTROLLER, 3-WAY HIGH-PRESSURE STOPCOCK WITH ROTATING END AND PREMIUM HIGH-PRESSURE TUBING: Brand: ANGIOTOUCH® KIT

## (undated) DEVICE — ENCORE® LATEX MICRO SIZE 7.5, STERILE LATEX POWDER-FREE SURGICAL GLOVE: Brand: ENCORE

## (undated) DEVICE — MYNXGRIP 6F/7F: Brand: MYNXGRIP

## (undated) DEVICE — CANN NASL CO2 DIVIDED A/

## (undated) DEVICE — SUCTION CANISTER, 1500CC, RIGID: Brand: DEROYAL

## (undated) DEVICE — GUIDE CATHETER: Brand: MACH1™

## (undated) DEVICE — VIPERTRACK, 50 PACK: Brand: VIPERTRACK

## (undated) DEVICE — DOME MONITORING W BONDED STPCK BIOTRANS2

## (undated) DEVICE — CATH DIAG EXPO M/ PK 5F FL4/FR4 PIG

## (undated) DEVICE — KT 2 CONTRST ADMIN

## (undated) DEVICE — LIMB HOLDER, WRIST/ANKLE: Brand: DEROYAL

## (undated) DEVICE — DRSNG SURESITE123 4X4.8IN

## (undated) DEVICE — ST INF PRI SMRTSTE 20DRP 2VLV 24ML 117

## (undated) DEVICE — CONN Y IRR DISP 1P/U

## (undated) DEVICE — TR BAND RADIAL ARTERY COMPRESSION DEVICE: Brand: TR BAND

## (undated) DEVICE — JELLY,LUBE,STERILE,FLIP TOP,TUBE,4-OZ: Brand: MEDLINE

## (undated) DEVICE — CATH DIAG EXPO .045 FL3.5 5F 100CM

## (undated) DEVICE — TUBING, SUCTION, 1/4" X 20', STRAIGHT: Brand: MEDLINE INDUSTRIES, INC.

## (undated) DEVICE — ADULT, W/LG. BACK PAD, RADIOTRANSPARENT ELEMENT AND LEAD WIRE: Brand: DEFIBRILLATION ELECTRODES

## (undated) DEVICE — GUIDELINER CATHETERS ARE INTENDED TO BE USED IN CONJUNCTION WITH GUIDE CATHETERS TO ACCESS DISCRETE REGIONS OF THE CORONARY AND/OR PERIPHERAL VASCULATURE, AND TO FACILITATE PLACEMENT OF INTERVENTIONAL DEVICES.: Brand: GUIDELINER® V3 CATHETER

## (undated) DEVICE — BALN EVERCROSS OTW .035 5F 5X80MM 135CM

## (undated) DEVICE — PK CATH CARD 10

## (undated) DEVICE — TRY SKINPREP PVP SCRB W PAINT

## (undated) DEVICE — KT MANIFLD EP

## (undated) DEVICE — PLASMABLADE PS210-030S 3.0S LOCK: Brand: PLASMABLADE™

## (undated) DEVICE — SUT PROLN 2/0 PC3 8833H

## (undated) DEVICE — TBG ABL COOL PT 2.6M 100042049

## (undated) DEVICE — Device

## (undated) DEVICE — Device: Brand: NAVISTAR RMT THERMOCOOL

## (undated) DEVICE — CATH DIAG EXPO .045 AL1 5F 100CM

## (undated) DEVICE — CATH TEMPO 5F PIG 65CM 5SH: Brand: TEMPO

## (undated) DEVICE — DECANT BG O JET

## (undated) DEVICE — DRSNG TELFA PAD NONADH STR 1S 3X8IN

## (undated) DEVICE — GUIDE CORNRY SIN JUMBO STD 9F 40CM

## (undated) DEVICE — GW CORNRY QUIKCAS

## (undated) DEVICE — INTRO SHEATH FAST/CATH LG/LUM 11F .038IN 12CM

## (undated) DEVICE — ENDOCUFF VISION PRP SM 10.4

## (undated) DEVICE — SKIN PREP TRAY W/CHG: Brand: MEDLINE INDUSTRIES, INC.

## (undated) DEVICE — HI-TORQUE PILOT 200 GUIDE WIRE .014 STRAIGHT TIP 3.0 CM X 190 CM: Brand: HI-TORQUE PILOT

## (undated) DEVICE — GLIDESHEATH SLENDER STAINLESS STEEL KIT: Brand: GLIDESHEATH SLENDER

## (undated) DEVICE — PINNACLE INTRODUCER SHEATH: Brand: PINNACLE

## (undated) DEVICE — RADIFOCUS TORQUE DEVICE MULTI-TORQUE VISE: Brand: RADIFOCUS TORQUE DEVICE

## (undated) DEVICE — SUT GUT CHRM 3/0 SH 27IN G122H

## (undated) DEVICE — INTRO SHEATH ART/FEM ENGAGE .038 6F12CM

## (undated) DEVICE — PACK,LITHOTOMY,PK III,SIRUS: Brand: MEDLINE

## (undated) DEVICE — SOL NACL 0.9PCT 1000ML

## (undated) DEVICE — KT VLV HEMO MAP ACC PLS LG/BORE MTL/INTRO W/TORQ/DEV

## (undated) DEVICE — GW TRNSEP SAFESEPT LT/ATRIAL RO 135CM .014IN

## (undated) DEVICE — GW EXCHG TSCF .035 260CM 3MM

## (undated) DEVICE — MINI TREK CORONARY DILATATION CATHETER 2.0 MM X 12 MM / RAPID-EXCHANGE: Brand: MINI TREK

## (undated) DEVICE — Device: Brand: MEDEX

## (undated) DEVICE — DRSNG PAD ABD 8X10IN STRL

## (undated) DEVICE — PROB S-CATH TEMP ESOPH 10F

## (undated) DEVICE — SINGLE PORT MANIFOLD: Brand: NEPTUNE 2

## (undated) DEVICE — MODEL BT2000 P/N 700287-012KIT CONTENTS: MANIFOLD WITH SALINE AND CONTRAST PORTS, SALINE TUBING WITH SPIKE AND HAND SYRINGE, TRANSDUCER: Brand: BT2000 AUTOMATED MANIFOLD KIT

## (undated) DEVICE — BALN EVERCROSS OTW .035 5F 4X80MM 135CM

## (undated) DEVICE — SHEATH STEER NAGARE .032 8.8F 91.4CM

## (undated) DEVICE — DEV INFL MONARCH 25W

## (undated) DEVICE — RADIFOCUS GLIDEWIRE: Brand: GLIDEWIRE

## (undated) DEVICE — CATH ULTRASND ECHOCARDIOGRAPHY ACUNAV

## (undated) DEVICE — LEX ELECTRO PHYSIOLOGY: Brand: MEDLINE INDUSTRIES, INC.

## (undated) DEVICE — STERILE (15.2 TAPERED TO 7.6 X 183CM) POLYETHYLENE ACCORDION-FOLDED COVER FOR USE WITH SIEMENS ACUNAV ULTRASOUND CATHETER FAMILY CONNECTOR: Brand: SWIFTLINK TRANSDUCER COVER

## (undated) DEVICE — CATH F5 INF AL I 100CM: Brand: INFINITI

## (undated) DEVICE — SET PRIMARY GRVTY 10DP MALE LL 104IN

## (undated) DEVICE — GW J TP FIX CORE .035 150

## (undated) DEVICE — RADIFOCUS GLIDEWIRE ADVANTAGE GUIDEWIRE: Brand: GLIDEWIRE ADVANTAGE

## (undated) DEVICE — PK BASIC 70

## (undated) DEVICE — DEV COMP RAD PRELUDESYNC 24CM

## (undated) DEVICE — SYS TRNSEP ACC BRK ACROSS A/ 18G 98CM

## (undated) DEVICE — CATH DIAG EXPO .045 FL3  5F 100CM

## (undated) DEVICE — RADIFOCUS GLIDECATH: Brand: GLIDECATH

## (undated) DEVICE — INTRO SHEATH ENGAGE W/50 GW .038 8F12

## (undated) DEVICE — DIAGNOSTIC ELECTRODE CATHETER: Brand: WOVEN

## (undated) DEVICE — Device: Brand: DEFENDO AIR/WATER/SUCTION AND BIOPSY VALVE

## (undated) DEVICE — SYR LUERLOK 30CC

## (undated) DEVICE — MEDI-VAC YANKAUER SUCTION HANDLE W/BULBOUS TIP: Brand: CARDINAL HEALTH

## (undated) DEVICE — INTRO LAT VEIN WORLEY ADV RENAL 5.5F 62CM

## (undated) DEVICE — ANGIO-SEAL EVOLUTION VASCULAR CLOSURE DEVICE: Brand: ANGIO-SEAL

## (undated) DEVICE — Device: Brand: ENDOGATOR

## (undated) DEVICE — ST EXT IV SMARTSITE 2VLV SP M LL 5ML IV1

## (undated) DEVICE — HI-TORQUE COMMAND ES GUIDE WIRE .014" 300 CM: Brand: HI-TORQUE COMMAND

## (undated) DEVICE — ST ACC MICROPUNCTURE .018 TRANSLSS/SS/TP 5F/10CM 21G/7CM

## (undated) DEVICE — HIGH RESOLUTION MAPPING CATHETER: Brand: INTELLAMAP ORION™

## (undated) DEVICE — HOLDER: Brand: DEROYAL

## (undated) DEVICE — GW FC FLOP/TP .035 260CM 3MM

## (undated) DEVICE — INTRO SHEATH PRELUDE IDEAL SPRNG COIL 021 6F 23X80CM

## (undated) DEVICE — CANNULA,ADULT,SOFT-TOUCH,7'TUBE,UC: Brand: PENDING

## (undated) DEVICE — MODEL AT P54, P/N 700608-035KIT CONTENTS: HAND CONTROLLER, 3-WAY HIGH-PRESSURE STOPCOCK WITH ROTATING END AND PREMIUM HIGH-PRESSURE TUBING: Brand: ANGIOTOUCH® KIT

## (undated) DEVICE — CAUTERY TIP POLISHER: Brand: DEVON

## (undated) DEVICE — LOCATION REFERENCE PATCH KIT: Brand: RHYTHMIA™ MAPPING SYSTEM

## (undated) DEVICE — Device: Brand: WEBSTER CS

## (undated) DEVICE — CATH IMG IVUS PIONEER PLS .014GW 6F 120CM

## (undated) DEVICE — PENCL ES MEGADINE EZ/CLEAN BUTN W/HOLSTR 10FT

## (undated) DEVICE — GW PRESS VERRATA STR 185CM

## (undated) DEVICE — CATH OMNI FLUSH 5FR

## (undated) DEVICE — HI-TORQUE SPARTACORE 14 PERIPHERAL GUIDE WIRE .014 5.0 CM X 190 CM: Brand: SPARTACORE

## (undated) DEVICE — GOWN,REINF,POLY,ECL,PP SLV,XL: Brand: MEDLINE

## (undated) DEVICE — TUBING, SUCTION, 1/4" X 10', STRAIGHT: Brand: MEDLINE

## (undated) DEVICE — INTRO SHEATH ENGAGE W/50 GW .038 9F12